# Patient Record
Sex: MALE | Race: WHITE | NOT HISPANIC OR LATINO | Employment: OTHER | URBAN - METROPOLITAN AREA
[De-identification: names, ages, dates, MRNs, and addresses within clinical notes are randomized per-mention and may not be internally consistent; named-entity substitution may affect disease eponyms.]

---

## 2017-01-01 ENCOUNTER — OUTPATIENT CASE MANAGEMENT (OUTPATIENT)
Dept: ADMINISTRATIVE | Facility: OTHER | Age: 67
End: 2017-01-01

## 2017-01-01 ENCOUNTER — TELEPHONE (OUTPATIENT)
Dept: ADMINISTRATIVE | Facility: CLINIC | Age: 67
End: 2017-01-01

## 2017-01-01 ENCOUNTER — TELEPHONE (OUTPATIENT)
Dept: HEPATOLOGY | Facility: CLINIC | Age: 67
End: 2017-01-01

## 2017-01-01 ENCOUNTER — HOSPITAL ENCOUNTER (OUTPATIENT)
Dept: INTERVENTIONAL RADIOLOGY/VASCULAR | Facility: HOSPITAL | Age: 67
Discharge: HOME | End: 2017-09-19
Attending: PHYSICIAN ASSISTANT
Payer: MEDICARE

## 2017-01-01 ENCOUNTER — HOSPITAL ENCOUNTER (INPATIENT)
Facility: HOSPITAL | Age: 67
LOS: 2 days | Discharge: HOME HEALTH | DRG: 641 | End: 2017-12-12
Attending: EMERGENCY MEDICINE | Admitting: HOSPITALIST
Payer: MEDICARE

## 2017-01-01 ENCOUNTER — HOSPITAL ENCOUNTER (OUTPATIENT)
Dept: INTERVENTIONAL RADIOLOGY/VASCULAR | Facility: HOSPITAL | Age: 67
Discharge: HOME | End: 2017-08-24
Attending: PHYSICIAN ASSISTANT

## 2017-01-01 ENCOUNTER — HOSPITAL ENCOUNTER (INPATIENT)
Facility: HOSPITAL | Age: 67
LOS: 2 days | Discharge: HOME HEALTH | DRG: 641 | End: 2017-11-15
Attending: EMERGENCY MEDICINE | Admitting: HOSPITALIST
Payer: MEDICARE

## 2017-01-01 ENCOUNTER — TELEPHONE (OUTPATIENT)
Dept: INTERVENTIONAL RADIOLOGY/VASCULAR | Facility: HOSPITAL | Age: 67
End: 2017-01-01

## 2017-01-01 ENCOUNTER — TELEPHONE (OUTPATIENT)
Dept: INTERNAL MEDICINE | Facility: CLINIC | Age: 67
End: 2017-01-01

## 2017-01-01 ENCOUNTER — OFFICE VISIT (OUTPATIENT)
Dept: HEPATOLOGY | Facility: CLINIC | Age: 67
End: 2017-01-01
Payer: MEDICARE

## 2017-01-01 ENCOUNTER — HOSPITAL ENCOUNTER (OUTPATIENT)
Dept: INTERVENTIONAL RADIOLOGY/VASCULAR | Facility: HOSPITAL | Age: 67
Discharge: HOME | End: 2017-08-10
Attending: PHYSICIAN ASSISTANT
Payer: MEDICARE

## 2017-01-01 ENCOUNTER — HOSPITAL ENCOUNTER (INPATIENT)
Facility: HOSPITAL | Age: 67
LOS: 2 days | Discharge: HOME | DRG: 432 | End: 2017-10-11
Attending: EMERGENCY MEDICINE | Admitting: INTERNAL MEDICINE
Payer: MEDICARE

## 2017-01-01 ENCOUNTER — TELEPHONE (OUTPATIENT)
Dept: HEPATOLOGY | Facility: HOSPITAL | Age: 67
End: 2017-01-01

## 2017-01-01 ENCOUNTER — NURSE TRIAGE (OUTPATIENT)
Dept: ADMINISTRATIVE | Facility: CLINIC | Age: 67
End: 2017-01-01

## 2017-01-01 ENCOUNTER — OFFICE VISIT (OUTPATIENT)
Dept: INTERNAL MEDICINE | Facility: CLINIC | Age: 67
End: 2017-01-01
Payer: MEDICARE

## 2017-01-01 ENCOUNTER — PATIENT OUTREACH (OUTPATIENT)
Dept: ADMINISTRATIVE | Facility: CLINIC | Age: 67
End: 2017-01-01

## 2017-01-01 ENCOUNTER — CLINICAL SUPPORT (OUTPATIENT)
Dept: CARDIOLOGY | Facility: CLINIC | Age: 67
End: 2017-01-01
Payer: MEDICARE

## 2017-01-01 ENCOUNTER — PROCEDURE VISIT (OUTPATIENT)
Dept: HEPATOLOGY | Facility: CLINIC | Age: 67
End: 2017-01-01
Attending: INTERNAL MEDICINE
Payer: MEDICARE

## 2017-01-01 ENCOUNTER — HOSPITAL ENCOUNTER (INPATIENT)
Facility: HOSPITAL | Age: 67
LOS: 3 days | Discharge: HOME HEALTH | DRG: 433 | End: 2017-08-07
Attending: EMERGENCY MEDICINE | Admitting: EMERGENCY MEDICINE
Payer: MEDICARE

## 2017-01-01 ENCOUNTER — HOSPITAL ENCOUNTER (INPATIENT)
Facility: HOSPITAL | Age: 67
LOS: 4 days | Discharge: HOME HEALTH | DRG: 371 | End: 2017-11-09
Attending: EMERGENCY MEDICINE | Admitting: HOSPITALIST
Payer: MEDICARE

## 2017-01-01 ENCOUNTER — LAB VISIT (OUTPATIENT)
Dept: LAB | Facility: HOSPITAL | Age: 67
End: 2017-01-01
Attending: INTERNAL MEDICINE
Payer: MEDICARE

## 2017-01-01 ENCOUNTER — LAB VISIT (OUTPATIENT)
Dept: LAB | Facility: HOSPITAL | Age: 67
End: 2017-01-01
Attending: FAMILY MEDICINE
Payer: MEDICARE

## 2017-01-01 ENCOUNTER — ANESTHESIA (OUTPATIENT)
Dept: EMERGENCY MEDICINE | Facility: HOSPITAL | Age: 67
DRG: 871 | End: 2017-01-01
Payer: MEDICARE

## 2017-01-01 ENCOUNTER — HOSPITAL ENCOUNTER (OUTPATIENT)
Facility: HOSPITAL | Age: 67
Discharge: HOME | End: 2017-10-03
Attending: EMERGENCY MEDICINE | Admitting: INTERNAL MEDICINE
Payer: MEDICARE

## 2017-01-01 ENCOUNTER — HOSPITAL ENCOUNTER (EMERGENCY)
Facility: HOSPITAL | Age: 67
Discharge: HOME | End: 2017-07-19
Attending: EMERGENCY MEDICINE
Payer: MEDICARE

## 2017-01-01 ENCOUNTER — HOSPITAL ENCOUNTER (INPATIENT)
Facility: HOSPITAL | Age: 67
LOS: 1 days | Discharge: LEFT BEFORE DISCHARGE | DRG: 434 | End: 2017-12-03
Attending: EMERGENCY MEDICINE | Admitting: HOSPITALIST
Payer: MEDICARE

## 2017-01-01 ENCOUNTER — HOSPITAL ENCOUNTER (OUTPATIENT)
Dept: RADIOLOGY | Facility: HOSPITAL | Age: 67
Discharge: HOME | End: 2017-08-10
Attending: INTERNAL MEDICINE
Payer: MEDICARE

## 2017-01-01 ENCOUNTER — HOSPITAL ENCOUNTER (EMERGENCY)
Facility: HOSPITAL | Age: 67
Discharge: HOME | End: 2017-11-01
Attending: EMERGENCY MEDICINE
Payer: MEDICARE

## 2017-01-01 ENCOUNTER — HOSPITAL ENCOUNTER (OUTPATIENT)
Dept: INTERVENTIONAL RADIOLOGY/VASCULAR | Facility: HOSPITAL | Age: 67
Discharge: HOME | End: 2017-10-23
Attending: INTERNAL MEDICINE

## 2017-01-01 ENCOUNTER — HOSPITAL ENCOUNTER (INPATIENT)
Facility: HOSPITAL | Age: 67
LOS: 4 days | Discharge: HOME HEALTH | DRG: 432 | End: 2017-10-19
Attending: EMERGENCY MEDICINE | Admitting: INTERNAL MEDICINE
Payer: MEDICARE

## 2017-01-01 ENCOUNTER — HOSPITAL ENCOUNTER (OUTPATIENT)
Dept: INTERVENTIONAL RADIOLOGY/VASCULAR | Facility: HOSPITAL | Age: 67
Discharge: HOME | End: 2017-08-31
Attending: PHYSICIAN ASSISTANT

## 2017-01-01 ENCOUNTER — ANESTHESIA EVENT (OUTPATIENT)
Dept: EMERGENCY MEDICINE | Facility: HOSPITAL | Age: 67
DRG: 871 | End: 2017-01-01
Payer: MEDICARE

## 2017-01-01 ENCOUNTER — HOSPITAL ENCOUNTER (OUTPATIENT)
Dept: INTERVENTIONAL RADIOLOGY/VASCULAR | Facility: HOSPITAL | Age: 67
Discharge: HOME | DRG: 371 | End: 2017-11-02
Attending: INTERNAL MEDICINE
Payer: MEDICARE

## 2017-01-01 ENCOUNTER — OFFICE VISIT (OUTPATIENT)
Dept: INTERNAL MEDICINE | Facility: CLINIC | Age: 67
DRG: 432 | End: 2017-01-01
Payer: MEDICARE

## 2017-01-01 ENCOUNTER — HOSPITAL ENCOUNTER (INPATIENT)
Facility: HOSPITAL | Age: 67
LOS: 1 days | DRG: 871 | End: 2017-12-20
Attending: EMERGENCY MEDICINE | Admitting: HOSPITALIST
Payer: MEDICARE

## 2017-01-01 VITALS
SYSTOLIC BLOOD PRESSURE: 130 MMHG | RESPIRATION RATE: 16 BRPM | DIASTOLIC BLOOD PRESSURE: 63 MMHG | OXYGEN SATURATION: 100 % | HEART RATE: 76 BPM

## 2017-01-01 VITALS
DIASTOLIC BLOOD PRESSURE: 61 MMHG | BODY MASS INDEX: 26.55 KG/M2 | RESPIRATION RATE: 20 BRPM | HEART RATE: 79 BPM | WEIGHT: 185.44 LBS | TEMPERATURE: 99 F | OXYGEN SATURATION: 94 % | SYSTOLIC BLOOD PRESSURE: 98 MMHG | HEIGHT: 70 IN

## 2017-01-01 VITALS
OXYGEN SATURATION: 98 % | SYSTOLIC BLOOD PRESSURE: 133 MMHG | HEIGHT: 67 IN | RESPIRATION RATE: 17 BRPM | BODY MASS INDEX: 19.96 KG/M2 | DIASTOLIC BLOOD PRESSURE: 87 MMHG | TEMPERATURE: 98 F | HEART RATE: 97 BPM | WEIGHT: 127.19 LBS

## 2017-01-01 VITALS
BODY MASS INDEX: 26.02 KG/M2 | SYSTOLIC BLOOD PRESSURE: 119 MMHG | DIASTOLIC BLOOD PRESSURE: 66 MMHG | TEMPERATURE: 98 F | RESPIRATION RATE: 18 BRPM | HEIGHT: 71 IN | OXYGEN SATURATION: 84 % | WEIGHT: 185.88 LBS | HEART RATE: 104 BPM

## 2017-01-01 VITALS
BODY MASS INDEX: 26.54 KG/M2 | TEMPERATURE: 98 F | DIASTOLIC BLOOD PRESSURE: 74 MMHG | SYSTOLIC BLOOD PRESSURE: 117 MMHG | HEIGHT: 70 IN | OXYGEN SATURATION: 98 % | HEART RATE: 80 BPM | RESPIRATION RATE: 18 BRPM | WEIGHT: 185.38 LBS

## 2017-01-01 VITALS
HEIGHT: 71 IN | DIASTOLIC BLOOD PRESSURE: 57 MMHG | HEART RATE: 80 BPM | OXYGEN SATURATION: 95 % | WEIGHT: 179 LBS | SYSTOLIC BLOOD PRESSURE: 129 MMHG | TEMPERATURE: 98 F | BODY MASS INDEX: 25.06 KG/M2 | RESPIRATION RATE: 18 BRPM

## 2017-01-01 VITALS
DIASTOLIC BLOOD PRESSURE: 86 MMHG | OXYGEN SATURATION: 99 % | SYSTOLIC BLOOD PRESSURE: 132 MMHG | HEIGHT: 71 IN | WEIGHT: 179.25 LBS | HEART RATE: 95 BPM | BODY MASS INDEX: 25.1 KG/M2

## 2017-01-01 VITALS
WEIGHT: 165.44 LBS | SYSTOLIC BLOOD PRESSURE: 115 MMHG | DIASTOLIC BLOOD PRESSURE: 68 MMHG | RESPIRATION RATE: 16 BRPM | TEMPERATURE: 98 F | HEIGHT: 70 IN | HEART RATE: 70 BPM | BODY MASS INDEX: 23.68 KG/M2 | OXYGEN SATURATION: 96 %

## 2017-01-01 VITALS
DIASTOLIC BLOOD PRESSURE: 89 MMHG | BODY MASS INDEX: 24.66 KG/M2 | OXYGEN SATURATION: 100 % | WEIGHT: 176.13 LBS | HEIGHT: 71 IN | SYSTOLIC BLOOD PRESSURE: 147 MMHG | HEART RATE: 94 BPM | TEMPERATURE: 97 F | RESPIRATION RATE: 18 BRPM

## 2017-01-01 VITALS
OXYGEN SATURATION: 97 % | RESPIRATION RATE: 20 BRPM | HEIGHT: 70 IN | HEART RATE: 90 BPM | BODY MASS INDEX: 26.48 KG/M2 | DIASTOLIC BLOOD PRESSURE: 74 MMHG | SYSTOLIC BLOOD PRESSURE: 151 MMHG | TEMPERATURE: 99 F | WEIGHT: 185 LBS

## 2017-01-01 VITALS
TEMPERATURE: 98 F | HEIGHT: 70 IN | BODY MASS INDEX: 25 KG/M2 | HEART RATE: 97 BPM | SYSTOLIC BLOOD PRESSURE: 124 MMHG | WEIGHT: 174.63 LBS | OXYGEN SATURATION: 97 % | DIASTOLIC BLOOD PRESSURE: 82 MMHG

## 2017-01-01 VITALS
RESPIRATION RATE: 16 BRPM | DIASTOLIC BLOOD PRESSURE: 75 MMHG | OXYGEN SATURATION: 100 % | SYSTOLIC BLOOD PRESSURE: 150 MMHG | HEART RATE: 80 BPM

## 2017-01-01 VITALS
WEIGHT: 182.75 LBS | TEMPERATURE: 97 F | BODY MASS INDEX: 27.7 KG/M2 | RESPIRATION RATE: 16 BRPM | HEART RATE: 76 BPM | SYSTOLIC BLOOD PRESSURE: 110 MMHG | HEIGHT: 68 IN | OXYGEN SATURATION: 100 % | DIASTOLIC BLOOD PRESSURE: 63 MMHG

## 2017-01-01 VITALS
DIASTOLIC BLOOD PRESSURE: 72 MMHG | RESPIRATION RATE: 16 BRPM | WEIGHT: 161.38 LBS | SYSTOLIC BLOOD PRESSURE: 111 MMHG | HEART RATE: 76 BPM | HEIGHT: 71 IN | TEMPERATURE: 99 F | OXYGEN SATURATION: 95 % | BODY MASS INDEX: 22.59 KG/M2

## 2017-01-01 VITALS
SYSTOLIC BLOOD PRESSURE: 148 MMHG | RESPIRATION RATE: 16 BRPM | HEIGHT: 71 IN | HEART RATE: 86 BPM | WEIGHT: 170 LBS | OXYGEN SATURATION: 95 % | TEMPERATURE: 98 F | BODY MASS INDEX: 23.8 KG/M2 | DIASTOLIC BLOOD PRESSURE: 87 MMHG

## 2017-01-01 VITALS
HEART RATE: 102 BPM | DIASTOLIC BLOOD PRESSURE: 88 MMHG | OXYGEN SATURATION: 94 % | TEMPERATURE: 99 F | HEIGHT: 70 IN | WEIGHT: 175.06 LBS | SYSTOLIC BLOOD PRESSURE: 134 MMHG | BODY MASS INDEX: 25.06 KG/M2 | RESPIRATION RATE: 20 BRPM

## 2017-01-01 VITALS
DIASTOLIC BLOOD PRESSURE: 65 MMHG | BODY MASS INDEX: 23.4 KG/M2 | WEIGHT: 195.56 LBS | TEMPERATURE: 98 F | WEIGHT: 167.13 LBS | HEIGHT: 71 IN | SYSTOLIC BLOOD PRESSURE: 153 MMHG | BODY MASS INDEX: 28 KG/M2 | OXYGEN SATURATION: 96 % | RESPIRATION RATE: 16 BRPM | HEART RATE: 86 BPM | HEIGHT: 70 IN | HEART RATE: 77 BPM | TEMPERATURE: 99 F | DIASTOLIC BLOOD PRESSURE: 76 MMHG | SYSTOLIC BLOOD PRESSURE: 121 MMHG

## 2017-01-01 VITALS
TEMPERATURE: 98 F | HEIGHT: 71 IN | HEART RATE: 76 BPM | WEIGHT: 201.75 LBS | SYSTOLIC BLOOD PRESSURE: 104 MMHG | DIASTOLIC BLOOD PRESSURE: 60 MMHG | BODY MASS INDEX: 28.24 KG/M2

## 2017-01-01 VITALS
DIASTOLIC BLOOD PRESSURE: 78 MMHG | TEMPERATURE: 99 F | RESPIRATION RATE: 16 BRPM | BODY MASS INDEX: 25.06 KG/M2 | HEIGHT: 70 IN | WEIGHT: 175.06 LBS | HEART RATE: 97 BPM | SYSTOLIC BLOOD PRESSURE: 114 MMHG

## 2017-01-01 VITALS
RESPIRATION RATE: 16 BRPM | OXYGEN SATURATION: 100 % | HEART RATE: 88 BPM | DIASTOLIC BLOOD PRESSURE: 94 MMHG | SYSTOLIC BLOOD PRESSURE: 140 MMHG

## 2017-01-01 DIAGNOSIS — I10 ESSENTIAL HYPERTENSION: Chronic | ICD-10-CM

## 2017-01-01 DIAGNOSIS — J94.8 HYDROTHORAX: ICD-10-CM

## 2017-01-01 DIAGNOSIS — R19.07 ABDOMINAL SWELLING, GENERALIZED: ICD-10-CM

## 2017-01-01 DIAGNOSIS — R53.81 DEBILITY: ICD-10-CM

## 2017-01-01 DIAGNOSIS — K70.11 ASCITES DUE TO ALCOHOLIC HEPATITIS: ICD-10-CM

## 2017-01-01 DIAGNOSIS — K74.69 OTHER CIRRHOSIS OF LIVER: ICD-10-CM

## 2017-01-01 DIAGNOSIS — K70.31 ASCITES DUE TO ALCOHOLIC CIRRHOSIS: Primary | ICD-10-CM

## 2017-01-01 DIAGNOSIS — B18.2 HEP C W/O COMA, CHRONIC: Primary | Chronic | ICD-10-CM

## 2017-01-01 DIAGNOSIS — R18.8 OTHER ASCITES: Primary | ICD-10-CM

## 2017-01-01 DIAGNOSIS — R06.00 DYSPNEA: ICD-10-CM
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
## 2017-01-01 DIAGNOSIS — K85.90 ACUTE PANCREATITIS, UNSPECIFIED COMPLICATION STATUS, UNSPECIFIED PANCREATITIS TYPE: ICD-10-CM

## 2017-01-01 DIAGNOSIS — K70.11 ASCITES DUE TO ALCOHOLIC HEPATITIS: Primary | ICD-10-CM

## 2017-01-01 DIAGNOSIS — D64.9 ANEMIA, UNSPECIFIED TYPE: ICD-10-CM

## 2017-01-01 DIAGNOSIS — F10.11 HISTORY OF ALCOHOL ABUSE: ICD-10-CM

## 2017-01-01 DIAGNOSIS — Z51.5 PALLIATIVE CARE ENCOUNTER: ICD-10-CM

## 2017-01-01 DIAGNOSIS — R06.02 SHORTNESS OF BREATH: ICD-10-CM

## 2017-01-01 DIAGNOSIS — K92.0 GASTROINTESTINAL HEMORRHAGE WITH HEMATEMESIS: ICD-10-CM

## 2017-01-01 DIAGNOSIS — B18.2 CHRONIC HEPATITIS C WITHOUT HEPATIC COMA: ICD-10-CM

## 2017-01-01 DIAGNOSIS — K74.60 DECOMPENSATED HEPATIC CIRRHOSIS: Chronic | ICD-10-CM

## 2017-01-01 DIAGNOSIS — R73.09 ELEVATED GLUCOSE: ICD-10-CM

## 2017-01-01 DIAGNOSIS — K72.90 DECOMPENSATED HEPATIC CIRRHOSIS: Primary | ICD-10-CM

## 2017-01-01 DIAGNOSIS — K74.60 DECOMPENSATED HEPATIC CIRRHOSIS: Primary | ICD-10-CM

## 2017-01-01 DIAGNOSIS — R52 PAIN: ICD-10-CM

## 2017-01-01 DIAGNOSIS — M79.671 PAIN IN BOTH FEET: Primary | ICD-10-CM

## 2017-01-01 DIAGNOSIS — R10.9 CHRONIC ABDOMINAL PAIN: Primary | ICD-10-CM

## 2017-01-01 DIAGNOSIS — K65.2 SPONTANEOUS BACTERIAL PERITONITIS: Chronic | ICD-10-CM

## 2017-01-01 DIAGNOSIS — R53.81 PHYSICAL DECONDITIONING: ICD-10-CM

## 2017-01-01 DIAGNOSIS — F10.21 ALCOHOLISM IN REMISSION: ICD-10-CM

## 2017-01-01 DIAGNOSIS — K70.31 ASCITES DUE TO ALCOHOLIC CIRRHOSIS: ICD-10-CM

## 2017-01-01 DIAGNOSIS — F10.21 ALCOHOLISM IN REMISSION: Chronic | ICD-10-CM

## 2017-01-01 DIAGNOSIS — I35.9 NONRHEUMATIC AORTIC VALVE DISORDER: ICD-10-CM

## 2017-01-01 DIAGNOSIS — J91.8 FLUID IN PLEURAL CAVITY ASSOCIATED WITH LIVER DISEASE: ICD-10-CM

## 2017-01-01 DIAGNOSIS — M79.672 PAIN IN BOTH FEET: Primary | ICD-10-CM

## 2017-01-01 DIAGNOSIS — Z71.89 GOALS OF CARE, COUNSELING/DISCUSSION: ICD-10-CM

## 2017-01-01 DIAGNOSIS — R18.8 OTHER ASCITES: ICD-10-CM

## 2017-01-01 DIAGNOSIS — B18.2 CHRONIC HEPATITIS C WITHOUT HEPATIC COMA: Primary | ICD-10-CM

## 2017-01-01 DIAGNOSIS — K72.90 DECOMPENSATED HEPATIC CIRRHOSIS: ICD-10-CM

## 2017-01-01 DIAGNOSIS — F10.21 ALCOHOLISM IN REMISSION: Primary | Chronic | ICD-10-CM

## 2017-01-01 DIAGNOSIS — K65.2 SPONTANEOUS BACTERIAL PERITONITIS: ICD-10-CM

## 2017-01-01 DIAGNOSIS — E87.5 HYPERKALEMIA: Primary | ICD-10-CM

## 2017-01-01 DIAGNOSIS — N18.2 TYPE 2 DIABETES MELLITUS WITH STAGE 2 CHRONIC KIDNEY DISEASE AND HYPERTENSION: ICD-10-CM

## 2017-01-01 DIAGNOSIS — B18.2 HEP C W/O COMA, CHRONIC: ICD-10-CM

## 2017-01-01 DIAGNOSIS — Z91.81 FALLS INFREQUENTLY: ICD-10-CM

## 2017-01-01 DIAGNOSIS — E11.9 DM2 (DIABETES MELLITUS, TYPE 2): ICD-10-CM

## 2017-01-01 DIAGNOSIS — B18.2 HEP C W/O COMA, CHRONIC: Chronic | ICD-10-CM

## 2017-01-01 DIAGNOSIS — K72.91 HEPATIC COMA/ENCEPHALOPATHY: ICD-10-CM

## 2017-01-01 DIAGNOSIS — Z12.5 PROSTATE CANCER SCREENING: ICD-10-CM

## 2017-01-01 DIAGNOSIS — E46 PROTEIN-CALORIE MALNUTRITION: ICD-10-CM

## 2017-01-01 DIAGNOSIS — B18.2 HEP C W/O COMA, CHRONIC: Primary | ICD-10-CM

## 2017-01-01 DIAGNOSIS — E11.65 TYPE 2 DIABETES MELLITUS WITH HYPERGLYCEMIA, WITHOUT LONG-TERM CURRENT USE OF INSULIN: ICD-10-CM

## 2017-01-01 DIAGNOSIS — K74.60 DECOMPENSATED HEPATIC CIRRHOSIS: ICD-10-CM

## 2017-01-01 DIAGNOSIS — K76.9 FLUID IN PLEURAL CAVITY ASSOCIATED WITH LIVER DISEASE: ICD-10-CM

## 2017-01-01 DIAGNOSIS — R10.9 ABDOMINAL PAIN, UNSPECIFIED ABDOMINAL LOCATION: ICD-10-CM

## 2017-01-01 DIAGNOSIS — E87.5 ACUTE HYPERKALEMIA: ICD-10-CM

## 2017-01-01 DIAGNOSIS — K72.90 DECOMPENSATED HEPATIC CIRRHOSIS: Chronic | ICD-10-CM

## 2017-01-01 DIAGNOSIS — R06.02 SOB (SHORTNESS OF BREATH): ICD-10-CM

## 2017-01-01 DIAGNOSIS — R10.84 GENERALIZED ABDOMINAL PAIN: ICD-10-CM

## 2017-01-01 DIAGNOSIS — E87.5 HYPERKALEMIA: ICD-10-CM

## 2017-01-01 DIAGNOSIS — R06.03 RESPIRATORY DISTRESS: ICD-10-CM

## 2017-01-01 DIAGNOSIS — R05.9 COUGH: ICD-10-CM

## 2017-01-01 DIAGNOSIS — R80.9 PROTEINURIA, UNSPECIFIED TYPE: ICD-10-CM

## 2017-01-01 DIAGNOSIS — K70.31 ALCOHOLIC CIRRHOSIS OF LIVER WITH ASCITES: ICD-10-CM

## 2017-01-01 DIAGNOSIS — R29.898 WEAKNESS OF BOTH LOWER EXTREMITIES: ICD-10-CM

## 2017-01-01 DIAGNOSIS — D51.9 ANEMIA DUE TO VITAMIN B12 DEFICIENCY, UNSPECIFIED B12 DEFICIENCY TYPE: ICD-10-CM

## 2017-01-01 DIAGNOSIS — E11.9 TYPE 2 DIABETES MELLITUS WITHOUT COMPLICATION: ICD-10-CM

## 2017-01-01 DIAGNOSIS — G89.29 CHRONIC ABDOMINAL PAIN: Primary | ICD-10-CM

## 2017-01-01 DIAGNOSIS — Z01.818 ENCOUNTER FOR INTUBATION: ICD-10-CM

## 2017-01-01 DIAGNOSIS — E11.22 TYPE 2 DIABETES MELLITUS WITH STAGE 2 CHRONIC KIDNEY DISEASE AND HYPERTENSION: ICD-10-CM

## 2017-01-01 DIAGNOSIS — R60.0 EDEMA, PERIPHERAL: Primary | ICD-10-CM

## 2017-01-01 DIAGNOSIS — I89.8 CHYLOUS ASCITES: ICD-10-CM

## 2017-01-01 DIAGNOSIS — K70.31 ALCOHOLIC CIRRHOSIS OF LIVER WITH ASCITES: Primary | ICD-10-CM

## 2017-01-01 DIAGNOSIS — I10 ESSENTIAL HYPERTENSION: ICD-10-CM

## 2017-01-01 DIAGNOSIS — N17.9 AKI (ACUTE KIDNEY INJURY): ICD-10-CM

## 2017-01-01 DIAGNOSIS — K85.90 ACUTE PANCREATITIS, UNSPECIFIED COMPLICATION STATUS, UNSPECIFIED PANCREATITIS TYPE: Primary | ICD-10-CM

## 2017-01-01 DIAGNOSIS — K92.0 HEMATEMESIS, PRESENCE OF NAUSEA NOT SPECIFIED: ICD-10-CM

## 2017-01-01 DIAGNOSIS — I12.9 TYPE 2 DIABETES MELLITUS WITH STAGE 2 CHRONIC KIDNEY DISEASE AND HYPERTENSION: ICD-10-CM

## 2017-01-01 LAB
ABO + RH BLD: NORMAL
ACID FAST MOD KINY STN SPEC: NORMAL
AFP SERPL-MCNC: 1 NG/ML
AFP SERPL-MCNC: 1 NG/ML
ALBUMIN FLD-MCNC: 0.6 G/DL
ALBUMIN FLD-MCNC: 0.7 G/DL
ALBUMIN FLD-MCNC: 0.8 G/DL
ALBUMIN FLD-MCNC: 0.8 G/DL
ALBUMIN FLD-MCNC: 1 G/DL
ALBUMIN FLD-MCNC: 1 G/DL
ALBUMIN FLD-MCNC: 1.1 G/DL
ALBUMIN FLD-MCNC: 1.2 G/DL
ALBUMIN SERPL BCP-MCNC: 1.6 G/DL
ALBUMIN SERPL BCP-MCNC: 1.9 G/DL
ALBUMIN SERPL BCP-MCNC: 2.1 G/DL
ALBUMIN SERPL BCP-MCNC: 2.1 G/DL
ALBUMIN SERPL BCP-MCNC: 2.2 G/DL
ALBUMIN SERPL BCP-MCNC: 2.3 G/DL
ALBUMIN SERPL BCP-MCNC: 2.4 G/DL
ALBUMIN SERPL BCP-MCNC: 2.5 G/DL
ALBUMIN SERPL BCP-MCNC: 2.6 G/DL
ALBUMIN SERPL BCP-MCNC: 2.7 G/DL
ALBUMIN SERPL BCP-MCNC: 2.8 G/DL
ALBUMIN SERPL BCP-MCNC: 2.9 G/DL
ALBUMIN SERPL BCP-MCNC: 2.9 G/DL
ALBUMIN SERPL BCP-MCNC: 3 G/DL
ALBUMIN SERPL BCP-MCNC: 3.1 G/DL
ALBUMIN SERPL BCP-MCNC: 3.2 G/DL
ALBUMIN SERPL BCP-MCNC: 3.2 G/DL
ALLENS TEST: ABNORMAL
ALP SERPL-CCNC: 100 U/L
ALP SERPL-CCNC: 102 U/L
ALP SERPL-CCNC: 103 U/L
ALP SERPL-CCNC: 105 U/L
ALP SERPL-CCNC: 107 U/L
ALP SERPL-CCNC: 107 U/L
ALP SERPL-CCNC: 108 U/L
ALP SERPL-CCNC: 108 U/L
ALP SERPL-CCNC: 111 U/L
ALP SERPL-CCNC: 118 U/L
ALP SERPL-CCNC: 125 U/L
ALP SERPL-CCNC: 132 U/L
ALP SERPL-CCNC: 142 U/L
ALP SERPL-CCNC: 150 U/L
ALP SERPL-CCNC: 161 U/L
ALP SERPL-CCNC: 174 U/L
ALP SERPL-CCNC: 178 U/L
ALP SERPL-CCNC: 186 U/L
ALP SERPL-CCNC: 200 U/L
ALP SERPL-CCNC: 203 U/L
ALP SERPL-CCNC: 208 U/L
ALP SERPL-CCNC: 210 U/L
ALP SERPL-CCNC: 217 U/L
ALP SERPL-CCNC: 220 U/L
ALP SERPL-CCNC: 230 U/L
ALP SERPL-CCNC: 233 U/L
ALP SERPL-CCNC: 352 U/L
ALP SERPL-CCNC: 68 U/L
ALP SERPL-CCNC: 70 U/L
ALP SERPL-CCNC: 73 U/L
ALP SERPL-CCNC: 83 U/L
ALP SERPL-CCNC: 89 U/L
ALP SERPL-CCNC: 92 U/L
ALP SERPL-CCNC: 93 U/L
ALP SERPL-CCNC: 94 U/L
ALT SERPL W/O P-5'-P-CCNC: 13 U/L
ALT SERPL W/O P-5'-P-CCNC: 15 U/L
ALT SERPL W/O P-5'-P-CCNC: 15 U/L
ALT SERPL W/O P-5'-P-CCNC: 17 U/L
ALT SERPL W/O P-5'-P-CCNC: 17 U/L
ALT SERPL W/O P-5'-P-CCNC: 18 U/L
ALT SERPL W/O P-5'-P-CCNC: 18 U/L
ALT SERPL W/O P-5'-P-CCNC: 19 U/L
ALT SERPL W/O P-5'-P-CCNC: 20 U/L
ALT SERPL W/O P-5'-P-CCNC: 23 U/L
ALT SERPL W/O P-5'-P-CCNC: 24 U/L
ALT SERPL W/O P-5'-P-CCNC: 24 U/L
ALT SERPL W/O P-5'-P-CCNC: 25 U/L
ALT SERPL W/O P-5'-P-CCNC: 25 U/L
ALT SERPL W/O P-5'-P-CCNC: 26 U/L
ALT SERPL W/O P-5'-P-CCNC: 34 U/L
ALT SERPL W/O P-5'-P-CCNC: 39 U/L
ALT SERPL W/O P-5'-P-CCNC: 40 U/L
ALT SERPL W/O P-5'-P-CCNC: 43 U/L
ALT SERPL W/O P-5'-P-CCNC: 45 U/L
ALT SERPL W/O P-5'-P-CCNC: 47 U/L
ALT SERPL W/O P-5'-P-CCNC: 49 U/L
ALT SERPL W/O P-5'-P-CCNC: 52 U/L
ALT SERPL W/O P-5'-P-CCNC: 52 U/L
ALT SERPL W/O P-5'-P-CCNC: 56 U/L
AMMONIA PLAS-SCNC: 100 UMOL/L
AMMONIA PLAS-SCNC: 103 UMOL/L
AMMONIA PLAS-SCNC: 104 UMOL/L
AMMONIA PLAS-SCNC: 32 UMOL/L
AMMONIA PLAS-SCNC: 66 UMOL/L
AMMONIA PLAS-SCNC: 92 UMOL/L
AMMONIA PLAS-SCNC: 96 UMOL/L
AMPHET+METHAMPHET UR QL: NEGATIVE
AMYLASE, BODY FLUID: 60 U/L
ANION GAP SERPL CALC-SCNC: 10 MMOL/L
ANION GAP SERPL CALC-SCNC: 13 MMOL/L
ANION GAP SERPL CALC-SCNC: 5 MMOL/L
ANION GAP SERPL CALC-SCNC: 6 MMOL/L
ANION GAP SERPL CALC-SCNC: 7 MMOL/L
ANION GAP SERPL CALC-SCNC: 8 MMOL/L
ANION GAP SERPL CALC-SCNC: 9 MMOL/L
ANISOCYTOSIS BLD QL SMEAR: SLIGHT
AORTIC VALVE REGURGITATION: ABNORMAL
AORTIC VALVE REGURGITATION: NORMAL
APPEARANCE FLD: ABNORMAL
APPEARANCE FLD: ABNORMAL
APPEARANCE FLD: NORMAL
APTT BLDCRRT: 22.5 SEC
APTT BLDCRRT: 22.9 SEC
APTT BLDCRRT: 23.2 SEC
APTT BLDCRRT: 24 SEC
AST SERPL-CCNC: 16 U/L
AST SERPL-CCNC: 16 U/L
AST SERPL-CCNC: 17 U/L
AST SERPL-CCNC: 17 U/L
AST SERPL-CCNC: 20 U/L
AST SERPL-CCNC: 21 U/L
AST SERPL-CCNC: 21 U/L
AST SERPL-CCNC: 22 U/L
AST SERPL-CCNC: 23 U/L
AST SERPL-CCNC: 24 U/L
AST SERPL-CCNC: 24 U/L
AST SERPL-CCNC: 26 U/L
AST SERPL-CCNC: 26 U/L
AST SERPL-CCNC: 30 U/L
AST SERPL-CCNC: 30 U/L
AST SERPL-CCNC: 31 U/L
AST SERPL-CCNC: 31 U/L
AST SERPL-CCNC: 32 U/L
AST SERPL-CCNC: 32 U/L
AST SERPL-CCNC: 33 U/L
AST SERPL-CCNC: 36 U/L
AST SERPL-CCNC: 39 U/L
AST SERPL-CCNC: 41 U/L
AST SERPL-CCNC: 42 U/L
AST SERPL-CCNC: 50 U/L
AST SERPL-CCNC: 52 U/L
AST SERPL-CCNC: 52 U/L
AST SERPL-CCNC: 56 U/L
AST SERPL-CCNC: 56 U/L
BACTERIA #/AREA URNS AUTO: ABNORMAL /HPF
BACTERIA #/AREA URNS AUTO: NORMAL /HPF
BACTERIA #/AREA URNS AUTO: NORMAL /HPF
BACTERIA BLD CULT: NORMAL
BACTERIA FLD AEROBE CULT: NO GROWTH
BACTERIA FLD AEROBE CULT: NO GROWTH
BACTERIA FLD CULT: NORMAL
BACTERIA FLD CULT: NORMAL
BACTERIA SPEC AEROBE CULT: NO GROWTH
BACTERIA SPEC ANAEROBE CULT: NORMAL
BARBITURATES UR QL SCN>200 NG/ML: NEGATIVE
BASOPHILS # BLD AUTO: 0.01 K/UL
BASOPHILS # BLD AUTO: 0.02 K/UL
BASOPHILS # BLD AUTO: 0.03 K/UL
BASOPHILS # BLD AUTO: 0.04 K/UL
BASOPHILS # BLD AUTO: 0.05 K/UL
BASOPHILS # BLD AUTO: 0.05 K/UL
BASOPHILS NFR BLD: 0.2 %
BASOPHILS NFR BLD: 0.3 %
BASOPHILS NFR BLD: 0.4 %
BASOPHILS NFR BLD: 0.5 %
BASOPHILS NFR BLD: 0.6 %
BASOPHILS NFR BLD: 0.6 %
BASOPHILS NFR BLD: 0.7 %
BASOPHILS NFR BLD: 0.9 %
BASOPHILS NFR BLD: 1.1 %
BENZODIAZ UR QL SCN>200 NG/ML: NEGATIVE
BILIRUB DIRECT SERPL-MCNC: 0.4 MG/DL
BILIRUB DIRECT SERPL-MCNC: 0.4 MG/DL
BILIRUB SERPL-MCNC: 0.5 MG/DL
BILIRUB SERPL-MCNC: 0.7 MG/DL
BILIRUB SERPL-MCNC: 0.8 MG/DL
BILIRUB SERPL-MCNC: 0.9 MG/DL
BILIRUB SERPL-MCNC: 1 MG/DL
BILIRUB SERPL-MCNC: 1.1 MG/DL
BILIRUB SERPL-MCNC: 1.2 MG/DL
BILIRUB SERPL-MCNC: 1.3 MG/DL
BILIRUB SERPL-MCNC: 1.3 MG/DL
BILIRUB SERPL-MCNC: 1.4 MG/DL
BILIRUB SERPL-MCNC: 1.6 MG/DL
BILIRUB SERPL-MCNC: 1.6 MG/DL
BILIRUB SERPL-MCNC: 1.7 MG/DL
BILIRUB SERPL-MCNC: 1.7 MG/DL
BILIRUB SERPL-MCNC: 2.1 MG/DL
BILIRUB SERPL-MCNC: 2.2 MG/DL
BILIRUB SERPL-MCNC: 2.7 MG/DL
BILIRUB SERPL-MCNC: 2.7 MG/DL
BILIRUB UR QL STRIP: NEGATIVE
BLD GP AB SCN CELLS X3 SERPL QL: NORMAL
BLD PROD TYP BPU: NORMAL
BLOOD UNIT EXPIRATION DATE: NORMAL
BLOOD UNIT TYPE CODE: 8400
BLOOD UNIT TYPE: NORMAL
BNP SERPL-MCNC: 69 PG/ML
BNP SERPL-MCNC: 79 PG/ML
BNP SERPL-MCNC: 80 PG/ML
BNP SERPL-MCNC: 88 PG/ML
BNP SERPL-MCNC: 92 PG/ML
BODY FLD TYPE: ABNORMAL
BODY FLD TYPE: ABNORMAL
BODY FLD TYPE: NORMAL
BODY FLUID COMMENTS: NORMAL
BODY FLUID SOURCE AMYLASE: NORMAL
BODY FLUID SOURCE, LDH: NORMAL
BUN SERPL-MCNC: 26 MG/DL
BUN SERPL-MCNC: 27 MG/DL
BUN SERPL-MCNC: 28 MG/DL
BUN SERPL-MCNC: 36 MG/DL
BUN SERPL-MCNC: 37 MG/DL
BUN SERPL-MCNC: 37 MG/DL
BUN SERPL-MCNC: 40 MG/DL
BUN SERPL-MCNC: 46 MG/DL
BUN SERPL-MCNC: 46 MG/DL
BUN SERPL-MCNC: 47 MG/DL
BUN SERPL-MCNC: 48 MG/DL
BUN SERPL-MCNC: 49 MG/DL
BUN SERPL-MCNC: 50 MG/DL
BUN SERPL-MCNC: 55 MG/DL
BUN SERPL-MCNC: 55 MG/DL
BUN SERPL-MCNC: 56 MG/DL
BUN SERPL-MCNC: 56 MG/DL (ref 6–30)
BUN SERPL-MCNC: 57 MG/DL
BUN SERPL-MCNC: 58 MG/DL
BUN SERPL-MCNC: 59 MG/DL
BUN SERPL-MCNC: 60 MG/DL
BUN SERPL-MCNC: 60 MG/DL
BUN SERPL-MCNC: 61 MG/DL
BUN SERPL-MCNC: 63 MG/DL
BUN SERPL-MCNC: 64 MG/DL
BUN SERPL-MCNC: 66 MG/DL
BUN SERPL-MCNC: 66 MG/DL
BUN SERPL-MCNC: 68 MG/DL
BUN SERPL-MCNC: 69 MG/DL
BUN SERPL-MCNC: 71 MG/DL
BUN SERPL-MCNC: 72 MG/DL (ref 6–30)
BUN SERPL-MCNC: 73 MG/DL (ref 6–30)
BUN SERPL-MCNC: 75 MG/DL
BUN SERPL-MCNC: 85 MG/DL
BUN SERPL-MCNC: 86 MG/DL
BUN SERPL-MCNC: 90 MG/DL
BUN SERPL-MCNC: 90 MG/DL
BUN SERPL-MCNC: 93 MG/DL (ref 6–30)
BUN SERPL-MCNC: 95 MG/DL (ref 6–30)
BZE UR QL SCN: NEGATIVE
CALCIUM SERPL-MCNC: 7.4 MG/DL
CALCIUM SERPL-MCNC: 7.5 MG/DL
CALCIUM SERPL-MCNC: 7.5 MG/DL
CALCIUM SERPL-MCNC: 7.8 MG/DL
CALCIUM SERPL-MCNC: 7.9 MG/DL
CALCIUM SERPL-MCNC: 8 MG/DL
CALCIUM SERPL-MCNC: 8.1 MG/DL
CALCIUM SERPL-MCNC: 8.2 MG/DL
CALCIUM SERPL-MCNC: 8.3 MG/DL
CALCIUM SERPL-MCNC: 8.4 MG/DL
CALCIUM SERPL-MCNC: 8.5 MG/DL
CALCIUM SERPL-MCNC: 8.6 MG/DL
CALCIUM SERPL-MCNC: 8.7 MG/DL
CALCIUM SERPL-MCNC: 8.8 MG/DL
CALCIUM SERPL-MCNC: 8.8 MG/DL
CALCIUM SERPL-MCNC: 9 MG/DL
CALCIUM SERPL-MCNC: 9 MG/DL
CANNABINOIDS UR QL SCN: NEGATIVE
CHLORIDE SERPL-SCNC: 102 MMOL/L
CHLORIDE SERPL-SCNC: 102 MMOL/L
CHLORIDE SERPL-SCNC: 102 MMOL/L (ref 95–110)
CHLORIDE SERPL-SCNC: 103 MMOL/L
CHLORIDE SERPL-SCNC: 103 MMOL/L
CHLORIDE SERPL-SCNC: 103 MMOL/L (ref 95–110)
CHLORIDE SERPL-SCNC: 104 MMOL/L
CHLORIDE SERPL-SCNC: 105 MMOL/L
CHLORIDE SERPL-SCNC: 105 MMOL/L (ref 95–110)
CHLORIDE SERPL-SCNC: 105 MMOL/L (ref 95–110)
CHLORIDE SERPL-SCNC: 106 MMOL/L
CHLORIDE SERPL-SCNC: 106 MMOL/L (ref 95–110)
CHLORIDE SERPL-SCNC: 107 MMOL/L
CHLORIDE SERPL-SCNC: 108 MMOL/L
CHLORIDE SERPL-SCNC: 109 MMOL/L
CHLORIDE SERPL-SCNC: 110 MMOL/L
CHLORIDE SERPL-SCNC: 111 MMOL/L
CHLORIDE SERPL-SCNC: 111 MMOL/L
CHLORIDE SERPL-SCNC: 114 MMOL/L
CHLORIDE SERPL-SCNC: 97 MMOL/L
CHLORIDE UR-SCNC: <20 MMOL/L
CHOLEST/HDLC SERPL: 5.5 {RATIO}
CK MB SERPL-MCNC: 1.9 NG/ML
CK MB SERPL-MCNC: 4.6 NG/ML
CK MB SERPL-RTO: 10.2 %
CK MB SERPL-RTO: 8.6 %
CK SERPL-CCNC: 22 U/L
CK SERPL-CCNC: 45 U/L
CLARITY UR REFRACT.AUTO: ABNORMAL
CLARITY UR REFRACT.AUTO: CLEAR
CO2 SERPL-SCNC: 14 MMOL/L
CO2 SERPL-SCNC: 14 MMOL/L
CO2 SERPL-SCNC: 16 MMOL/L
CO2 SERPL-SCNC: 17 MMOL/L
CO2 SERPL-SCNC: 18 MMOL/L
CO2 SERPL-SCNC: 19 MMOL/L
CO2 SERPL-SCNC: 20 MMOL/L
CO2 SERPL-SCNC: 21 MMOL/L
CO2 SERPL-SCNC: 22 MMOL/L
CO2 SERPL-SCNC: 23 MMOL/L
CO2 SERPL-SCNC: 24 MMOL/L
CO2 SERPL-SCNC: 25 MMOL/L
CODING SYSTEM: NORMAL
COLOR FLD: ABNORMAL
COLOR FLD: ABNORMAL
COLOR FLD: NORMAL
COLOR FLD: YELLOW
COLOR UR AUTO: ABNORMAL
COLOR UR AUTO: ABNORMAL
COLOR UR AUTO: YELLOW
COMPLEXED PSA SERPL-MCNC: 0.35 NG/ML
CREAT SERPL-MCNC: 1 MG/DL
CREAT SERPL-MCNC: 1.1 MG/DL
CREAT SERPL-MCNC: 1.1 MG/DL
CREAT SERPL-MCNC: 1.2 MG/DL
CREAT SERPL-MCNC: 1.3 MG/DL
CREAT SERPL-MCNC: 1.4 MG/DL
CREAT SERPL-MCNC: 1.4 MG/DL (ref 0.5–1.4)
CREAT SERPL-MCNC: 1.5 MG/DL
CREAT SERPL-MCNC: 1.5 MG/DL (ref 0.5–1.4)
CREAT SERPL-MCNC: 1.6 MG/DL
CREAT SERPL-MCNC: 1.7 MG/DL
CREAT SERPL-MCNC: 1.8 MG/DL
CREAT SERPL-MCNC: 1.8 MG/DL (ref 0.5–1.4)
CREAT SERPL-MCNC: 1.9 MG/DL
CREAT SERPL-MCNC: 2.2 MG/DL
CREAT SERPL-MCNC: 2.2 MG/DL
CREAT SERPL-MCNC: 2.4 MG/DL
CREAT SERPL-MCNC: 2.5 MG/DL (ref 0.5–1.4)
CREAT SERPL-MCNC: 2.5 MG/DL (ref 0.5–1.4)
CREAT SERPL-MCNC: 2.6 MG/DL
CREAT UR-MCNC: 197 MG/DL
CREAT UR-MCNC: 75 MG/DL
CREAT UR-MCNC: 75 MG/DL
CREAT UR-MCNC: 97 MG/DL
DELSYS: ABNORMAL
DELSYS: ABNORMAL
DIASTOLIC DYSFUNCTION: YES
DIFFERENTIAL METHOD: ABNORMAL
DISPENSE STATUS: NORMAL
EOSINOPHIL # BLD AUTO: 0 K/UL
EOSINOPHIL # BLD AUTO: 0.1 K/UL
EOSINOPHIL # BLD AUTO: 0.2 K/UL
EOSINOPHIL # BLD AUTO: 0.3 K/UL
EOSINOPHIL NFR BLD: 0.1 %
EOSINOPHIL NFR BLD: 0.5 %
EOSINOPHIL NFR BLD: 0.7 %
EOSINOPHIL NFR BLD: 1.2 %
EOSINOPHIL NFR BLD: 1.2 %
EOSINOPHIL NFR BLD: 1.3 %
EOSINOPHIL NFR BLD: 1.4 %
EOSINOPHIL NFR BLD: 1.7 %
EOSINOPHIL NFR BLD: 1.8 %
EOSINOPHIL NFR BLD: 2 %
EOSINOPHIL NFR BLD: 2.1 %
EOSINOPHIL NFR BLD: 2.4 %
EOSINOPHIL NFR BLD: 2.4 %
EOSINOPHIL NFR BLD: 2.5 %
EOSINOPHIL NFR BLD: 2.6 %
EOSINOPHIL NFR BLD: 2.9 %
EOSINOPHIL NFR BLD: 3 %
EOSINOPHIL NFR BLD: 3 %
EOSINOPHIL NFR BLD: 3.2 %
EOSINOPHIL NFR BLD: 3.5 %
EOSINOPHIL NFR BLD: 3.6 %
EOSINOPHIL NFR BLD: 3.8 %
EOSINOPHIL NFR BLD: 3.9 %
EOSINOPHIL NFR BLD: 4 %
EOSINOPHIL NFR BLD: 4.5 %
EOSINOPHIL NFR BLD: 4.9 %
EOSINOPHIL NFR FLD MANUAL: 1 %
EOSINOPHIL URNS QL WRIGHT STN: NORMAL
ERYTHROCYTE [DISTWIDTH] IN BLOOD BY AUTOMATED COUNT: 14.9 %
ERYTHROCYTE [DISTWIDTH] IN BLOOD BY AUTOMATED COUNT: 15 %
ERYTHROCYTE [DISTWIDTH] IN BLOOD BY AUTOMATED COUNT: 15 %
ERYTHROCYTE [DISTWIDTH] IN BLOOD BY AUTOMATED COUNT: 15.1 %
ERYTHROCYTE [DISTWIDTH] IN BLOOD BY AUTOMATED COUNT: 15.1 %
ERYTHROCYTE [DISTWIDTH] IN BLOOD BY AUTOMATED COUNT: 15.4 %
ERYTHROCYTE [DISTWIDTH] IN BLOOD BY AUTOMATED COUNT: 15.7 %
ERYTHROCYTE [DISTWIDTH] IN BLOOD BY AUTOMATED COUNT: 15.9 %
ERYTHROCYTE [DISTWIDTH] IN BLOOD BY AUTOMATED COUNT: 16.1 %
ERYTHROCYTE [DISTWIDTH] IN BLOOD BY AUTOMATED COUNT: 16.3 %
ERYTHROCYTE [DISTWIDTH] IN BLOOD BY AUTOMATED COUNT: 16.5 %
ERYTHROCYTE [DISTWIDTH] IN BLOOD BY AUTOMATED COUNT: 16.6 %
ERYTHROCYTE [DISTWIDTH] IN BLOOD BY AUTOMATED COUNT: 16.6 %
ERYTHROCYTE [DISTWIDTH] IN BLOOD BY AUTOMATED COUNT: 16.8 %
ERYTHROCYTE [DISTWIDTH] IN BLOOD BY AUTOMATED COUNT: 16.8 %
ERYTHROCYTE [DISTWIDTH] IN BLOOD BY AUTOMATED COUNT: 16.9 %
ERYTHROCYTE [DISTWIDTH] IN BLOOD BY AUTOMATED COUNT: 16.9 %
ERYTHROCYTE [DISTWIDTH] IN BLOOD BY AUTOMATED COUNT: 17 %
ERYTHROCYTE [DISTWIDTH] IN BLOOD BY AUTOMATED COUNT: 17.4 %
ERYTHROCYTE [DISTWIDTH] IN BLOOD BY AUTOMATED COUNT: 17.5 %
ERYTHROCYTE [DISTWIDTH] IN BLOOD BY AUTOMATED COUNT: 17.5 %
ERYTHROCYTE [DISTWIDTH] IN BLOOD BY AUTOMATED COUNT: 17.8 %
ERYTHROCYTE [DISTWIDTH] IN BLOOD BY AUTOMATED COUNT: 18 %
ERYTHROCYTE [DISTWIDTH] IN BLOOD BY AUTOMATED COUNT: 18.2 %
ERYTHROCYTE [DISTWIDTH] IN BLOOD BY AUTOMATED COUNT: 18.4 %
ERYTHROCYTE [DISTWIDTH] IN BLOOD BY AUTOMATED COUNT: 18.5 %
ERYTHROCYTE [DISTWIDTH] IN BLOOD BY AUTOMATED COUNT: 18.8 %
ERYTHROCYTE [SEDIMENTATION RATE] IN BLOOD BY WESTERGREN METHOD: 16 MM/H
EST. GFR  (AFRICAN AMERICAN): 28.4 ML/MIN/1.73 M^2
EST. GFR  (AFRICAN AMERICAN): 31.3 ML/MIN/1.73 M^2
EST. GFR  (AFRICAN AMERICAN): 34.8 ML/MIN/1.73 M^2
EST. GFR  (AFRICAN AMERICAN): 34.8 ML/MIN/1.73 M^2
EST. GFR  (AFRICAN AMERICAN): 41.5 ML/MIN/1.73 M^2
EST. GFR  (AFRICAN AMERICAN): 41.8 ML/MIN/1.73 M^2
EST. GFR  (AFRICAN AMERICAN): 41.8 ML/MIN/1.73 M^2
EST. GFR  (AFRICAN AMERICAN): 44.3 ML/MIN/1.73 M^2
EST. GFR  (AFRICAN AMERICAN): 44.7 ML/MIN/1.73 M^2
EST. GFR  (AFRICAN AMERICAN): 44.7 ML/MIN/1.73 M^2
EST. GFR  (AFRICAN AMERICAN): 47.5 ML/MIN/1.73 M^2
EST. GFR  (AFRICAN AMERICAN): 47.9 ML/MIN/1.73 M^2
EST. GFR  (AFRICAN AMERICAN): 47.9 ML/MIN/1.73 M^2
EST. GFR  (AFRICAN AMERICAN): 51.5 ML/MIN/1.73 M^2
EST. GFR  (AFRICAN AMERICAN): 55.7 ML/MIN/1.73 M^2
EST. GFR  (AFRICAN AMERICAN): >60 ML/MIN/1.73 M^2
EST. GFR  (NON AFRICAN AMERICAN): 24.6 ML/MIN/1.73 M^2
EST. GFR  (NON AFRICAN AMERICAN): 27.1 ML/MIN/1.73 M^2
EST. GFR  (NON AFRICAN AMERICAN): 30.1 ML/MIN/1.73 M^2
EST. GFR  (NON AFRICAN AMERICAN): 30.1 ML/MIN/1.73 M^2
EST. GFR  (NON AFRICAN AMERICAN): 35.9 ML/MIN/1.73 M^2
EST. GFR  (NON AFRICAN AMERICAN): 36.2 ML/MIN/1.73 M^2
EST. GFR  (NON AFRICAN AMERICAN): 36.2 ML/MIN/1.73 M^2
EST. GFR  (NON AFRICAN AMERICAN): 38.4 ML/MIN/1.73 M^2
EST. GFR  (NON AFRICAN AMERICAN): 38.6 ML/MIN/1.73 M^2
EST. GFR  (NON AFRICAN AMERICAN): 38.6 ML/MIN/1.73 M^2
EST. GFR  (NON AFRICAN AMERICAN): 41.1 ML/MIN/1.73 M^2
EST. GFR  (NON AFRICAN AMERICAN): 41.4 ML/MIN/1.73 M^2
EST. GFR  (NON AFRICAN AMERICAN): 41.4 ML/MIN/1.73 M^2
EST. GFR  (NON AFRICAN AMERICAN): 44.5 ML/MIN/1.73 M^2
EST. GFR  (NON AFRICAN AMERICAN): 48.2 ML/MIN/1.73 M^2
EST. GFR  (NON AFRICAN AMERICAN): 52.4 ML/MIN/1.73 M^2
EST. GFR  (NON AFRICAN AMERICAN): 57.3 ML/MIN/1.73 M^2
EST. GFR  (NON AFRICAN AMERICAN): >60 ML/MIN/1.73 M^2
ESTIMATED AVG GLUCOSE: 131 MG/DL
ESTIMATED AVG GLUCOSE: 134 MG/DL
ESTIMATED AVG GLUCOSE: 137 MG/DL
ESTIMATED AVG GLUCOSE: 137 MG/DL
ESTIMATED AVG GLUCOSE: 263 MG/DL
ESTIMATED PA SYSTOLIC PRESSURE: 29.83
ESTIMATED PA SYSTOLIC PRESSURE: 32.21
ETHANOL UR-MCNC: <10 MG/DL
FIO2: 21
FIO2: 30
FOLATE SERPL-MCNC: 13.4 NG/ML
GLUCOSE FLD-MCNC: 157 MG/DL
GLUCOSE FLD-MCNC: 178 MG/DL
GLUCOSE SERPL-MCNC: 106 MG/DL
GLUCOSE SERPL-MCNC: 118 MG/DL
GLUCOSE SERPL-MCNC: 118 MG/DL (ref 70–110)
GLUCOSE SERPL-MCNC: 123 MG/DL
GLUCOSE SERPL-MCNC: 132 MG/DL
GLUCOSE SERPL-MCNC: 134 MG/DL
GLUCOSE SERPL-MCNC: 142 MG/DL
GLUCOSE SERPL-MCNC: 144 MG/DL
GLUCOSE SERPL-MCNC: 145 MG/DL
GLUCOSE SERPL-MCNC: 151 MG/DL
GLUCOSE SERPL-MCNC: 151 MG/DL
GLUCOSE SERPL-MCNC: 155 MG/DL
GLUCOSE SERPL-MCNC: 159 MG/DL (ref 70–110)
GLUCOSE SERPL-MCNC: 161 MG/DL
GLUCOSE SERPL-MCNC: 161 MG/DL
GLUCOSE SERPL-MCNC: 162 MG/DL
GLUCOSE SERPL-MCNC: 164 MG/DL
GLUCOSE SERPL-MCNC: 165 MG/DL
GLUCOSE SERPL-MCNC: 168 MG/DL
GLUCOSE SERPL-MCNC: 170 MG/DL
GLUCOSE SERPL-MCNC: 172 MG/DL
GLUCOSE SERPL-MCNC: 173 MG/DL
GLUCOSE SERPL-MCNC: 176 MG/DL
GLUCOSE SERPL-MCNC: 177 MG/DL
GLUCOSE SERPL-MCNC: 183 MG/DL
GLUCOSE SERPL-MCNC: 184 MG/DL
GLUCOSE SERPL-MCNC: 184 MG/DL
GLUCOSE SERPL-MCNC: 195 MG/DL
GLUCOSE SERPL-MCNC: 202 MG/DL
GLUCOSE SERPL-MCNC: 211 MG/DL
GLUCOSE SERPL-MCNC: 213 MG/DL
GLUCOSE SERPL-MCNC: 224 MG/DL (ref 70–110)
GLUCOSE SERPL-MCNC: 235 MG/DL
GLUCOSE SERPL-MCNC: 243 MG/DL
GLUCOSE SERPL-MCNC: 258 MG/DL
GLUCOSE SERPL-MCNC: 271 MG/DL
GLUCOSE SERPL-MCNC: 273 MG/DL
GLUCOSE SERPL-MCNC: 275 MG/DL
GLUCOSE SERPL-MCNC: 276 MG/DL
GLUCOSE SERPL-MCNC: 286 MG/DL (ref 70–110)
GLUCOSE SERPL-MCNC: 289 MG/DL
GLUCOSE SERPL-MCNC: 293 MG/DL
GLUCOSE SERPL-MCNC: 298 MG/DL
GLUCOSE SERPL-MCNC: 301 MG/DL
GLUCOSE SERPL-MCNC: 302 MG/DL
GLUCOSE SERPL-MCNC: 305 MG/DL
GLUCOSE SERPL-MCNC: 316 MG/DL (ref 70–110)
GLUCOSE SERPL-MCNC: 588 MG/DL
GLUCOSE SERPL-MCNC: 84 MG/DL
GLUCOSE SERPL-MCNC: 92 MG/DL
GLUCOSE SERPL-MCNC: 94 MG/DL
GLUCOSE SERPL-MCNC: 97 MG/DL
GLUCOSE SERPL-MCNC: 97 MG/DL
GLUCOSE UR QL STRIP: NEGATIVE
GRAM STN SPEC: NORMAL
HBA1C MFR BLD HPLC: 10.8 %
HBA1C MFR BLD HPLC: 6.2 %
HBA1C MFR BLD HPLC: 6.3 %
HBA1C MFR BLD HPLC: 6.4 %
HBA1C MFR BLD HPLC: 6.4 %
HBV CORE AB SERPL QL IA: POSITIVE
HBV SURFACE AB SER-ACNC: NEGATIVE M[IU]/ML
HBV SURFACE AG SERPL QL IA: NEGATIVE
HCO3 UR-SCNC: 16.1 MMOL/L (ref 24–28)
HCO3 UR-SCNC: 17.2 MMOL/L (ref 24–28)
HCO3 UR-SCNC: 19.7 MMOL/L (ref 24–28)
HCT VFR BLD AUTO: 23.9 %
HCT VFR BLD AUTO: 24 %
HCT VFR BLD AUTO: 24.4 %
HCT VFR BLD AUTO: 25.4 %
HCT VFR BLD AUTO: 26 %
HCT VFR BLD AUTO: 26.6 %
HCT VFR BLD AUTO: 27.1 %
HCT VFR BLD AUTO: 27.3 %
HCT VFR BLD AUTO: 27.3 %
HCT VFR BLD AUTO: 27.4 %
HCT VFR BLD AUTO: 27.6 %
HCT VFR BLD AUTO: 27.8 %
HCT VFR BLD AUTO: 28.3 %
HCT VFR BLD AUTO: 28.6 %
HCT VFR BLD AUTO: 28.9 %
HCT VFR BLD AUTO: 29 %
HCT VFR BLD AUTO: 29.5 %
HCT VFR BLD AUTO: 29.5 %
HCT VFR BLD AUTO: 29.7 %
HCT VFR BLD AUTO: 30.3 %
HCT VFR BLD AUTO: 30.4 %
HCT VFR BLD AUTO: 30.5 %
HCT VFR BLD AUTO: 30.8 %
HCT VFR BLD AUTO: 30.8 %
HCT VFR BLD AUTO: 31 %
HCT VFR BLD AUTO: 31.5 %
HCT VFR BLD AUTO: 31.9 %
HCT VFR BLD AUTO: 32 %
HCT VFR BLD AUTO: 32.5 %
HCT VFR BLD AUTO: 33 %
HCT VFR BLD AUTO: 33.4 %
HCT VFR BLD AUTO: 33.5 %
HCT VFR BLD AUTO: 34.5 %
HCT VFR BLD AUTO: 34.5 %
HCT VFR BLD AUTO: 35 %
HCT VFR BLD CALC: 24 %PCV (ref 36–54)
HCT VFR BLD CALC: 26 %PCV (ref 36–54)
HCT VFR BLD CALC: 27 %PCV (ref 36–54)
HCT VFR BLD CALC: 28 %PCV (ref 36–54)
HCT VFR BLD CALC: 30 %PCV (ref 36–54)
HCT VFR BLD CALC: 30 %PCV (ref 36–54)
HCV AB SERPL QL IA: POSITIVE
HCV GENTYP SERPL NAA+PROBE: ABNORMAL
HCV QUALITATIVE RESULT: DETECTED
HCV QUANTITATIVE LOG: 3.79 LOG (10) IU/ML
HCV RNA SPEC NAA+PROBE-ACNC: ABNORMAL IU/ML
HDL/CHOLESTEROL RATIO: 18.2 %
HDLC SERPL-MCNC: 192 MG/DL
HDLC SERPL-MCNC: 35 MG/DL
HEPATITIS A ANTIBODY, IGG: POSITIVE
HGB BLD-MCNC: 10 G/DL
HGB BLD-MCNC: 10 G/DL
HGB BLD-MCNC: 10.2 G/DL
HGB BLD-MCNC: 10.2 G/DL
HGB BLD-MCNC: 10.3 G/DL
HGB BLD-MCNC: 10.5 G/DL
HGB BLD-MCNC: 10.5 G/DL
HGB BLD-MCNC: 10.6 G/DL
HGB BLD-MCNC: 10.7 G/DL
HGB BLD-MCNC: 10.7 G/DL
HGB BLD-MCNC: 10.8 G/DL
HGB BLD-MCNC: 11 G/DL
HGB BLD-MCNC: 11.3 G/DL
HGB BLD-MCNC: 11.3 G/DL
HGB BLD-MCNC: 11.6 G/DL
HGB BLD-MCNC: 11.7 G/DL
HGB BLD-MCNC: 8 G/DL
HGB BLD-MCNC: 8.2 G/DL
HGB BLD-MCNC: 8.2 G/DL
HGB BLD-MCNC: 8.4 G/DL
HGB BLD-MCNC: 8.5 G/DL
HGB BLD-MCNC: 8.6 G/DL
HGB BLD-MCNC: 9 G/DL
HGB BLD-MCNC: 9.1 G/DL
HGB BLD-MCNC: 9.2 G/DL
HGB BLD-MCNC: 9.2 G/DL
HGB BLD-MCNC: 9.3 G/DL
HGB BLD-MCNC: 9.3 G/DL
HGB BLD-MCNC: 9.5 G/DL
HGB BLD-MCNC: 9.6 G/DL
HGB BLD-MCNC: 9.9 G/DL
HGB BLD-MCNC: 9.9 G/DL
HGB UR QL STRIP: ABNORMAL
HGB UR QL STRIP: ABNORMAL
HGB UR QL STRIP: NEGATIVE
HYALINE CASTS UR QL AUTO: 0 /LPF
HYALINE CASTS UR QL AUTO: 0 /LPF
HYALINE CASTS UR QL AUTO: 10 /LPF
IMM GRANULOCYTES # BLD AUTO: 0.01 K/UL
IMM GRANULOCYTES # BLD AUTO: 0.02 K/UL
IMM GRANULOCYTES # BLD AUTO: 0.03 K/UL
IMM GRANULOCYTES # BLD AUTO: 0.04 K/UL
IMM GRANULOCYTES # BLD AUTO: 0.04 K/UL
IMM GRANULOCYTES # BLD AUTO: 0.05 K/UL
IMM GRANULOCYTES # BLD AUTO: 0.09 K/UL
IMM GRANULOCYTES # BLD AUTO: 0.18 K/UL
IMM GRANULOCYTES NFR BLD AUTO: 0.2 %
IMM GRANULOCYTES NFR BLD AUTO: 0.3 %
IMM GRANULOCYTES NFR BLD AUTO: 0.4 %
IMM GRANULOCYTES NFR BLD AUTO: 0.5 %
IMM GRANULOCYTES NFR BLD AUTO: 0.6 %
IMM GRANULOCYTES NFR BLD AUTO: 0.7 %
IMM GRANULOCYTES NFR BLD AUTO: 1 %
INR PPP: 0.9
INR PPP: 1
INR PPP: 1.1
IRON SERPL-MCNC: 43 UG/DL
KETONES UR QL STRIP: NEGATIVE
LACTATE SERPL-SCNC: 1 MMOL/L
LACTATE SERPL-SCNC: 1.6 MMOL/L
LACTATE SERPL-SCNC: 1.8 MMOL/L
LACTATE SERPL-SCNC: 2 MMOL/L
LACTATE SERPL-SCNC: 2.2 MMOL/L
LACTATE SERPL-SCNC: 4.1 MMOL/L
LDH FLD L TO P-CCNC: 50 U/L
LDH FLD L TO P-CCNC: 50 U/L
LDH FLD L TO P-CCNC: 54 U/L
LDH FLD L TO P-CCNC: 62 U/L
LDH FLD L TO P-CCNC: 70 U/L
LDH SERPL L TO P-CCNC: 188 U/L
LDH SERPL L TO P-CCNC: 372 U/L
LDLC SERPL CALC-MCNC: 134 MG/DL
LEUKOCYTE ESTERASE UR QL STRIP: NEGATIVE
LIPASE SERPL-CCNC: 105 U/L
LIPASE SERPL-CCNC: 110 U/L
LIPASE SERPL-CCNC: 115 U/L
LIPASE SERPL-CCNC: 199 U/L
LIPASE SERPL-CCNC: 23 U/L
LIPASE SERPL-CCNC: 40 U/L
LIPASE SERPL-CCNC: 42 U/L
LIPASE SERPL-CCNC: 99 U/L
LYMPHOCYTES # BLD AUTO: 0.3 K/UL
LYMPHOCYTES # BLD AUTO: 0.3 K/UL
LYMPHOCYTES # BLD AUTO: 0.4 K/UL
LYMPHOCYTES # BLD AUTO: 0.5 K/UL
LYMPHOCYTES # BLD AUTO: 0.6 K/UL
LYMPHOCYTES # BLD AUTO: 0.7 K/UL
LYMPHOCYTES # BLD AUTO: 0.8 K/UL
LYMPHOCYTES # BLD AUTO: 0.9 K/UL
LYMPHOCYTES # BLD AUTO: 1 K/UL
LYMPHOCYTES # BLD AUTO: 1 K/UL
LYMPHOCYTES NFR BLD: 10.3 %
LYMPHOCYTES NFR BLD: 10.6 %
LYMPHOCYTES NFR BLD: 10.9 %
LYMPHOCYTES NFR BLD: 11 %
LYMPHOCYTES NFR BLD: 11.3 %
LYMPHOCYTES NFR BLD: 11.5 %
LYMPHOCYTES NFR BLD: 11.8 %
LYMPHOCYTES NFR BLD: 13 %
LYMPHOCYTES NFR BLD: 13.1 %
LYMPHOCYTES NFR BLD: 13.6 %
LYMPHOCYTES NFR BLD: 13.6 %
LYMPHOCYTES NFR BLD: 14.3 %
LYMPHOCYTES NFR BLD: 14.7 %
LYMPHOCYTES NFR BLD: 15.3 %
LYMPHOCYTES NFR BLD: 15.3 %
LYMPHOCYTES NFR BLD: 16.9 %
LYMPHOCYTES NFR BLD: 17.2 %
LYMPHOCYTES NFR BLD: 17.4 %
LYMPHOCYTES NFR BLD: 17.4 %
LYMPHOCYTES NFR BLD: 18.8 %
LYMPHOCYTES NFR BLD: 19 %
LYMPHOCYTES NFR BLD: 20 %
LYMPHOCYTES NFR BLD: 20.1 %
LYMPHOCYTES NFR BLD: 22.3 %
LYMPHOCYTES NFR BLD: 3.3 %
LYMPHOCYTES NFR BLD: 5.2 %
LYMPHOCYTES NFR BLD: 6.8 %
LYMPHOCYTES NFR BLD: 7.3 %
LYMPHOCYTES NFR BLD: 7.5 %
LYMPHOCYTES NFR BLD: 8.7 %
LYMPHOCYTES NFR BLD: 9.1 %
LYMPHOCYTES NFR BLD: 9.3 %
LYMPHOCYTES NFR BLD: 9.7 %
LYMPHOCYTES NFR BLD: 9.7 %
LYMPHOCYTES NFR FLD MANUAL: 28 %
LYMPHOCYTES NFR FLD MANUAL: 30 %
LYMPHOCYTES NFR FLD MANUAL: 43 %
LYMPHOCYTES NFR FLD MANUAL: 62 %
LYMPHOCYTES NFR FLD MANUAL: 65 %
LYMPHOCYTES NFR FLD MANUAL: 70 %
LYMPHOCYTES NFR FLD MANUAL: 72 %
LYMPHOCYTES NFR FLD MANUAL: 80 %
LYMPHOCYTES NFR FLD MANUAL: 82 %
LYMPHOCYTES NFR FLD MANUAL: 84 %
LYMPHOCYTES NFR FLD MANUAL: 85 %
LYMPHOCYTES NFR FLD MANUAL: 86 %
MAGNESIUM SERPL-MCNC: 1.6 MG/DL
MAGNESIUM SERPL-MCNC: 1.6 MG/DL
MAGNESIUM SERPL-MCNC: 1.9 MG/DL
MAGNESIUM SERPL-MCNC: 2 MG/DL
MAGNESIUM SERPL-MCNC: 2 MG/DL
MAGNESIUM SERPL-MCNC: 2.1 MG/DL
MAGNESIUM SERPL-MCNC: 2.2 MG/DL
MAGNESIUM SERPL-MCNC: 2.3 MG/DL
MAGNESIUM SERPL-MCNC: 2.4 MG/DL
MAGNESIUM SERPL-MCNC: 2.4 MG/DL
MAGNESIUM SERPL-MCNC: 2.5 MG/DL
MAGNESIUM SERPL-MCNC: 2.6 MG/DL
MCH RBC QN AUTO: 22.5 PG
MCH RBC QN AUTO: 22.6 PG
MCH RBC QN AUTO: 24.7 PG
MCH RBC QN AUTO: 24.7 PG
MCH RBC QN AUTO: 24.8 PG
MCH RBC QN AUTO: 24.8 PG
MCH RBC QN AUTO: 24.9 PG
MCH RBC QN AUTO: 25 PG
MCH RBC QN AUTO: 25.1 PG
MCH RBC QN AUTO: 25.2 PG
MCH RBC QN AUTO: 25.8 PG
MCH RBC QN AUTO: 25.9 PG
MCH RBC QN AUTO: 25.9 PG
MCH RBC QN AUTO: 26 PG
MCH RBC QN AUTO: 26 PG
MCH RBC QN AUTO: 26.1 PG
MCH RBC QN AUTO: 26.4 PG
MCH RBC QN AUTO: 26.6 PG
MCH RBC QN AUTO: 26.8 PG
MCH RBC QN AUTO: 26.8 PG
MCH RBC QN AUTO: 26.9 PG
MCH RBC QN AUTO: 27 PG
MCH RBC QN AUTO: 27.1 PG
MCH RBC QN AUTO: 27.1 PG
MCH RBC QN AUTO: 27.2 PG
MCH RBC QN AUTO: 27.3 PG
MCH RBC QN AUTO: 27.4 PG
MCH RBC QN AUTO: 27.5 PG
MCH RBC QN AUTO: 27.6 PG
MCH RBC QN AUTO: 27.6 PG
MCH RBC QN AUTO: 27.7 PG
MCHC RBC AUTO-ENTMCNC: 31.1 G/DL
MCHC RBC AUTO-ENTMCNC: 31.2 G/DL
MCHC RBC AUTO-ENTMCNC: 31.2 G/DL
MCHC RBC AUTO-ENTMCNC: 31.3 %
MCHC RBC AUTO-ENTMCNC: 31.5 G/DL
MCHC RBC AUTO-ENTMCNC: 31.8 G/DL
MCHC RBC AUTO-ENTMCNC: 31.9 %
MCHC RBC AUTO-ENTMCNC: 32 G/DL
MCHC RBC AUTO-ENTMCNC: 32.3 G/DL
MCHC RBC AUTO-ENTMCNC: 32.4 G/DL
MCHC RBC AUTO-ENTMCNC: 32.5 G/DL
MCHC RBC AUTO-ENTMCNC: 32.5 G/DL
MCHC RBC AUTO-ENTMCNC: 32.6 G/DL
MCHC RBC AUTO-ENTMCNC: 33.1 G/DL
MCHC RBC AUTO-ENTMCNC: 33.1 G/DL
MCHC RBC AUTO-ENTMCNC: 33.2 G/DL
MCHC RBC AUTO-ENTMCNC: 33.2 G/DL
MCHC RBC AUTO-ENTMCNC: 33.4 G/DL
MCHC RBC AUTO-ENTMCNC: 33.4 G/DL
MCHC RBC AUTO-ENTMCNC: 33.6 G/DL
MCHC RBC AUTO-ENTMCNC: 33.6 G/DL
MCHC RBC AUTO-ENTMCNC: 33.8 G/DL
MCHC RBC AUTO-ENTMCNC: 33.9 G/DL
MCHC RBC AUTO-ENTMCNC: 34.1 G/DL
MCHC RBC AUTO-ENTMCNC: 34.3 G/DL
MCHC RBC AUTO-ENTMCNC: 34.3 G/DL
MCHC RBC AUTO-ENTMCNC: 34.5 G/DL
MCHC RBC AUTO-ENTMCNC: 34.5 G/DL
MCHC RBC AUTO-ENTMCNC: 34.6 G/DL
MCHC RBC AUTO-ENTMCNC: 34.7 G/DL
MCHC RBC AUTO-ENTMCNC: 35 G/DL
MCHC RBC AUTO-ENTMCNC: 35 G/DL
MCHC RBC AUTO-ENTMCNC: 35.2 G/DL
MCHC RBC AUTO-ENTMCNC: 35.4 G/DL
MCHC RBC AUTO-ENTMCNC: 35.4 G/DL
MCV RBC AUTO: 71 FL
MCV RBC AUTO: 72 FL
MCV RBC AUTO: 75 FL
MCV RBC AUTO: 76 FL
MCV RBC AUTO: 77 FL
MCV RBC AUTO: 78 FL
MCV RBC AUTO: 79 FL
MCV RBC AUTO: 80 FL
MCV RBC AUTO: 81 FL
MCV RBC AUTO: 81 FL
MCV RBC AUTO: 82 FL
MCV RBC AUTO: 82 FL
MCV RBC AUTO: 83 FL
MCV RBC AUTO: 85 FL
MESOTHL CELL NFR FLD MANUAL: 1 %
MESOTHL CELL NFR FLD MANUAL: 3 %
MESOTHL CELL NFR FLD MANUAL: 4 %
MESOTHL CELL NFR FLD MANUAL: 5 %
METHADONE UR QL SCN>300 NG/ML: NEGATIVE
MICROSCOPIC COMMENT: ABNORMAL
MICROSCOPIC COMMENT: NORMAL
MICROSCOPIC COMMENT: NORMAL
MIN VOL: 8
MITOGEN NIL: 5.45 IU/ML
MITRAL VALVE REGURGITATION: ABNORMAL
MODE: ABNORMAL
MODE: ABNORMAL
MONOCYTES # BLD AUTO: 0.4 K/UL
MONOCYTES # BLD AUTO: 0.4 K/UL
MONOCYTES # BLD AUTO: 0.5 K/UL
MONOCYTES # BLD AUTO: 0.6 K/UL
MONOCYTES # BLD AUTO: 0.7 K/UL
MONOCYTES # BLD AUTO: 0.8 K/UL
MONOCYTES # BLD AUTO: 0.9 K/UL
MONOCYTES # BLD AUTO: 0.9 K/UL
MONOCYTES # BLD AUTO: 1 K/UL
MONOCYTES # BLD AUTO: 1.1 K/UL
MONOCYTES # BLD AUTO: 1.8 K/UL
MONOCYTES NFR BLD: 10.2 %
MONOCYTES NFR BLD: 10.7 %
MONOCYTES NFR BLD: 10.9 %
MONOCYTES NFR BLD: 11.1 %
MONOCYTES NFR BLD: 11.1 %
MONOCYTES NFR BLD: 11.2 %
MONOCYTES NFR BLD: 11.3 %
MONOCYTES NFR BLD: 11.4 %
MONOCYTES NFR BLD: 11.4 %
MONOCYTES NFR BLD: 11.8 %
MONOCYTES NFR BLD: 12 %
MONOCYTES NFR BLD: 12.3 %
MONOCYTES NFR BLD: 12.8 %
MONOCYTES NFR BLD: 12.8 %
MONOCYTES NFR BLD: 13 %
MONOCYTES NFR BLD: 13 %
MONOCYTES NFR BLD: 13.4 %
MONOCYTES NFR BLD: 13.5 %
MONOCYTES NFR BLD: 13.7 %
MONOCYTES NFR BLD: 14.1 %
MONOCYTES NFR BLD: 14.2 %
MONOCYTES NFR BLD: 14.3 %
MONOCYTES NFR BLD: 14.3 %
MONOCYTES NFR BLD: 14.7 %
MONOCYTES NFR BLD: 15.3 %
MONOCYTES NFR BLD: 15.7 %
MONOCYTES NFR BLD: 15.8 %
MONOCYTES NFR BLD: 17.9 %
MONOCYTES NFR BLD: 18 %
MONOCYTES NFR BLD: 9 %
MONOCYTES NFR BLD: 9.1 %
MONOCYTES NFR BLD: 9.2 %
MONOCYTES NFR BLD: 9.4 %
MONOCYTES NFR BLD: 9.8 %
MONOS+MACROS NFR FLD MANUAL: 10 %
MONOS+MACROS NFR FLD MANUAL: 12 %
MONOS+MACROS NFR FLD MANUAL: 13 %
MONOS+MACROS NFR FLD MANUAL: 14 %
MONOS+MACROS NFR FLD MANUAL: 14 %
MONOS+MACROS NFR FLD MANUAL: 18 %
MONOS+MACROS NFR FLD MANUAL: 22 %
MONOS+MACROS NFR FLD MANUAL: 24 %
MONOS+MACROS NFR FLD MANUAL: 32 %
MONOS+MACROS NFR FLD MANUAL: 41 %
MONOS+MACROS NFR FLD MANUAL: 6 %
MONOS+MACROS NFR FLD MANUAL: 9 %
MYCOBACTERIUM SPEC QL CULT: NORMAL
NEUTROPHILS # BLD AUTO: 1.8 K/UL
NEUTROPHILS # BLD AUTO: 15.4 K/UL
NEUTROPHILS # BLD AUTO: 2.1 K/UL
NEUTROPHILS # BLD AUTO: 2.1 K/UL
NEUTROPHILS # BLD AUTO: 2.4 K/UL
NEUTROPHILS # BLD AUTO: 2.6 K/UL
NEUTROPHILS # BLD AUTO: 2.7 K/UL
NEUTROPHILS # BLD AUTO: 2.8 K/UL
NEUTROPHILS # BLD AUTO: 2.9 K/UL
NEUTROPHILS # BLD AUTO: 3 K/UL
NEUTROPHILS # BLD AUTO: 3 K/UL
NEUTROPHILS # BLD AUTO: 3.2 K/UL
NEUTROPHILS # BLD AUTO: 3.3 K/UL
NEUTROPHILS # BLD AUTO: 3.3 K/UL
NEUTROPHILS # BLD AUTO: 3.4 K/UL
NEUTROPHILS # BLD AUTO: 3.6 K/UL
NEUTROPHILS # BLD AUTO: 3.7 K/UL
NEUTROPHILS # BLD AUTO: 3.8 K/UL
NEUTROPHILS # BLD AUTO: 3.8 K/UL
NEUTROPHILS # BLD AUTO: 3.9 K/UL
NEUTROPHILS # BLD AUTO: 4 K/UL
NEUTROPHILS # BLD AUTO: 4 K/UL
NEUTROPHILS # BLD AUTO: 4.3 K/UL
NEUTROPHILS # BLD AUTO: 4.7 K/UL
NEUTROPHILS # BLD AUTO: 4.9 K/UL
NEUTROPHILS # BLD AUTO: 5.1 K/UL
NEUTROPHILS # BLD AUTO: 5.2 K/UL
NEUTROPHILS # BLD AUTO: 5.7 K/UL
NEUTROPHILS # BLD AUTO: 6 K/UL
NEUTROPHILS # BLD AUTO: 6.7 K/UL
NEUTROPHILS # BLD AUTO: 8.8 K/UL
NEUTROPHILS NFR BLD: 60.2 %
NEUTROPHILS NFR BLD: 60.6 %
NEUTROPHILS NFR BLD: 65.3 %
NEUTROPHILS NFR BLD: 65.3 %
NEUTROPHILS NFR BLD: 65.8 %
NEUTROPHILS NFR BLD: 66.5 %
NEUTROPHILS NFR BLD: 66.8 %
NEUTROPHILS NFR BLD: 67.3 %
NEUTROPHILS NFR BLD: 67.3 %
NEUTROPHILS NFR BLD: 68.8 %
NEUTROPHILS NFR BLD: 69.2 %
NEUTROPHILS NFR BLD: 69.5 %
NEUTROPHILS NFR BLD: 69.7 %
NEUTROPHILS NFR BLD: 70 %
NEUTROPHILS NFR BLD: 70.1 %
NEUTROPHILS NFR BLD: 70.3 %
NEUTROPHILS NFR BLD: 70.4 %
NEUTROPHILS NFR BLD: 70.5 %
NEUTROPHILS NFR BLD: 70.7 %
NEUTROPHILS NFR BLD: 71.1 %
NEUTROPHILS NFR BLD: 71.8 %
NEUTROPHILS NFR BLD: 72.5 %
NEUTROPHILS NFR BLD: 73.7 %
NEUTROPHILS NFR BLD: 74.3 %
NEUTROPHILS NFR BLD: 74.9 %
NEUTROPHILS NFR BLD: 75 %
NEUTROPHILS NFR BLD: 75.2 %
NEUTROPHILS NFR BLD: 75.6 %
NEUTROPHILS NFR BLD: 77.1 %
NEUTROPHILS NFR BLD: 77.1 %
NEUTROPHILS NFR BLD: 78.2 %
NEUTROPHILS NFR BLD: 79.5 %
NEUTROPHILS NFR BLD: 80.7 %
NEUTROPHILS NFR BLD: 85.6 %
NEUTROPHILS NFR FLD MANUAL: 10 %
NEUTROPHILS NFR FLD MANUAL: 2 %
NEUTROPHILS NFR FLD MANUAL: 2 %
NEUTROPHILS NFR FLD MANUAL: 3 %
NEUTROPHILS NFR FLD MANUAL: 4 %
NEUTROPHILS NFR FLD MANUAL: 5 %
NEUTROPHILS NFR FLD MANUAL: 56 %
NEUTROPHILS NFR FLD MANUAL: 6 %
NEUTROPHILS NFR FLD MANUAL: 63 %
NEUTROPHILS NFR FLD MANUAL: 8 %
NIL: 0.05 IU/ML
NITRITE UR QL STRIP: NEGATIVE
NONHDLC SERPL-MCNC: 157 MG/DL
NRBC BLD-RTO: 0 /100 WBC
OPIATES UR QL SCN: NORMAL
OSMOLALITY UR: 435 MOSM/KG
OSMOLALITY UR: 490 MOSM/KG
OTHER CELLS FLD MANUAL: 10 %
OTHER CELLS FLD MANUAL: 3 %
PATH INTERP FLD-IMP: NORMAL
PATH INTERP FLD-IMP: NORMAL
PCO2 BLDA: 30.4 MMHG (ref 35–45)
PCO2 BLDA: 31.6 MMHG (ref 35–45)
PCO2 BLDA: 33.4 MMHG (ref 35–45)
PCP UR QL SCN>25 NG/ML: NEGATIVE
PEEP: 5
PH SMN: 7.32 [PH] (ref 7.35–7.45)
PH SMN: 7.36 [PH] (ref 7.35–7.45)
PH SMN: 7.38 [PH] (ref 7.35–7.45)
PH UR STRIP: 5 [PH] (ref 5–8)
PHOSPHATE SERPL-MCNC: 2.9 MG/DL
PHOSPHATE SERPL-MCNC: 3 MG/DL
PHOSPHATE SERPL-MCNC: 3.3 MG/DL
PHOSPHATE SERPL-MCNC: 3.4 MG/DL
PHOSPHATE SERPL-MCNC: 3.5 MG/DL
PHOSPHATE SERPL-MCNC: 3.5 MG/DL
PHOSPHATE SERPL-MCNC: 3.7 MG/DL
PHOSPHATE SERPL-MCNC: 3.8 MG/DL
PHOSPHATE SERPL-MCNC: 3.8 MG/DL
PHOSPHATE SERPL-MCNC: 3.9 MG/DL
PHOSPHATE SERPL-MCNC: 4 MG/DL
PHOSPHATE SERPL-MCNC: 4.5 MG/DL
PHOSPHATE SERPL-MCNC: 4.9 MG/DL
PHOSPHATE SERPL-MCNC: 4.9 MG/DL
PHOSPHATE SERPL-MCNC: 5.2 MG/DL
PHOSPHATE SERPL-MCNC: 5.5 MG/DL
PHOSPHATE SERPL-MCNC: 6.1 MG/DL
PHOSPHATIDYLETHANOL (PETH): NEGATIVE NG/ML
PIP: 21
PLATELET # BLD AUTO: 107 K/UL
PLATELET # BLD AUTO: 108 K/UL
PLATELET # BLD AUTO: 109 K/UL
PLATELET # BLD AUTO: 110 K/UL
PLATELET # BLD AUTO: 110 K/UL
PLATELET # BLD AUTO: 112 K/UL
PLATELET # BLD AUTO: 113 K/UL
PLATELET # BLD AUTO: 115 K/UL
PLATELET # BLD AUTO: 117 K/UL
PLATELET # BLD AUTO: 128 K/UL
PLATELET # BLD AUTO: 128 K/UL
PLATELET # BLD AUTO: 134 K/UL
PLATELET # BLD AUTO: 136 K/UL
PLATELET # BLD AUTO: 142 K/UL
PLATELET # BLD AUTO: 142 K/UL
PLATELET # BLD AUTO: 144 K/UL
PLATELET # BLD AUTO: 147 K/UL
PLATELET # BLD AUTO: 153 K/UL
PLATELET # BLD AUTO: 168 K/UL
PLATELET # BLD AUTO: 168 K/UL
PLATELET # BLD AUTO: 172 K/UL
PLATELET # BLD AUTO: 217 K/UL
PLATELET # BLD AUTO: 219 K/UL
PLATELET # BLD AUTO: 45 K/UL
PLATELET # BLD AUTO: 52 K/UL
PLATELET # BLD AUTO: 67 K/UL
PLATELET # BLD AUTO: 77 K/UL
PLATELET # BLD AUTO: 84 K/UL
PLATELET # BLD AUTO: 89 K/UL
PLATELET # BLD AUTO: 90 K/UL
PLATELET # BLD AUTO: 95 K/UL
PLATELET # BLD AUTO: 96 K/UL
PLATELET # BLD AUTO: 98 K/UL
PLATELET # BLD AUTO: 98 K/UL
PLATELET # BLD AUTO: ABNORMAL K/UL
PLATELET BLD QL SMEAR: ABNORMAL
PMV BLD AUTO: 10 FL
PMV BLD AUTO: 10.2 FL
PMV BLD AUTO: 10.3 FL
PMV BLD AUTO: 10.3 FL
PMV BLD AUTO: 10.7 FL
PMV BLD AUTO: 10.7 FL
PMV BLD AUTO: 10.9 FL
PMV BLD AUTO: 11.1 FL
PMV BLD AUTO: 11.2 FL
PMV BLD AUTO: 11.5 FL
PMV BLD AUTO: 11.6 FL
PMV BLD AUTO: 12.1 FL
PMV BLD AUTO: 12.2 FL
PMV BLD AUTO: 12.4 FL
PMV BLD AUTO: 12.5 FL
PMV BLD AUTO: 12.6 FL
PMV BLD AUTO: 8.8 FL
PMV BLD AUTO: 8.8 FL
PMV BLD AUTO: 8.9 FL
PMV BLD AUTO: 9 FL
PMV BLD AUTO: 9.1 FL
PMV BLD AUTO: 9.2 FL
PMV BLD AUTO: 9.4 FL
PMV BLD AUTO: 9.5 FL
PMV BLD AUTO: 9.6 FL
PMV BLD AUTO: 9.7 FL
PMV BLD AUTO: 9.7 FL
PMV BLD AUTO: 9.9 FL
PMV BLD AUTO: ABNORMAL FL
PO2 BLDA: 123 MMHG (ref 80–100)
PO2 BLDA: 26 MMHG (ref 40–60)
PO2 BLDA: 31 MMHG (ref 40–60)
POC BE: -10 MMOL/L
POC BE: -5 MMOL/L
POC BE: -8 MMOL/L
POC IONIZED CALCIUM: 1.05 MMOL/L (ref 1.06–1.42)
POC IONIZED CALCIUM: 1.09 MMOL/L (ref 1.06–1.42)
POC IONIZED CALCIUM: 1.1 MMOL/L (ref 1.06–1.42)
POC IONIZED CALCIUM: 1.14 MMOL/L (ref 1.06–1.42)
POC IONIZED CALCIUM: 1.15 MMOL/L (ref 1.06–1.42)
POC IONIZED CALCIUM: 1.17 MMOL/L (ref 1.06–1.42)
POC SATURATED O2: 47 % (ref 95–100)
POC SATURATED O2: 58 % (ref 95–100)
POC SATURATED O2: 99 % (ref 95–100)
POC TCO2 (MEASURED): 17 MMOL/L (ref 23–29)
POC TCO2 (MEASURED): 19 MMOL/L (ref 23–29)
POC TCO2 (MEASURED): 19 MMOL/L (ref 23–29)
POC TCO2 (MEASURED): 22 MMOL/L (ref 23–29)
POC TCO2 (MEASURED): ABNORMAL MMOL/L
POC TCO2: 17 MMOL/L (ref 23–27)
POC TCO2: 18 MMOL/L (ref 24–29)
POC TCO2: 21 MMOL/L (ref 24–29)
POCT GLUCOSE: 112 MG/DL (ref 70–110)
POCT GLUCOSE: 121 MG/DL (ref 70–110)
POCT GLUCOSE: 121 MG/DL (ref 70–110)
POCT GLUCOSE: 123 MG/DL (ref 70–110)
POCT GLUCOSE: 124 MG/DL (ref 70–110)
POCT GLUCOSE: 125 MG/DL (ref 70–110)
POCT GLUCOSE: 125 MG/DL (ref 70–110)
POCT GLUCOSE: 132 MG/DL (ref 70–110)
POCT GLUCOSE: 135 MG/DL (ref 70–110)
POCT GLUCOSE: 142 MG/DL (ref 70–110)
POCT GLUCOSE: 143 MG/DL (ref 70–110)
POCT GLUCOSE: 143 MG/DL (ref 70–110)
POCT GLUCOSE: 144 MG/DL (ref 70–110)
POCT GLUCOSE: 144 MG/DL (ref 70–110)
POCT GLUCOSE: 147 MG/DL (ref 70–110)
POCT GLUCOSE: 152 MG/DL (ref 70–110)
POCT GLUCOSE: 159 MG/DL (ref 70–110)
POCT GLUCOSE: 159 MG/DL (ref 70–110)
POCT GLUCOSE: 160 MG/DL (ref 70–110)
POCT GLUCOSE: 162 MG/DL (ref 70–110)
POCT GLUCOSE: 162 MG/DL (ref 70–110)
POCT GLUCOSE: 164 MG/DL (ref 70–110)
POCT GLUCOSE: 165 MG/DL (ref 70–110)
POCT GLUCOSE: 167 MG/DL (ref 70–110)
POCT GLUCOSE: 168 MG/DL (ref 70–110)
POCT GLUCOSE: 169 MG/DL (ref 70–110)
POCT GLUCOSE: 170 MG/DL (ref 70–110)
POCT GLUCOSE: 174 MG/DL (ref 70–110)
POCT GLUCOSE: 176 MG/DL (ref 70–110)
POCT GLUCOSE: 178 MG/DL (ref 70–110)
POCT GLUCOSE: 181 MG/DL (ref 70–110)
POCT GLUCOSE: 181 MG/DL (ref 70–110)
POCT GLUCOSE: 183 MG/DL (ref 70–110)
POCT GLUCOSE: 186 MG/DL (ref 70–110)
POCT GLUCOSE: 189 MG/DL (ref 70–110)
POCT GLUCOSE: 189 MG/DL (ref 70–110)
POCT GLUCOSE: 192 MG/DL (ref 70–110)
POCT GLUCOSE: 193 MG/DL (ref 70–110)
POCT GLUCOSE: 196 MG/DL (ref 70–110)
POCT GLUCOSE: 199 MG/DL (ref 70–110)
POCT GLUCOSE: 202 MG/DL (ref 70–110)
POCT GLUCOSE: 203 MG/DL (ref 70–110)
POCT GLUCOSE: 203 MG/DL (ref 70–110)
POCT GLUCOSE: 204 MG/DL (ref 70–110)
POCT GLUCOSE: 207 MG/DL (ref 70–110)
POCT GLUCOSE: 209 MG/DL (ref 70–110)
POCT GLUCOSE: 210 MG/DL (ref 70–110)
POCT GLUCOSE: 211 MG/DL (ref 70–110)
POCT GLUCOSE: 215 MG/DL (ref 70–110)
POCT GLUCOSE: 215 MG/DL (ref 70–110)
POCT GLUCOSE: 221 MG/DL (ref 70–110)
POCT GLUCOSE: 222 MG/DL (ref 70–110)
POCT GLUCOSE: 223 MG/DL (ref 70–110)
POCT GLUCOSE: 224 MG/DL (ref 70–110)
POCT GLUCOSE: 224 MG/DL (ref 70–110)
POCT GLUCOSE: 225 MG/DL (ref 70–110)
POCT GLUCOSE: 230 MG/DL (ref 70–110)
POCT GLUCOSE: 232 MG/DL (ref 70–110)
POCT GLUCOSE: 236 MG/DL (ref 70–110)
POCT GLUCOSE: 240 MG/DL (ref 70–110)
POCT GLUCOSE: 240 MG/DL (ref 70–110)
POCT GLUCOSE: 243 MG/DL (ref 70–110)
POCT GLUCOSE: 244 MG/DL (ref 70–110)
POCT GLUCOSE: 246 MG/DL (ref 70–110)
POCT GLUCOSE: 249 MG/DL (ref 70–110)
POCT GLUCOSE: 256 MG/DL (ref 70–110)
POCT GLUCOSE: 265 MG/DL (ref 70–110)
POCT GLUCOSE: 266 MG/DL (ref 70–110)
POCT GLUCOSE: 271 MG/DL (ref 70–110)
POCT GLUCOSE: 271 MG/DL (ref 70–110)
POCT GLUCOSE: 273 MG/DL (ref 70–110)
POCT GLUCOSE: 277 MG/DL (ref 70–110)
POCT GLUCOSE: 284 MG/DL (ref 70–110)
POCT GLUCOSE: 285 MG/DL (ref 70–110)
POCT GLUCOSE: 289 MG/DL (ref 70–110)
POCT GLUCOSE: 292 MG/DL (ref 70–110)
POCT GLUCOSE: 297 MG/DL (ref 70–110)
POCT GLUCOSE: 299 MG/DL (ref 70–110)
POCT GLUCOSE: 300 MG/DL (ref 70–110)
POCT GLUCOSE: 311 MG/DL (ref 70–110)
POCT GLUCOSE: 314 MG/DL (ref 70–110)
POCT GLUCOSE: 315 MG/DL (ref 70–110)
POCT GLUCOSE: 317 MG/DL (ref 70–110)
POCT GLUCOSE: 343 MG/DL (ref 70–110)
POCT GLUCOSE: 348 MG/DL (ref 70–110)
POCT GLUCOSE: 411 MG/DL (ref 70–110)
POCT GLUCOSE: 74 MG/DL (ref 70–110)
POCT GLUCOSE: 78 MG/DL (ref 70–110)
POCT GLUCOSE: 84 MG/DL (ref 70–110)
POCT GLUCOSE: 87 MG/DL (ref 70–110)
POCT GLUCOSE: 92 MG/DL (ref 70–110)
POCT GLUCOSE: 97 MG/DL (ref 70–110)
POLYCHROMASIA BLD QL SMEAR: ABNORMAL
POTASSIUM BLD-SCNC: 4.7 MMOL/L (ref 3.5–5.1)
POTASSIUM BLD-SCNC: 5.7 MMOL/L (ref 3.5–5.1)
POTASSIUM BLD-SCNC: 6.6 MMOL/L (ref 3.5–5.1)
POTASSIUM BLD-SCNC: 6.6 MMOL/L (ref 3.5–5.1)
POTASSIUM BLD-SCNC: 7 MMOL/L (ref 3.5–5.1)
POTASSIUM BLD-SCNC: 7.3 MMOL/L (ref 3.5–5.1)
POTASSIUM SERPL-SCNC: 4.3 MMOL/L
POTASSIUM SERPL-SCNC: 4.4 MMOL/L
POTASSIUM SERPL-SCNC: 4.4 MMOL/L
POTASSIUM SERPL-SCNC: 4.5 MMOL/L
POTASSIUM SERPL-SCNC: 4.6 MMOL/L
POTASSIUM SERPL-SCNC: 4.7 MMOL/L
POTASSIUM SERPL-SCNC: 4.8 MMOL/L
POTASSIUM SERPL-SCNC: 4.9 MMOL/L
POTASSIUM SERPL-SCNC: 4.9 MMOL/L
POTASSIUM SERPL-SCNC: 5 MMOL/L
POTASSIUM SERPL-SCNC: 5.2 MMOL/L
POTASSIUM SERPL-SCNC: 5.2 MMOL/L
POTASSIUM SERPL-SCNC: 5.3 MMOL/L
POTASSIUM SERPL-SCNC: 5.4 MMOL/L
POTASSIUM SERPL-SCNC: 5.4 MMOL/L
POTASSIUM SERPL-SCNC: 5.5 MMOL/L
POTASSIUM SERPL-SCNC: 5.6 MMOL/L
POTASSIUM SERPL-SCNC: 5.7 MMOL/L
POTASSIUM SERPL-SCNC: 5.8 MMOL/L
POTASSIUM SERPL-SCNC: 5.8 MMOL/L
POTASSIUM SERPL-SCNC: 5.9 MMOL/L
POTASSIUM SERPL-SCNC: 6 MMOL/L
POTASSIUM SERPL-SCNC: 6.1 MMOL/L
POTASSIUM SERPL-SCNC: 6.3 MMOL/L
POTASSIUM SERPL-SCNC: 6.5 MMOL/L
POTASSIUM SERPL-SCNC: 6.6 MMOL/L
POTASSIUM SERPL-SCNC: 6.7 MMOL/L
POTASSIUM SERPL-SCNC: 6.8 MMOL/L
POTASSIUM SERPL-SCNC: 7 MMOL/L
POTASSIUM SERPL-SCNC: 7.1 MMOL/L
POTASSIUM SERPL-SCNC: 7.3 MMOL/L
POTASSIUM UR-SCNC: 59 MMOL/L
POTASSIUM UR-SCNC: 63 MMOL/L
PROT FLD-MCNC: 1.3 G/DL
PROT FLD-MCNC: 2.1 G/DL
PROT FLD-MCNC: 2.2 G/DL
PROT FLD-MCNC: 2.5 G/DL
PROT FLD-MCNC: 2.6 G/DL
PROT FLD-MCNC: 2.6 G/DL
PROT FLD-MCNC: 2.8 G/DL
PROT FLD-MCNC: 3.8 G/DL
PROT SERPL-MCNC: 5.1 G/DL
PROT SERPL-MCNC: 5.4 G/DL
PROT SERPL-MCNC: 5.4 G/DL
PROT SERPL-MCNC: 5.5 G/DL
PROT SERPL-MCNC: 5.5 G/DL
PROT SERPL-MCNC: 5.6 G/DL
PROT SERPL-MCNC: 5.7 G/DL
PROT SERPL-MCNC: 5.7 G/DL
PROT SERPL-MCNC: 5.8 G/DL
PROT SERPL-MCNC: 6 G/DL
PROT SERPL-MCNC: 6.2 G/DL
PROT SERPL-MCNC: 6.3 G/DL
PROT SERPL-MCNC: 6.3 G/DL
PROT SERPL-MCNC: 6.4 G/DL
PROT SERPL-MCNC: 6.6 G/DL
PROT SERPL-MCNC: 6.7 G/DL
PROT SERPL-MCNC: 6.8 G/DL
PROT SERPL-MCNC: 6.9 G/DL
PROT SERPL-MCNC: 6.9 G/DL
PROT SERPL-MCNC: 7.1 G/DL
PROT SERPL-MCNC: 7.2 G/DL
PROT SERPL-MCNC: 7.2 G/DL
PROT UR QL STRIP: ABNORMAL
PROT UR QL STRIP: NEGATIVE
PROT UR-MCNC: 496 MG/DL
PROT/CREAT RATIO, UR: 2.52
PROTHROMBIN TIME: 10 SEC
PROTHROMBIN TIME: 10.1 SEC
PROTHROMBIN TIME: 10.2 SEC
PROTHROMBIN TIME: 10.3 SEC
PROTHROMBIN TIME: 10.4 SEC
PROTHROMBIN TIME: 10.5 SEC
PROTHROMBIN TIME: 10.6 SEC
PROTHROMBIN TIME: 10.8 SEC
PROTHROMBIN TIME: 10.9 SEC
PROTHROMBIN TIME: 11.2 SEC
PROTHROMBIN TIME: 11.2 SEC
PROTHROMBIN TIME: 11.4 SEC
PROTHROMBIN TIME: 11.4 SEC
PROTHROMBIN TIME: 11.5 SEC
PROTHROMBIN TIME: 9.8 SEC
RBC # BLD AUTO: 3.07 M/UL
RBC # BLD AUTO: 3.08 M/UL
RBC # BLD AUTO: 3.15 M/UL
RBC # BLD AUTO: 3.23 M/UL
RBC # BLD AUTO: 3.32 M/UL
RBC # BLD AUTO: 3.35 M/UL
RBC # BLD AUTO: 3.37 M/UL
RBC # BLD AUTO: 3.42 M/UL
RBC # BLD AUTO: 3.44 M/UL
RBC # BLD AUTO: 3.44 M/UL
RBC # BLD AUTO: 3.53 M/UL
RBC # BLD AUTO: 3.57 M/UL
RBC # BLD AUTO: 3.57 M/UL
RBC # BLD AUTO: 3.59 M/UL
RBC # BLD AUTO: 3.65 M/UL
RBC # BLD AUTO: 3.68 M/UL
RBC # BLD AUTO: 3.69 M/UL
RBC # BLD AUTO: 3.7 M/UL
RBC # BLD AUTO: 3.71 M/UL
RBC # BLD AUTO: 3.73 M/UL
RBC # BLD AUTO: 3.82 M/UL
RBC # BLD AUTO: 3.83 M/UL
RBC # BLD AUTO: 3.85 M/UL
RBC # BLD AUTO: 3.92 M/UL
RBC # BLD AUTO: 3.95 M/UL
RBC # BLD AUTO: 4.05 M/UL
RBC # BLD AUTO: 4.11 M/UL
RBC # BLD AUTO: 4.14 M/UL
RBC # BLD AUTO: 4.18 M/UL
RBC # BLD AUTO: 4.22 M/UL
RBC # BLD AUTO: 4.27 M/UL
RBC # BLD AUTO: 4.28 M/UL
RBC # BLD AUTO: 4.49 M/UL
RBC # BLD AUTO: 4.65 M/UL
RBC # BLD AUTO: 4.88 M/UL
RBC #/AREA URNS AUTO: 0 /HPF (ref 0–4)
RBC #/AREA URNS AUTO: 1 /HPF (ref 0–4)
RBC #/AREA URNS AUTO: 2 /HPF (ref 0–4)
RETIRED EF AND QEF - SEE NOTES: 60 (ref 55–65)
RETIRED EF AND QEF - SEE NOTES: 65 (ref 55–65)
SAMPLE: ABNORMAL
SITE: ABNORMAL
SODIUM BLD-SCNC: 130 MMOL/L (ref 136–145)
SODIUM BLD-SCNC: 130 MMOL/L (ref 136–145)
SODIUM BLD-SCNC: 133 MMOL/L (ref 136–145)
SODIUM BLD-SCNC: 133 MMOL/L (ref 136–145)
SODIUM BLD-SCNC: 136 MMOL/L (ref 136–145)
SODIUM BLD-SCNC: 136 MMOL/L (ref 136–145)
SODIUM SERPL-SCNC: 126 MMOL/L
SODIUM SERPL-SCNC: 128 MMOL/L
SODIUM SERPL-SCNC: 129 MMOL/L
SODIUM SERPL-SCNC: 129 MMOL/L
SODIUM SERPL-SCNC: 130 MMOL/L
SODIUM SERPL-SCNC: 130 MMOL/L
SODIUM SERPL-SCNC: 131 MMOL/L
SODIUM SERPL-SCNC: 131 MMOL/L
SODIUM SERPL-SCNC: 132 MMOL/L
SODIUM SERPL-SCNC: 133 MMOL/L
SODIUM SERPL-SCNC: 134 MMOL/L
SODIUM SERPL-SCNC: 135 MMOL/L
SODIUM SERPL-SCNC: 136 MMOL/L
SODIUM SERPL-SCNC: 137 MMOL/L
SODIUM SERPL-SCNC: 138 MMOL/L
SODIUM SERPL-SCNC: 138 MMOL/L
SODIUM SERPL-SCNC: 139 MMOL/L
SODIUM SERPL-SCNC: 141 MMOL/L
SODIUM UR-SCNC: <20 MMOL/L
SODIUM UR-SCNC: <20 MMOL/L
SP GR UR STRIP: 1.01 (ref 1–1.03)
SP GR UR STRIP: 1.02 (ref 1–1.03)
SP02: 100
SP02: 99
SPECIMEN SOURCE: NORMAL
SQUAMOUS #/AREA URNS AUTO: 0 /HPF
SQUAMOUS #/AREA URNS AUTO: 0 /HPF
SQUAMOUS #/AREA URNS AUTO: 1 /HPF
T4 FREE SERPL-MCNC: 0.9 NG/DL
TB ANTIGEN NIL: 0.07 IU/ML
TB ANTIGEN: 0.12 IU/ML
TB GOLD: NEGATIVE
TB INDURATION 48 - 72 HR READ: 0 MM
TOXICOLOGY INFORMATION: NORMAL
TRANS ERYTHROCYTES VOL PATIENT: NORMAL ML
TRICUSPID VALVE REGURGITATION: ABNORMAL
TRIGL SERPL-MCNC: 115 MG/DL
TRIGLYCERIDE, BF: 579 MG/DL
TROPONIN I SERPL DL<=0.01 NG/ML-MCNC: 0.01 NG/ML
TROPONIN I SERPL DL<=0.01 NG/ML-MCNC: 0.02 NG/ML
TROPONIN I SERPL DL<=0.01 NG/ML-MCNC: 0.02 NG/ML
TSH SERPL DL<=0.005 MIU/L-ACNC: 2.01 UIU/ML
URN SPEC COLLECT METH UR: ABNORMAL
URN SPEC COLLECT METH UR: NORMAL
UROBILINOGEN UR STRIP-ACNC: 2 EU/DL
UROBILINOGEN UR STRIP-ACNC: 2 EU/DL
UROBILINOGEN UR STRIP-ACNC: 4 EU/DL
UROBILINOGEN UR STRIP-ACNC: NEGATIVE EU/DL
UUN UR-MCNC: 1063 MG/DL
UUN UR-MCNC: 706 MG/DL
UUN UR-MCNC: 861 MG/DL
VIT B12 SERPL-MCNC: 380 PG/ML
VIT B12 SERPL-MCNC: 448 PG/ML
VT: 400
WBC # BLD AUTO: 10.84 K/UL
WBC # BLD AUTO: 17.99 K/UL
WBC # BLD AUTO: 2.91 K/UL
WBC # BLD AUTO: 3.13 K/UL
WBC # BLD AUTO: 3.45 K/UL
WBC # BLD AUTO: 3.67 K/UL
WBC # BLD AUTO: 3.67 K/UL
WBC # BLD AUTO: 3.82 K/UL
WBC # BLD AUTO: 4.04 K/UL
WBC # BLD AUTO: 4.04 K/UL
WBC # BLD AUTO: 4.08 K/UL
WBC # BLD AUTO: 4.14 K/UL
WBC # BLD AUTO: 4.33 K/UL
WBC # BLD AUTO: 4.38 K/UL
WBC # BLD AUTO: 4.43 K/UL
WBC # BLD AUTO: 4.58 K/UL
WBC # BLD AUTO: 4.71 K/UL
WBC # BLD AUTO: 4.8 K/UL
WBC # BLD AUTO: 5.07 K/UL
WBC # BLD AUTO: 5.31 K/UL
WBC # BLD AUTO: 5.38 K/UL
WBC # BLD AUTO: 5.49 K/UL
WBC # BLD AUTO: 5.51 K/UL
WBC # BLD AUTO: 5.51 K/UL
WBC # BLD AUTO: 5.59 K/UL
WBC # BLD AUTO: 5.61 K/UL
WBC # BLD AUTO: 5.71 K/UL
WBC # BLD AUTO: 6.4 K/UL
WBC # BLD AUTO: 6.41 K/UL
WBC # BLD AUTO: 6.41 K/UL
WBC # BLD AUTO: 6.87 K/UL
WBC # BLD AUTO: 6.92 K/UL
WBC # BLD AUTO: 7.49 K/UL
WBC # BLD AUTO: 7.52 K/UL
WBC # BLD AUTO: 8.65 K/UL
WBC # FLD: 1056 /CU MM
WBC # FLD: 166 /CU MM
WBC # FLD: 218 /CU MM
WBC # FLD: 278 /CU MM
WBC # FLD: 290 /CU MM
WBC # FLD: 300 /CU MM
WBC # FLD: 336 /CU MM
WBC # FLD: 428 /CU MM
WBC # FLD: 555 /CU MM
WBC # FLD: 579 /CU MM
WBC # FLD: 580 /CU MM
WBC # FLD: 610 /CU MM
WBC #/AREA URNS AUTO: 0 /HPF (ref 0–5)
WBC #/AREA URNS AUTO: 1 /HPF (ref 0–5)
WBC #/AREA URNS AUTO: 1 /HPF (ref 0–5)
WBC #/AREA URNS AUTO: 2 /HPF (ref 0–5)
WBC #/AREA URNS AUTO: 2 /HPF (ref 0–5)

## 2017-01-01 PROCEDURE — 97161 PT EVAL LOW COMPLEX 20 MIN: CPT

## 2017-01-01 PROCEDURE — 36415 COLL VENOUS BLD VENIPUNCTURE: CPT

## 2017-01-01 PROCEDURE — 85610 PROTHROMBIN TIME: CPT

## 2017-01-01 PROCEDURE — 25000003 PHARM REV CODE 250: Performed by: HOSPITALIST

## 2017-01-01 PROCEDURE — 63600175 PHARM REV CODE 636 W HCPCS: Performed by: INTERNAL MEDICINE

## 2017-01-01 PROCEDURE — 97166 OT EVAL MOD COMPLEX 45 MIN: CPT

## 2017-01-01 PROCEDURE — 83735 ASSAY OF MAGNESIUM: CPT

## 2017-01-01 PROCEDURE — 99999 PR PBB SHADOW E&M-EST. PATIENT-LVL III: CPT | Mod: PBBFAC,,, | Performed by: FAMILY MEDICINE

## 2017-01-01 PROCEDURE — 87075 CULTR BACTERIA EXCEPT BLOOD: CPT

## 2017-01-01 PROCEDURE — 96375 TX/PRO/DX INJ NEW DRUG ADDON: CPT

## 2017-01-01 PROCEDURE — G0378 HOSPITAL OBSERVATION PER HR: HCPCS

## 2017-01-01 PROCEDURE — 97110 THERAPEUTIC EXERCISES: CPT

## 2017-01-01 PROCEDURE — 85025 COMPLETE CBC W/AUTO DIFF WBC: CPT

## 2017-01-01 PROCEDURE — 20000000 HC ICU ROOM

## 2017-01-01 PROCEDURE — 99285 EMERGENCY DEPT VISIT HI MDM: CPT | Mod: 25

## 2017-01-01 PROCEDURE — 63600175 PHARM REV CODE 636 W HCPCS: Performed by: STUDENT IN AN ORGANIZED HEALTH CARE EDUCATION/TRAINING PROGRAM

## 2017-01-01 PROCEDURE — 80307 DRUG TEST PRSMV CHEM ANLYZR: CPT

## 2017-01-01 PROCEDURE — 97165 OT EVAL LOW COMPLEX 30 MIN: CPT

## 2017-01-01 PROCEDURE — 11000001 HC ACUTE MED/SURG PRIVATE ROOM

## 2017-01-01 PROCEDURE — 82150 ASSAY OF AMYLASE: CPT

## 2017-01-01 PROCEDURE — 85730 THROMBOPLASTIN TIME PARTIAL: CPT

## 2017-01-01 PROCEDURE — 25000242 PHARM REV CODE 250 ALT 637 W/ HCPCS: Performed by: STUDENT IN AN ORGANIZED HEALTH CARE EDUCATION/TRAINING PROGRAM

## 2017-01-01 PROCEDURE — 51702 INSERT TEMP BLADDER CATH: CPT

## 2017-01-01 PROCEDURE — 91200 LIVER ELASTOGRAPHY: CPT | Mod: 26,S$PBB,, | Performed by: PHYSICIAN ASSISTANT

## 2017-01-01 PROCEDURE — 0W9G3ZZ DRAINAGE OF PERITONEAL CAVITY, PERCUTANEOUS APPROACH: ICD-10-PCS | Performed by: HOSPITALIST

## 2017-01-01 PROCEDURE — 80321 ALCOHOLS BIOMARKERS 1OR 2: CPT

## 2017-01-01 PROCEDURE — 63600175 PHARM REV CODE 636 W HCPCS: Performed by: EMERGENCY MEDICINE

## 2017-01-01 PROCEDURE — 25000003 PHARM REV CODE 250: Performed by: INTERNAL MEDICINE

## 2017-01-01 PROCEDURE — 76700 US EXAM ABDOM COMPLETE: CPT | Mod: 26,GC,, | Performed by: RADIOLOGY

## 2017-01-01 PROCEDURE — 99900026 HC AIRWAY MAINTENANCE (STAT)

## 2017-01-01 PROCEDURE — 80053 COMPREHEN METABOLIC PANEL: CPT

## 2017-01-01 PROCEDURE — 87070 CULTURE OTHR SPECIMN AEROBIC: CPT

## 2017-01-01 PROCEDURE — A4216 STERILE WATER/SALINE, 10 ML: HCPCS | Performed by: PHYSICIAN ASSISTANT

## 2017-01-01 PROCEDURE — 25000003 PHARM REV CODE 250: Performed by: EMERGENCY MEDICINE

## 2017-01-01 PROCEDURE — 84478 ASSAY OF TRIGLYCERIDES: CPT

## 2017-01-01 PROCEDURE — 84157 ASSAY OF PROTEIN OTHER: CPT

## 2017-01-01 PROCEDURE — 31500 INSERT EMERGENCY AIRWAY: CPT

## 2017-01-01 PROCEDURE — 93010 ELECTROCARDIOGRAM REPORT: CPT | Mod: ,,, | Performed by: INTERNAL MEDICINE

## 2017-01-01 PROCEDURE — 99285 EMERGENCY DEPT VISIT HI MDM: CPT | Mod: ,,, | Performed by: EMERGENCY MEDICINE

## 2017-01-01 PROCEDURE — 84484 ASSAY OF TROPONIN QUANT: CPT

## 2017-01-01 PROCEDURE — 25000003 PHARM REV CODE 250: Performed by: PHYSICIAN ASSISTANT

## 2017-01-01 PROCEDURE — 88305 TISSUE EXAM BY PATHOLOGIST: CPT | Mod: 26,,, | Performed by: PATHOLOGY

## 2017-01-01 PROCEDURE — 93005 ELECTROCARDIOGRAM TRACING: CPT

## 2017-01-01 PROCEDURE — 25000003 PHARM REV CODE 250: Performed by: STUDENT IN AN ORGANIZED HEALTH CARE EDUCATION/TRAINING PROGRAM

## 2017-01-01 PROCEDURE — 3078F DIAST BP <80 MM HG: CPT | Mod: ,,, | Performed by: PHYSICIAN ASSISTANT

## 2017-01-01 PROCEDURE — 63600175 PHARM REV CODE 636 W HCPCS

## 2017-01-01 PROCEDURE — 89051 BODY FLUID CELL COUNT: CPT

## 2017-01-01 PROCEDURE — 63600175 PHARM REV CODE 636 W HCPCS: Performed by: HOSPITALIST

## 2017-01-01 PROCEDURE — 80048 BASIC METABOLIC PNL TOTAL CA: CPT | Mod: 91

## 2017-01-01 PROCEDURE — 49083 ABD PARACENTESIS W/IMAGING: CPT

## 2017-01-01 PROCEDURE — 84132 ASSAY OF SERUM POTASSIUM: CPT

## 2017-01-01 PROCEDURE — 96368 THER/DIAG CONCURRENT INF: CPT

## 2017-01-01 PROCEDURE — 97802 MEDICAL NUTRITION INDIV IN: CPT | Performed by: DIETITIAN, REGISTERED

## 2017-01-01 PROCEDURE — 99220 PR INITIAL OBSERVATION CARE,LEVL III: CPT | Mod: ,,, | Performed by: INTERNAL MEDICINE

## 2017-01-01 PROCEDURE — 97116 GAIT TRAINING THERAPY: CPT

## 2017-01-01 PROCEDURE — 99233 SBSQ HOSP IP/OBS HIGH 50: CPT | Mod: 25,GC,, | Performed by: INTERNAL MEDICINE

## 2017-01-01 PROCEDURE — 84100 ASSAY OF PHOSPHORUS: CPT

## 2017-01-01 PROCEDURE — 94640 AIRWAY INHALATION TREATMENT: CPT

## 2017-01-01 PROCEDURE — 36430 TRANSFUSION BLD/BLD COMPNT: CPT

## 2017-01-01 PROCEDURE — 83690 ASSAY OF LIPASE: CPT

## 2017-01-01 PROCEDURE — 82436 ASSAY OF URINE CHLORIDE: CPT

## 2017-01-01 PROCEDURE — 87205 SMEAR GRAM STAIN: CPT

## 2017-01-01 PROCEDURE — 99231 SBSQ HOSP IP/OBS SF/LOW 25: CPT | Mod: ,,, | Performed by: INTERNAL MEDICINE

## 2017-01-01 PROCEDURE — 84439 ASSAY OF FREE THYROXINE: CPT

## 2017-01-01 PROCEDURE — 88305 TISSUE EXAM BY PATHOLOGIST: CPT | Performed by: PATHOLOGY

## 2017-01-01 PROCEDURE — 82962 GLUCOSE BLOOD TEST: CPT

## 2017-01-01 PROCEDURE — 99213 OFFICE O/P EST LOW 20 MIN: CPT | Mod: PBBFAC,PO | Performed by: FAMILY MEDICINE

## 2017-01-01 PROCEDURE — 96376 TX/PRO/DX INJ SAME DRUG ADON: CPT

## 2017-01-01 PROCEDURE — 83540 ASSAY OF IRON: CPT

## 2017-01-01 PROCEDURE — 25000003 PHARM REV CODE 250

## 2017-01-01 PROCEDURE — 86790 VIRUS ANTIBODY NOS: CPT

## 2017-01-01 PROCEDURE — 99232 SBSQ HOSP IP/OBS MODERATE 35: CPT | Mod: GC,,, | Performed by: HOSPITALIST

## 2017-01-01 PROCEDURE — 36415 COLL VENOUS BLD VENIPUNCTURE: CPT | Mod: PO

## 2017-01-01 PROCEDURE — 94761 N-INVAS EAR/PLS OXIMETRY MLT: CPT

## 2017-01-01 PROCEDURE — 94002 VENT MGMT INPAT INIT DAY: CPT

## 2017-01-01 PROCEDURE — 82140 ASSAY OF AMMONIA: CPT

## 2017-01-01 PROCEDURE — C1729 CATH, DRAINAGE: HCPCS

## 2017-01-01 PROCEDURE — 5A1945Z RESPIRATORY VENTILATION, 24-96 CONSECUTIVE HOURS: ICD-10-PCS | Performed by: EMERGENCY MEDICINE

## 2017-01-01 PROCEDURE — 96372 THER/PROPH/DIAG INJ SC/IM: CPT

## 2017-01-01 PROCEDURE — 4010F ACE/ARB THERAPY RXD/TAKEN: CPT | Mod: ,,, | Performed by: FAMILY MEDICINE

## 2017-01-01 PROCEDURE — 0W9G3ZZ DRAINAGE OF PERITONEAL CAVITY, PERCUTANEOUS APPROACH: ICD-10-PCS | Performed by: RADIOLOGY

## 2017-01-01 PROCEDURE — 87116 MYCOBACTERIA CULTURE: CPT

## 2017-01-01 PROCEDURE — 99214 OFFICE O/P EST MOD 30 MIN: CPT | Mod: S$PBB,,, | Performed by: FAMILY MEDICINE

## 2017-01-01 PROCEDURE — 80061 LIPID PANEL: CPT

## 2017-01-01 PROCEDURE — 99900035 HC TECH TIME PER 15 MIN (STAT)

## 2017-01-01 PROCEDURE — 87076 CULTURE ANAEROBE IDENT EACH: CPT

## 2017-01-01 PROCEDURE — 99220 PR INITIAL OBSERVATION CARE,LEVL III: CPT | Mod: ,,, | Performed by: PHYSICIAN ASSISTANT

## 2017-01-01 PROCEDURE — 99239 HOSP IP/OBS DSCHRG MGMT >30: CPT | Mod: ,,, | Performed by: INTERNAL MEDICINE

## 2017-01-01 PROCEDURE — 83615 LACTATE (LD) (LDH) ENZYME: CPT

## 2017-01-01 PROCEDURE — 84153 ASSAY OF PSA TOTAL: CPT

## 2017-01-01 PROCEDURE — 94003 VENT MGMT INPAT SUBQ DAY: CPT

## 2017-01-01 PROCEDURE — 87205 SMEAR GRAM STAIN: CPT | Mod: 59

## 2017-01-01 PROCEDURE — 82105 ALPHA-FETOPROTEIN SERUM: CPT

## 2017-01-01 PROCEDURE — P9047 ALBUMIN (HUMAN), 25%, 50ML: HCPCS | Performed by: PHYSICIAN ASSISTANT

## 2017-01-01 PROCEDURE — 82330 ASSAY OF CALCIUM: CPT

## 2017-01-01 PROCEDURE — P9047 ALBUMIN (HUMAN), 25%, 50ML: HCPCS | Performed by: STUDENT IN AN ORGANIZED HEALTH CARE EDUCATION/TRAINING PROGRAM

## 2017-01-01 PROCEDURE — 0W9G3ZX DRAINAGE OF PERITONEAL CAVITY, PERCUTANEOUS APPROACH, DIAGNOSTIC: ICD-10-PCS | Performed by: HOSPITALIST

## 2017-01-01 PROCEDURE — 0BH18EZ INSERTION OF ENDOTRACHEAL AIRWAY INTO TRACHEA, VIA NATURAL OR ARTIFICIAL OPENING ENDOSCOPIC: ICD-10-PCS | Performed by: EMERGENCY MEDICINE

## 2017-01-01 PROCEDURE — 99223 1ST HOSP IP/OBS HIGH 75: CPT | Mod: AI,GC,, | Performed by: HOSPITALIST

## 2017-01-01 PROCEDURE — 3074F SYST BP LT 130 MM HG: CPT | Mod: ,,, | Performed by: PHYSICIAN ASSISTANT

## 2017-01-01 PROCEDURE — 81001 URINALYSIS AUTO W/SCOPE: CPT

## 2017-01-01 PROCEDURE — 82042 OTHER SOURCE ALBUMIN QUAN EA: CPT

## 2017-01-01 PROCEDURE — 93306 TTE W/DOPPLER COMPLETE: CPT

## 2017-01-01 PROCEDURE — 99233 SBSQ HOSP IP/OBS HIGH 50: CPT | Mod: GC,,, | Performed by: INTERNAL MEDICINE

## 2017-01-01 PROCEDURE — 99232 SBSQ HOSP IP/OBS MODERATE 35: CPT | Mod: ,,, | Performed by: INTERNAL MEDICINE

## 2017-01-01 PROCEDURE — 84295 ASSAY OF SERUM SODIUM: CPT

## 2017-01-01 PROCEDURE — 93010 ELECTROCARDIOGRAM REPORT: CPT | Mod: 77,,, | Performed by: INTERNAL MEDICINE

## 2017-01-01 PROCEDURE — 83036 HEMOGLOBIN GLYCOSYLATED A1C: CPT

## 2017-01-01 PROCEDURE — 99213 OFFICE O/P EST LOW 20 MIN: CPT | Mod: PBBFAC | Performed by: INTERNAL MEDICINE

## 2017-01-01 PROCEDURE — 63600175 PHARM REV CODE 636 W HCPCS: Performed by: PHYSICIAN ASSISTANT

## 2017-01-01 PROCEDURE — 80048 BASIC METABOLIC PNL TOTAL CA: CPT

## 2017-01-01 PROCEDURE — 96374 THER/PROPH/DIAG INJ IV PUSH: CPT

## 2017-01-01 PROCEDURE — 81003 URINALYSIS AUTO W/O SCOPE: CPT

## 2017-01-01 PROCEDURE — 99233 SBSQ HOSP IP/OBS HIGH 50: CPT | Mod: GC,,, | Performed by: HOSPITALIST

## 2017-01-01 PROCEDURE — 20600001 HC STEP DOWN PRIVATE ROOM

## 2017-01-01 PROCEDURE — 99223 1ST HOSP IP/OBS HIGH 75: CPT | Mod: ,,, | Performed by: INTERNAL MEDICINE

## 2017-01-01 PROCEDURE — 82945 GLUCOSE OTHER FLUID: CPT

## 2017-01-01 PROCEDURE — 96361 HYDRATE IV INFUSION ADD-ON: CPT

## 2017-01-01 PROCEDURE — 31500 INSERT EMERGENCY AIRWAY: CPT | Performed by: ANESTHESIOLOGY

## 2017-01-01 PROCEDURE — 99284 EMERGENCY DEPT VISIT MOD MDM: CPT | Mod: 25

## 2017-01-01 PROCEDURE — 84540 ASSAY OF URINE/UREA-N: CPT

## 2017-01-01 PROCEDURE — 84443 ASSAY THYROID STIM HORMONE: CPT

## 2017-01-01 PROCEDURE — 90792 PSYCH DIAG EVAL W/MED SRVCS: CPT | Mod: GC,,, | Performed by: PSYCHIATRY & NEUROLOGY

## 2017-01-01 PROCEDURE — 99284 EMERGENCY DEPT VISIT MOD MDM: CPT | Mod: ,,, | Performed by: PHYSICIAN ASSISTANT

## 2017-01-01 PROCEDURE — 83880 ASSAY OF NATRIURETIC PEPTIDE: CPT

## 2017-01-01 PROCEDURE — 97162 PT EVAL MOD COMPLEX 30 MIN: CPT

## 2017-01-01 PROCEDURE — 83935 ASSAY OF URINE OSMOLALITY: CPT

## 2017-01-01 PROCEDURE — 99239 HOSP IP/OBS DSCHRG MGMT >30: CPT | Mod: GC,,, | Performed by: HOSPITALIST

## 2017-01-01 PROCEDURE — 99222 1ST HOSP IP/OBS MODERATE 55: CPT | Mod: AI,,, | Performed by: HOSPITALIST

## 2017-01-01 PROCEDURE — G8988 SELF CARE GOAL STATUS: HCPCS | Mod: CJ

## 2017-01-01 PROCEDURE — 99999 PR PBB SHADOW E&M-EST. PATIENT-LVL III: CPT | Mod: PBBFAC,,, | Performed by: INTERNAL MEDICINE

## 2017-01-01 PROCEDURE — 88112 CYTOPATH CELL ENHANCE TECH: CPT | Mod: 26,,, | Performed by: PATHOLOGY

## 2017-01-01 PROCEDURE — 3044F HG A1C LEVEL LT 7.0%: CPT | Mod: ,,, | Performed by: FAMILY MEDICINE

## 2017-01-01 PROCEDURE — 87040 BLOOD CULTURE FOR BACTERIA: CPT

## 2017-01-01 PROCEDURE — 99223 1ST HOSP IP/OBS HIGH 75: CPT | Mod: ,,, | Performed by: PSYCHIATRY & NEUROLOGY

## 2017-01-01 PROCEDURE — 87040 BLOOD CULTURE FOR BACTERIA: CPT | Mod: 59

## 2017-01-01 PROCEDURE — 83605 ASSAY OF LACTIC ACID: CPT | Mod: 91

## 2017-01-01 PROCEDURE — 96365 THER/PROPH/DIAG IV INF INIT: CPT

## 2017-01-01 PROCEDURE — 84300 ASSAY OF URINE SODIUM: CPT

## 2017-01-01 PROCEDURE — 3074F SYST BP LT 130 MM HG: CPT | Mod: ,,, | Performed by: FAMILY MEDICINE

## 2017-01-01 PROCEDURE — 93306 TTE W/DOPPLER COMPLETE: CPT | Mod: PBBFAC,PO | Performed by: INTERNAL MEDICINE

## 2017-01-01 PROCEDURE — 88112 CYTOPATH CELL ENHANCE TECH: CPT | Mod: 26,59,, | Performed by: PATHOLOGY

## 2017-01-01 PROCEDURE — 25500020 PHARM REV CODE 255: Performed by: STUDENT IN AN ORGANIZED HEALTH CARE EDUCATION/TRAINING PROGRAM

## 2017-01-01 PROCEDURE — 99999 PR PBB SHADOW E&M-EST. PATIENT-LVL IV: CPT | Mod: PBBFAC,,, | Performed by: PHYSICIAN ASSISTANT

## 2017-01-01 PROCEDURE — 85027 COMPLETE CBC AUTOMATED: CPT

## 2017-01-01 PROCEDURE — 87522 HEPATITIS C REVRS TRNSCRPJ: CPT

## 2017-01-01 PROCEDURE — 83605 ASSAY OF LACTIC ACID: CPT

## 2017-01-01 PROCEDURE — 84132 ASSAY OF SERUM POTASSIUM: CPT | Mod: 91

## 2017-01-01 PROCEDURE — 86704 HEP B CORE ANTIBODY TOTAL: CPT

## 2017-01-01 PROCEDURE — 96366 THER/PROPH/DIAG IV INF ADDON: CPT

## 2017-01-01 PROCEDURE — A7048 VACUUM DRAIN BOTTLE/TUBE KIT: HCPCS

## 2017-01-01 PROCEDURE — 99233 SBSQ HOSP IP/OBS HIGH 50: CPT | Mod: ,,, | Performed by: PHYSICIAN ASSISTANT

## 2017-01-01 PROCEDURE — 99284 EMERGENCY DEPT VISIT MOD MDM: CPT | Mod: ,,, | Performed by: EMERGENCY MEDICINE

## 2017-01-01 PROCEDURE — 99223 1ST HOSP IP/OBS HIGH 75: CPT | Mod: ,,, | Performed by: CLINICAL NURSE SPECIALIST

## 2017-01-01 PROCEDURE — 43244 EGD VARICES LIGATION: CPT | Performed by: INTERNAL MEDICINE

## 2017-01-01 PROCEDURE — 93010 ELECTROCARDIOGRAM REPORT: CPT | Mod: 76,,, | Performed by: INTERNAL MEDICINE

## 2017-01-01 PROCEDURE — 82803 BLOOD GASES ANY COMBINATION: CPT

## 2017-01-01 PROCEDURE — 84133 ASSAY OF URINE POTASSIUM: CPT

## 2017-01-01 PROCEDURE — 49082 ABD PARACENTESIS: CPT

## 2017-01-01 PROCEDURE — P9047 ALBUMIN (HUMAN), 25%, 50ML: HCPCS | Performed by: INTERNAL MEDICINE

## 2017-01-01 PROCEDURE — 99284 EMERGENCY DEPT VISIT MOD MDM: CPT | Mod: ,,,

## 2017-01-01 PROCEDURE — 82607 VITAMIN B-12: CPT

## 2017-01-01 PROCEDURE — 86580 TB INTRADERMAL TEST: CPT | Performed by: INTERNAL MEDICINE

## 2017-01-01 PROCEDURE — 82962 GLUCOSE BLOOD TEST: CPT | Mod: 91

## 2017-01-01 PROCEDURE — 86850 RBC ANTIBODY SCREEN: CPT

## 2017-01-01 PROCEDURE — 99215 OFFICE O/P EST HI 40 MIN: CPT | Mod: S$PBB,,, | Performed by: INTERNAL MEDICINE

## 2017-01-01 PROCEDURE — P9047 ALBUMIN (HUMAN), 25%, 50ML: HCPCS | Performed by: HOSPITALIST

## 2017-01-01 PROCEDURE — S0030 INJECTION, METRONIDAZOLE: HCPCS | Performed by: EMERGENCY MEDICINE

## 2017-01-01 PROCEDURE — 97530 THERAPEUTIC ACTIVITIES: CPT

## 2017-01-01 PROCEDURE — C9113 INJ PANTOPRAZOLE SODIUM, VIA: HCPCS | Performed by: INTERNAL MEDICINE

## 2017-01-01 PROCEDURE — P9021 RED BLOOD CELLS UNIT: HCPCS

## 2017-01-01 PROCEDURE — 99217 PR OBSERVATION CARE DISCHARGE: CPT | Mod: ,,, | Performed by: INTERNAL MEDICINE

## 2017-01-01 PROCEDURE — 3008F BODY MASS INDEX DOCD: CPT | Mod: ,,, | Performed by: FAMILY MEDICINE

## 2017-01-01 PROCEDURE — 99223 1ST HOSP IP/OBS HIGH 75: CPT | Mod: GC,,, | Performed by: INTERNAL MEDICINE

## 2017-01-01 PROCEDURE — 99222 1ST HOSP IP/OBS MODERATE 55: CPT | Mod: AI,GC,, | Performed by: HOSPITALIST

## 2017-01-01 PROCEDURE — 99238 HOSP IP/OBS DSCHRG MGMT 30/<: CPT | Mod: GC,,, | Performed by: HOSPITALIST

## 2017-01-01 PROCEDURE — 85014 HEMATOCRIT: CPT

## 2017-01-01 PROCEDURE — 86803 HEPATITIS C AB TEST: CPT

## 2017-01-01 PROCEDURE — 99232 SBSQ HOSP IP/OBS MODERATE 35: CPT | Mod: GC,,, | Performed by: INTERNAL MEDICINE

## 2017-01-01 PROCEDURE — 99214 OFFICE O/P EST MOD 30 MIN: CPT | Mod: PBBFAC,25 | Performed by: PHYSICIAN ASSISTANT

## 2017-01-01 PROCEDURE — 87070 CULTURE OTHR SPECIMN AEROBIC: CPT | Mod: 59

## 2017-01-01 PROCEDURE — G8978 MOBILITY CURRENT STATUS: HCPCS | Mod: CJ

## 2017-01-01 PROCEDURE — 84156 ASSAY OF PROTEIN URINE: CPT

## 2017-01-01 PROCEDURE — 82553 CREATINE MB FRACTION: CPT

## 2017-01-01 PROCEDURE — 99204 OFFICE O/P NEW MOD 45 MIN: CPT | Mod: S$PBB,,, | Performed by: FAMILY MEDICINE

## 2017-01-01 PROCEDURE — 25500020 PHARM REV CODE 255: Performed by: INTERNAL MEDICINE

## 2017-01-01 PROCEDURE — 99999 PR PBB SHADOW E&M-EST. PATIENT-LVL IV: CPT | Mod: PBBFAC,,, | Performed by: FAMILY MEDICINE

## 2017-01-01 PROCEDURE — 96367 TX/PROPH/DG ADDL SEQ IV INF: CPT

## 2017-01-01 PROCEDURE — 3075F SYST BP GE 130 - 139MM HG: CPT | Mod: ,,, | Performed by: INTERNAL MEDICINE

## 2017-01-01 PROCEDURE — 99214 OFFICE O/P EST MOD 30 MIN: CPT | Mod: PBBFAC,PO | Performed by: FAMILY MEDICINE

## 2017-01-01 PROCEDURE — 86920 COMPATIBILITY TEST SPIN: CPT

## 2017-01-01 PROCEDURE — 27100111 IR PARACENTESIS WITH IMAGING

## 2017-01-01 PROCEDURE — 27000221 HC OXYGEN, UP TO 24 HOURS

## 2017-01-01 PROCEDURE — 82746 ASSAY OF FOLIC ACID SERUM: CPT

## 2017-01-01 PROCEDURE — 94760 N-INVAS EAR/PLS OXIMETRY 1: CPT

## 2017-01-01 PROCEDURE — 80076 HEPATIC FUNCTION PANEL: CPT

## 2017-01-01 PROCEDURE — 25000003 PHARM REV CODE 250: Performed by: NURSE PRACTITIONER

## 2017-01-01 PROCEDURE — 83615 LACTATE (LD) (LDH) ENZYME: CPT | Mod: 91

## 2017-01-01 PROCEDURE — 99284 EMERGENCY DEPT VISIT MOD MDM: CPT | Mod: GC,,, | Performed by: EMERGENCY MEDICINE

## 2017-01-01 PROCEDURE — 25000003 PHARM REV CODE 250: Performed by: ANESTHESIOLOGY

## 2017-01-01 PROCEDURE — 99291 CRITICAL CARE FIRST HOUR: CPT | Mod: ,,, | Performed by: INTERNAL MEDICINE

## 2017-01-01 PROCEDURE — 36600 WITHDRAWAL OF ARTERIAL BLOOD: CPT

## 2017-01-01 PROCEDURE — 99214 OFFICE O/P EST MOD 30 MIN: CPT | Mod: S$PBB,,, | Performed by: PHYSICIAN ASSISTANT

## 2017-01-01 PROCEDURE — 96375 TX/PRO/DX INJ NEW DRUG ADDON: CPT | Mod: 59

## 2017-01-01 PROCEDURE — 99223 1ST HOSP IP/OBS HIGH 75: CPT | Mod: ,,, | Performed by: PHYSICIAN ASSISTANT

## 2017-01-01 PROCEDURE — 27201022: Performed by: INTERNAL MEDICINE

## 2017-01-01 PROCEDURE — 3079F DIAST BP 80-89 MM HG: CPT | Mod: ,,, | Performed by: INTERNAL MEDICINE

## 2017-01-01 PROCEDURE — 87015 SPECIMEN INFECT AGNT CONCNTJ: CPT

## 2017-01-01 PROCEDURE — 99239 HOSP IP/OBS DSCHRG MGMT >30: CPT | Mod: ,,, | Performed by: PHYSICIAN ASSISTANT

## 2017-01-01 PROCEDURE — 93306 TTE W/DOPPLER COMPLETE: CPT | Mod: 26,,, | Performed by: INTERNAL MEDICINE

## 2017-01-01 PROCEDURE — 97535 SELF CARE MNGMENT TRAINING: CPT

## 2017-01-01 PROCEDURE — 49083 ABD PARACENTESIS W/IMAGING: CPT | Mod: GC,,, | Performed by: RADIOLOGY

## 2017-01-01 PROCEDURE — 86706 HEP B SURFACE ANTIBODY: CPT

## 2017-01-01 PROCEDURE — G8979 MOBILITY GOAL STATUS: HCPCS | Mod: CI

## 2017-01-01 PROCEDURE — 82570 ASSAY OF URINE CREATININE: CPT

## 2017-01-01 PROCEDURE — 86480 TB TEST CELL IMMUN MEASURE: CPT

## 2017-01-01 PROCEDURE — 99233 SBSQ HOSP IP/OBS HIGH 50: CPT | Mod: ,,, | Performed by: INTERNAL MEDICINE

## 2017-01-01 PROCEDURE — 99285 EMERGENCY DEPT VISIT HI MDM: CPT | Mod: ,,, | Performed by: PHYSICIAN ASSISTANT

## 2017-01-01 PROCEDURE — 87902 NFCT AGT GNTYP ALYS HEP C: CPT

## 2017-01-01 PROCEDURE — G8987 SELF CARE CURRENT STATUS: HCPCS | Mod: CK

## 2017-01-01 PROCEDURE — 87340 HEPATITIS B SURFACE AG IA: CPT

## 2017-01-01 PROCEDURE — 63600175 PHARM REV CODE 636 W HCPCS: Performed by: ANESTHESIOLOGY

## 2017-01-01 PROCEDURE — 06L38CZ OCCLUSION OF ESOPHAGEAL VEIN WITH EXTRALUMINAL DEVICE, VIA NATURAL OR ARTIFICIAL OPENING ENDOSCOPIC: ICD-10-PCS | Performed by: INTERNAL MEDICINE

## 2017-01-01 PROCEDURE — 3008F BODY MASS INDEX DOCD: CPT | Mod: ,,, | Performed by: PHYSICIAN ASSISTANT

## 2017-01-01 PROCEDURE — 3078F DIAST BP <80 MM HG: CPT | Mod: ,,, | Performed by: FAMILY MEDICINE

## 2017-01-01 PROCEDURE — 49083 ABD PARACENTESIS W/IMAGING: CPT | Mod: ,,, | Performed by: FAMILY MEDICINE

## 2017-01-01 PROCEDURE — 43244 EGD VARICES LIGATION: CPT | Mod: 22,,, | Performed by: INTERNAL MEDICINE

## 2017-01-01 RX ORDER — IBUPROFEN 200 MG
24 TABLET ORAL
Status: DISCONTINUED | OUTPATIENT
Start: 2017-01-01 | End: 2017-01-01

## 2017-01-01 RX ORDER — IBUPROFEN 200 MG
16 TABLET ORAL
Status: DISCONTINUED | OUTPATIENT
Start: 2017-01-01 | End: 2017-01-01

## 2017-01-01 RX ORDER — POTASSIUM CHLORIDE 20 MEQ/1
20 TABLET, EXTENDED RELEASE ORAL 2 TIMES DAILY
Status: ON HOLD | COMMUNITY
End: 2017-01-01 | Stop reason: HOSPADM

## 2017-01-01 RX ORDER — HYDROCODONE BITARTRATE AND ACETAMINOPHEN 5; 325 MG/1; MG/1
1 TABLET ORAL EVERY 4 HOURS PRN
Status: DISCONTINUED | OUTPATIENT
Start: 2017-01-01 | End: 2017-01-01

## 2017-01-01 RX ORDER — INSULIN ASPART 100 [IU]/ML
3 INJECTION, SOLUTION INTRAVENOUS; SUBCUTANEOUS
Status: DISCONTINUED | OUTPATIENT
Start: 2017-01-01 | End: 2017-01-01 | Stop reason: HOSPADM

## 2017-01-01 RX ORDER — GLIMEPIRIDE 1 MG/1
1 TABLET ORAL
Qty: 60 TABLET | Refills: 0 | Status: ON HOLD | OUTPATIENT
Start: 2017-01-01 | End: 2017-01-01 | Stop reason: HOSPADM

## 2017-01-01 RX ORDER — FUROSEMIDE 20 MG/1
20 TABLET ORAL DAILY
Qty: 7 TABLET | Refills: 0
Start: 2017-01-01 | End: 2017-01-01

## 2017-01-01 RX ORDER — NAPROXEN SODIUM 220 MG/1
81 TABLET, FILM COATED ORAL DAILY
Status: DISCONTINUED | OUTPATIENT
Start: 2017-01-01 | End: 2017-01-01 | Stop reason: HOSPADM

## 2017-01-01 RX ORDER — OXYCODONE HYDROCHLORIDE 5 MG/1
5 TABLET ORAL EVERY 6 HOURS PRN
Status: DISCONTINUED | OUTPATIENT
Start: 2017-01-01 | End: 2017-01-01 | Stop reason: HOSPADM

## 2017-01-01 RX ORDER — ONDANSETRON 2 MG/ML
8 INJECTION INTRAMUSCULAR; INTRAVENOUS EVERY 8 HOURS PRN
Status: DISCONTINUED | OUTPATIENT
Start: 2017-01-01 | End: 2017-01-01 | Stop reason: HOSPADM

## 2017-01-01 RX ORDER — GLUCAGON 1 MG
1 KIT INJECTION
Status: DISCONTINUED | OUTPATIENT
Start: 2017-01-01 | End: 2017-01-01

## 2017-01-01 RX ORDER — LACTULOSE 10 G/15ML
200 SOLUTION ORAL; RECTAL
Status: DISCONTINUED | OUTPATIENT
Start: 2017-01-01 | End: 2017-01-01

## 2017-01-01 RX ORDER — FUROSEMIDE 40 MG/1
40 TABLET ORAL 2 TIMES DAILY
Status: DISCONTINUED | OUTPATIENT
Start: 2017-01-01 | End: 2017-01-01

## 2017-01-01 RX ORDER — CIPROFLOXACIN 500 MG/1
500 TABLET ORAL DAILY
Qty: 30 TABLET | Refills: 3 | Status: ON HOLD | OUTPATIENT
Start: 2017-01-01 | End: 2017-01-01

## 2017-01-01 RX ORDER — LACTULOSE 10 G/15ML
30 SOLUTION ORAL EVERY 6 HOURS
Status: DISCONTINUED | OUTPATIENT
Start: 2017-01-01 | End: 2017-01-01 | Stop reason: HOSPADM

## 2017-01-01 RX ORDER — INSULIN ASPART 100 [IU]/ML
0-5 INJECTION, SOLUTION INTRAVENOUS; SUBCUTANEOUS EVERY 6 HOURS PRN
Status: DISCONTINUED | OUTPATIENT
Start: 2017-01-01 | End: 2017-01-01 | Stop reason: HOSPADM

## 2017-01-01 RX ORDER — CARVEDILOL 3.12 MG/1
6.25 TABLET ORAL 2 TIMES DAILY
Status: DISCONTINUED | OUTPATIENT
Start: 2017-01-01 | End: 2017-01-01 | Stop reason: HOSPADM

## 2017-01-01 RX ORDER — IBUPROFEN 200 MG
16 TABLET ORAL
Status: DISCONTINUED | OUTPATIENT
Start: 2017-01-01 | End: 2017-01-01 | Stop reason: HOSPADM

## 2017-01-01 RX ORDER — HYDRALAZINE HYDROCHLORIDE 25 MG/1
25 TABLET, FILM COATED ORAL EVERY 6 HOURS PRN
Status: DISCONTINUED | OUTPATIENT
Start: 2017-01-01 | End: 2017-01-01 | Stop reason: HOSPADM

## 2017-01-01 RX ORDER — GLIMEPIRIDE 1 MG/1
1 TABLET ORAL
Qty: 90 TABLET | Refills: 3 | Status: ON HOLD | OUTPATIENT
Start: 2017-01-01 | End: 2017-01-01

## 2017-01-01 RX ORDER — LIDOCAINE HYDROCHLORIDE 20 MG/ML
INJECTION, SOLUTION INFILTRATION; PERINEURAL
Status: COMPLETED
Start: 2017-01-01 | End: 2017-01-01

## 2017-01-01 RX ORDER — NOREPINEPHRINE BITARTRATE/D5W 4MG/250ML
0.05 PLASTIC BAG, INJECTION (ML) INTRAVENOUS CONTINUOUS
Status: DISCONTINUED | OUTPATIENT
Start: 2017-01-01 | End: 2017-01-01

## 2017-01-01 RX ORDER — MORPHINE SULFATE 2 MG/ML
1 INJECTION, SOLUTION INTRAMUSCULAR; INTRAVENOUS EVERY 6 HOURS PRN
Status: DISCONTINUED | OUTPATIENT
Start: 2017-01-01 | End: 2017-01-01 | Stop reason: HOSPADM

## 2017-01-01 RX ORDER — HYDROMORPHONE HYDROCHLORIDE 1 MG/ML
1 INJECTION, SOLUTION INTRAMUSCULAR; INTRAVENOUS; SUBCUTANEOUS
Status: COMPLETED | OUTPATIENT
Start: 2017-01-01 | End: 2017-01-01

## 2017-01-01 RX ORDER — LISINOPRIL 20 MG/1
20 TABLET ORAL DAILY
Qty: 90 TABLET | Refills: 3 | Status: SHIPPED | OUTPATIENT
Start: 2017-01-01 | End: 2017-01-01 | Stop reason: SDUPTHER

## 2017-01-01 RX ORDER — ONDANSETRON 2 MG/ML
4 INJECTION INTRAMUSCULAR; INTRAVENOUS EVERY 8 HOURS PRN
Status: DISCONTINUED | OUTPATIENT
Start: 2017-01-01 | End: 2017-01-01 | Stop reason: HOSPADM

## 2017-01-01 RX ORDER — ONDANSETRON 2 MG/ML
4 INJECTION INTRAMUSCULAR; INTRAVENOUS EVERY 12 HOURS PRN
Status: DISCONTINUED | OUTPATIENT
Start: 2017-01-01 | End: 2017-01-01

## 2017-01-01 RX ORDER — ACETAMINOPHEN 325 MG/1
325 TABLET ORAL EVERY 6 HOURS PRN
Status: DISCONTINUED | OUTPATIENT
Start: 2017-01-01 | End: 2017-01-01 | Stop reason: HOSPADM

## 2017-01-01 RX ORDER — CIPROFLOXACIN 500 MG/1
500 TABLET ORAL EVERY 24 HOURS
Status: DISCONTINUED | OUTPATIENT
Start: 2017-01-01 | End: 2017-01-01 | Stop reason: HOSPADM

## 2017-01-01 RX ORDER — ONDANSETRON 2 MG/ML
8 INJECTION INTRAMUSCULAR; INTRAVENOUS EVERY 6 HOURS PRN
Status: DISCONTINUED | OUTPATIENT
Start: 2017-01-01 | End: 2017-12-21 | Stop reason: HOSPADM

## 2017-01-01 RX ORDER — HYDROCODONE BITARTRATE AND ACETAMINOPHEN 5; 325 MG/1; MG/1
1 TABLET ORAL
Status: COMPLETED | OUTPATIENT
Start: 2017-01-01 | End: 2017-01-01

## 2017-01-01 RX ORDER — ACETAMINOPHEN 325 MG/1
650 TABLET ORAL EVERY 8 HOURS PRN
Status: DISCONTINUED | OUTPATIENT
Start: 2017-01-01 | End: 2017-01-01 | Stop reason: HOSPADM

## 2017-01-01 RX ORDER — DEXTROSE 50 % IN WATER (D50W) INTRAVENOUS SYRINGE
12.5
Status: COMPLETED | OUTPATIENT
Start: 2017-01-01 | End: 2017-01-01

## 2017-01-01 RX ORDER — ROCURONIUM BROMIDE 10 MG/ML
90 INJECTION, SOLUTION INTRAVENOUS ONCE
Status: COMPLETED | OUTPATIENT
Start: 2017-01-01 | End: 2017-01-01

## 2017-01-01 RX ORDER — ONDANSETRON 2 MG/ML
4 INJECTION INTRAMUSCULAR; INTRAVENOUS
Status: COMPLETED | OUTPATIENT
Start: 2017-01-01 | End: 2017-01-01

## 2017-01-01 RX ORDER — LACTULOSE 10 G/15ML
15 SOLUTION ORAL 3 TIMES DAILY
Status: DISCONTINUED | OUTPATIENT
Start: 2017-01-01 | End: 2017-01-01

## 2017-01-01 RX ORDER — CARVEDILOL 6.25 MG/1
6.25 TABLET ORAL 2 TIMES DAILY
Status: DISCONTINUED | OUTPATIENT
Start: 2017-01-01 | End: 2017-01-01 | Stop reason: HOSPADM

## 2017-01-01 RX ORDER — METHADONE HYDROCHLORIDE 5 MG/1
TABLET ORAL
Qty: 30 TABLET | Refills: 0 | Status: ON HOLD | OUTPATIENT
Start: 2017-01-01 | End: 2017-01-01

## 2017-01-01 RX ORDER — MORPHINE SULFATE 2 MG/ML
5 INJECTION, SOLUTION INTRAMUSCULAR; INTRAVENOUS ONCE
Status: DISCONTINUED | OUTPATIENT
Start: 2017-01-01 | End: 2017-12-21 | Stop reason: HOSPADM

## 2017-01-01 RX ORDER — HYDROMORPHONE HYDROCHLORIDE 1 MG/ML
0.2 INJECTION, SOLUTION INTRAMUSCULAR; INTRAVENOUS; SUBCUTANEOUS EVERY 6 HOURS PRN
Status: DISCONTINUED | OUTPATIENT
Start: 2017-01-01 | End: 2017-01-01

## 2017-01-01 RX ORDER — LACTULOSE 10 G/15ML
20 SOLUTION ORAL 3 TIMES DAILY
Status: DISCONTINUED | OUTPATIENT
Start: 2017-01-01 | End: 2017-01-01

## 2017-01-01 RX ORDER — HEPARIN SODIUM 5000 [USP'U]/ML
5000 INJECTION, SOLUTION INTRAVENOUS; SUBCUTANEOUS EVERY 8 HOURS
Status: DISCONTINUED | OUTPATIENT
Start: 2017-01-01 | End: 2017-01-01 | Stop reason: HOSPADM

## 2017-01-01 RX ORDER — FUROSEMIDE 20 MG/1
20 TABLET ORAL DAILY
Qty: 7 TABLET | Refills: 0
Start: 2017-01-01 | End: 2017-01-01 | Stop reason: SDUPTHER

## 2017-01-01 RX ORDER — FUROSEMIDE 20 MG/1
20 TABLET ORAL DAILY
Qty: 7 TABLET | Refills: 0 | Status: ON HOLD | OUTPATIENT
Start: 2017-01-01 | End: 2017-01-01

## 2017-01-01 RX ORDER — CIPROFLOXACIN 500 MG/1
500 TABLET ORAL DAILY
Qty: 60 TABLET | Refills: 0 | Status: ON HOLD | OUTPATIENT
Start: 2017-01-01 | End: 2017-01-01 | Stop reason: HOSPADM

## 2017-01-01 RX ORDER — FENTANYL CITRATE 50 UG/ML
50 INJECTION, SOLUTION INTRAMUSCULAR; INTRAVENOUS
Status: DISCONTINUED | OUTPATIENT
Start: 2017-01-01 | End: 2017-01-01

## 2017-01-01 RX ORDER — MORPHINE SULFATE 2 MG/ML
1 INJECTION, SOLUTION INTRAMUSCULAR; INTRAVENOUS
Status: DISCONTINUED | OUTPATIENT
Start: 2017-01-01 | End: 2017-01-01

## 2017-01-01 RX ORDER — LACTULOSE 10 G/15ML
200 SOLUTION ORAL; RECTAL 3 TIMES DAILY
Status: DISCONTINUED | OUTPATIENT
Start: 2017-01-01 | End: 2017-01-01

## 2017-01-01 RX ORDER — METFORMIN HYDROCHLORIDE 500 MG/1
2000 TABLET, EXTENDED RELEASE ORAL
Qty: 360 TABLET | Refills: 3 | Status: SHIPPED | OUTPATIENT
Start: 2017-01-01 | End: 2017-01-01 | Stop reason: SDUPTHER

## 2017-01-01 RX ORDER — ALBUTEROL SULFATE 0.83 MG/ML
10 SOLUTION RESPIRATORY (INHALATION) ONCE
Status: COMPLETED | OUTPATIENT
Start: 2017-01-01 | End: 2017-01-01

## 2017-01-01 RX ORDER — INSULIN ASPART 100 [IU]/ML
5 INJECTION, SOLUTION INTRAVENOUS; SUBCUTANEOUS
Status: DISCONTINUED | OUTPATIENT
Start: 2017-01-01 | End: 2017-01-01 | Stop reason: HOSPADM

## 2017-01-01 RX ORDER — ACETAMINOPHEN 325 MG/1
650 TABLET ORAL EVERY 6 HOURS PRN
Status: DISCONTINUED | OUTPATIENT
Start: 2017-01-01 | End: 2017-01-01 | Stop reason: HOSPADM

## 2017-01-01 RX ORDER — MORPHINE SULFATE 2 MG/ML
2 INJECTION, SOLUTION INTRAMUSCULAR; INTRAVENOUS
Status: DISCONTINUED | OUTPATIENT
Start: 2017-01-01 | End: 2017-12-21 | Stop reason: HOSPADM

## 2017-01-01 RX ORDER — NOREPINEPHRINE BITARTRATE/D5W 4MG/250ML
PLASTIC BAG, INJECTION (ML) INTRAVENOUS
Status: COMPLETED
Start: 2017-01-01 | End: 2017-01-01

## 2017-01-01 RX ORDER — ONDANSETRON 8 MG/1
8 TABLET, ORALLY DISINTEGRATING ORAL EVERY 8 HOURS PRN
Status: DISCONTINUED | OUTPATIENT
Start: 2017-01-01 | End: 2017-01-01

## 2017-01-01 RX ORDER — FUROSEMIDE 20 MG/1
20 TABLET ORAL DAILY
Qty: 30 TABLET | Refills: 1 | Status: ON HOLD
Start: 2017-01-01 | End: 2017-01-01 | Stop reason: HOSPADM

## 2017-01-01 RX ORDER — INSULIN ASPART 100 [IU]/ML
0-5 INJECTION, SOLUTION INTRAVENOUS; SUBCUTANEOUS
Status: DISCONTINUED | OUTPATIENT
Start: 2017-01-01 | End: 2017-01-01 | Stop reason: HOSPADM

## 2017-01-01 RX ORDER — FUROSEMIDE 40 MG/1
40 TABLET ORAL DAILY
Qty: 30 TABLET | Refills: 1 | Status: ON HOLD | OUTPATIENT
Start: 2017-01-01 | End: 2017-01-01 | Stop reason: HOSPADM

## 2017-01-01 RX ORDER — ONDANSETRON 4 MG/1
8 TABLET, ORALLY DISINTEGRATING ORAL EVERY 8 HOURS PRN
Status: DISCONTINUED | OUTPATIENT
Start: 2017-01-01 | End: 2017-01-01 | Stop reason: HOSPADM

## 2017-01-01 RX ORDER — LIDOCAINE HYDROCHLORIDE 10 MG/ML
1 INJECTION INFILTRATION; PERINEURAL ONCE
Status: COMPLETED | OUTPATIENT
Start: 2017-01-01 | End: 2017-01-01

## 2017-01-01 RX ORDER — HALOPERIDOL 5 MG/ML
1 INJECTION INTRAMUSCULAR EVERY 4 HOURS PRN
Status: DISCONTINUED | OUTPATIENT
Start: 2017-01-01 | End: 2017-01-01

## 2017-01-01 RX ORDER — OXYCODONE HYDROCHLORIDE 5 MG/1
5 TABLET ORAL EVERY 6 HOURS PRN
Qty: 15 TABLET | Refills: 0 | Status: SHIPPED | OUTPATIENT
Start: 2017-01-01 | End: 2017-01-01 | Stop reason: SDUPTHER

## 2017-01-01 RX ORDER — LIDOCAINE HYDROCHLORIDE 10 MG/ML
INJECTION INFILTRATION; PERINEURAL
Status: COMPLETED
Start: 2017-01-01 | End: 2017-01-01

## 2017-01-01 RX ORDER — FUROSEMIDE 40 MG/1
40 TABLET ORAL DAILY
Status: ON HOLD | COMMUNITY
End: 2017-01-01

## 2017-01-01 RX ORDER — LISINOPRIL 20 MG/1
20 TABLET ORAL DAILY
Qty: 90 TABLET | Refills: 3 | Status: ON HOLD | OUTPATIENT
Start: 2017-01-01 | End: 2017-01-01

## 2017-01-01 RX ORDER — ACETAMINOPHEN 325 MG/1
650 TABLET ORAL
Status: COMPLETED | OUTPATIENT
Start: 2017-01-01 | End: 2017-01-01

## 2017-01-01 RX ORDER — FUROSEMIDE 10 MG/ML
40 INJECTION INTRAMUSCULAR; INTRAVENOUS 2 TIMES DAILY
Status: DISCONTINUED | OUTPATIENT
Start: 2017-01-01 | End: 2017-01-01

## 2017-01-01 RX ORDER — ONDANSETRON 2 MG/ML
4 INJECTION INTRAMUSCULAR; INTRAVENOUS EVERY 6 HOURS PRN
Status: DISCONTINUED | OUTPATIENT
Start: 2017-01-01 | End: 2017-01-01 | Stop reason: HOSPADM

## 2017-01-01 RX ORDER — FUROSEMIDE 20 MG/1
20 TABLET ORAL DAILY
Status: DISCONTINUED | OUTPATIENT
Start: 2017-01-01 | End: 2017-01-01 | Stop reason: HOSPADM

## 2017-01-01 RX ORDER — GLUCAGON 1 MG
1 KIT INJECTION
Status: DISCONTINUED | OUTPATIENT
Start: 2017-01-01 | End: 2017-01-01 | Stop reason: SDUPTHER

## 2017-01-01 RX ORDER — ONDANSETRON 2 MG/ML
INJECTION INTRAMUSCULAR; INTRAVENOUS
Status: DISPENSED
Start: 2017-01-01 | End: 2017-01-01

## 2017-01-01 RX ORDER — SODIUM CHLORIDE 0.9 % (FLUSH) 0.9 %
3 SYRINGE (ML) INJECTION
Status: DISCONTINUED | OUTPATIENT
Start: 2017-01-01 | End: 2017-12-21 | Stop reason: HOSPADM

## 2017-01-01 RX ORDER — ONDANSETRON 2 MG/ML
4 INJECTION INTRAMUSCULAR; INTRAVENOUS EVERY 6 HOURS PRN
Status: DISCONTINUED | OUTPATIENT
Start: 2017-01-01 | End: 2017-01-01

## 2017-01-01 RX ORDER — ALBUMIN HUMAN 250 G/1000ML
50 SOLUTION INTRAVENOUS ONCE
Status: COMPLETED | OUTPATIENT
Start: 2017-01-01 | End: 2017-01-01

## 2017-01-01 RX ORDER — ONDANSETRON 2 MG/ML
4 INJECTION INTRAMUSCULAR; INTRAVENOUS EVERY 12 HOURS PRN
Status: DISCONTINUED | OUTPATIENT
Start: 2017-01-01 | End: 2017-01-01 | Stop reason: HOSPADM

## 2017-01-01 RX ORDER — ALBUMIN HUMAN 250 G/1000ML
12.5 SOLUTION INTRAVENOUS ONCE
Status: COMPLETED | OUTPATIENT
Start: 2017-01-01 | End: 2017-01-01

## 2017-01-01 RX ORDER — ACETAMINOPHEN 325 MG/1
650 TABLET ORAL EVERY 8 HOURS PRN
Status: DISCONTINUED | OUTPATIENT
Start: 2017-01-01 | End: 2017-01-01

## 2017-01-01 RX ORDER — FUROSEMIDE 10 MG/ML
60 INJECTION INTRAMUSCULAR; INTRAVENOUS ONCE
Status: DISCONTINUED | OUTPATIENT
Start: 2017-01-01 | End: 2017-01-01

## 2017-01-01 RX ORDER — MAG HYDROX/ALUMINUM HYD/SIMETH 200-200-20
30 SUSPENSION, ORAL (FINAL DOSE FORM) ORAL EVERY 6 HOURS PRN
Status: DISCONTINUED | OUTPATIENT
Start: 2017-01-01 | End: 2017-01-01 | Stop reason: HOSPADM

## 2017-01-01 RX ORDER — LISINOPRIL 20 MG/1
20 TABLET ORAL DAILY
Status: DISCONTINUED | OUTPATIENT
Start: 2017-01-01 | End: 2017-01-01

## 2017-01-01 RX ORDER — OXYCODONE HYDROCHLORIDE 5 MG/1
5 TABLET ORAL EVERY 6 HOURS PRN
Qty: 15 TABLET | Refills: 0 | Status: ON HOLD | OUTPATIENT
Start: 2017-01-01 | End: 2017-01-01 | Stop reason: HOSPADM

## 2017-01-01 RX ORDER — OXYCODONE AND ACETAMINOPHEN 5; 325 MG/1; MG/1
1 TABLET ORAL EVERY 6 HOURS PRN
Qty: 15 TABLET | Refills: 0 | OUTPATIENT
Start: 2017-01-01

## 2017-01-01 RX ORDER — IBUPROFEN 200 MG
24 TABLET ORAL
Status: DISCONTINUED | OUTPATIENT
Start: 2017-01-01 | End: 2017-01-01 | Stop reason: HOSPADM

## 2017-01-01 RX ORDER — MORPHINE SULFATE 2 MG/ML
2 INJECTION, SOLUTION INTRAMUSCULAR; INTRAVENOUS
Status: COMPLETED | OUTPATIENT
Start: 2017-01-01 | End: 2017-01-01

## 2017-01-01 RX ORDER — ONDANSETRON 4 MG/1
4 TABLET, ORALLY DISINTEGRATING ORAL ONCE
Status: DISCONTINUED | OUTPATIENT
Start: 2017-01-01 | End: 2017-01-01

## 2017-01-01 RX ORDER — GLUCAGON 1 MG
1 KIT INJECTION
Status: DISCONTINUED | OUTPATIENT
Start: 2017-01-01 | End: 2017-01-01 | Stop reason: HOSPADM

## 2017-01-01 RX ORDER — FUROSEMIDE 10 MG/ML
80 INJECTION INTRAMUSCULAR; INTRAVENOUS ONCE
Status: COMPLETED | OUTPATIENT
Start: 2017-01-01 | End: 2017-01-01

## 2017-01-01 RX ORDER — ALBUTEROL SULFATE 0.83 MG/ML
10 SOLUTION RESPIRATORY (INHALATION)
Status: COMPLETED | OUTPATIENT
Start: 2017-01-01 | End: 2017-01-01

## 2017-01-01 RX ORDER — OXYCODONE AND ACETAMINOPHEN 10; 325 MG/1; MG/1
1 TABLET ORAL ONCE
Status: COMPLETED | OUTPATIENT
Start: 2017-01-01 | End: 2017-01-01

## 2017-01-01 RX ORDER — LIDOCAINE HYDROCHLORIDE AND EPINEPHRINE 20; 10 MG/ML; UG/ML
1 INJECTION, SOLUTION INFILTRATION; PERINEURAL ONCE
Status: DISCONTINUED | OUTPATIENT
Start: 2017-01-01 | End: 2017-01-01

## 2017-01-01 RX ORDER — LIDOCAINE HYDROCHLORIDE AND EPINEPHRINE 10; 10 MG/ML; UG/ML
1 INJECTION, SOLUTION INFILTRATION; PERINEURAL ONCE
Status: COMPLETED | OUTPATIENT
Start: 2017-01-01 | End: 2017-01-01

## 2017-01-01 RX ORDER — CARVEDILOL 6.25 MG/1
6.25 TABLET ORAL 2 TIMES DAILY
Qty: 60 TABLET | Refills: 3 | Status: SHIPPED | OUTPATIENT
Start: 2017-01-01 | End: 2017-01-01 | Stop reason: SDUPTHER

## 2017-01-01 RX ORDER — ALBUMIN HUMAN 250 G/1000ML
25 SOLUTION INTRAVENOUS ONCE
Status: COMPLETED | OUTPATIENT
Start: 2017-01-01 | End: 2017-01-01

## 2017-01-01 RX ORDER — METFORMIN HYDROCHLORIDE 1000 MG/1
1000 TABLET ORAL 2 TIMES DAILY WITH MEALS
Qty: 180 TABLET | Refills: 3 | Status: SHIPPED | OUTPATIENT
Start: 2017-01-01 | End: 2017-01-01 | Stop reason: SDUPTHER

## 2017-01-01 RX ORDER — HEPARIN SODIUM 5000 [USP'U]/ML
5000 INJECTION, SOLUTION INTRAVENOUS; SUBCUTANEOUS EVERY 8 HOURS
Status: DISCONTINUED | OUTPATIENT
Start: 2017-01-01 | End: 2017-01-01

## 2017-01-01 RX ORDER — SCOLOPAMINE TRANSDERMAL SYSTEM 1 MG/1
1 PATCH, EXTENDED RELEASE TRANSDERMAL
Status: DISCONTINUED | OUTPATIENT
Start: 2017-01-01 | End: 2017-12-21 | Stop reason: HOSPADM

## 2017-01-01 RX ORDER — PANTOPRAZOLE SODIUM 40 MG/10ML
80 INJECTION, POWDER, LYOPHILIZED, FOR SOLUTION INTRAVENOUS ONCE
Status: COMPLETED | OUTPATIENT
Start: 2017-01-01 | End: 2017-01-01

## 2017-01-01 RX ORDER — ONDANSETRON 8 MG/1
8 TABLET, ORALLY DISINTEGRATING ORAL EVERY 6 HOURS PRN
Qty: 30 TABLET | Refills: 1 | Status: ON HOLD | OUTPATIENT
Start: 2017-01-01 | End: 2017-01-01

## 2017-01-01 RX ORDER — LACTULOSE 10 G/15ML
30 SOLUTION ORAL EVERY 4 HOURS
Status: DISCONTINUED | OUTPATIENT
Start: 2017-01-01 | End: 2017-01-01

## 2017-01-01 RX ORDER — METFORMIN HYDROCHLORIDE 1000 MG/1
TABLET ORAL
COMMUNITY
Start: 2017-01-01 | End: 2017-01-01

## 2017-01-01 RX ORDER — CIPROFLOXACIN 500 MG/1
500 TABLET ORAL
Qty: 90 TABLET | Refills: 3 | Status: CANCELLED | OUTPATIENT
Start: 2017-01-01

## 2017-01-01 RX ORDER — RISPERIDONE 0.5 MG/1
0.5 TABLET ORAL NIGHTLY
Status: DISCONTINUED | OUTPATIENT
Start: 2017-01-01 | End: 2017-01-01 | Stop reason: HOSPADM

## 2017-01-01 RX ORDER — CIPROFLOXACIN 500 MG/1
500 TABLET ORAL DAILY
Qty: 30 TABLET | Refills: 3 | Status: CANCELLED | OUTPATIENT
Start: 2017-01-01 | End: 2018-03-13

## 2017-01-01 RX ORDER — MORPHINE SULFATE 2 MG/ML
4 INJECTION, SOLUTION INTRAMUSCULAR; INTRAVENOUS EVERY 4 HOURS PRN
Status: DISCONTINUED | OUTPATIENT
Start: 2017-01-01 | End: 2017-01-01

## 2017-01-01 RX ORDER — SULFAMETHOXAZOLE AND TRIMETHOPRIM 800; 160 MG/1; MG/1
TABLET ORAL
Refills: 0 | COMMUNITY
Start: 2016-10-24 | End: 2017-01-01 | Stop reason: ALTCHOICE

## 2017-01-01 RX ORDER — ALBUTEROL SULFATE 0.83 MG/ML
9 SOLUTION RESPIRATORY (INHALATION) EVERY 8 HOURS
Status: DISCONTINUED | OUTPATIENT
Start: 2017-01-01 | End: 2017-01-01

## 2017-01-01 RX ORDER — LACTULOSE 10 G/15ML
20 SOLUTION ORAL 3 TIMES DAILY
Status: DISCONTINUED | OUTPATIENT
Start: 2017-01-01 | End: 2017-01-01 | Stop reason: HOSPADM

## 2017-01-01 RX ORDER — ALBUMIN HUMAN 250 G/1000ML
100 SOLUTION INTRAVENOUS ONCE
Status: COMPLETED | OUTPATIENT
Start: 2017-01-01 | End: 2017-01-01

## 2017-01-01 RX ORDER — GLIMEPIRIDE 1 MG/1
1 TABLET ORAL
Qty: 90 TABLET | Refills: 3 | Status: CANCELLED | OUTPATIENT
Start: 2017-01-01 | End: 2018-11-15

## 2017-01-01 RX ORDER — LACTULOSE 10 G/15ML
30 SOLUTION ORAL 3 TIMES DAILY
Status: DISCONTINUED | OUTPATIENT
Start: 2017-01-01 | End: 2017-01-01 | Stop reason: HOSPADM

## 2017-01-01 RX ORDER — ACETAMINOPHEN 325 MG/1
650 TABLET ORAL EVERY 8 HOURS PRN
Refills: 0 | Status: ON HOLD | COMMUNITY
Start: 2017-01-01 | End: 2017-01-01

## 2017-01-01 RX ORDER — CARVEDILOL 6.25 MG/1
6.25 TABLET ORAL 2 TIMES DAILY
Status: DISCONTINUED | OUTPATIENT
Start: 2017-01-01 | End: 2017-01-01

## 2017-01-01 RX ORDER — CIPROFLOXACIN 500 MG/1
500 TABLET ORAL DAILY
Status: DISCONTINUED | OUTPATIENT
Start: 2017-01-01 | End: 2017-01-01 | Stop reason: HOSPADM

## 2017-01-01 RX ORDER — HYDROCODONE BITARTRATE AND ACETAMINOPHEN 500; 5 MG/1; MG/1
TABLET ORAL
Status: DISCONTINUED | OUTPATIENT
Start: 2017-01-01 | End: 2017-01-01

## 2017-01-01 RX ORDER — ETOMIDATE 2 MG/ML
INJECTION INTRAVENOUS
Status: DISPENSED
Start: 2017-01-01 | End: 2017-01-01

## 2017-01-01 RX ORDER — FUROSEMIDE 10 MG/ML
80 INJECTION INTRAMUSCULAR; INTRAVENOUS
Status: COMPLETED | OUTPATIENT
Start: 2017-01-01 | End: 2017-01-01

## 2017-01-01 RX ORDER — LIDOCAINE HYDROCHLORIDE 10 MG/ML
5 INJECTION INFILTRATION; PERINEURAL ONCE
Status: COMPLETED | OUTPATIENT
Start: 2017-01-01 | End: 2017-01-01

## 2017-01-01 RX ORDER — LACTULOSE 10 G/15ML
15 SOLUTION ORAL 3 TIMES DAILY
Status: DISCONTINUED | OUTPATIENT
Start: 2017-01-01 | End: 2017-01-01 | Stop reason: HOSPADM

## 2017-01-01 RX ORDER — LACTULOSE 10 G/15ML
30 SOLUTION ORAL 3 TIMES DAILY
Status: DISCONTINUED | OUTPATIENT
Start: 2017-01-01 | End: 2017-01-01

## 2017-01-01 RX ORDER — FUROSEMIDE 40 MG/1
40 TABLET ORAL DAILY
Status: DISCONTINUED | OUTPATIENT
Start: 2017-01-01 | End: 2017-01-01 | Stop reason: HOSPADM

## 2017-01-01 RX ORDER — ONDANSETRON 8 MG/1
8 TABLET, ORALLY DISINTEGRATING ORAL EVERY 6 HOURS PRN
Status: DISCONTINUED | OUTPATIENT
Start: 2017-01-01 | End: 2017-01-01 | Stop reason: HOSPADM

## 2017-01-01 RX ORDER — MORPHINE SULFATE 4 MG/ML
4 INJECTION, SOLUTION INTRAMUSCULAR; INTRAVENOUS EVERY 4 HOURS PRN
Status: DISCONTINUED | OUTPATIENT
Start: 2017-01-01 | End: 2017-01-01

## 2017-01-01 RX ORDER — ALBUMIN HUMAN 250 G/1000ML
37.5 SOLUTION INTRAVENOUS ONCE
Status: COMPLETED | OUTPATIENT
Start: 2017-01-01 | End: 2017-01-01

## 2017-01-01 RX ORDER — ALBUMIN HUMAN 250 G/1000ML
1 SOLUTION INTRAVENOUS ONCE
Status: DISCONTINUED | OUTPATIENT
Start: 2017-01-01 | End: 2017-01-01

## 2017-01-01 RX ORDER — MORPHINE SULFATE 4 MG/ML
4 INJECTION, SOLUTION INTRAMUSCULAR; INTRAVENOUS
Status: COMPLETED | OUTPATIENT
Start: 2017-01-01 | End: 2017-01-01

## 2017-01-01 RX ORDER — OXYCODONE HYDROCHLORIDE 5 MG/1
10 TABLET ORAL EVERY 6 HOURS PRN
Status: DISCONTINUED | OUTPATIENT
Start: 2017-01-01 | End: 2017-01-01 | Stop reason: HOSPADM

## 2017-01-01 RX ORDER — FUROSEMIDE 40 MG/1
40 TABLET ORAL DAILY
Qty: 90 TABLET | Refills: 1 | Status: SHIPPED | OUTPATIENT
Start: 2017-01-01 | End: 2017-01-01 | Stop reason: SDUPTHER

## 2017-01-01 RX ORDER — LIDOCAINE HYDROCHLORIDE 10 MG/ML
10 INJECTION INFILTRATION; PERINEURAL ONCE
Status: COMPLETED | OUTPATIENT
Start: 2017-01-01 | End: 2017-01-01

## 2017-01-01 RX ORDER — CHLORHEXIDINE GLUCONATE ORAL RINSE 1.2 MG/ML
15 SOLUTION DENTAL 2 TIMES DAILY
Status: DISCONTINUED | OUTPATIENT
Start: 2017-01-01 | End: 2017-12-21 | Stop reason: HOSPADM

## 2017-01-01 RX ORDER — LACTULOSE 10 G/15ML
20 SOLUTION ORAL; RECTAL 3 TIMES DAILY
Qty: 1892 ML | Refills: 1 | Status: ON HOLD | OUTPATIENT
Start: 2017-01-01 | End: 2017-01-01

## 2017-01-01 RX ORDER — ONDANSETRON 8 MG/1
8 TABLET, ORALLY DISINTEGRATING ORAL EVERY 8 HOURS PRN
Status: DISCONTINUED | OUTPATIENT
Start: 2017-01-01 | End: 2017-01-01 | Stop reason: HOSPADM

## 2017-01-01 RX ORDER — METHADONE HYDROCHLORIDE 5 MG/1
5 TABLET ORAL EVERY 12 HOURS PRN
Qty: 30 TABLET | Refills: 0 | Status: SHIPPED | OUTPATIENT
Start: 2017-01-01 | End: 2017-01-01 | Stop reason: SDUPTHER

## 2017-01-01 RX ORDER — LACTULOSE 10 G/15ML
30 SOLUTION ORAL EVERY 6 HOURS PRN
Status: DISCONTINUED | OUTPATIENT
Start: 2017-01-01 | End: 2017-01-01

## 2017-01-01 RX ORDER — POTASSIUM CHLORIDE 20 MEQ/1
20 TABLET, EXTENDED RELEASE ORAL 2 TIMES DAILY
Status: DISCONTINUED | OUTPATIENT
Start: 2017-01-01 | End: 2017-01-01 | Stop reason: HOSPADM

## 2017-01-01 RX ORDER — FUROSEMIDE 40 MG/1
40 TABLET ORAL DAILY
Qty: 90 TABLET | Refills: 1 | Status: SHIPPED | OUTPATIENT
Start: 2017-01-01 | End: 2017-01-01

## 2017-01-01 RX ORDER — ONDANSETRON 4 MG/1
4 TABLET, ORALLY DISINTEGRATING ORAL EVERY 8 HOURS PRN
Status: DISCONTINUED | OUTPATIENT
Start: 2017-01-01 | End: 2017-01-01

## 2017-01-01 RX ORDER — ACETAMINOPHEN 500 MG
500 TABLET ORAL EVERY 6 HOURS PRN
Status: DISCONTINUED | OUTPATIENT
Start: 2017-01-01 | End: 2017-01-01 | Stop reason: HOSPADM

## 2017-01-01 RX ORDER — LACTULOSE 10 G/15ML
10 SOLUTION ORAL; RECTAL DAILY
Qty: 236 ML | Refills: 0 | Status: ON HOLD | OUTPATIENT
Start: 2017-01-01 | End: 2017-01-01

## 2017-01-01 RX ORDER — MORPHINE SULFATE 2 MG/ML
6 INJECTION, SOLUTION INTRAMUSCULAR; INTRAVENOUS
Status: COMPLETED | OUTPATIENT
Start: 2017-01-01 | End: 2017-01-01

## 2017-01-01 RX ORDER — RAMELTEON 8 MG/1
8 TABLET ORAL NIGHTLY PRN
Status: DISCONTINUED | OUTPATIENT
Start: 2017-01-01 | End: 2017-01-01 | Stop reason: HOSPADM

## 2017-01-01 RX ORDER — MAGNESIUM SULFATE HEPTAHYDRATE 40 MG/ML
2 INJECTION, SOLUTION INTRAVENOUS
Status: COMPLETED | OUTPATIENT
Start: 2017-01-01 | End: 2017-01-01

## 2017-01-01 RX ORDER — CALCIUM CARBONATE 200(500)MG
500 TABLET,CHEWABLE ORAL ONCE
Status: COMPLETED | OUTPATIENT
Start: 2017-01-01 | End: 2017-01-01

## 2017-01-01 RX ORDER — MORPHINE SULFATE 2 MG/ML
2 INJECTION, SOLUTION INTRAMUSCULAR; INTRAVENOUS EVERY 4 HOURS PRN
Status: DISCONTINUED | OUTPATIENT
Start: 2017-01-01 | End: 2017-01-01

## 2017-01-01 RX ORDER — OXYCODONE AND ACETAMINOPHEN 5; 325 MG/1; MG/1
1 TABLET ORAL EVERY 6 HOURS PRN
Qty: 15 TABLET | Refills: 0 | Status: SHIPPED | OUTPATIENT
Start: 2017-01-01 | End: 2017-01-01

## 2017-01-01 RX ORDER — LORAZEPAM 2 MG/ML
2 INJECTION INTRAMUSCULAR
Status: COMPLETED | OUTPATIENT
Start: 2017-01-01 | End: 2017-01-01

## 2017-01-01 RX ORDER — FUROSEMIDE 40 MG/1
40 TABLET ORAL DAILY
Status: DISCONTINUED | OUTPATIENT
Start: 2017-01-01 | End: 2017-01-01

## 2017-01-01 RX ORDER — HYDROMORPHONE HYDROCHLORIDE 1 MG/ML
0.1 INJECTION, SOLUTION INTRAMUSCULAR; INTRAVENOUS; SUBCUTANEOUS ONCE
Status: DISCONTINUED | OUTPATIENT
Start: 2017-01-01 | End: 2017-01-01

## 2017-01-01 RX ORDER — OXYCODONE AND ACETAMINOPHEN 5; 325 MG/1; MG/1
1 TABLET ORAL ONCE
Status: COMPLETED | OUTPATIENT
Start: 2017-01-01 | End: 2017-01-01

## 2017-01-01 RX ORDER — FENTANYL CITRAT/DEXTROSE 5%/PF 100 MCG/10
PATIENT CONTROLLED ANALGESIA SYRINGE INTRAVENOUS CONTINUOUS
Status: DISCONTINUED | OUTPATIENT
Start: 2017-01-01 | End: 2017-01-01

## 2017-01-01 RX ORDER — SULFAMETHOXAZOLE AND TRIMETHOPRIM 400; 80 MG/1; MG/1
1 TABLET ORAL 2 TIMES DAILY
Qty: 60 TABLET | Refills: 3 | Status: ON HOLD | OUTPATIENT
Start: 2017-01-01 | End: 2017-01-01 | Stop reason: HOSPADM

## 2017-01-01 RX ORDER — FUROSEMIDE 20 MG/1
20 TABLET ORAL
Status: DISCONTINUED | OUTPATIENT
Start: 2017-01-01 | End: 2017-01-01

## 2017-01-01 RX ORDER — OXYCODONE HYDROCHLORIDE 5 MG/1
5 TABLET ORAL ONCE
Status: COMPLETED | OUTPATIENT
Start: 2017-01-01 | End: 2017-01-01

## 2017-01-01 RX ORDER — AMOXICILLIN 250 MG
1 CAPSULE ORAL 2 TIMES DAILY
Status: DISCONTINUED | OUTPATIENT
Start: 2017-01-01 | End: 2017-01-01 | Stop reason: HOSPADM

## 2017-01-01 RX ORDER — HEPARIN SODIUM 5000 [USP'U]/ML
5000 INJECTION, SOLUTION INTRAVENOUS; SUBCUTANEOUS EVERY 12 HOURS
Status: DISCONTINUED | OUTPATIENT
Start: 2017-01-01 | End: 2017-01-01 | Stop reason: HOSPADM

## 2017-01-01 RX ORDER — FUROSEMIDE 10 MG/ML
60 INJECTION INTRAMUSCULAR; INTRAVENOUS ONCE
Status: COMPLETED | OUTPATIENT
Start: 2017-01-01 | End: 2017-01-01

## 2017-01-01 RX ORDER — HALOPERIDOL 5 MG/ML
1 INJECTION INTRAMUSCULAR EVERY 4 HOURS PRN
Status: DISCONTINUED | OUTPATIENT
Start: 2017-01-01 | End: 2017-12-21 | Stop reason: HOSPADM

## 2017-01-01 RX ORDER — POLYETHYLENE GLYCOL 3350 17 G/17G
17 POWDER, FOR SOLUTION ORAL 2 TIMES DAILY PRN
Status: DISCONTINUED | OUTPATIENT
Start: 2017-01-01 | End: 2017-01-01 | Stop reason: HOSPADM

## 2017-01-01 RX ORDER — SODIUM CHLORIDE 0.9 % (FLUSH) 0.9 %
3 SYRINGE (ML) INJECTION EVERY 8 HOURS
Status: DISCONTINUED | OUTPATIENT
Start: 2017-01-01 | End: 2017-01-01 | Stop reason: HOSPADM

## 2017-01-01 RX ORDER — LISINOPRIL 20 MG/1
20 TABLET ORAL DAILY
Qty: 90 TABLET | Refills: 3 | Status: ON HOLD
Start: 2017-01-01 | End: 2017-01-01 | Stop reason: HOSPADM

## 2017-01-01 RX ORDER — LACTULOSE 10 G/15ML
200 SOLUTION ORAL; RECTAL ONCE
Status: COMPLETED | OUTPATIENT
Start: 2017-01-01 | End: 2017-01-01

## 2017-01-01 RX ORDER — ALBUMIN HUMAN 250 G/1000ML
25 SOLUTION INTRAVENOUS 3 TIMES DAILY
Status: COMPLETED | OUTPATIENT
Start: 2017-01-01 | End: 2017-01-01

## 2017-01-01 RX ORDER — METOCLOPRAMIDE HYDROCHLORIDE 5 MG/ML
5 INJECTION INTRAMUSCULAR; INTRAVENOUS EVERY 8 HOURS PRN
Status: DISCONTINUED | OUTPATIENT
Start: 2017-01-01 | End: 2017-01-01 | Stop reason: HOSPADM

## 2017-01-01 RX ORDER — METRONIDAZOLE 500 MG/100ML
500 INJECTION, SOLUTION INTRAVENOUS
Status: COMPLETED | OUTPATIENT
Start: 2017-01-01 | End: 2017-01-01

## 2017-01-01 RX ORDER — HYDROCODONE BITARTRATE AND ACETAMINOPHEN 5; 325 MG/1; MG/1
1 TABLET ORAL ONCE
Status: COMPLETED | OUTPATIENT
Start: 2017-01-01 | End: 2017-01-01

## 2017-01-01 RX ORDER — ALBUTEROL SULFATE 0.83 MG/ML
2.5 SOLUTION RESPIRATORY (INHALATION) EVERY 4 HOURS
Status: DISCONTINUED | OUTPATIENT
Start: 2017-01-01 | End: 2017-01-01

## 2017-01-01 RX ORDER — ROCURONIUM BROMIDE 10 MG/ML
INJECTION, SOLUTION INTRAVENOUS
Status: DISPENSED
Start: 2017-01-01 | End: 2017-01-01

## 2017-01-01 RX ORDER — LACTULOSE 10 G/15ML
10 SOLUTION ORAL 3 TIMES DAILY
Status: DISCONTINUED | OUTPATIENT
Start: 2017-01-01 | End: 2017-01-01 | Stop reason: HOSPADM

## 2017-01-01 RX ORDER — MORPHINE SULFATE 2 MG/ML
2 INJECTION, SOLUTION INTRAMUSCULAR; INTRAVENOUS EVERY 4 HOURS PRN
Status: DISCONTINUED | OUTPATIENT
Start: 2017-01-01 | End: 2017-01-01 | Stop reason: ALTCHOICE

## 2017-01-01 RX ORDER — SPIRONOLACTONE 25 MG/1
25 TABLET ORAL DAILY
Status: DISCONTINUED | OUTPATIENT
Start: 2017-01-01 | End: 2017-01-01

## 2017-01-01 RX ORDER — FUROSEMIDE 20 MG/1
20 TABLET ORAL DAILY
Status: DISCONTINUED | OUTPATIENT
Start: 2017-01-01 | End: 2017-01-01

## 2017-01-01 RX ORDER — GLYCOPYRROLATE 1 MG/5ML
1 SOLUTION ORAL 3 TIMES DAILY PRN
Status: DISCONTINUED | OUTPATIENT
Start: 2017-01-01 | End: 2017-12-21 | Stop reason: HOSPADM

## 2017-01-01 RX ORDER — ALBUMIN HUMAN 250 G/1000ML
60 SOLUTION INTRAVENOUS ONCE
Status: COMPLETED | OUTPATIENT
Start: 2017-01-01 | End: 2017-01-01

## 2017-01-01 RX ORDER — LACTULOSE 10 G/15ML
20 SOLUTION ORAL; RECTAL 3 TIMES DAILY
Qty: 5400 ML | Refills: 0 | Status: ON HOLD | OUTPATIENT
Start: 2017-01-01 | End: 2017-01-01 | Stop reason: HOSPADM

## 2017-01-01 RX ORDER — INSULIN ASPART 100 [IU]/ML
0-5 INJECTION, SOLUTION INTRAVENOUS; SUBCUTANEOUS EVERY 6 HOURS PRN
Status: DISCONTINUED | OUTPATIENT
Start: 2017-01-01 | End: 2017-01-01

## 2017-01-01 RX ORDER — METFORMIN HYDROCHLORIDE 1000 MG/1
1000 TABLET ORAL 2 TIMES DAILY WITH MEALS
Qty: 180 TABLET | Refills: 3 | Status: SHIPPED | OUTPATIENT
Start: 2017-01-01 | End: 2017-01-01 | Stop reason: ALTCHOICE

## 2017-01-01 RX ORDER — CIPROFLOXACIN 250 MG/1
500 TABLET, FILM COATED ORAL EVERY 24 HOURS
Status: DISCONTINUED | OUTPATIENT
Start: 2017-01-01 | End: 2017-01-01 | Stop reason: HOSPADM

## 2017-01-01 RX ORDER — POTASSIUM CHLORIDE 20 MEQ/1
20 TABLET, EXTENDED RELEASE ORAL 2 TIMES DAILY
Qty: 180 TABLET | Refills: 1 | Status: SHIPPED | OUTPATIENT
Start: 2017-01-01 | End: 2017-01-01 | Stop reason: SDUPTHER

## 2017-01-01 RX ORDER — INSULIN ASPART 100 [IU]/ML
0-5 INJECTION, SOLUTION INTRAVENOUS; SUBCUTANEOUS
Status: DISCONTINUED | OUTPATIENT
Start: 2017-01-01 | End: 2017-01-01

## 2017-01-01 RX ORDER — PANTOPRAZOLE SODIUM 40 MG/10ML
40 INJECTION, POWDER, LYOPHILIZED, FOR SOLUTION INTRAVENOUS 2 TIMES DAILY
Status: DISCONTINUED | OUTPATIENT
Start: 2017-01-01 | End: 2017-01-01

## 2017-01-01 RX ORDER — METFORMIN HYDROCHLORIDE 500 MG/1
2000 TABLET, EXTENDED RELEASE ORAL
Qty: 360 TABLET | Refills: 1 | Status: SHIPPED | OUTPATIENT
Start: 2017-01-01 | End: 2017-01-01

## 2017-01-01 RX ORDER — SPIRONOLACTONE 25 MG/1
25 TABLET ORAL DAILY
Status: DISCONTINUED | OUTPATIENT
Start: 2017-01-01 | End: 2017-01-01 | Stop reason: HOSPADM

## 2017-01-01 RX ORDER — LACTULOSE 10 G/15ML
20 SOLUTION ORAL; RECTAL 3 TIMES DAILY
Status: DISCONTINUED | OUTPATIENT
Start: 2017-01-01 | End: 2017-01-01

## 2017-01-01 RX ORDER — FUROSEMIDE 10 MG/ML
40 INJECTION INTRAMUSCULAR; INTRAVENOUS ONCE
Status: COMPLETED | OUTPATIENT
Start: 2017-01-01 | End: 2017-01-01

## 2017-01-01 RX ORDER — LORAZEPAM 2 MG/ML
2 INJECTION INTRAMUSCULAR
Status: DISCONTINUED | OUTPATIENT
Start: 2017-01-01 | End: 2017-12-21 | Stop reason: HOSPADM

## 2017-01-01 RX ORDER — ONDANSETRON 4 MG/1
4 TABLET, FILM COATED ORAL EVERY 6 HOURS PRN
Qty: 6 TABLET | Refills: 0 | Status: ON HOLD | OUTPATIENT
Start: 2017-01-01 | End: 2017-01-01

## 2017-01-01 RX ORDER — LIDOCAINE HCL/PF 100 MG/5ML
SYRINGE (ML) INTRAVENOUS
Status: COMPLETED
Start: 2017-01-01 | End: 2017-01-01

## 2017-01-01 RX ORDER — FUROSEMIDE 10 MG/ML
40 INJECTION INTRAMUSCULAR; INTRAVENOUS 2 TIMES DAILY
Status: DISCONTINUED | OUTPATIENT
Start: 2017-01-01 | End: 2017-01-01 | Stop reason: HOSPADM

## 2017-01-01 RX ORDER — TRAMADOL HYDROCHLORIDE 50 MG/1
50 TABLET ORAL EVERY 6 HOURS PRN
Status: DISCONTINUED | OUTPATIENT
Start: 2017-01-01 | End: 2017-01-01 | Stop reason: HOSPADM

## 2017-01-01 RX ORDER — NAPROXEN SODIUM 220 MG/1
81 TABLET, FILM COATED ORAL EVERY OTHER DAY
Status: DISCONTINUED | OUTPATIENT
Start: 2017-01-01 | End: 2017-01-01 | Stop reason: HOSPADM

## 2017-01-01 RX ORDER — LACTULOSE 10 G/15ML
20 SOLUTION ORAL; RECTAL 3 TIMES DAILY
Qty: 236 ML | Refills: 0 | Status: SHIPPED | OUTPATIENT
Start: 2017-01-01 | End: 2017-01-01 | Stop reason: SDUPTHER

## 2017-01-01 RX ORDER — POTASSIUM CHLORIDE 20 MEQ/1
20 TABLET, EXTENDED RELEASE ORAL 2 TIMES DAILY
Qty: 180 TABLET | Refills: 1 | Status: ON HOLD | OUTPATIENT
Start: 2017-01-01 | End: 2017-01-01

## 2017-01-01 RX ORDER — HYDROCODONE BITARTRATE AND ACETAMINOPHEN 5; 325 MG/1; MG/1
1 TABLET ORAL EVERY 6 HOURS PRN
Qty: 30 TABLET | Refills: 0 | Status: ON HOLD | OUTPATIENT
Start: 2017-01-01 | End: 2017-01-01 | Stop reason: HOSPADM

## 2017-01-01 RX ORDER — LACTULOSE 10 G/15ML
30 SOLUTION ORAL EVERY 6 HOURS
Status: DISCONTINUED | OUTPATIENT
Start: 2017-01-01 | End: 2017-01-01

## 2017-01-01 RX ORDER — CARVEDILOL 6.25 MG/1
6.25 TABLET ORAL 2 TIMES DAILY
Qty: 180 TABLET | Refills: 1 | Status: ON HOLD | OUTPATIENT
Start: 2017-01-01 | End: 2017-01-01 | Stop reason: HOSPADM

## 2017-01-01 RX ORDER — ALBUTEROL SULFATE 0.83 MG/ML
9 SOLUTION RESPIRATORY (INHALATION) EVERY 8 HOURS
Status: COMPLETED | OUTPATIENT
Start: 2017-01-01 | End: 2017-01-01

## 2017-01-01 RX ORDER — FUROSEMIDE 40 MG/1
40 TABLET ORAL DAILY
Qty: 7 TABLET | Refills: 0 | Status: SHIPPED | OUTPATIENT
Start: 2017-01-01 | End: 2017-01-01 | Stop reason: SDUPTHER

## 2017-01-01 RX ORDER — LACTULOSE 10 G/15ML
20 SOLUTION ORAL; RECTAL 3 TIMES DAILY
Qty: 1892 ML | Refills: 1 | Status: SHIPPED | OUTPATIENT
Start: 2017-01-01 | End: 2017-01-01 | Stop reason: SDUPTHER

## 2017-01-01 RX ORDER — SPIRONOLACTONE 25 MG/1
25 TABLET ORAL DAILY
Qty: 30 TABLET | Refills: 11 | Status: ON HOLD | OUTPATIENT
Start: 2017-01-01 | End: 2017-01-01 | Stop reason: HOSPADM

## 2017-01-01 RX ORDER — ONDANSETRON 2 MG/ML
8 INJECTION INTRAMUSCULAR; INTRAVENOUS
Status: COMPLETED | OUTPATIENT
Start: 2017-01-01 | End: 2017-01-01

## 2017-01-01 RX ORDER — ALBUMIN HUMAN 250 G/1000ML
90 SOLUTION INTRAVENOUS ONCE
Status: COMPLETED | OUTPATIENT
Start: 2017-01-01 | End: 2017-01-01

## 2017-01-01 RX ORDER — LISINOPRIL 20 MG/1
20 TABLET ORAL DAILY
Qty: 90 TABLET | Refills: 3
Start: 2017-01-01 | End: 2017-01-01

## 2017-01-01 RX ORDER — ALBUMIN HUMAN 250 G/1000ML
12.5 SOLUTION INTRAVENOUS ONCE
Status: DISCONTINUED | OUTPATIENT
Start: 2017-01-01 | End: 2017-01-01

## 2017-01-01 RX ORDER — SODIUM CHLORIDE 0.9 % (FLUSH) 0.9 %
5 SYRINGE (ML) INJECTION
Status: DISCONTINUED | OUTPATIENT
Start: 2017-01-01 | End: 2017-01-01 | Stop reason: HOSPADM

## 2017-01-01 RX ORDER — HYDROCODONE BITARTRATE AND ACETAMINOPHEN 5; 325 MG/1; MG/1
1 TABLET ORAL EVERY 4 HOURS PRN
Status: DISCONTINUED | OUTPATIENT
Start: 2017-01-01 | End: 2017-01-01 | Stop reason: HOSPADM

## 2017-01-01 RX ORDER — METOCLOPRAMIDE HYDROCHLORIDE 5 MG/ML
10 INJECTION INTRAMUSCULAR; INTRAVENOUS ONCE
Status: DISCONTINUED | OUTPATIENT
Start: 2017-01-01 | End: 2017-01-01

## 2017-01-01 RX ORDER — BISACODYL 10 MG
10 SUPPOSITORY, RECTAL RECTAL DAILY
Status: DISCONTINUED | OUTPATIENT
Start: 2017-01-01 | End: 2017-01-01

## 2017-01-01 RX ORDER — FUROSEMIDE 20 MG/1
20 TABLET ORAL DAILY
Qty: 30 TABLET | Refills: 11 | Status: ON HOLD | OUTPATIENT
Start: 2017-01-01 | End: 2017-01-01 | Stop reason: HOSPADM

## 2017-01-01 RX ORDER — ALBUMIN HUMAN 250 G/1000ML
25 SOLUTION INTRAVENOUS
Status: DISCONTINUED | OUTPATIENT
Start: 2017-01-01 | End: 2017-01-01 | Stop reason: HOSPADM

## 2017-01-01 RX ORDER — ACETAMINOPHEN 500 MG
500 TABLET ORAL ONCE
Status: COMPLETED | OUTPATIENT
Start: 2017-01-01 | End: 2017-01-01

## 2017-01-01 RX ORDER — FUROSEMIDE 80 MG/1
80 TABLET ORAL DAILY
Status: DISCONTINUED | OUTPATIENT
Start: 2017-01-01 | End: 2017-01-01 | Stop reason: HOSPADM

## 2017-01-01 RX ORDER — ETOMIDATE 2 MG/ML
20 INJECTION INTRAVENOUS
Status: COMPLETED | OUTPATIENT
Start: 2017-01-01 | End: 2017-01-01

## 2017-01-01 RX ORDER — METFORMIN HYDROCHLORIDE 500 MG/1
500 TABLET, EXTENDED RELEASE ORAL EVERY OTHER DAY
Status: ON HOLD | COMMUNITY
Start: 2017-01-01 | End: 2017-01-01 | Stop reason: HOSPADM

## 2017-01-01 RX ADMIN — RIFAXIMIN 550 MG: 550 TABLET ORAL at 09:10

## 2017-01-01 RX ADMIN — FUROSEMIDE 40 MG: 10 INJECTION, SOLUTION INTRAVENOUS at 03:10

## 2017-01-01 RX ADMIN — INSULIN ASPART 3 UNITS: 100 INJECTION, SOLUTION INTRAVENOUS; SUBCUTANEOUS at 11:11

## 2017-01-01 RX ADMIN — CEFTRIAXONE SODIUM 1 G: 1 INJECTION, POWDER, FOR SOLUTION INTRAMUSCULAR; INTRAVENOUS at 01:11

## 2017-01-01 RX ADMIN — DEXTROSE MONOHYDRATE 12.5 G: 25 INJECTION, SOLUTION INTRAVENOUS at 04:11

## 2017-01-01 RX ADMIN — RAMELTEON 8 MG: 8 TABLET, FILM COATED ORAL at 09:08

## 2017-01-01 RX ADMIN — LACTULOSE 20 G: 20 SOLUTION ORAL at 09:10

## 2017-01-01 RX ADMIN — FUROSEMIDE 40 MG: 40 TABLET ORAL at 06:12

## 2017-01-01 RX ADMIN — HEPARIN SODIUM 5000 UNITS: 5000 INJECTION, SOLUTION INTRAVENOUS; SUBCUTANEOUS at 09:11

## 2017-01-01 RX ADMIN — ACETAMINOPHEN 650 MG: 325 TABLET ORAL at 05:10

## 2017-01-01 RX ADMIN — ALBUMIN (HUMAN) 12.5 G: 25 SOLUTION INTRAVENOUS at 12:08

## 2017-01-01 RX ADMIN — FUROSEMIDE 40 MG: 10 INJECTION, SOLUTION INTRAVENOUS at 10:11

## 2017-01-01 RX ADMIN — SCOPALAMINE 1 PATCH: 1 PATCH, EXTENDED RELEASE TRANSDERMAL at 04:12

## 2017-01-01 RX ADMIN — IOHEXOL 75 ML: 350 INJECTION, SOLUTION INTRAVENOUS at 07:10

## 2017-01-01 RX ADMIN — Medication 3 ML: at 06:08

## 2017-01-01 RX ADMIN — FUROSEMIDE 80 MG: 10 INJECTION, SOLUTION INTRAVENOUS at 04:11

## 2017-01-01 RX ADMIN — HYDROCODONE BITARTRATE AND ACETAMINOPHEN 1 TABLET: 5; 325 TABLET ORAL at 10:10

## 2017-01-01 RX ADMIN — HYDROCODONE BITARTRATE AND ACETAMINOPHEN 1 TABLET: 5; 325 TABLET ORAL at 09:10

## 2017-01-01 RX ADMIN — LACTULOSE 20 G: 20 SOLUTION ORAL at 04:10

## 2017-01-01 RX ADMIN — MAGNESIUM SULFATE IN WATER 2 G: 40 INJECTION, SOLUTION INTRAVENOUS at 12:08

## 2017-01-01 RX ADMIN — LACTULOSE 30 G: 20 SOLUTION ORAL at 09:11

## 2017-01-01 RX ADMIN — INSULIN ASPART 2 UNITS: 100 INJECTION, SOLUTION INTRAVENOUS; SUBCUTANEOUS at 08:10

## 2017-01-01 RX ADMIN — ONDANSETRON 4 MG: 4 TABLET, ORALLY DISINTEGRATING ORAL at 01:11

## 2017-01-01 RX ADMIN — ACETAMINOPHEN 500 MG: 500 TABLET ORAL at 10:12

## 2017-01-01 RX ADMIN — CARVEDILOL 6.25 MG: 3.12 TABLET, FILM COATED ORAL at 10:10

## 2017-01-01 RX ADMIN — LACTULOSE 30 G: 20 SOLUTION ORAL at 10:11

## 2017-01-01 RX ADMIN — INSULIN ASPART 3 UNITS: 100 INJECTION, SOLUTION INTRAVENOUS; SUBCUTANEOUS at 12:11

## 2017-01-01 RX ADMIN — SODIUM CHLORIDE 500 ML: 0.9 INJECTION, SOLUTION INTRAVENOUS at 06:12

## 2017-01-01 RX ADMIN — LISINOPRIL 20 MG: 20 TABLET ORAL at 09:08

## 2017-01-01 RX ADMIN — LACTULOSE 20 G: 20 SOLUTION ORAL at 05:12

## 2017-01-01 RX ADMIN — CEFTRIAXONE 1 G: 1 INJECTION, SOLUTION INTRAVENOUS at 11:10

## 2017-01-01 RX ADMIN — INSULIN ASPART 2 UNITS: 100 INJECTION, SOLUTION INTRAVENOUS; SUBCUTANEOUS at 05:10

## 2017-01-01 RX ADMIN — FUROSEMIDE 20 MG: 20 TABLET ORAL at 05:10

## 2017-01-01 RX ADMIN — ALBUMIN (HUMAN) 100 G: 25 SOLUTION INTRAVENOUS at 12:09

## 2017-01-01 RX ADMIN — CARVEDILOL 6.25 MG: 3.12 TABLET, FILM COATED ORAL at 09:10

## 2017-01-01 RX ADMIN — BISACODYL 10 MG: 10 SUPPOSITORY RECTAL at 05:11

## 2017-01-01 RX ADMIN — LACTULOSE 30 G: 20 SOLUTION ORAL at 01:11

## 2017-01-01 RX ADMIN — MORPHINE SULFATE 2 MG: 2 INJECTION, SOLUTION INTRAMUSCULAR; INTRAVENOUS at 09:10

## 2017-01-01 RX ADMIN — ASPIRIN 81 MG CHEWABLE TABLET 81 MG: 81 TABLET CHEWABLE at 09:08

## 2017-01-01 RX ADMIN — CARVEDILOL 6.25 MG: 3.12 TABLET, FILM COATED ORAL at 11:10

## 2017-01-01 RX ADMIN — LACTULOSE 20 G: 20 SOLUTION ORAL at 02:12

## 2017-01-01 RX ADMIN — INSULIN DETEMIR 5 UNITS: 100 INJECTION, SOLUTION SUBCUTANEOUS at 08:11

## 2017-01-01 RX ADMIN — ACETAMINOPHEN 500 MG: 500 TABLET ORAL at 01:11

## 2017-01-01 RX ADMIN — STANDARDIZED SENNA CONCENTRATE AND DOCUSATE SODIUM 1 TABLET: 8.6; 5 TABLET, FILM COATED ORAL at 08:08

## 2017-01-01 RX ADMIN — LIDOCAINE HYDROCHLORIDE: 10 INJECTION, SOLUTION INFILTRATION; PERINEURAL at 01:11

## 2017-01-01 RX ADMIN — DEXTROSE MONOHYDRATE 25 G: 25 INJECTION, SOLUTION INTRAVENOUS at 11:12

## 2017-01-01 RX ADMIN — ASPIRIN 81 MG CHEWABLE TABLET 81 MG: 81 TABLET CHEWABLE at 10:10

## 2017-01-01 RX ADMIN — ASPIRIN 81 MG CHEWABLE TABLET 81 MG: 81 TABLET CHEWABLE at 08:12

## 2017-01-01 RX ADMIN — ALBUMIN (HUMAN) 25 G: 25 SOLUTION INTRAVENOUS at 12:11

## 2017-01-01 RX ADMIN — Medication 50 MCG/HR: at 02:12

## 2017-01-01 RX ADMIN — ALBUMIN (HUMAN) 25 G: 12.5 SOLUTION INTRAVENOUS at 09:08

## 2017-01-01 RX ADMIN — ONDANSETRON 4 MG: 2 INJECTION INTRAMUSCULAR; INTRAVENOUS at 06:12

## 2017-01-01 RX ADMIN — HYDROCODONE BITARTRATE AND ACETAMINOPHEN 1 TABLET: 5; 325 TABLET ORAL at 08:10

## 2017-01-01 RX ADMIN — LACTULOSE 15 G: 20 SOLUTION ORAL at 09:10

## 2017-01-01 RX ADMIN — INSULIN ASPART 2 UNITS: 100 INJECTION, SOLUTION INTRAVENOUS; SUBCUTANEOUS at 07:11

## 2017-01-01 RX ADMIN — ALBUMIN (HUMAN): 25 SOLUTION INTRAVENOUS at 10:10

## 2017-01-01 RX ADMIN — INSULIN HUMAN 8.12 UNITS: 100 INJECTION, SOLUTION PARENTERAL at 07:12

## 2017-01-01 RX ADMIN — INSULIN ASPART 3 UNITS: 100 INJECTION, SOLUTION INTRAVENOUS; SUBCUTANEOUS at 04:11

## 2017-01-01 RX ADMIN — MORPHINE SULFATE 1 MG: 2 INJECTION, SOLUTION INTRAMUSCULAR; INTRAVENOUS at 05:12

## 2017-01-01 RX ADMIN — LACTULOSE 15 G: 20 SOLUTION ORAL at 01:10

## 2017-01-01 RX ADMIN — CARVEDILOL 6.25 MG: 6.25 TABLET, FILM COATED ORAL at 01:10

## 2017-01-01 RX ADMIN — INSULIN ASPART 3 UNITS: 100 INJECTION, SOLUTION INTRAVENOUS; SUBCUTANEOUS at 01:11

## 2017-01-01 RX ADMIN — PANTOPRAZOLE SODIUM 80 MG: 40 INJECTION, POWDER, FOR SOLUTION INTRAVENOUS at 09:12

## 2017-01-01 RX ADMIN — ONDANSETRON 4 MG: 2 INJECTION INTRAMUSCULAR; INTRAVENOUS at 04:11

## 2017-01-01 RX ADMIN — ASPIRIN 81 MG CHEWABLE TABLET 81 MG: 81 TABLET CHEWABLE at 09:10

## 2017-01-01 RX ADMIN — ALBUMIN (HUMAN) 12.5 G: 12.5 SOLUTION INTRAVENOUS at 12:12

## 2017-01-01 RX ADMIN — CIPROFLOXACIN HYDROCHLORIDE 500 MG: 500 TABLET, FILM COATED ORAL at 08:12

## 2017-01-01 RX ADMIN — LACTULOSE 30 G: 20 SOLUTION ORAL at 06:11

## 2017-01-01 RX ADMIN — MORPHINE SULFATE 1 MG/HR: 10 INJECTION INTRAVENOUS at 02:12

## 2017-01-01 RX ADMIN — OXYCODONE HYDROCHLORIDE 5 MG: 5 TABLET ORAL at 08:08

## 2017-01-01 RX ADMIN — HEPARIN SODIUM 5000 UNITS: 5000 INJECTION, SOLUTION INTRAVENOUS; SUBCUTANEOUS at 10:11

## 2017-01-01 RX ADMIN — LACTULOSE 15 G: 20 SOLUTION ORAL at 02:10

## 2017-01-01 RX ADMIN — INSULIN HUMAN 8.12 UNITS: 100 INJECTION, SOLUTION PARENTERAL at 09:12

## 2017-01-01 RX ADMIN — CALCIUM GLUCONATE 1 G: 94 INJECTION, SOLUTION INTRAVENOUS at 04:11

## 2017-01-01 RX ADMIN — LACTULOSE 200 G: 10 SOLUTION ORAL at 06:11

## 2017-01-01 RX ADMIN — RIFAXIMIN 550 MG: 550 TABLET ORAL at 10:12

## 2017-01-01 RX ADMIN — INSULIN ASPART 3 UNITS: 100 INJECTION, SOLUTION INTRAVENOUS; SUBCUTANEOUS at 07:11

## 2017-01-01 RX ADMIN — CIPROFLOXACIN HYDROCHLORIDE 500 MG: 250 TABLET, FILM COATED ORAL at 12:11

## 2017-01-01 RX ADMIN — HEPARIN SODIUM 5000 UNITS: 5000 INJECTION, SOLUTION INTRAVENOUS; SUBCUTANEOUS at 08:11

## 2017-01-01 RX ADMIN — INSULIN ASPART 2 UNITS: 100 INJECTION, SOLUTION INTRAVENOUS; SUBCUTANEOUS at 11:10

## 2017-01-01 RX ADMIN — HYDROCODONE BITARTRATE AND ACETAMINOPHEN 1 TABLET: 5; 325 TABLET ORAL at 05:10

## 2017-01-01 RX ADMIN — FUROSEMIDE 80 MG: 80 TABLET ORAL at 10:12

## 2017-01-01 RX ADMIN — LORAZEPAM 2 MG: 2 INJECTION, SOLUTION INTRAMUSCULAR; INTRAVENOUS at 02:12

## 2017-01-01 RX ADMIN — DEXTROSE MONOHYDRATE 25 G: 25 INJECTION, SOLUTION INTRAVENOUS at 09:11

## 2017-01-01 RX ADMIN — ALBUTEROL SULFATE 10 MG: 2.5 SOLUTION RESPIRATORY (INHALATION) at 04:11

## 2017-01-01 RX ADMIN — LACTULOSE 20 G: 20 SOLUTION ORAL at 05:10

## 2017-01-01 RX ADMIN — Medication 100 MCG/HR: at 03:12

## 2017-01-01 RX ADMIN — STANDARDIZED SENNA CONCENTRATE AND DOCUSATE SODIUM 1 TABLET: 8.6; 5 TABLET, FILM COATED ORAL at 11:08

## 2017-01-01 RX ADMIN — HYDROCODONE BITARTRATE AND ACETAMINOPHEN 1 TABLET: 5; 325 TABLET ORAL at 01:10

## 2017-01-01 RX ADMIN — ALBUMIN (HUMAN) 50 G: 25 SOLUTION INTRAVENOUS at 04:10

## 2017-01-01 RX ADMIN — LACTULOSE 15 G: 20 SOLUTION ORAL at 10:10

## 2017-01-01 RX ADMIN — ALBUTEROL SULFATE 10 MG: 2.5 SOLUTION RESPIRATORY (INHALATION) at 05:11

## 2017-01-01 RX ADMIN — FUROSEMIDE 40 MG: 10 INJECTION, SOLUTION INTRAVENOUS at 05:08

## 2017-01-01 RX ADMIN — LACTULOSE 30 G: 20 SOLUTION ORAL at 05:11

## 2017-01-01 RX ADMIN — RIFAXIMIN 550 MG: 550 TABLET ORAL at 10:10

## 2017-01-01 RX ADMIN — ALBUMIN (HUMAN) 90 G: 12.5 SOLUTION INTRAVENOUS at 03:12

## 2017-01-01 RX ADMIN — LACTULOSE 10 G: 20 SOLUTION ORAL at 09:12

## 2017-01-01 RX ADMIN — LACTULOSE 20 G: 20 SOLUTION ORAL at 02:10

## 2017-01-01 RX ADMIN — LIDOCAINE HYDROCHLORIDE AND EPINEPHRINE 1 ML: 10; 10 INJECTION, SOLUTION INFILTRATION; PERINEURAL at 02:11

## 2017-01-01 RX ADMIN — PIPERACILLIN SODIUM,TAZOBACTAM SODIUM 4.5 G: 4; .5 INJECTION, POWDER, FOR SOLUTION INTRAVENOUS at 05:12

## 2017-01-01 RX ADMIN — LIDOCAINE HYDROCHLORIDE 5 ML: 10 INJECTION, SOLUTION INFILTRATION; PERINEURAL at 12:11

## 2017-01-01 RX ADMIN — INSULIN ASPART 1 UNITS: 100 INJECTION, SOLUTION INTRAVENOUS; SUBCUTANEOUS at 09:10

## 2017-01-01 RX ADMIN — MORPHINE SULFATE 1 MG: 2 INJECTION, SOLUTION INTRAMUSCULAR; INTRAVENOUS at 12:12

## 2017-01-01 RX ADMIN — HEPARIN SODIUM 5000 UNITS: 5000 INJECTION, SOLUTION INTRAVENOUS; SUBCUTANEOUS at 06:10

## 2017-01-01 RX ADMIN — SODIUM CHLORIDE 1000 ML: 0.9 INJECTION, SOLUTION INTRAVENOUS at 05:12

## 2017-01-01 RX ADMIN — INSULIN ASPART 2 UNITS: 100 INJECTION, SOLUTION INTRAVENOUS; SUBCUTANEOUS at 12:11

## 2017-01-01 RX ADMIN — OXYCODONE AND ACETAMINOPHEN 1 TABLET: 10; 325 TABLET ORAL at 08:12

## 2017-01-01 RX ADMIN — RIFAXIMIN 200 MG: 200 TABLET ORAL at 09:12

## 2017-01-01 RX ADMIN — LACTULOSE 15 G: 20 SOLUTION ORAL at 05:10

## 2017-01-01 RX ADMIN — SODIUM CHLORIDE 1000 ML: 0.9 INJECTION, SOLUTION INTRAVENOUS at 08:12

## 2017-01-01 RX ADMIN — PANTOPRAZOLE SODIUM 40 MG: 40 INJECTION, POWDER, FOR SOLUTION INTRAVENOUS at 09:12

## 2017-01-01 RX ADMIN — INSULIN ASPART 2 UNITS: 100 INJECTION, SOLUTION INTRAVENOUS; SUBCUTANEOUS at 12:08

## 2017-01-01 RX ADMIN — LIDOCAINE HYDROCHLORIDE 10 ML: 10 INJECTION, SOLUTION INFILTRATION; PERINEURAL at 10:12

## 2017-01-01 RX ADMIN — ONDANSETRON 4 MG: 2 INJECTION INTRAMUSCULAR; INTRAVENOUS at 02:10

## 2017-01-01 RX ADMIN — INSULIN ASPART 3 UNITS: 100 INJECTION, SOLUTION INTRAVENOUS; SUBCUTANEOUS at 05:11

## 2017-01-01 RX ADMIN — SODIUM POLYSTYRENE SULFONATE 30 G: 15 SUSPENSION ORAL; RECTAL at 06:11

## 2017-01-01 RX ADMIN — ONDANSETRON 8 MG: 2 INJECTION INTRAMUSCULAR; INTRAVENOUS at 01:12

## 2017-01-01 RX ADMIN — Medication 3 ML: at 09:08

## 2017-01-01 RX ADMIN — INSULIN DETEMIR 5 UNITS: 100 INJECTION, SOLUTION SUBCUTANEOUS at 09:11

## 2017-01-01 RX ADMIN — MORPHINE SULFATE 4 MG: 4 INJECTION INTRAVENOUS at 10:11

## 2017-01-01 RX ADMIN — Medication 3 ML: at 10:08

## 2017-01-01 RX ADMIN — CARVEDILOL 6.25 MG: 3.12 TABLET, FILM COATED ORAL at 08:10

## 2017-01-01 RX ADMIN — CEFTRIAXONE 2 G: 2 INJECTION, SOLUTION INTRAVENOUS at 03:10

## 2017-01-01 RX ADMIN — RIFAXIMIN 550 MG: 550 TABLET ORAL at 11:10

## 2017-01-01 RX ADMIN — CARVEDILOL 6.25 MG: 6.25 TABLET, FILM COATED ORAL at 09:10

## 2017-01-01 RX ADMIN — RIFAXIMIN 550 MG: 550 TABLET ORAL at 08:10

## 2017-01-01 RX ADMIN — MORPHINE SULFATE 4 MG: 4 INJECTION, SOLUTION INTRAMUSCULAR; INTRAVENOUS at 08:12

## 2017-01-01 RX ADMIN — INSULIN ASPART 2 UNITS: 100 INJECTION, SOLUTION INTRAVENOUS; SUBCUTANEOUS at 01:11

## 2017-01-01 RX ADMIN — MORPHINE SULFATE 4 MG: 4 INJECTION INTRAVENOUS at 06:12

## 2017-01-01 RX ADMIN — ONDANSETRON 4 MG: 2 INJECTION INTRAMUSCULAR; INTRAVENOUS at 05:11

## 2017-01-01 RX ADMIN — FUROSEMIDE 20 MG: 20 TABLET ORAL at 08:10

## 2017-01-01 RX ADMIN — HYDROCODONE BITARTRATE AND ACETAMINOPHEN 1 TABLET: 5; 325 TABLET ORAL at 02:11

## 2017-01-01 RX ADMIN — SODIUM POLYSTYRENE SULFONATE 30 G: 15 SUSPENSION ORAL; RECTAL at 02:12

## 2017-01-01 RX ADMIN — INSULIN ASPART 3 UNITS: 100 INJECTION, SOLUTION INTRAVENOUS; SUBCUTANEOUS at 12:10

## 2017-01-01 RX ADMIN — INSULIN ASPART 5 UNITS: 100 INJECTION, SOLUTION INTRAVENOUS; SUBCUTANEOUS at 01:11

## 2017-01-01 RX ADMIN — CEFTRIAXONE 1 G: 1 INJECTION, SOLUTION INTRAVENOUS at 12:10

## 2017-01-01 RX ADMIN — ALBUTEROL SULFATE 9 MG: 2.5 SOLUTION RESPIRATORY (INHALATION) at 04:11

## 2017-01-01 RX ADMIN — OXYCODONE HYDROCHLORIDE 10 MG: 5 TABLET ORAL at 04:08

## 2017-01-01 RX ADMIN — OCTREOTIDE ACETATE 50 MCG/HR: 1000 INJECTION, SOLUTION INTRAVENOUS; SUBCUTANEOUS at 09:12

## 2017-01-01 RX ADMIN — INSULIN ASPART 3 UNITS: 100 INJECTION, SOLUTION INTRAVENOUS; SUBCUTANEOUS at 08:11

## 2017-01-01 RX ADMIN — LACTULOSE 20 G: 20 SOLUTION ORAL at 01:10

## 2017-01-01 RX ADMIN — ONDANSETRON 4 MG: 2 INJECTION INTRAMUSCULAR; INTRAVENOUS at 03:11

## 2017-01-01 RX ADMIN — FUROSEMIDE 20 MG: 20 TABLET ORAL at 09:10

## 2017-01-01 RX ADMIN — Medication 0.05 MCG/KG/MIN: at 01:12

## 2017-01-01 RX ADMIN — SODIUM CHLORIDE 500 ML: 0.9 INJECTION, SOLUTION INTRAVENOUS at 05:12

## 2017-01-01 RX ADMIN — HEPARIN SODIUM 5000 UNITS: 5000 INJECTION, SOLUTION INTRAVENOUS; SUBCUTANEOUS at 06:12

## 2017-01-01 RX ADMIN — SODIUM POLYSTYRENE SULFONATE 15 G: 15 SUSPENSION ORAL; RECTAL at 04:12

## 2017-01-01 RX ADMIN — Medication 3 ML: at 01:08

## 2017-01-01 RX ADMIN — ASPIRIN 81 MG CHEWABLE TABLET 81 MG: 81 TABLET CHEWABLE at 11:08

## 2017-01-01 RX ADMIN — OXYCODONE HYDROCHLORIDE 5 MG: 5 TABLET ORAL at 09:10

## 2017-01-01 RX ADMIN — FUROSEMIDE 40 MG: 10 INJECTION, SOLUTION INTRAVENOUS at 09:08

## 2017-01-01 RX ADMIN — LACTULOSE 20 G: 20 SOLUTION ORAL at 01:11

## 2017-01-01 RX ADMIN — CARVEDILOL 6.25 MG: 6.25 TABLET, FILM COATED ORAL at 09:08

## 2017-01-01 RX ADMIN — METRONIDAZOLE 500 MG: 500 INJECTION, SOLUTION INTRAVENOUS at 10:11

## 2017-01-01 RX ADMIN — FUROSEMIDE 20 MG: 20 TABLET ORAL at 11:10

## 2017-01-01 RX ADMIN — INSULIN ASPART 1 UNITS: 100 INJECTION, SOLUTION INTRAVENOUS; SUBCUTANEOUS at 09:08

## 2017-01-01 RX ADMIN — LORAZEPAM 2 MG: 2 INJECTION, SOLUTION INTRAMUSCULAR; INTRAVENOUS at 04:12

## 2017-01-01 RX ADMIN — INSULIN ASPART 2 UNITS: 100 INJECTION, SOLUTION INTRAVENOUS; SUBCUTANEOUS at 04:11

## 2017-01-01 RX ADMIN — ALBUMIN (HUMAN) 25 G: 12.5 SOLUTION INTRAVENOUS at 04:11

## 2017-01-01 RX ADMIN — LACTULOSE 20 G: 20 SOLUTION ORAL at 09:12

## 2017-01-01 RX ADMIN — LIDOCAINE HYDROCHLORIDE: 20 INJECTION, SOLUTION INFILTRATION; PERINEURAL at 03:11

## 2017-01-01 RX ADMIN — INSULIN ASPART 5 UNITS: 100 INJECTION, SOLUTION INTRAVENOUS; SUBCUTANEOUS at 09:11

## 2017-01-01 RX ADMIN — DEXTROSE MONOHYDRATE 25 G: 25 INJECTION, SOLUTION INTRAVENOUS at 03:12

## 2017-01-01 RX ADMIN — LACTULOSE 15 G: 20 SOLUTION ORAL at 04:12

## 2017-01-01 RX ADMIN — LACTULOSE 20 G: 20 SOLUTION ORAL at 05:11

## 2017-01-01 RX ADMIN — SPIRONOLACTONE 25 MG: 25 TABLET, FILM COATED ORAL at 08:10

## 2017-01-01 RX ADMIN — STANDARDIZED SENNA CONCENTRATE AND DOCUSATE SODIUM 1 TABLET: 8.6; 5 TABLET, FILM COATED ORAL at 09:08

## 2017-01-01 RX ADMIN — ACETAMINOPHEN 650 MG: 325 TABLET ORAL at 05:12

## 2017-01-01 RX ADMIN — INSULIN HUMAN 10 UNITS: 100 INJECTION, SOLUTION PARENTERAL at 03:12

## 2017-01-01 RX ADMIN — OXYCODONE HYDROCHLORIDE AND ACETAMINOPHEN 1 TABLET: 5; 325 TABLET ORAL at 04:12

## 2017-01-01 RX ADMIN — ETOMIDATE 20 MG: 2 INJECTION, SOLUTION INTRAVENOUS at 01:12

## 2017-01-01 RX ADMIN — INSULIN DETEMIR 10 UNITS: 100 INJECTION, SOLUTION SUBCUTANEOUS at 06:11

## 2017-01-01 RX ADMIN — FUROSEMIDE 40 MG: 40 TABLET ORAL at 08:12

## 2017-01-01 RX ADMIN — INSULIN ASPART 1 UNITS: 100 INJECTION, SOLUTION INTRAVENOUS; SUBCUTANEOUS at 09:11

## 2017-01-01 RX ADMIN — SODIUM POLYSTYRENE SULFONATE 30 G: 15 SUSPENSION ORAL; RECTAL at 10:12

## 2017-01-01 RX ADMIN — POTASSIUM CHLORIDE 20 MEQ: 1500 TABLET, EXTENDED RELEASE ORAL at 08:12

## 2017-01-01 RX ADMIN — INSULIN ASPART 2 UNITS: 100 INJECTION, SOLUTION INTRAVENOUS; SUBCUTANEOUS at 01:08

## 2017-01-01 RX ADMIN — CALCIUM GLUCONATE 1 G: 94 INJECTION, SOLUTION INTRAVENOUS at 09:11

## 2017-01-01 RX ADMIN — CIPROFLOXACIN HYDROCHLORIDE 500 MG: 500 TABLET, FILM COATED ORAL at 10:12

## 2017-01-01 RX ADMIN — CIPROFLOXACIN HYDROCHLORIDE 500 MG: 250 TABLET, FILM COATED ORAL at 10:11

## 2017-01-01 RX ADMIN — ALBUMIN (HUMAN) 25 G: 12.5 SOLUTION INTRAVENOUS at 06:08

## 2017-01-01 RX ADMIN — INSULIN HUMAN 9.07 UNITS: 100 INJECTION, SOLUTION PARENTERAL at 11:12

## 2017-01-01 RX ADMIN — HYDROCODONE BITARTRATE AND ACETAMINOPHEN 1 TABLET: 5; 325 TABLET ORAL at 02:12

## 2017-01-01 RX ADMIN — ALBUTEROL SULFATE 9 MG: 2.5 SOLUTION RESPIRATORY (INHALATION) at 01:11

## 2017-01-01 RX ADMIN — LACTULOSE 200 G: 10 SOLUTION ORAL at 04:11

## 2017-01-01 RX ADMIN — TRAMADOL HYDROCHLORIDE 50 MG: 50 TABLET, COATED ORAL at 11:12

## 2017-01-01 RX ADMIN — ALBUMIN (HUMAN) 50 G: 12.5 SOLUTION INTRAVENOUS at 02:10

## 2017-01-01 RX ADMIN — OXYCODONE HYDROCHLORIDE 5 MG: 5 TABLET ORAL at 10:08

## 2017-01-01 RX ADMIN — INSULIN ASPART 5 UNITS: 100 INJECTION, SOLUTION INTRAVENOUS; SUBCUTANEOUS at 06:11

## 2017-01-01 RX ADMIN — INSULIN ASPART 1 UNITS: 100 INJECTION, SOLUTION INTRAVENOUS; SUBCUTANEOUS at 10:10

## 2017-01-01 RX ADMIN — IOHEXOL 15 ML: 350 INJECTION, SOLUTION INTRAVENOUS at 03:10

## 2017-01-01 RX ADMIN — OCTREOTIDE ACETATE 50 MCG/HR: 1000 INJECTION, SOLUTION INTRAVENOUS; SUBCUTANEOUS at 07:12

## 2017-01-01 RX ADMIN — ONDANSETRON 4 MG: 2 INJECTION INTRAMUSCULAR; INTRAVENOUS at 10:11

## 2017-01-01 RX ADMIN — LACTULOSE 10 G: 20 SOLUTION ORAL at 05:12

## 2017-01-01 RX ADMIN — ACETAMINOPHEN 650 MG: 325 TABLET ORAL at 10:10

## 2017-01-01 RX ADMIN — PROMETHAZINE HYDROCHLORIDE 25 MG: 25 INJECTION INTRAMUSCULAR; INTRAVENOUS at 11:11

## 2017-01-01 RX ADMIN — ACETAMINOPHEN 650 MG: 325 TABLET ORAL at 04:10

## 2017-01-01 RX ADMIN — LIDOCAINE HYDROCHLORIDE 100 MG: 20 INJECTION, SOLUTION INTRAVENOUS at 10:10

## 2017-01-01 RX ADMIN — ALBUTEROL SULFATE 9 MG: 2.5 SOLUTION RESPIRATORY (INHALATION) at 10:11

## 2017-01-01 RX ADMIN — RIFAXIMIN 550 MG: 550 TABLET ORAL at 09:12

## 2017-01-01 RX ADMIN — MAGNESIUM SULFATE IN WATER 2 G: 40 INJECTION, SOLUTION INTRAVENOUS at 09:08

## 2017-01-01 RX ADMIN — DEXTROSE MONOHYDRATE 25 G: 25 INJECTION, SOLUTION INTRAVENOUS at 09:12

## 2017-01-01 RX ADMIN — ALBUMIN (HUMAN) 25 G: 25 SOLUTION INTRAVENOUS at 12:08

## 2017-01-01 RX ADMIN — ALBUTEROL SULFATE 10 MG: 2.5 SOLUTION RESPIRATORY (INHALATION) at 02:11

## 2017-01-01 RX ADMIN — ALBUMIN (HUMAN) 60 G: 12.5 SOLUTION INTRAVENOUS at 04:12

## 2017-01-01 RX ADMIN — INSULIN ASPART 2 UNITS: 100 INJECTION, SOLUTION INTRAVENOUS; SUBCUTANEOUS at 08:08

## 2017-01-01 RX ADMIN — Medication 5 UNITS: at 04:10

## 2017-01-01 RX ADMIN — INSULIN ASPART 3 UNITS: 100 INJECTION, SOLUTION INTRAVENOUS; SUBCUTANEOUS at 09:11

## 2017-01-01 RX ADMIN — FUROSEMIDE 40 MG: 10 INJECTION, SOLUTION INTRAVENOUS at 02:11

## 2017-01-01 RX ADMIN — ROCURONIUM BROMIDE 90 MG: 10 INJECTION, SOLUTION INTRAVENOUS at 01:12

## 2017-01-01 RX ADMIN — FUROSEMIDE 40 MG: 10 INJECTION, SOLUTION INTRAVENOUS at 07:12

## 2017-01-01 RX ADMIN — LIDOCAINE HYDROCHLORIDE 1 ML: 10 INJECTION, SOLUTION INFILTRATION; PERINEURAL at 01:12

## 2017-01-01 RX ADMIN — Medication 3 ML: at 02:08

## 2017-01-01 RX ADMIN — FUROSEMIDE 80 MG: 10 INJECTION, SOLUTION INTRAVENOUS at 01:08

## 2017-01-01 RX ADMIN — OXYCODONE HYDROCHLORIDE 10 MG: 5 TABLET ORAL at 11:08

## 2017-01-01 RX ADMIN — LACTULOSE 20 G: 20 SOLUTION ORAL at 10:10

## 2017-01-01 RX ADMIN — OXYCODONE HYDROCHLORIDE 5 MG: 5 TABLET ORAL at 02:08

## 2017-01-01 RX ADMIN — SODIUM POLYSTYRENE SULFONATE 15 G: 15 SUSPENSION ORAL; RECTAL at 11:10

## 2017-01-01 RX ADMIN — LACTULOSE 30 G: 20 SOLUTION ORAL at 02:11

## 2017-01-01 RX ADMIN — ALBUMIN (HUMAN) 50 G: 25 SOLUTION INTRAVENOUS at 10:08

## 2017-01-01 RX ADMIN — LACTULOSE 10 G: 20 SOLUTION ORAL at 01:12

## 2017-01-01 RX ADMIN — ONDANSETRON 8 MG: 8 TABLET, ORALLY DISINTEGRATING ORAL at 01:11

## 2017-01-01 RX ADMIN — CARVEDILOL 6.25 MG: 6.25 TABLET, FILM COATED ORAL at 08:08

## 2017-01-01 RX ADMIN — MORPHINE SULFATE 6 MG: 2 INJECTION, SOLUTION INTRAMUSCULAR; INTRAVENOUS at 09:12

## 2017-01-01 RX ADMIN — CEFTRIAXONE 1 G: 1 INJECTION, SOLUTION INTRAVENOUS at 01:11

## 2017-01-01 RX ADMIN — ASPIRIN 81 MG CHEWABLE TABLET 81 MG: 81 TABLET CHEWABLE at 11:10

## 2017-01-01 RX ADMIN — ALBUMIN (HUMAN) 25 G: 12.5 SOLUTION INTRAVENOUS at 02:08

## 2017-01-01 RX ADMIN — ONDANSETRON 8 MG: 2 INJECTION INTRAMUSCULAR; INTRAVENOUS at 03:12

## 2017-01-01 RX ADMIN — SPIRONOLACTONE 25 MG: 25 TABLET, FILM COATED ORAL at 05:10

## 2017-01-01 RX ADMIN — ALBUMIN (HUMAN) 12.5 G: 25 SOLUTION INTRAVENOUS at 01:08

## 2017-01-01 RX ADMIN — ONDANSETRON 4 MG: 2 INJECTION INTRAMUSCULAR; INTRAVENOUS at 06:11

## 2017-01-01 RX ADMIN — MORPHINE SULFATE 2 MG: 2 INJECTION, SOLUTION INTRAMUSCULAR; INTRAVENOUS at 12:10

## 2017-01-01 RX ADMIN — CEFTRIAXONE 1 G: 1 INJECTION, SOLUTION INTRAVENOUS at 12:11

## 2017-01-01 RX ADMIN — ALBUMIN (HUMAN) 50 G: 12.5 SOLUTION INTRAVENOUS at 11:11

## 2017-01-01 RX ADMIN — INSULIN ASPART 2 UNITS: 100 INJECTION, SOLUTION INTRAVENOUS; SUBCUTANEOUS at 11:08

## 2017-01-01 RX ADMIN — LACTULOSE 20 G: 20 SOLUTION ORAL at 06:12

## 2017-01-01 RX ADMIN — DEXTROSE MONOHYDRATE 25 G: 25 INJECTION, SOLUTION INTRAVENOUS at 12:12

## 2017-01-01 RX ADMIN — CEFTRIAXONE SODIUM 2 G: 2 INJECTION, POWDER, FOR SOLUTION INTRAMUSCULAR; INTRAVENOUS at 04:12

## 2017-01-01 RX ADMIN — FUROSEMIDE 60 MG: 10 INJECTION, SOLUTION INTRAMUSCULAR; INTRAVENOUS at 05:11

## 2017-01-01 RX ADMIN — CALCIUM CARBONATE (ANTACID) CHEW TAB 500 MG 500 MG: 500 CHEW TAB at 10:11

## 2017-01-01 RX ADMIN — LORAZEPAM 2 MG: 2 INJECTION, SOLUTION INTRAMUSCULAR; INTRAVENOUS at 01:12

## 2017-01-01 RX ADMIN — ALBUTEROL SULFATE 10 MG: 2.5 SOLUTION RESPIRATORY (INHALATION) at 09:12

## 2017-01-01 RX ADMIN — CHLORHEXIDINE GLUCONATE 15 ML: 1.2 RINSE ORAL at 09:12

## 2017-01-01 RX ADMIN — CALCIUM GLUCONATE 1 G: 94 INJECTION, SOLUTION INTRAVENOUS at 08:12

## 2017-01-01 RX ADMIN — ACETAMINOPHEN 500 MG: 500 TABLET ORAL at 09:12

## 2017-01-01 RX ADMIN — ONDANSETRON 8 MG: 2 INJECTION INTRAMUSCULAR; INTRAVENOUS at 07:12

## 2017-01-01 RX ADMIN — OXYCODONE HYDROCHLORIDE 10 MG: 5 TABLET ORAL at 09:08

## 2017-01-01 RX ADMIN — INSULIN HUMAN 10 UNITS: 100 INJECTION, SOLUTION PARENTERAL at 04:11

## 2017-01-01 RX ADMIN — ASPIRIN 81 MG CHEWABLE TABLET 81 MG: 81 TABLET CHEWABLE at 08:10

## 2017-01-01 RX ADMIN — ONDANSETRON 8 MG: 2 INJECTION INTRAMUSCULAR; INTRAVENOUS at 10:12

## 2017-01-01 RX ADMIN — DEXTROSE MONOHYDRATE 25 G: 25 INJECTION, SOLUTION INTRAVENOUS at 04:12

## 2017-01-01 RX ADMIN — SODIUM POLYSTYRENE SULFONATE 30 G: 15 SUSPENSION ORAL; RECTAL at 04:12

## 2017-01-01 RX ADMIN — LACTULOSE 200 G: 20 SOLUTION ORAL at 10:11

## 2017-01-01 RX ADMIN — CARVEDILOL 6.25 MG: 6.25 TABLET, FILM COATED ORAL at 11:08

## 2017-01-01 RX ADMIN — CIPROFLOXACIN HYDROCHLORIDE 500 MG: 500 TABLET, FILM COATED ORAL at 09:12

## 2017-01-01 RX ADMIN — SODIUM POLYSTYRENE SULFONATE 15 G: 15 SUSPENSION ORAL; RECTAL at 06:11

## 2017-01-01 RX ADMIN — DEXTROSE MONOHYDRATE 25 G: 25 INJECTION, SOLUTION INTRAVENOUS at 04:11

## 2017-01-01 RX ADMIN — CALCIUM GLUCONATE 1 G: 94 INJECTION, SOLUTION INTRAVENOUS at 10:12

## 2017-01-01 RX ADMIN — ALBUTEROL SULFATE 10 MG: 2.5 SOLUTION RESPIRATORY (INHALATION) at 07:11

## 2017-01-01 RX ADMIN — CIPROFLOXACIN HYDROCHLORIDE 500 MG: 250 TABLET, FILM COATED ORAL at 08:11

## 2017-01-01 RX ADMIN — HYDROCODONE BITARTRATE AND ACETAMINOPHEN 1 TABLET: 5; 325 TABLET ORAL at 12:10

## 2017-01-01 RX ADMIN — IOHEXOL 15 ML: 350 INJECTION, SOLUTION INTRAVENOUS at 02:10

## 2017-01-01 RX ADMIN — ACETAMINOPHEN 500 MG: 500 TABLET ORAL at 04:12

## 2017-01-01 RX ADMIN — LIDOCAINE HYDROCHLORIDE 1 ML: 10 INJECTION, SOLUTION INFILTRATION; PERINEURAL at 12:12

## 2017-01-01 RX ADMIN — HYDROMORPHONE HYDROCHLORIDE 1 MG: 1 INJECTION, SOLUTION INTRAMUSCULAR; INTRAVENOUS; SUBCUTANEOUS at 11:11

## 2017-01-01 RX ADMIN — FUROSEMIDE 80 MG: 80 TABLET ORAL at 09:12

## 2017-01-01 RX ADMIN — INSULIN HUMAN 7.94 UNITS: 100 INJECTION, SOLUTION PARENTERAL at 09:11

## 2017-01-01 RX ADMIN — OCTREOTIDE ACETATE 50 MCG/HR: 1000 INJECTION, SOLUTION INTRAVENOUS; SUBCUTANEOUS at 05:12

## 2017-01-01 RX ADMIN — MORPHINE SULFATE 2 MG: 2 INJECTION, SOLUTION INTRAMUSCULAR; INTRAVENOUS at 08:10

## 2017-01-01 RX ADMIN — HEPARIN SODIUM 5000 UNITS: 5000 INJECTION, SOLUTION INTRAVENOUS; SUBCUTANEOUS at 02:10

## 2017-01-01 RX ADMIN — DEXTROSE MONOHYDRATE 25 G: 25 INJECTION, SOLUTION INTRAVENOUS at 07:12

## 2017-01-01 RX ADMIN — Medication 3 ML: at 05:08

## 2017-01-01 RX ADMIN — ONDANSETRON 4 MG: 2 INJECTION INTRAMUSCULAR; INTRAVENOUS at 10:10

## 2017-01-01 RX ADMIN — INSULIN ASPART 2 UNITS: 100 INJECTION, SOLUTION INTRAVENOUS; SUBCUTANEOUS at 10:10

## 2017-01-16 NOTE — PATIENT INSTRUCTIONS
Using a Blood Sugar Log  You have diabetes. This means your body has trouble regulating a sugar called glucose. To help manage your diabetes, youll need to check your blood sugar level as directed by your healthcare provider. Keeping a log of your blood sugar levels will help you track your blood sugar readings. Its a simple and easy way to see how well you are controlling your diabetes.    Checking your blood sugar level  You can check your blood sugar level with a blood glucose meter. Youll first prick the side of your finger with a tiny lancet to draw a tiny drop of blood onto the test strip. Some glucose meters let you use another place on your body to test. But these other places should not be used in some cases as they may be inaccurate. Follow the instructions for your glucose meter. And talk with your healthcare provider before doing the test on other places.  The strip goes into the meter first, then a drop of blood is placed on the tip of the strip. The meter then shows a reading that tells you the level of your blood sugar. Your readings should be in your target range as often as possible. This means not too high or too low. Staying in this range helps lower your risk for complications. Your healthcare provider will help you figure out the target range that is best for you.  Tracking your readings  Every time you check your blood sugar, use your log to keep track of your readings. Your meter will also probably have a memory feature that your healthcare provider can check at your next visit. You may be advised by your healthcare provider to check your blood sugar in the morning, at bedtime, and before and after meals. Be sure to write down all of your numbers. Also use your log to record things that might have affected your blood sugar. Some examples include being sick, certain medicines, being physically active, feeling stressed, or skipping meals.   Lessons learned from your readings  Tracking your  blood sugar readings helps you see patterns. These patterns tell you how your actions affect your blood sugar. For instance, you may have higher numbers after eating certain foods or lower numbers after exercise. They just help you understand how to stay in your target range more often, so that your diabetes remains in good control.  Sharing your log with your healthcare team  Bring your blood sugar log and glucose meter with you to all of your healthcare appointments. This can help your healthcare team make changes to your treatment plan, if needed. This may involve making changes in what you eat, what medicines you take, or how much you exercise.  To learn more  The resources below can help you learn more:  · American Diabetes Association 299-971-2979 www.diabetes.org  · [employer] 122-799-8840 www.lighthouse.org  · [employer] 195-194-2751 www.nei.nih.gov  · [employer] 033-709-1680 www.hormone.org  © 2209-1231 [employer]. [address] [place] [address]. All rights reserved. This information is not intended as a substitute for professional medical care. Always follow your healthcare professional's instructions.        Step-by-Step  Checking Your Blood Sugar    © 4354-7308 [employer]. [address] [place] [address]. All rights reserved. This information is not intended as a substitute for professional medical care. Always follow your healthcare professional's instructions.        Diet: Diabetes  Food is an important tool that you can use to control diabetes and stay healthy. Eating well-balanced meals in the correct amounts will help you control your blood glucose levels and prevent low blood sugar reactions. It will also help you reduce the health risks of diabetes. There is no one specific diet that is right for everyone with diabetes. But there are general guidelines to follow. A registered dietitian (WILLIAM) will create a  tailored diet approach thats just right for you. He or she will also help you plan healthy meals and snacks. If you have any questions, call your dietitian for advice.    Guidelines for success  Talk with your healthcare provider before starting a diabetes diet or weight loss program. If you haven't talked with a dietitian yet, ask your provider for a referral. The following guidelines can help you succeed:  · Select foods from the 6 food groups below. Your dietitian will help you find food choices within each group. He or she will also show you serving sizes and how many servings you can have at each meal.  ¨ Grains, beans, and starchy vegetables  ¨ Vegetables  ¨ Fruit  ¨ Milk or yogurt  ¨ Meat, poultry, fish, or tofu  ¨ Healthy fats  · Check your blood sugar levels as directed by your provider. Take any medicine as prescribed by your provider.  · Learn to read food labels and pick the right portion sizes.  · Eat only the amount of food in your meal plan. Eat about the same amount of food at regular times each day. Dont skip meals. Eat meals 4 to 5 hours apart, with snacks in between.  · Limit alcohol. It raises blood sugar levels. Drink water or calorie-free diet drinks that use safe sweeteners.  · Eat less fat to help lower your risk of heart disease. Use nonfat or low-fat dairy products and lean meats. Avoid fried foods. Use cooking oils that are unsaturated, such as olive, canola, or peanut oil.  · Talk with your dietitian about safe sugar substitutes.  · Avoid added salt. It can contribute to high blood pressure, which can cause heart disease. People with diabetes already have a risk of high blood pressure and heart disease.  · Stay at a healthy weight. If you need to lose weight, cut down on your portion sizes. But dont skip meals. Exercise is an important part of any weight management program. Talk with your provider about an exercise program thats right for you.  · For more information about the best  diet plan for you, talk with a registered dietitian (RD). To find an RD in your area, contact:  ¨ Academy of Nutrition and Dietetics www.eatright.org  ¨ The American Diabetes Association 224-944-8100 www.diabetes.org  © 9131-3495 [employer]. [address] [place] [address]. All rights reserved. This information is not intended as a substitute for professional medical care. Always follow your healthcare professional's instructions.        Diabetes (General Information)  Diabetes is a long-term health problem. It means your body does not make enough insulin. Or it may mean that your body cannot use the insulin it makes. Insulin is a hormone in your body. It lets blood sugar (glucose) reach the cells in your body. All of your cells need glucose for fuel.  When you have diabetes, the glucose in your blood builds up because it cannot get into the cells. This buildup is called high blood sugar (hyperglycemia).  Your blood sugar level depends on several things. It depends on what kind of food you eat and how much of it you eat. It also depends on how much exercise you get, and how much insulin you have in your body. Eating too much of the wrong kinds of food or not taking diabetes medicine on time can cause high blood sugar. Infections can cause high blood sugar even if you are taking medicines correctly.  These things can also cause low blood sugar:  · Missing meals  · Not eating enough food  · Taking too much diabetes medicine  Diabetes can cause serious problems over time if you do not get treated. These problems include heart disease, stroke, kidney failure, and blindness. They also include nerve pain or loss of feeling in your legs and feet, and gangrene of the feet. By keeping your blood sugar under control you can prevent or delay these problems.  Normal blood sugar levels are 80 to 100 before a meal and less than 180 in the 1 to 2 hours after a meal.  Home care  Follow these guidelines when  caring for yourself at home:  · Follow the diet your healthcare provider gives you. Take insulin or other diabetes medicine exactly as told to.  · Watch your blood sugar as you are told to. Keep a log of your results. This will help your provider change your medicines to keep your blood sugar under control.  · Try to reach your ideal weight. You may be able to cut back on or not have to take diabetes medicine if you eat the right foods and get exercise.  · Do not smoke. Smoking worsens the effects of diabetes on your circulation. You are much more likely to have a heart attack if you have diabetes and you smoke.  · Take good care of your feet. If you have lost feeling in your feet, you may not see an injury or infection. Check your feet and between your toes at least once a week.  · Wear a medical alert bracelet or necklace, or carry a card in your wallet that says you have diabetes. This will help healthcare providers give you the right care if you get very ill and cannot tell them that you have diabetes.  Sick day plan  If you get a cold, the flu, or a bacterial or viral infection, take these steps:  · Look at your diabetes sick plan and call your healthcare provider as you were told to. You may need to call your provider right away if:  ¨ Your blood sugar is above 240 while taking your diabetes medicine  ¨ Your urine ketone levels are above normal or high  ¨ You have been vomiting more than 6 hours  ¨ You have trouble breathing or your breath ha s a fruity smell  ¨ You have a high fever  ¨ You have a fever for several days and you are not getting better  ¨ You get light-headed and are sleepier than usual  · Keep taking your diabetes pills (oral medicine) even if you have been vomiting and are feeling sick. Call your provider right away because you may need insulin to lower your blood sugar until you recover from your illness.  · Keep taking your insulin even if you have been vomiting and are feeling sick. Call  your provider right away to ask if you need to change your insulin dose. This will depend on your blood sugar results.  · Check your blood sugar every 2 to 4 hours, or at least 4 times a day.  · Check your ketones often. If you are vomiting and having diarrhea, watch them more often.  · Do not skip meals. Try to eat small meals on a regular schedule. Do this even if you do not feel like eating.  · Drink water or other liquids that do not have caffeine or calories. This will keep you from getting dehydrated. If you are nauseated or vomiting, takes small sips every 5 minutes. To prevent dehydration try to drink a cup (8 ounces) of fluids every hour while you are awake.  General care  Always bring a source of fast-acting sugar with you in case you have symptoms of low blood sugar (below 70). At the first sign of low blood sugar, eat or drink 15 to 20 grams of fast-acting sugar to raise your blood sugar. Examples are:  · 3 to 4 glucose tablets. You can buy these at most drugstores.  · 4 ounces (1/2 cup) of regular (not diet) soft drinks  · 4 ounces (1/2 cup) of any fruit juice  · 8 ounces (1 cup) of milk  · 5 to 6 pieces of hard candy  · 1 tablespoon of honey  Check your blood sugar 15 minutes after treating yourself. If it is still below 70, take 15 to 20 more grams of fast-acting sugar. Test again in 15 minutes. If it returns to normal (70 or above), eat a snack or meal to keep your blood sugar in a safe range. If it stays low, call your doctor or go to an emergency room.  Follow-up care  Follow-up with your healthcare provider, or as advised. For more information about diabetes, visit the American Diabetes Association website at www.diabetes.org or call 679-686-4239.  When to seek medical advice  Call your healthcare provider right away if you have any of these symptoms of high blood sugar:  · Frequent urination  · Dizziness  · Drowsiness  · Thirst  · Headache  · Nausea or vomiting  · Abdominal pain  · Eyesight  changes  · Fast breathing  · Confusion or loss of consciousness  Also call your provider right away if you have any of these signs of low blood sugar:  · Fatigue  · Headache  · Shakes  · Excess sweating  · Hunger  · Feeling anxious or restless  · Eyesight changes  · Drowsiness  · Weakness  · Confusion or loss of consciousness  Call 911  Call for emergency help right away if any of these occur:  · Chest pain or shortness of breath  · Dizziness or fainting  · Weakness of an arm or leg or one side of the face  · Trouble speaking or seeing   © 2000-2016 [employer]. [address] [place] [address]. All rights reserved. This information is not intended as a substitute for professional medical care. Always follow your healthcare professional's instructions.        Diabetes: Meal Planning  You can help keep your blood sugar level in your target range by eating healthy foods. Your healthcare team can help you create a low-fat, nutritious meal plan. Take an active role in your diabetes management by following your meal plan and working with your healthcare team.    Make your meal plan  A meal plan gives guidelines for the types and amounts of food you should eat. The goal is to balance food and insulin (or other diabetes medications) so your blood sugars will be in your target range. Your dietitian will help you make a flexible meal plan that includes many foods that you like.  Watch serving sizes  Your meal plan will group foods by servings. To learn how much a serving is, start by measuring food portions at each meal. Soon youll know what a serving looks like on your plate. Ask your healthcare provider about how to balance servings of different foods.  Eat from all the food groups  The basis of a healthy meal plan is variety (eating lots of different foods). Choose lean meats, fresh fruits and vegetables, whole grains, and low-fat or nonfat dairy products. Eating a wide variety of foods provides  the nutrients your body needs. It can also keep you from getting bored with your meal plan.  Learn about carbohydrates, fats, and protein  · Carbohydrates are starches, sugars, and fiber. They are found in many foods, including fruit, bread, pasta, milk, and sweets. Of all the foods you eat, carbohydrates have the most effect on your blood sugar. Your dietitian may teach you about carb counting, a way to figure out the number of carbohydrates in a meal.  · Fats have the most calories. They also have the most effect on your weight and your risk of heart disease. When you have diabetes, its important to control your weight and protect your heart. Foods that are high in fat include whole milk, cheese, snack foods, and desserts.  · Protein is important for building and repairing muscles and bones. Choose low-fat protein sources, such as fish, egg whites, and skinless chicken.  Reduce liquid sugars  Extra calories from sodas, sports drinks, and fruit drinks make it hard to keep blood sugar in range. Cut as many liquid sugars from your meal plan as you can.  This includes most fruit juices, which are often high in natural or added sugar. Instead, drink plenty of water and other sugar-free beverages.  Eat less fat  If you need to lose weight, try to reduce the amount of fat in your diet. This can also help lower your cholesterol level to keep blood vessels healthier. Cut fat by using only small amounts of liquid oil for cooking. Read food labels carefully to avoid foods with unhealthy trans fats.  Timing your meals  When it comes to blood sugar control, when you eat is as important as what you eat. You may need to eat several small meals spaced evenly throughout the day to stay in your target range. So dont skip breakfast or wait until late in the day to get most of your calories. Doing so can cause your blood sugar to rise too high or fall too low.   © 4708-8904 [employer]. [address] [facility],  [place] [address]. All rights reserved. This information is not intended as a substitute for professional medical care. Always follow your healthcare professional's instructions.        Oral Medicines for Type 2 Diabetes  Diabetes pills can help to manage your blood sugar. These pills are not insulin. They work to manage your blood sugar in several ways. You may be given a combination of medicines. Always follow your healthcare provider's instructions.  Some pills may put you at higher risk for low blood sugar (hypoglycemia). Watch for symptoms of low blood sugar. Symptoms are listed below. Call your healthcare provider's if low blood sugar happens often.  Type of diabetes pills  Biguanides  These pills help control the amount of glucose in your blood. They do this by decreasing the amount of glucose made by your liver and helping your muscles use insulin more effectively. These medicines are usually taken with or after each meal. Possible side effects and other problems include:  · Diarrhea  · Nausea  · Vomiting  · Abdominal bloating  · Excess gas (flatulence)  · Metallic taste in mouth  · Lower blood vitamin B12 levels from decreased absorption from the GI tract of this essential vitamin  Sulfonylureas  These pills help your body make more insulin. They are usually taken 30 minutes before a meal. Possible side effects include hypoglycemia.  Alpha-glucosidase inhibitors  These pills slow the digestion of sugars and starches. They can help keep your blood sugar from going too high after a meal. Take them with the first bite of each main meal. Possible side effects include:  · Stomach pain  · Diarrhea  · Excess gas (flatulence)    Thiazolidinediones  These pills help your muscle cells use insulin better. Your healthcare provider may order lab tests to check the function of your liver before prescribing these pills and regularly while you are taking them. Possible side effects include:   · Weight gain  · Extra fluid  in your body and swelling  · Increased risk for heart failure  · Osteoporosis and increased risk for broken bones  Meglitinides  These pills increase your insulin for a short period of time. They are usually taken before meals. Possible side effects include:  · Low blood sugar  · Diarrhea  · Headache   · Slightly increased risk for heart problems  DPP-4 inhibitors  These pills help lower blood sugar levels in people with type 2 diabetes. They are less likely to cause hypoglycemia, unless you take them with a sulfonylurea. They are taken once a day. Possible side effects include:  · Upper respiratory tract infection  · Stuffy or runny nose  · Sore throat  · Headache  Other side effects are under investigation.  SGLT-2 inhibitors  These pills help lower blood sugar levels in people with type 2 diabetes by increasing the amount of sugar that leaks into the urine. Possible side effects include:  · Urinary tract infections  · Genital infections, especially in women  · Dehydration and low blood pressure  · Increased bone fractures  · Development of keotacidosis while blood sugar is only mildly elevated above the target range   Dopamine D2 receptor agonist (bromocriptine mesylate)  These pills help lower blood sugar levels in people with type 2 diabetes. Possible side effects of this medicine include:  · Nausea  · Vomiting  · Fatigue  · Dizziness  · Headaches  Combination pills  These medicines may help keep your blood glucose within your target range. They also help your pancreas make more insulin and may help your muscles use insulin more effectively. Side effects depend on which type of combination you use. Your healthcare provider can tell you more.     Watch for symptoms of hypoglycemia  Symptoms include the following:  · Headaches  · Shakiness or dizziness  · Hunger  · Cold, clammy skin; sweating  · A hard, fast heartbeat  · Confusion or irritability   © 2146-0805 [employer]. [address] [place] [address],  [place] [address]. All rights reserved. This information is not intended as a substitute for professional medical care. Always follow your healthcare professional's instructions.        Preventing Falls in the Home  As you get older, falls are more likely. Thats because your reaction time slows. Your muscles and joints may also get stiffer, making them less flexible. Illness, medications, and vision changes can also affect your balance. A fall could leave you unable to live on your own. To make your home safer, follow these tips:   Floors:  · Put nonskid pads under area rugs.  · Remove throw rugs.  · Replace worn floor coverings.  · Tack carpets firmly to each step on carpeted stairs. Put nonskid strips on the edges of uncarpeted stairs.  · Keep floors and stairs free of clutter and cords.  · Arrange furniture so there are clear pathways.  · Clean up any spills right away.   Bathrooms:  ·   Install grab bars in the tub or shower.  · Apply nonskid strips or put a nonskid rubber mat in the tub or shower.  · Sit on a bath chair to bathe.  · Use bathmats with nonskid backing.   Lighting:  · Keep a flashlight in each room.  · Put a nightlight along the pathway between the bedroom and the bathroom.  © 7438-7053 [employer]. [address] [place] [address], [place] [address]. All rights reserved. This information is not intended as a substitute for professional medical care. Always follow your healthcare professional's instructions.

## 2017-01-16 NOTE — PROGRESS NOTES
"Subjective:   Patient ID: Soy Reza is a 66 y.o. male.    Chief Complaint: Establish Care      HPI     Cordial 65 yo male with type 2 diabetes mellitus that he states is controlled (glucose am today per his report 124) with metformin 1000mg po bid and with essential hypertension presents for medication refills and to discuss his chronic illnesses and to establish with a new pcp.     He also reports he has peripheral neuropathy with his diabetes and says this causes his "knees to give out" and he has had falls at home; about 5-6 over the last 12 months.  He lives with his 92 yo mother who can't help him get up. Sometimes he has been on the floor a couple of hours before he felt he could get up. However, he denies any history of seizures. He denies hypoglycemia, hyperglycemia, dyspnea, chest pain, syncope. He says his "feet or knees or both give out" and he falls. He denies any recent fall and denies any injury or pain to me today.    Patient queried and denies any further complaints    PAST MEDICAL HISTORY:  Past Medical History   Diagnosis Date    Diabetes mellitus     Hypertension     Macular degeneration     Neuropathy        PAST SURGICAL HISTORY:  Past Surgical History   Procedure Laterality Date    Eye surgery      Hand surgery         SOCIAL HISTORY:  Social History     Social History    Marital status: Single     Spouse name: N/A    Number of children: N/A    Years of education: N/A     Occupational History    Not on file.     Social History Main Topics    Smoking status: Former Smoker    Smokeless tobacco: Not on file    Alcohol use No      Comment: 2 5th per week- stopped one month ago approx 9/10/16    Drug use: No    Sexual activity: Not on file     Other Topics Concern    Not on file     Social History Narrative     CODE STATUS: FULL CODE. No medical power of . No   ADLs-- Good but has falls; about 5-6 in last 12 months.      FAMILY HISTORY:  History reviewed. No pertinent " "family history.    ALLERGIES AND MEDICATIONS: updated and reviewed.  Review of patient's allergies indicates:  No Known Allergies    Current Outpatient Prescriptions:     aspirin 81 MG Chew, Take 81 mg by mouth once daily., Disp: , Rfl:     lisinopril (PRINIVIL,ZESTRIL) 20 MG tablet, Take 1 tablet (20 mg total) by mouth once daily., Disp: 90 tablet, Rfl: 3    metformin (GLUCOPHAGE) 1000 MG tablet, Take 1 tablet (1,000 mg total) by mouth 2 (two) times daily with meals., Disp: 180 tablet, Rfl: 3    Review of Systems   Constitutional: Negative for activity change, appetite change, chills, diaphoresis, fatigue, fever and unexpected weight change.   HENT: Negative for congestion, ear discharge, ear pain, postnasal drip, rhinorrhea, sneezing and sore throat.    Eyes: Negative for photophobia and discharge.   Respiratory: Negative for cough, chest tightness, shortness of breath and wheezing.    Cardiovascular: Negative for chest pain and palpitations.   Gastrointestinal: Negative for abdominal distention, abdominal pain, diarrhea, nausea and vomiting.   Genitourinary: Negative for dysuria.   Musculoskeletal: Negative for arthralgias and neck pain.   Skin: Negative for rash.   Neurological: Negative for headaches.       Objective:     Vitals:    01/16/17 1316   BP: 114/78   Pulse: 97   Resp: 16   Temp: 98.7 °F (37.1 °C)   TempSrc: Oral   Weight: 79.4 kg (175 lb 0.7 oz)   Height: 5' 10" (1.778 m)   PainSc: 0-No pain     Body mass index is 25.12 kg/(m^2).    Physical Exam   Constitutional: He appears well-developed and well-nourished.   HENT:   Head: Normocephalic and atraumatic.   Right Ear: Hearing, tympanic membrane, external ear and ear canal normal. No drainage or swelling. No decreased hearing is noted.   Left Ear: Hearing, tympanic membrane, external ear and ear canal normal. No drainage or swelling. No decreased hearing is noted.   Nose: Nose normal. No rhinorrhea.   Mouth/Throat: Oropharynx is clear and moist. No " oropharyngeal exudate, posterior oropharyngeal edema or posterior oropharyngeal erythema.   Eyes: Conjunctivae, EOM and lids are normal. Pupils are equal, round, and reactive to light. Right eye exhibits no discharge and no exudate. Left eye exhibits no discharge and no exudate. Right conjunctiva is not injected. Left conjunctiva is not injected.   Neck: Trachea normal and full passive range of motion without pain. Normal carotid pulses, no hepatojugular reflux and no JVD present. Carotid bruit is not present. No rigidity. No edema and no erythema present. No thyroid mass and no thyromegaly present.   Cardiovascular: Normal rate, regular rhythm and normal heart sounds.    Pulmonary/Chest: Effort normal. No respiratory distress.   Abdominal: Soft. Normal appearance and bowel sounds are normal. There is no tenderness. There is negative Blount's sign.   Lymphadenopathy:     He has no cervical adenopathy.   Neurological: He is alert.   Skin: Skin is warm and dry.   Psychiatric: He has a normal mood and affect. His speech is normal and behavior is normal.       Assessment and Plan:   Soy Bain was seen today for establish care.    Diagnoses and all orders for this visit:    Type 2 diabetes, controlled by patient history, with neuropathy  -     CBC auto differential; Future  -     Comprehensive metabolic panel; Future  -     Hemoglobin A1c; Future  -     TSH; Future  -     T4, free; Future  -     Lipid panel; Future  -     Hepatitis C antibody; Future  -     metformin (GLUCOPHAGE) 1000 MG tablet; Take 1 tablet (1,000 mg total) by mouth 2 (two) times daily with meals.    Essential hypertension, controlled.  --     lisinopril (PRINIVIL,ZESTRIL) 20 MG tablet; Take 1 tablet (20 mg total) by mouth once daily.    Falls infrequently    -     CBC auto differential; Future  -     Comprehensive metabolic panel; Future  -     Hemoglobin A1c; Future  -     TSH; Future  -     T4, free; Future  -     Lipid panel; Future  -      Hepatitis C antibody; Future  -     Ambulatory consult to Physical Therapy    Prostate cancer screening  -     CBC auto differential; Future  -     Comprehensive metabolic panel; Future  -     Hemoglobin A1c; Future  -     TSH; Future  -     T4, free; Future  -     PSA, Screening; Future  -     Lipid panel; Future  -     Hepatitis C antibody; Future    Weakness of both lower extremities  -     Ambulatory consult to Physical Therapy

## 2017-01-16 NOTE — TELEPHONE ENCOUNTER
"Called [facility] pharmacy [phone]; spoke with "Trace" (pharmacist) gave verbal for the following per Dr Swanson:    Lisinopril 20   1 po qd  #90  3 refills    Metformin 1000  1 po bid  #180  3 refills    Pharmacist is preparing medication for pt and will be available for  in about an hour. Pt has been informed.   "

## 2017-01-16 NOTE — MR AVS SNAPSHOT
[place] - [facility] Medicine   [facility]  [place] [address]  Phone: [phone]  Fax: [phone]                  Soy Reza   2017 1:00 PM   Office Visit    Description:  Male : 1950   Provider:  Jean Carlos Swanson MD   Department:  [place] - [facility] Medicine           Reason for Visit     Establish Care           Diagnoses this Visit        Comments    Type 2 diabetes, uncontrolled, with neuropathy    -  Primary     Falls infrequently         Prostate cancer screening         Weakness of both lower extremities                To Do List           Future Appointments        Provider Department Dept Phone    2017 8:00 AM LAB, [facility] [phone]    2017 8:00 AM Jose Hendrickson, PT [facility] [phone]      Goals (5 Years of Data)     None      Follow-Up and Disposition     Return in about 3 months (around 2017).       These Medications        Disp Refills Start End    metformin (GLUCOPHAGE) 1000 MG tablet 180 tablet 3 2017     Take 1 tablet (1,000 mg total) by mouth 2 (two) times daily with meals. - Oral    Pharmacy: [facility]/pharmacy #[id number] - [place], [place] - [address] [facility] Ph #: [phone]       lisinopril (PRINIVIL,ZESTRIL) 20 MG tablet 90 tablet 3 2017     Take 1 tablet (20 mg total) by mouth once daily. - Oral    Pharmacy: [facility]/pharmacy #[id number]  [place], [place] - [address] [facility] [address] #: [phone]         [facility]     [facility] -  Assistance  Registered nurses in the [facility] provide clinical advisement, health education, appointment booking, and other advisory services.  Call for this free service at [phone].             Medications           Message regarding Medications     Verify the changes and/or additions to your medication regime listed below are the same as discussed with your clinician today.  If any of these changes or  "additions are incorrect, please notify your healthcare provider.        CHANGE how you are taking these medications     Start Taking Instead of    lisinopril (PRINIVIL,ZESTRIL) 20 MG tablet lisinopril (PRINIVIL,ZESTRIL) 20 MG tablet    Dosage:  Take 1 tablet (20 mg total) by mouth once daily. Dosage:  Take 20 mg by mouth once daily.    Reason for Change:  Reorder       STOP taking these medications     sulfamethoxazole-trimethoprim 800-160mg (BACTRIM DS) 800-160 mg Tab TAKE 1 TABLET BY MOUTH EVERY DAY FOR 10 DAYS    meloxicam (MOBIC) 15 MG tablet Take 1 tablet (15 mg total) by mouth once daily.           Verify that the below list of medications is an accurate representation of the medications you are currently taking.  If none reported, the list may be blank. If incorrect, please contact your healthcare provider. Carry this list with you in case of emergency.           Current Medications     aspirin 81 MG Chew Take 81 mg by mouth once daily.    lisinopril (PRINIVIL,ZESTRIL) 20 MG tablet Take 1 tablet (20 mg total) by mouth once daily.    metformin (GLUCOPHAGE) 1000 MG tablet Take 1 tablet (1,000 mg total) by mouth 2 (two) times daily with meals.           Clinical Reference Information           Vital Signs - Last Recorded  Most recent update: 1/16/2017  1:47 PM by Jean Carlos Swanson MD    BP Pulse Temp Resp Ht Wt    114/78 (BP Location: Left arm, Patient Position: Sitting, BP Method: Manual) 97 98.7 °F (37.1 °C) (Oral) 16 5' 10" (1.778 m) 79.4 kg (175 lb 0.7 oz)    BMI                25.12 kg/m2          Blood Pressure          Most Recent Value    BP  114/78      Allergies as of 1/16/2017     No Known Allergies      Immunizations Administered on Date of Encounter - 1/16/2017     None      Orders Placed During Today's Visit      Normal Orders This Visit    Ambulatory consult to Physical Therapy     Future Labs/Procedures Expected by Expires    Comprehensive metabolic panel  1/16/2017 3/17/2018    Hepatitis C " antibody  1/16/2017 3/17/2018    Lipid panel  1/16/2017 3/17/2018    PSA, Screening  1/16/2017 3/17/2018    CBC auto differential  2/15/2017 (Approximate) 3/17/2018    T4, free  2/15/2017 (Approximate) 3/17/2018    TSH  2/15/2017 (Approximate) 3/17/2018    Hemoglobin A1c  As directed 3/17/2018      [facility] Sign-Up     Activating your [facility] account is as easy as 1-2-3!     1) Visit [email or web address], select Sign Up Now, enter this activation code and your date of birth, then select Next.  [email or web address]  Expires: 3/2/2017  5:28 PM      2) Create a username and password to use when you visit [facility] in the future and select a security question in case you lose your password and select Next.    3) Enter your e-mail address and click Sign Up!    Additional Information  If you have questions, please e-mail [email or web address] or call [phone] to talk to our [facility] staff. Remember, [facility] is NOT to be used for urgent needs. For medical emergencies, dial 911.         Instructions      Using a Blood Sugar Log  You have diabetes. This means your body has trouble regulating a sugar called glucose. To help manage your diabetes, youll need to check your blood sugar level as directed by your healthcare provider. Keeping a log of your blood sugar levels will help you track your blood sugar readings. Its a simple and easy way to see how well you are controlling your diabetes.    Checking your blood sugar level  You can check your blood sugar level with a blood glucose meter. Youll first prick the side of your finger with a tiny lancet to draw a tiny drop of blood onto the test strip. Some glucose meters let you use another place on your body to test. But these other places should not be used in some cases as they may be inaccurate. Follow the instructions for your glucose meter. And talk with your healthcare provider before doing the test on other places.  The strip goes into the meter first, then a drop of  blood is placed on the tip of the strip. The meter then shows a reading that tells you the level of your blood sugar. Your readings should be in your target range as often as possible. This means not too high or too low. Staying in this range helps lower your risk for complications. Your healthcare provider will help you figure out the target range that is best for you.  Tracking your readings  Every time you check your blood sugar, use your log to keep track of your readings. Your meter will also probably have a memory feature that your healthcare provider can check at your next visit. You may be advised by your healthcare provider to check your blood sugar in the morning, at bedtime, and before and after meals. Be sure to write down all of your numbers. Also use your log to record things that might have affected your blood sugar. Some examples include being sick, certain medicines, being physically active, feeling stressed, or skipping meals.   Lessons learned from your readings  Tracking your blood sugar readings helps you see patterns. These patterns tell you how your actions affect your blood sugar. For instance, you may have higher numbers after eating certain foods or lower numbers after exercise. They just help you understand how to stay in your target range more often, so that your diabetes remains in good control.  Sharing your log with your healthcare team  Bring your blood sugar log and glucose meter with you to all of your healthcare appointments. This can help your healthcare team make changes to your treatment plan, if needed. This may involve making changes in what you eat, what medicines you take, or how much you exercise.  To learn more  The resources below can help you learn more:  · American Diabetes Association 979-298-2489 www.diabetes.org  · [employer] 738-405-7471 www.lighthouse.org  · [employer] 749-969-5273 www.nei.nih.gov  · H[employer]one [employer] Network 404-230-3318  www.hormone.org  © 2000-2016 [employer]. [address] [place] [address]. All rights reserved. This information is not intended as a substitute for professional medical care. Always follow your healthcare professional's instructions.        Step-by-Step  Checking Your Blood Sugar    © 2000-2016 [employer]. [address] [place] [address]. All rights reserved. This information is not intended as a substitute for professional medical care. Always follow your healthcare professional's instructions.        Diet: Diabetes  Food is an important tool that you can use to control diabetes and stay healthy. Eating well-balanced meals in the correct amounts will help you control your blood glucose levels and prevent low blood sugar reactions. It will also help you reduce the health risks of diabetes. There is no one specific diet that is right for everyone with diabetes. But there are general guidelines to follow. A registered dietitian (RD) will create a tailored diet approach thats just right for you. He or she will also help you plan healthy meals and snacks. If you have any questions, call your dietitian for advice.    Guidelines for success  Talk with your healthcare provider before starting a diabetes diet or weight loss program. If you haven't talked with a dietitian yet, ask your provider for a referral. The following guidelines can help you succeed:  · Select foods from the 6 food groups below. Your dietitian will help you find food choices within each group. He or she will also show you serving sizes and how many servings you can have at each meal.  ¨ Grains, beans, and starchy vegetables  ¨ Vegetables  ¨ Fruit  ¨ Milk or yogurt  ¨ Meat, poultry, fish, or tofu  ¨ Healthy fats  · Check your blood sugar levels as directed by your provider. Take any medicine as prescribed by your provider.  · Learn to read food labels and pick the right portion sizes.  · Eat only the  amount of food in your meal plan. Eat about the same amount of food at regular times each day. Dont skip meals. Eat meals 4 to 5 hours apart, with snacks in between.  · Limit alcohol. It raises blood sugar levels. Drink water or calorie-free diet drinks that use safe sweeteners.  · Eat less fat to help lower your risk of heart disease. Use nonfat or low-fat dairy products and lean meats. Avoid fried foods. Use cooking oils that are unsaturated, such as olive, canola, or peanut oil.  · Talk with your dietitian about safe sugar substitutes.  · Avoid added salt. It can contribute to high blood pressure, which can cause heart disease. People with diabetes already have a risk of high blood pressure and heart disease.  · Stay at a healthy weight. If you need to lose weight, cut down on your portion sizes. But dont skip meals. Exercise is an important part of any weight management program. Talk with your provider about an exercise program thats right for you.  · For more information about the best diet plan for you, talk with a registered dietitian (RD). To find an RD in your area, contact:  ¨ Academy of Nutrition and Dietetics www.eatright.org  ¨ The American Diabetes Association 371-882-1422 www.diabetes.org  © 5788-8244 [employer] [facility]. [address] [place] [address], [place] [address]. All rights reserved. This information is not intended as a substitute for professional medical care. Always follow your healthcare professional's instructions.        Diabetes (General Information)  Diabetes is a long-term health problem. It means your body does not make enough insulin. Or it may mean that your body cannot use the insulin it makes. Insulin is a hormone in your body. It lets blood sugar (glucose) reach the cells in your body. All of your cells need glucose for fuel.  When you have diabetes, the glucose in your blood builds up because it cannot get into the cells. This buildup is called high blood sugar  (hyperglycemia).  Your blood sugar level depends on several things. It depends on what kind of food you eat and how much of it you eat. It also depends on how much exercise you get, and how much insulin you have in your body. Eating too much of the wrong kinds of food or not taking diabetes medicine on time can cause high blood sugar. Infections can cause high blood sugar even if you are taking medicines correctly.  These things can also cause low blood sugar:  · Missing meals  · Not eating enough food  · Taking too much diabetes medicine  Diabetes can cause serious problems over time if you do not get treated. These problems include heart disease, stroke, kidney failure, and blindness. They also include nerve pain or loss of feeling in your legs and feet, and gangrene of the feet. By keeping your blood sugar under control you can prevent or delay these problems.  Normal blood sugar levels are 80 to 100 before a meal and less than 180 in the 1 to 2 hours after a meal.  Home care  Follow these guidelines when caring for yourself at home:  · Follow the diet your healthcare provider gives you. Take insulin or other diabetes medicine exactly as told to.  · Watch your blood sugar as you are told to. Keep a log of your results. This will help your provider change your medicines to keep your blood sugar under control.  · Try to reach your ideal weight. You may be able to cut back on or not have to take diabetes medicine if you eat the right foods and get exercise.  · Do not smoke. Smoking worsens the effects of diabetes on your circulation. You are much more likely to have a heart attack if you have diabetes and you smoke.  · Take good care of your feet. If you have lost feeling in your feet, you may not see an injury or infection. Check your feet and between your toes at least once a week.  · Wear a medical alert bracelet or necklace, or carry a card in your wallet that says you have diabetes. This will help healthcare  providers give you the right care if you get very ill and cannot tell them that you have diabetes.  Sick day plan  If you get a cold, the flu, or a bacterial or viral infection, take these steps:  · Look at your diabetes sick plan and call your healthcare provider as you were told to. You may need to call your provider right away if:  ¨ Your blood sugar is above 240 while taking your diabetes medicine  ¨ Your urine ketone levels are above normal or high  ¨ You have been vomiting more than 6 hours  ¨ You have trouble breathing or your breath ha s a fruity smell  ¨ You have a high fever  ¨ You have a fever for several days and you are not getting better  ¨ You get light-headed and are sleepier than usual  · Keep taking your diabetes pills (oral medicine) even if you have been vomiting and are feeling sick. Call your provider right away because you may need insulin to lower your blood sugar until you recover from your illness.  · Keep taking your insulin even if you have been vomiting and are feeling sick. Call your provider right away to ask if you need to change your insulin dose. This will depend on your blood sugar results.  · Check your blood sugar every 2 to 4 hours, or at least 4 times a day.  · Check your ketones often. If you are vomiting and having diarrhea, watch them more often.  · Do not skip meals. Try to eat small meals on a regular schedule. Do this even if you do not feel like eating.  · Drink water or other liquids that do not have caffeine or calories. This will keep you from getting dehydrated. If you are nauseated or vomiting, takes small sips every 5 minutes. To prevent dehydration try to drink a cup (8 ounces) of fluids every hour while you are awake.  General care  Always bring a source of fast-acting sugar with you in case you have symptoms of low blood sugar (below 70). At the first sign of low blood sugar, eat or drink 15 to 20 grams of fast-acting sugar to raise your blood sugar. Examples  are:  · 3 to 4 glucose tablets. You can buy these at most [employer].  · 4 ounces (1/2 cup) of regular (not diet) soft drinks  · 4 ounces (1/2 cup) of any fruit juice  · 8 ounces (1 cup) of milk  · 5 to 6 pieces of hard candy  · 1 tablespoon of honey  Check your blood sugar 15 minutes after treating yourself. If it is still below 70, take 15 to 20 more grams of fast-acting sugar. Test again in 15 minutes. If it returns to normal (70 or above), eat a snack or meal to keep your blood sugar in a safe range. If it stays low, call your doctor or go to an emergency room.  Follow-up care  Follow-up with your healthcare provider, or as advised. For more information about diabetes, visit the American Diabetes Association website at www.diabetes.org or call 915-185-5931.  When to seek medical advice  Call your healthcare provider right away if you have any of these symptoms of high blood sugar:  · Frequent urination  · Dizziness  · Drowsiness  · Thirst  · Headache  · Nausea or vomiting  · Abdominal pain  · Eyesight changes  · Fast breathing  · Confusion or loss of consciousness  Also call your provider right away if you have any of these signs of low blood sugar:  · Fatigue  · Headache  · Shakes  · Excess sweating  · Hunger  · Feeling anxious or restless  · Eyesight changes  · Drowsiness  · Weakness  · Confusion or loss of consciousness  Call 911  Call for emergency help right away if any of these occur:  · Chest pain or shortness of breath  · Dizziness or fainting  · Weakness of an arm or leg or one side of the face  · Trouble speaking or seeing   © 4552-5165 [employer]. [address] [place] [address]. All rights reserved. This information is not intended as a substitute for professional medical care. Always follow your healthcare professional's instructions.        Diabetes: Meal Planning  You can help keep your blood sugar level in your target range by eating healthy foods. Your healthcare team  can help you create a low-fat, nutritious meal plan. Take an active role in your diabetes management by following your meal plan and working with your healthcare team.    Make your meal plan  A meal plan gives guidelines for the types and amounts of food you should eat. The goal is to balance food and insulin (or other diabetes medications) so your blood sugars will be in your target range. Your dietitian will help you make a flexible meal plan that includes many foods that you like.  Watch serving sizes  Your meal plan will group foods by servings. To learn how much a serving is, start by measuring food portions at each meal. Soon youll know what a serving looks like on your plate. Ask your healthcare provider about how to balance servings of different foods.  Eat from all the food groups  The basis of a healthy meal plan is variety (eating lots of different foods). Choose lean meats, fresh fruits and vegetables, whole grains, and low-fat or nonfat dairy products. Eating a wide variety of foods provides the nutrients your body needs. It can also keep you from getting bored with your meal plan.  Learn about carbohydrates, fats, and protein  · Carbohydrates are starches, sugars, and fiber. They are found in many foods, including fruit, bread, pasta, milk, and sweets. Of all the foods you eat, carbohydrates have the most effect on your blood sugar. Your dietitian may teach you about carb counting, a way to figure out the number of carbohydrates in a meal.  · Fats have the most calories. They also have the most effect on your weight and your risk of heart disease. When you have diabetes, its important to control your weight and protect your heart. Foods that are high in fat include whole milk, cheese, snack foods, and desserts.  · Protein is important for building and repairing muscles and bones. Choose low-fat protein sources, such as fish, egg whites, and skinless chicken.  Reduce liquid sugars  Extra calories from  sodas, sports drinks, and fruit drinks make it hard to keep blood sugar in range. Cut as many liquid sugars from your meal plan as you can.  This includes most fruit juices, which are often high in natural or added sugar. Instead, drink plenty of water and other sugar-free beverages.  Eat less fat  If you need to lose weight, try to reduce the amount of fat in your diet. This can also help lower your cholesterol level to keep blood vessels healthier. Cut fat by using only small amounts of liquid oil for cooking. Read food labels carefully to avoid foods with unhealthy trans fats.  Timing your meals  When it comes to blood sugar control, when you eat is as important as what you eat. You may need to eat several small meals spaced evenly throughout the day to stay in your target range. So dont skip breakfast or wait until late in the day to get most of your calories. Doing so can cause your blood sugar to rise too high or fall too low.   © 3105-1701 [employer]. [address] [place] [address], [place] [address]. All rights reserved. This information is not intended as a substitute for professional medical care. Always follow your healthcare professional's instructions.        Oral Medicines for Type 2 Diabetes  Diabetes pills can help to manage your blood sugar. These pills are not insulin. They work to manage your blood sugar in several ways. You may be given a combination of medicines. Always follow your healthcare provider's instructions.  Some pills may put you at higher risk for low blood sugar (hypoglycemia). Watch for symptoms of low blood sugar. Symptoms are listed below. Call your healthcare provider's if low blood sugar happens often.  Type of diabetes pills  Biguanides  These pills help control the amount of glucose in your blood. They do this by decreasing the amount of glucose made by your liver and helping your muscles use insulin more effectively. These medicines are usually taken with  or after each meal. Possible side effects and other problems include:  · Diarrhea  · Nausea  · Vomiting  · Abdominal bloating  · Excess gas (flatulence)  · Metallic taste in mouth  · Lower blood vitamin B12 levels from decreased absorption from the GI tract of this essential vitamin  Sulfonylureas  These pills help your body make more insulin. They are usually taken 30 minutes before a meal. Possible side effects include hypoglycemia.  Alpha-glucosidase inhibitors  These pills slow the digestion of sugars and starches. They can help keep your blood sugar from going too high after a meal. Take them with the first bite of each main meal. Possible side effects include:  · Stomach pain  · Diarrhea  · Excess gas (flatulence)    Thiazolidinediones  These pills help your muscle cells use insulin better. Your healthcare provider may order lab tests to check the function of your liver before prescribing these pills and regularly while you are taking them. Possible side effects include:   · Weight gain  · Extra fluid in your body and swelling  · Increased risk for heart failure  · Osteoporosis and increased risk for broken bones  Meglitinides  These pills increase your insulin for a short period of time. They are usually taken before meals. Possible side effects include:  · Low blood sugar  · Diarrhea  · Headache   · Slightly increased risk for heart problems  DPP-4 inhibitors  These pills help lower blood sugar levels in people with type 2 diabetes. They are less likely to cause hypoglycemia, unless you take them with a sulfonylurea. They are taken once a day. Possible side effects include:  · Upper respiratory tract infection  · Stuffy or runny nose  · Sore throat  · Headache  Other side effects are under investigation.  SGLT-2 inhibitors  These pills help lower blood sugar levels in people with type 2 diabetes by increasing the amount of sugar that leaks into the urine. Possible side effects include:  · Urinary tract  infections  · Genital infections, especially in women  · Dehydration and low blood pressure  · Increased bone fractures  · Development of keotacidosis while blood sugar is only mildly elevated above the target range   Dopamine D2 receptor agonist (bromocriptine mesylate)  These pills help lower blood sugar levels in people with type 2 diabetes. Possible side effects of this medicine include:  · Nausea  · Vomiting  · Fatigue  · Dizziness  · Headaches  Combination pills  These medicines may help keep your blood glucose within your target range. They also help your pancreas make more insulin and may help your muscles use insulin more effectively. Side effects depend on which type of combination you use. Your healthcare provider can tell you more.     Watch for symptoms of hypoglycemia  Symptoms include the following:  · Headaches  · Shakiness or dizziness  · Hunger  · Cold, clammy skin; sweating  · A hard, fast heartbeat  · Confusion or irritability   © 7866-5975 [employer]. [address] [place] [address]. All rights reserved. This information is not intended as a substitute for professional medical care. Always follow your healthcare professional's instructions.        Preventing Falls in the Home  As you get older, falls are more likely. Thats because your reaction time slows. Your muscles and joints may also get stiffer, making them less flexible. Illness, medications, and vision changes can also affect your balance. A fall could leave you unable to live on your own. To make your home safer, follow these tips:   Floors:  · Put nonskid pads under area rugs.  · Remove throw rugs.  · Replace worn floor coverings.  · Tack carpets firmly to each step on carpeted stairs. Put nonskid strips on the edges of uncarpeted stairs.  · Keep floors and stairs free of clutter and cords.  · Arrange furniture so there are clear pathways.  · Clean up any spills right away.   Bathrooms:  ·   Install grab bars  in the tub or shower.  · Apply nonskid strips or put a nonskid rubber mat in the tub or shower.  · Sit on a bath chair to bathe.  · Use bathmats with nonskid backing.   Lighting:  · Keep a flashlight in each room.  · Put a nightlight along the pathway between the bedroom and the bathroom.  © 0526-6439 [employer]. [address] [place] [address], [place] [address]. All rights reserved. This information is not intended as a substitute for professional medical care. Always follow your healthcare professional's instructions.

## 2017-01-18 NOTE — TELEPHONE ENCOUNTER
----- Message from Jean Carlos Swanson MD sent at 1/18/2017 11:23 AM CST -----  Hepatitis C antibody test was positive. Has he ever been told this? If not, he should come in relatively soon to discuss. (NOT urgent, however.) Glucose is very high and HgA1c is very high. He should at least come in relatively soon to discuss our plan to improve his diabetes care.

## 2017-01-19 PROBLEM — D51.9 ANEMIA DUE TO VITAMIN B12 DEFICIENCY: Chronic | Status: ACTIVE | Noted: 2017-01-01

## 2017-01-19 PROBLEM — D51.9 ANEMIA DUE TO VITAMIN B12 DEFICIENCY: Status: ACTIVE | Noted: 2017-01-01

## 2017-01-19 PROBLEM — E11.65 TYPE 2 DIABETES MELLITUS WITH HYPERGLYCEMIA, WITHOUT LONG-TERM CURRENT USE OF INSULIN: Status: ACTIVE | Noted: 2017-01-01

## 2017-01-19 PROBLEM — B18.2 HEP C W/O COMA, CHRONIC: Status: ACTIVE | Noted: 2017-01-01

## 2017-01-19 PROBLEM — F10.21 ALCOHOLISM IN REMISSION: Status: ACTIVE | Noted: 2017-01-01

## 2017-01-19 PROBLEM — E11.65 TYPE 2 DIABETES MELLITUS WITH HYPERGLYCEMIA, WITHOUT LONG-TERM CURRENT USE OF INSULIN: Chronic | Status: ACTIVE | Noted: 2017-01-01

## 2017-01-19 PROBLEM — B18.2 HEP C W/O COMA, CHRONIC: Chronic | Status: ACTIVE | Noted: 2017-01-01

## 2017-01-19 PROBLEM — F10.21 ALCOHOLISM IN REMISSION: Chronic | Status: ACTIVE | Noted: 2017-01-01

## 2017-01-19 PROBLEM — I10 ESSENTIAL HYPERTENSION: Chronic | Status: ACTIVE | Noted: 2017-01-01

## 2017-01-19 PROBLEM — I10 ESSENTIAL HYPERTENSION: Status: ACTIVE | Noted: 2017-01-01

## 2017-01-19 NOTE — PATIENT INSTRUCTIONS
"  Alcohol Abuse  Alcoholic drinks are harmful when you have too many of them. There is no set number of drinks that defines too much. Drinking that disrupts your life or your health is called alcohol abuse. Alcohol abuse can hurt your relationships with others. You may lose friends, a spouse, or even your job. You may be abusing alcohol if any of the following are true for you:  · Duties at home or with  suffer because of drinking.  · Duties at work or in school suffer because of drinking.  · You have missed work or school because of drinking.  · You use alcohol while driving or operating machinery.  · You have legal problems such as arrests due to drinking.  · You keep drinking even though it causes serious problems in your life.  Health effects  Alcohol abuse causes health problems. Sometimes this can happen after only drinking a little." There is no set number of drinks or amount of alcohol that defines too much. The more you drink at one time, and the more often you drink determine both the short-term and long-term health effects. It affects all parts of your body and your health, including your:  · Brain. Alcohol is a central nervous system depressant. It can damage parts of the brain that affect your balance, memory, thinking, and emotions. It can cause memory loss, blackouts, depression, agitation, sleep cycle changes, and seizures. These changes may or may not be reversible.  · Heart and vascular system. Alcohol affects multiple areas. It can damage heart muscle causing cardiomyopathy, which is a weakening and stretching of the heart muscle. This can lead to trouble breathing, an irregular heartbeat, atrial fibrillation, leg swelling, and heart failure. Alcohol use makes the blood vessels stiffen causing high blood pressure. All of these problems increase your risk of having heart attacks or strokes.  · Liver. Alcohol causes fat to build up in the liver, affecting its normal function. This " increases the risk for hepatitis, leading to abdominal pain, appetite loss, jaundice, bleeding problems, liver fibrosis, and cirrhosis. This, in turn, can affect your ability to fight off infections, and can cause diabetes. The liver changes prevent it from removing toxins in your blood that can cause encephalopathy, which may show with confusion, altered level of consciousness, personality changes, memory loss, seizures, coma, and death.  · Pancreas. Alcohol can cause inflammation of the pancreas, or pancreatitis. This can cause abdominal pain, fever, and diabetes.  · Immune system. Alcohol weakens your immune system in a number of ways. It suppresses your immune system making it harder to fight infections and colds. It also increases the chance of getting pneumonia and tuberculosis.  · Cancer. Alcohol is a risk factor for developing cancer of the mouth, esophagus, pharynx, larynx, liver, and breast.  · Sexual function. Alcohol can lead to sexual problems.  Home care  The following guidelines will help you deal with alcohol abuse:  · Admit you have a problem with alcohol.  · Ask for help from your healthcare provider and trusted family members or close friends.  · Get help from people trained in dealing with alcohol abuse. This may be individual counseling or group therapy, or it may be a supervised alcohol treatment program.  · Join a self-help group for alcohol abuse such as Alcoholics Anonymous (AA).  · Avoid people who abuse alcohol or tempt you to drink.  Follow-up care  Follow up as advised by the healthcare provider, or as advised. Contact these groups to get help:  · Alcoholics Anonymous (AA): Go to www.aa.org or check the phone book for meetings near you.  · National Alcohol and Substance Abuse Information Center (NASAIC): 456.514.5551 www.addictioncareoptions.com  · National [place] on Alcoholism and Drug Dependence (NCADD): 529-CFA-TDYH (942-1949) www.ncadd.org  Call 911  Call 911 if any of these  occur:  · Trouble breathing or slow irregular breathing  · Chest pain  · Sudden weakness on one side of your body or sudden trouble speaking  · Heavy bleeding or vomiting blood  · Very drowsy or trouble awakening  · Fainting or loss of consciousness  · Rapid heart rate  · Seizure  When to seek medical care  Call your healthcare provider right away if any of these occur:   · Confusion  · Hallucinations (seeing, hearing, or feeling things that arent there)  · Pain in your upper abdomen that gets worse  · Repeated vomiting or black or tarry stools  · Severe shakiness  © 4694-8338 [employer]. [address] [place] [address]. All rights reserved. This information is not intended as a substitute for professional medical care. Always follow your healthcare professional's instructions.        Understanding Hepatitis C (HCV)  Hepatitis means inflammation of the liver. There are many kinds of hepatitis. Some can be spread. Others are not. Hepatitis C virus (HCV) can be spread to others. It can lead to lifelong liver disease. This includes chronic hepatitis, cirrhosis, liver failure, and liver cancer.  Symptoms of hepatitis C  Most people notice no problems until they develop liver disease years later. Symptoms include the following:  · Flulike problems (fatigue, nausea, vomiting, diarrhea, and sore muscles and joints)  · Tenderness in the upper right abdomen  · Jaundice (yellowing skin)  · Swelling in the belly  · Itching  · Dark urine  How HCV spreads  HCV spreads through exposure to an infected persons blood. This is most likely to happen if:  · You used an infected needle (IV drug needles, tattoos, acupuncture needles, and body piercing)  · You had a needlestick injury in the hospital  · You shared personal care items such as razors  · You had sex without a condom with an infected person (a less common cause)  · You had a blood transfusion several years ago (blood is now screened for HCV)  Many  people do not know how they were exposed to HCV.  Prevent the spread  No vaccine can prevent the spread of HCV and hepatitis C. Its up to you to keep others safe.  Do:  · Cover all skin breaks and sores yourself. If you need help, the person treating you should wear latex gloves.  · Use condoms during sex.  Dont:  · Dont donate blood, plasma, body organs, other body tissue, or sperm.  · Dont share needles.  · Dont share razors, toothbrushes, manicure tools, or other personal items.   © 8971-0528 [employer]. [address] [place] [address], [place] [address]. All rights reserved. This information is not intended as a substitute for professional medical care. Always follow your healthcare professional's instructions.

## 2017-01-19 NOTE — PROGRESS NOTES
"Subjective:   Patient ID: Soy Reza is a 66 y.o. male.    Chief Complaint: Follow-up (lab results)      HPI  65 yo male here to discuss positive HEP C study and to discuss uncontrolled type 2 diabetes mellitus. He has no known history of Hep C. He denies any IV drug use. He says, "I guess maybe it was through sex..." He is not currently sexually active.    He states he was drinking heavily but stopped in Oct after he took a hard fall and had a head injury. He denies residua from this trauma.     He is not especially compliant with his diabetic diet but is willing to try harder. He is willing to undergo treatment for HepC if he is a candidate. He is emphatic that he has quit drinking for life but admits it is a daily struggle.    Patient queried and denies any further complaints.    ALLERGIES AND MEDICATIONS: updated and reviewed.  Review of patient's allergies indicates:  No Known Allergies    Current Outpatient Prescriptions:     aspirin 81 MG Chew, Take 81 mg by mouth once daily., Disp: , Rfl:     lisinopril (PRINIVIL,ZESTRIL) 20 MG tablet, Take 1 tablet (20 mg total) by mouth once daily., Disp: 90 tablet, Rfl: 3    metformin (GLUCOPHAGE-XR) 500 MG 24 hr tablet, Take 4 tablets (2,000 mg total) by mouth daily with breakfast., Disp: 360 tablet, Rfl: 3    Review of Systems   Constitutional: Negative for activity change, appetite change, chills, diaphoresis, fatigue, fever and unexpected weight change.   HENT: Negative for congestion, ear discharge, ear pain, postnasal drip, rhinorrhea, sneezing and sore throat.    Eyes: Negative for photophobia and discharge.   Respiratory: Negative for cough, chest tightness, shortness of breath and wheezing.    Cardiovascular: Negative for chest pain and palpitations.   Gastrointestinal: Negative for abdominal distention, abdominal pain, diarrhea, nausea and vomiting.   Genitourinary: Negative for dysuria.   Musculoskeletal: Negative for arthralgias and neck pain.   Skin: " "Negative for rash.   Neurological: Negative for headaches.       Objective:     Vitals:    01/19/17 1100   BP: 124/82   Pulse: 97   Temp: 97.8 °F (36.6 °C)   TempSrc: Oral   SpO2: 97%   Weight: 79.2 kg (174 lb 9.7 oz)   Height: 5' 10" (1.778 m)   PainSc: 0-No pain     Body mass index is 25.05 kg/(m^2).    Physical Exam   Constitutional: He appears well-developed and well-nourished.   HENT:   Head: Normocephalic and atraumatic.   Right Ear: Hearing, tympanic membrane, external ear and ear canal normal. No drainage or swelling. No decreased hearing is noted.   Left Ear: Hearing, tympanic membrane, external ear and ear canal normal. No drainage or swelling. No decreased hearing is noted.   Nose: Nose normal. No rhinorrhea.   Mouth/Throat: Oropharynx is clear and moist. No oropharyngeal exudate, posterior oropharyngeal edema or posterior oropharyngeal erythema.   Eyes: Conjunctivae, EOM and lids are normal. Pupils are equal, round, and reactive to light. Right eye exhibits no discharge and no exudate. Left eye exhibits no discharge and no exudate. Right conjunctiva is not injected. Left conjunctiva is not injected.   Neck: Trachea normal and full passive range of motion without pain. Normal carotid pulses, no hepatojugular reflux and no JVD present. Carotid bruit is not present. No rigidity. No edema and no erythema present. No thyroid mass and no thyromegaly present.   Cardiovascular: Normal rate, regular rhythm and normal heart sounds.    Pulmonary/Chest: Effort normal. No respiratory distress.   Abdominal: Soft. Normal appearance and bowel sounds are normal. There is no tenderness. There is negative Blount's sign.   Lymphadenopathy:     He has no cervical adenopathy.   Neurological: He is alert.   Skin: Skin is warm and dry.   Psychiatric: He has a normal mood and affect. His speech is normal and behavior is normal.       Assessment and Plan:   Soy Bain was seen today for follow-up.    Diagnoses and all orders for " this visit:    Hep C w/o coma, chronic  -     Ambulatory Referral to Hepatitis C  -     CBC auto differential; Future  -     Basic metabolic panel; Future  -     Iron; Future  -     Folate; Future  -     Vitamin B12; Future    Anemia, unspecified type  -     Ambulatory Referral to Hepatitis C  -     CBC auto differential; Future  -     Basic metabolic panel; Future  -     Iron; Future  -     Folate; Future  -     Vitamin B12; Future    Protein-calorie malnutrition   -     Folate; Future    Anemia due to vitamin B12 deficiency, unspecified B12 deficiency type   -     Vitamin B12; Future    History of alcohol abuse    Type 2 diabetes mellitus with hyperglycemia, without long-term current use of insulin    Alcoholism in remission    Essential hypertension    Other orders  -     metformin (GLUCOPHAGE-XR) 500 MG 24 hr tablet; Take 4 tablets (2,000 mg total) by mouth daily with breakfast.        No Follow-up on file.

## 2017-01-19 NOTE — MR AVS SNAPSHOT
[place] [facility]   [facility]  [place] [address]  Phone: [phone]  Fax: [phone]                  Soy Reza   2017 11:00 AM   Office Visit    Description:  Male : 1950   Provider:  Jean Carlos Swanson MD   Department:  [place] [facility]           Reason for Visit     Follow-up           Diagnoses this Visit        Comments    Hep C w/o coma, chronic    -  Primary     Anemia, unspecified type         Protein-calorie malnutrition         Anemia due to vitamin B12 deficiency, unspecified B12 deficiency type         History of alcohol abuse         Type 2 diabetes mellitus with hyperglycemia, without long-term current use of insulin         Alcoholism in remission         Essential hypertension                To Do List           Future Appointments        Provider Department Dept Phone    2017 8:00 AM Jose Hendrickson PT [facility] [phone]      Goals (5 Years of Data)     None       These Medications        Disp Refills Start End    metformin (GLUCOPHAGE-XR) 500 MG 24 hr tablet 360 tablet 3 2017    Take 4 tablets (2,000 mg total) by mouth daily with breakfast. - Oral    Pharmacy: [facility]/pharmacy #[id number] - [place], [place] - [address] #: [phone]         [facility] On [facility]     [facility] -  Assistance  Registered nurses in the [facility] provide clinical advisement, health education, appointment booking, and other advisory services.  Call for this free service at [phone].             Medications           Message regarding Medications     Verify the changes and/or additions to your medication regime listed below are the same as discussed with your clinician today.  If any of these changes or additions are incorrect, please notify your healthcare provider.        START taking these NEW medications        Refills    metformin (GLUCOPHAGE-XR) 500 MG  "24 hr tablet 3    Sig: Take 4 tablets (2,000 mg total) by mouth daily with breakfast.    Class: Normal    Route: Oral      STOP taking these medications     metformin (GLUCOPHAGE) 1000 MG tablet Take 1 tablet (1,000 mg total) by mouth 2 (two) times daily with meals.           Verify that the below list of medications is an accurate representation of the medications you are currently taking.  If none reported, the list may be blank. If incorrect, please contact your healthcare provider. Carry this list with you in case of emergency.           Current Medications     aspirin 81 MG Chew Take 81 mg by mouth once daily.    lisinopril (PRINIVIL,ZESTRIL) 20 MG tablet Take 1 tablet (20 mg total) by mouth once daily.    metformin (GLUCOPHAGE-XR) 500 MG 24 hr tablet Take 4 tablets (2,000 mg total) by mouth daily with breakfast.           Clinical Reference Information           Vital Signs - Last Recorded  Most recent update: 1/19/2017 11:01 AM by Donna Hoffman MA    BP Pulse Temp Ht Wt SpO2    124/82 (BP Location: Right arm, Patient Position: Sitting, BP Method: Manual) 97 97.8 °F (36.6 °C) (Oral) 5' 10" (1.778 m) 79.2 kg (174 lb 9.7 oz) 97%    BMI                25.05 kg/m2          Blood Pressure          Most Recent Value    BP  124/82      Allergies as of 1/19/2017     No Known Allergies      Immunizations Administered on Date of Encounter - 1/19/2017     None      Orders Placed During Today's Visit      Normal Orders This Visit    Ambulatory Referral to Hepatitis C     Future Labs/Procedures Expected by Expires    Basic metabolic panel  1/19/2017 3/20/2018    CBC auto differential  2/18/2017 (Approximate) 3/20/2018    Folate  As directed 3/20/2018    Iron  As directed 3/20/2018    Vitamin B12  As directed 3/20/2018      Instructions      Alcohol Abuse  Alcoholic drinks are harmful when you have too many of them. There is no set number of drinks that defines too much. Drinking that disrupts your life or your health is " "called alcohol abuse. Alcohol abuse can hurt your relationships with others. You may lose friends, a spouse, or even your job. You may be abusing alcohol if any of the following are true for you:  · Duties at home or with  suffer because of drinking.  · Duties at work or in school suffer because of drinking.  · You have missed work or school because of drinking.  · You use alcohol while driving or operating machinery.  · You have legal problems such as arrests due to drinking.  · You keep drinking even though it causes serious problems in your life.  Health effects  Alcohol abuse causes health problems. Sometimes this can happen after only drinking a little." There is no set number of drinks or amount of alcohol that defines too much. The more you drink at one time, and the more often you drink determine both the short-term and long-term health effects. It affects all parts of your body and your health, including your:  · Brain. Alcohol is a central nervous system depressant. It can damage parts of the brain that affect your balance, memory, thinking, and emotions. It can cause memory loss, blackouts, depression, agitation, sleep cycle changes, and seizures. These changes may or may not be reversible.  · Heart and vascular system. Alcohol affects multiple areas. It can damage heart muscle causing cardiomyopathy, which is a weakening and stretching of the heart muscle. This can lead to trouble breathing, an irregular heartbeat, atrial fibrillation, leg swelling, and heart failure. Alcohol use makes the blood vessels stiffen causing high blood pressure. All of these problems increase your risk of having heart attacks or strokes.  · Liver. Alcohol causes fat to build up in the liver, affecting its normal function. This increases the risk for hepatitis, leading to abdominal pain, appetite loss, jaundice, bleeding problems, liver fibrosis, and cirrhosis. This, in turn, can affect your ability to fight off " infections, and can cause diabetes. The liver changes prevent it from removing toxins in your blood that can cause encephalopathy, which may show with confusion, altered level of consciousness, personality changes, memory loss, seizures, coma, and death.  · Pancreas. Alcohol can cause inflammation of the pancreas, or pancreatitis. This can cause abdominal pain, fever, and diabetes.  · Immune system. Alcohol weakens your immune system in a number of ways. It suppresses your immune system making it harder to fight infections and colds. It also increases the chance of getting pneumonia and tuberculosis.  · Cancer. Alcohol is a risk factor for developing cancer of the mouth, esophagus, pharynx, larynx, liver, and breast.  · Sexual function. Alcohol can lead to sexual problems.  Home care  The following guidelines will help you deal with alcohol abuse:  · Admit you have a problem with alcohol.  · Ask for help from your healthcare provider and trusted family members or close friends.  · Get help from people trained in dealing with alcohol abuse. This may be individual counseling or group therapy, or it may be a supervised alcohol treatment program.  · Join a self-help group for alcohol abuse such as Alcoholics Anonymous (AA).  · Avoid people who abuse alcohol or tempt you to drink.  Follow-up care  Follow up as advised by the healthcare provider, or as advised. Contact these groups to get help:  · Alcoholics Anonymous (AA): Go to www.aa.org or check the phone book for meetings near you.  · National Alcohol and Substance Abuse Information Center (NASAIC): 935.251.4937 www.addictioncareoptions.com  · National [place] on Alcoholism and Drug Dependence (NCADD): 087-SOY-IYLA (007-6586) www.ncadd.org  Call 911  Call 911 if any of these occur:  · Trouble breathing or slow irregular breathing  · Chest pain  · Sudden weakness on one side of your body or sudden trouble speaking  · Heavy bleeding or vomiting blood  · Very drowsy or  trouble awakening  · Fainting or loss of consciousness  · Rapid heart rate  · Seizure  When to seek medical care  Call your healthcare provider right away if any of these occur:   · Confusion  · Hallucinations (seeing, hearing, or feeling things that arent there)  · Pain in your upper abdomen that gets worse  · Repeated vomiting or black or tarry stools  · Severe shakiness  © 3688-2093 [employer]. [address] [place] [address], [place] [address]. All rights reserved. This information is not intended as a substitute for professional medical care. Always follow your healthcare professional's instructions.        Understanding Hepatitis C (HCV)  Hepatitis means inflammation of the liver. There are many kinds of hepatitis. Some can be spread. Others are not. Hepatitis C virus (HCV) can be spread to others. It can lead to lifelong liver disease. This includes chronic hepatitis, cirrhosis, liver failure, and liver cancer.  Symptoms of hepatitis C  Most people notice no problems until they develop liver disease years later. Symptoms include the following:  · Flulike problems (fatigue, nausea, vomiting, diarrhea, and sore muscles and joints)  · Tenderness in the upper right abdomen  · Jaundice (yellowing skin)  · Swelling in the belly  · Itching  · Dark urine  How HCV spreads  HCV spreads through exposure to an infected persons blood. This is most likely to happen if:  · You used an infected needle (IV drug needles, tattoos, acupuncture needles, and body piercing)  · You had a needlestick injury in the hospital  · You shared personal care items such as razors  · You had sex without a condom with an infected person (a less common cause)  · You had a blood transfusion several years ago (blood is now screened for HCV)  Many people do not know how they were exposed to HCV.  Prevent the spread  No vaccine can prevent the spread of HCV and hepatitis C. Its up to you to keep others safe.  Do:  · Cover all skin breaks  and sores yourself. If you need help, the person treating you should wear latex gloves.  · Use condoms during sex.  Dont:  · Dont donate blood, plasma, body organs, other body tissue, or sperm.  · Dont share needles.  · Dont share razors, toothbrushes, manicure tools, or other personal items.   © 6065-5223 [employer]. [address] [place] [address], [place] [address]. All rights reserved. This information is not intended as a substitute for professional medical care. Always follow your healthcare professional's instructions.

## 2017-01-20 NOTE — TELEPHONE ENCOUNTER
Chart reviewed. Positive HCV antibody on 1/17/17    Spoke to pt. States he remembers being told he had Hep A many years ago, unclear about Hep C. Informed pt that additional test is needed to confirm active virus. Pt states that he has a lot of bruising from recent labs and wants to wait about 2 weeks to return. HCV genotype scheduled on 2/3. Pt will be called once results are available.

## 2017-02-16 NOTE — TELEPHONE ENCOUNTER
Pt was a no show for lab on 2/3 and 2/14. Letter will be mailed asking that he call back to reschedule so that consult can be scheduled if needed.

## 2017-07-19 NOTE — ED PROVIDER NOTES
Encounter Date: 7/19/2017       History     Chief Complaint   Patient presents with    Leg Swelling     bilateral. first noticed yesterday. Hx DM. Denies CHF     Patient is a 66-year-old male with a past medical history of DMII (not on insulin), HTN and hepatitis C (dx in Jan), not on any medications who presents to the ED with abdominal distention and bilateral lower extremity swelling. Patient reports that he has been having gradually progressive swelling of his abdomen and BLL for the past month. He decided to come to the ED today because of his mother. He reports SOB when he gets up from a sitting poisition, constipation, & difficulty starting urination. Denies any fevers, chills, HA, CP, palpations, orthopnea, PND. A former smoker (3ppd*45ys), alcohol, illicit drug user, quit 5 years ago.               Review of patient's allergies indicates:  No Known Allergies  Past Medical History:   Diagnosis Date    Diabetes mellitus     Hypertension     Macular degeneration     Neuropathy      Past Surgical History:   Procedure Laterality Date    EYE SURGERY      HAND SURGERY       History reviewed. No pertinent family history.  Social History   Substance Use Topics    Smoking status: Former Smoker    Smokeless tobacco: Never Used    Alcohol use No      Comment: 2 5th per week- stopped one month ago approx 9/10/16     Review of Systems   Constitutional: Negative for chills and fever.   HENT: Negative for ear pain and sore throat.    Eyes: Negative for visual disturbance.   Respiratory: Positive for shortness of breath. Negative for cough.    Cardiovascular: Negative for chest pain.   Gastrointestinal: Positive for abdominal pain and constipation. Negative for nausea and vomiting.   Endocrine: Negative for polydipsia and polyuria.   Genitourinary: Positive for decreased urine volume and difficulty urinating. Negative for dysuria and hematuria.   Musculoskeletal: Negative for back pain and neck pain.   Skin:  Negative for rash.   Neurological: Negative for weakness and headaches.   Hematological: Does not bruise/bleed easily.       Physical Exam     Initial Vitals [07/19/17 1610]   BP Pulse Resp Temp SpO2   139/80 101 18 98.7 °F (37.1 °C) 96 %      MAP       99.67         Physical Exam    Constitutional: He appears well-developed. He is not diaphoretic. No distress.   HENT:   Head: Normocephalic and atraumatic.   Eyes: Conjunctivae and EOM are normal. Pupils are equal, round, and reactive to light. No scleral icterus.   Neck: Normal range of motion. Neck supple.   Cardiovascular: Normal rate, regular rhythm and intact distal pulses. Exam reveals no gallop and no friction rub.    No murmur heard.  2+ pitting edema of foot to mid shin then 1+ to knee bilaterally.   Pulmonary/Chest: No respiratory distress. He has no rales.   Abdominal: He exhibits shifting dullness, distension and ascites. There is no tenderness. There is no rigidity, no rebound and no guarding.   Neurological: He is alert and oriented to person, place, and time. No cranial nerve deficit or sensory deficit.   Skin: Skin is warm and dry.         ED Course   Procedures  Labs Reviewed   CBC W/ AUTO DIFFERENTIAL - Abnormal; Notable for the following:        Result Value    RBC 4.18 (*)     Hemoglobin 10.8 (*)     Hematocrit 32.5 (*)     MCV 78 (*)     MCH 25.8 (*)     RDW 16.3 (*)     Platelets 128 (*)     Lymph # 0.9 (*)     Lymph% 16.9 (*)     All other components within normal limits   COMPREHENSIVE METABOLIC PANEL - Abnormal; Notable for the following:     CO2 22 (*)     Glucose 151 (*)     BUN, Bld 27 (*)     Albumin 2.4 (*)     Total Bilirubin 1.2 (*)     AST 42 (*)     ALT 56 (*)     All other components within normal limits   URINALYSIS, REFLEX TO URINE CULTURE - Abnormal; Notable for the following:     Appearance, UA Hazy (*)     Protein, UA 3+ (*)     Occult Blood UA 1+ (*)     All other components within normal limits    Narrative:     Preferred  Collection Type->Urine, Clean Catch   AMMONIA - Abnormal; Notable for the following:     Ammonia 100 (*)     All other components within normal limits   PROTEIN / CREATININE RATIO, URINE - Abnormal; Notable for the following:     Protein, Urine Random 496 (*)     Prot/Creat Ratio, Ur 2.52 (*)     All other components within normal limits    Narrative:     Preferred Collection Type->Urine, Clean Catch   URINALYSIS MICROSCOPIC - Abnormal; Notable for the following:     Hyaline Casts, UA 10 (*)     All other components within normal limits    Narrative:     Preferred Collection Type->Urine, Clean Catch   PROTIME-INR   B-TYPE NATRIURETIC PEPTIDE             Medical Decision Making:   Initial Assessment:   Patient is a 66-year-old male with a past medical history of DMII (not on insulin), HTN and hepatitis C who presents to the ED w/ ascites. Most likely secondary to hep C. Will r/o proteinuria, CHF.    Differential Diagnosis:   Ascites secondary to hep C  Nephrotic Syndrome secondary to DM or Hep C  Proteinuria   CHF  Infectious    Independently Interpreted Test(s):   I have ordered and independently interpreted X-rays - see prior notes.  I have ordered and independently interpreted EKG Reading(s) - see prior notes  Clinical Tests:   Lab Tests: Ordered and Reviewed  The following lab test(s) were unremarkable: CBC, BMP, CMP, Urinalysis and PT  Radiological Study: Reviewed and Ordered  ED Management:  Patient came in with ascites and peripheral edema. No symptoms of SOB, orthopnea, CP, PND, cough. UA revealed proteinuria 3+, no acute liver decompensation. Patient stable. D/C with lasix and instruted to follow up with PCP.      Other:   I discussed test(s) with the performing physician.       APC / Resident Notes:   Patient is a 66-year-old male with a past medical history of DMII (not on insulin), HTN and hepatitis C who presents to the ED w/ ascites. Most likely secondary to hep C vs. Proteinuria.   CBC, CMP, UA, MA/CR  ratio, BNP, PT-INR.          Attending Attestation:   Physician Attestation Statement for Resident:  As the supervising MD   Physician Attestation Statement: I have personally seen and examined this patient.   I agree with the above history. -: No new sxs today, here after gradual onset of sxs at the suggestion of his mother.   Has not attempted to go see his pcp for it yet.    As the supervising MD I agree with the above PE.   -: Nad, wdwn  Anicteric  bilat LE edema, symmetric  abd w/ ascites, nontender, no peritoneal signs, no masses  Chronic venous stasis changes ble  2+ dp bilat  Nl mentation  Slow steady gait     As the supervising MD I agree with the above treatment, course, plan, and disposition.   -: sxs likely attributable to progression of chronic liver disease.  Will check screening studies to rule out any acute process that would warranted expedited inpt mgt.    I have reviewed and agree with the residents interpretation of the following: lab data.  I have reviewed the following: old records at this facility.            Attending ED Notes:   Start on trial of lasix w/ limited supply - pt educated on need to see pmd in coming week for repeat labs and re-evaluation for further med mgt and outpt care of his chronic medical condition.  He indicates understanding and is agreeable to d/c.            ED Course     Clinical Impression:     The primary encounter diagnosis was Edema, peripheral. Diagnoses of Abdominal swelling, generalized, Chronic hepatitis C without hepatic coma, Dyspnea, and Proteinuria, unspecified type were also pertinent to this visit.                           Nesha Abel MD  Resident  07/24/17 1611       Jean Carlos Colon MD  07/25/17 2032

## 2017-07-19 NOTE — ED NOTES
Patient identifiers verified and correct for Soy Reza.    LOC: The patient is awake, alert and oriented x 4. Pt is speaking appropriately, no slurred speech.  APPEARANCE: Patient resting comfortably and in no acute distress. Pt is clean and well groomed. No JVD visible. Pt reports pain level of 9/10.  SKIN: Skin is warm dry and intact, and color is pallor. No tenting observed and capillary refill <3 seconds. No clubbing noted to nail beds. No breakdown or brusing visible and mucus membranes moist and acyanotic.  MUSCULOSKELETAL: Full range of motion present in all extremities. Hand  equal and leg strength strong +2 bilaterally.  RESPIRATORY: Airway is open and patent. Respirations-unlabored, regular rate, equal bilaterally on inspiration and expiration. No accessory muscle use noted. Lungs clear to auscultation in all fields bilaterally anterior and posterior.   CARDIAC: Patient has regular heart rate and rhythm. Patient has no c/o chest pain. +2 pitting edema noted to BLE.  ABDOMEN: Firm, distended, no tenderness noted. Normoactive bowel sounds X4 quadrants. Pt c/o constipation - reports LBM 07/18/17. Pt reports normal appetite.   NEUROLOGIC: Eyes open spontaneously and facial expression symmetrical. Pt behavior appropriate to situation, and pt follows commands.  Pt reports sensation present in all extremities when touched with a finger. No s/s of ischemic stroke. PERRLA  : No complaints of frequency, burning, urgency or blood in the urine.

## 2017-07-19 NOTE — ED TRIAGE NOTES
Pt c/o bilateral lower extremity swelling and pain/ tenderness x2 days - rates pain 9/10 at this time. Pt also c/o SOB on exertion. Pt reports numbness and tingling to bilateral lower extremities with hx of neuropathy. Pt has no c/o CP.

## 2017-07-20 NOTE — ED NOTES
Patient requesting update on POC. Dr. Abel notified and states he will come speak with patient in ten minutes.

## 2017-07-21 NOTE — TELEPHONE ENCOUNTER
Returned call to pt. No answer. Left message informing him that lab is still needed before consult can be scheduled. Since pt has appt today, lab appt scheduled as well. Asked that he call back with any questions.

## 2017-07-21 NOTE — TELEPHONE ENCOUNTER
----- Message from Ana Rosa Gonzalez RN sent at 7/21/2017  8:34 AM CDT -----  Contact: patient       ----- Message -----  From: Daphne Garcia  Sent: 7/20/2017   3:55 PM  To: [facility] Hepatitis C Staff    Calling to schedule his appt. Please call [phone]

## 2017-07-25 NOTE — TELEPHONE ENCOUNTER
Pt was initially referred in 1/2017 with +HCV Ab. Labs were needed to confirm active virus. But after multiple attempts to schedule, he never had lab done. Pt was seen in ER on 7/24 and was noted to have ascites and edema. It was recommended that he see hepatology.     HCV genotype done 7/24, result pending. Labs done 7/19 also noted.    Returned call to pt. U/s, fibroscan, and clinic visit scheduled on 8/10. Reminder mailed.   Pt is aware that if HCV is not detected, appt day/time may need to be changed to general hepatology.

## 2017-07-25 NOTE — TELEPHONE ENCOUNTER
----- Message from Daphne Macdonald RN sent at 7/24/2017  4:27 PM CDT -----  Nikos Hernandez would like to RS his appointment with Hepatitis C clinic. He is requesting a call.

## 2017-07-25 NOTE — PROGRESS NOTES
"Subjective:   Patient ID: Soy Reza is a 66 y.o. male.    Chief Complaint: Follow-up (hosp)      HPI  65 yo male here after July 19 ER visit. Complains of abdominal distension without morgan pain and "fluid" in my legs. Complains of modest dyspnea on exertion. Denies chest pain, URI symptoms otherwise (ie, no cough)  Patient queried and denies any further complaints.      ALLERGIES AND MEDICATIONS: updated and reviewed.  Review of patient's allergies indicates:  No Known Allergies    Current Outpatient Prescriptions:     aspirin 81 MG Chew, Take 81 mg by mouth once daily., Disp: , Rfl:     furosemide (LASIX) 20 MG tablet, Take 1 tablet (20 mg total) by mouth once daily., Disp: 7 tablet, Rfl: 0    lisinopril (PRINIVIL,ZESTRIL) 20 MG tablet, Take 1 tablet (20 mg total) by mouth once daily., Disp: 90 tablet, Rfl: 3    metformin (GLUCOPHAGE-XR) 500 MG 24 hr tablet, Take 4 tablets (2,000 mg total) by mouth daily with breakfast., Disp: 360 tablet, Rfl: 3    Review of Systems   Constitutional: Positive for fatigue. Negative for activity change, appetite change, chills, diaphoresis, fever and unexpected weight change.   HENT: Negative for congestion, ear discharge, ear pain, postnasal drip, rhinorrhea, sneezing and sore throat.    Eyes: Negative for photophobia and discharge.   Respiratory: Positive for shortness of breath. Negative for cough, chest tightness and wheezing.    Cardiovascular: Negative for chest pain and palpitations.   Gastrointestinal: Positive for abdominal distention. Negative for abdominal pain, blood in stool, constipation, diarrhea, nausea, rectal pain and vomiting.   Genitourinary: Negative for dysuria.   Musculoskeletal: Negative for arthralgias, back pain, gait problem and neck pain.   Skin: Negative for rash.   Neurological: Negative for headaches.       Objective:     Vitals:    07/24/17 1418   BP: 104/60   Pulse: 76   Temp: 98.3 °F (36.8 °C)   TempSrc: Oral   Weight: 91.5 kg (201 lb " "11.5 oz)   Height: 5' 10.5" (1.791 m)   PainSc:   6     Body mass index is 28.53 kg/m².    Physical Exam   Constitutional: He appears well-developed and well-nourished.   HENT:   Head: Normocephalic and atraumatic.   Right Ear: Hearing, tympanic membrane, external ear and ear canal normal. No drainage or swelling. No decreased hearing is noted.   Left Ear: Hearing, tympanic membrane, external ear and ear canal normal. No drainage or swelling. No decreased hearing is noted.   Nose: Nose normal. No rhinorrhea.   Mouth/Throat: Oropharynx is clear and moist. No oropharyngeal exudate, posterior oropharyngeal edema or posterior oropharyngeal erythema.   Eyes: Conjunctivae, EOM and lids are normal. Pupils are equal, round, and reactive to light. Right eye exhibits no discharge and no exudate. Left eye exhibits no discharge and no exudate. Right conjunctiva is not injected. Left conjunctiva is not injected.   Neck: Trachea normal and full passive range of motion without pain. Normal carotid pulses, no hepatojugular reflux and no JVD present. Carotid bruit is not present. No neck rigidity. No edema and no erythema present. No thyroid mass and no thyromegaly present.   Cardiovascular: Normal rate, regular rhythm and normal heart sounds.    2+ edema lower ext bilat to mid-leg   Pulmonary/Chest: Effort normal. No respiratory distress.   Abdominal: Soft. Normal appearance and bowel sounds are normal. He exhibits ascites. He exhibits no pulsatile midline mass. There is no tenderness. There is negative Blount's sign.   Lymphadenopathy:     He has no cervical adenopathy.   Neurological: He is alert.   Skin: Skin is warm and dry.   Psychiatric: He has a normal mood and affect. His speech is normal and behavior is normal.       Assessment and Plan:   Soy Bain was seen today for follow-up.    Diagnoses and all orders for this visit:    Hep C w/o coma, chronic    Other ascites  -     2D Echo w/ Color Flow Doppler; Future    SOB " (shortness of breath)  -     2D Echo w/ Color Flow Doppler; Future    Has not attempted to see hepatology. Has not returned their calls. Declines EMS or going to ER ama.   Fluid restriction, sodium restriction discussed. Discussed possibly increasing lasix but I am worried with his bp today that he may become hypotensive.    Again strongly rec'd pt go to ER immediately. He later said he would think it over.    Time spent in the evaluation and management of this patient exceeded 45min and greater than 50% of this time was in face-to-face education regarding diagnoses, medications, plan, and follow-up.      Return in about 2 weeks (around 8/7/2017).    THIS NOTE WILL BE SHARED WITH THE PATIENT.

## 2017-07-31 NOTE — TELEPHONE ENCOUNTER
Pt advised of results and i explained in short detail  to pt what diastolic heart failure is and some of the symptoms that may be present or present themselves.  Pt voices understanding of results and the importance of keeping his appts. F/u made with Dr Swanson to go over results in more detail and possibly get a cardio referral.  Pt did schedule hepatology appt and Dr Swanson informed.

## 2017-07-31 NOTE — TELEPHONE ENCOUNTER
"----- Message from Jean Carlos Swanson MD sent at 7/28/2017  1:22 PM CDT -----  Please let pt know he has some "diastolic heart failure" and he can come in to discuss more with me or with card. He should come in within 1-2 weeks with one of us. Has he made an appt with the hepatology clinic? Thank you.  "

## 2017-08-03 PROBLEM — K70.11 ASCITES DUE TO ALCOHOLIC HEPATITIS: Status: ACTIVE | Noted: 2017-01-01

## 2017-08-03 NOTE — HOSPITAL COURSE
Patient admitted to observation for ascites. IR was consulted for paracentesis and removed 5L of fluid. Started on IV diuresis. Upgraded to inpatient for ongoing management. Patient started on coreg for elevated blood pressure and rate control. Noted to have increase in SCr (1.1>1.4) 8/5 and held ACEi, lasix, and started on IV albumin. 8/6 patient SCr increased to 1.8. Monitoring I/Os and continue to hold diuretics. Patient continues to have ascites, IR re-consulted and removed 10L of fluid on 8/7. Patient stable for discharge.

## 2017-08-03 NOTE — ED NOTES
"Pt had episode of incontinence due to "dropping the urinal on the floor when I was trying to use it."   "

## 2017-08-03 NOTE — ED NOTES
LOC: The patient is awake, alert, and oriented to place, time, situation. Affect is appropriate. Speech is appropriate and clear.       APPEARANCE: Patient resting comfortably in no acute distress. Patient is clean and well groomed.     SKIN: The skin is warm and dry; color consistent with ethnicity. Patient has normal skin turgor and moist mucus membranes. Skin intact; no breakdown or bruising noted.      MUSCULOSKELETAL: Patient moving upper and lower extremities without difficulty. Denies weakness.       RESPIRATORY: Airway is open and patent. Respirations spontaneous, even, easy, and slightly labored. Tachypnea. No accessory muscle use noted. Denies cough. reports SOB.     CARDIAC: Normal rhythm and rate noted. BLE edema 2+. complaints of chest pain left side 8/10.       ABDOMEN: Soft and tender to palpation. distention noted.       NEUROLOGIC: Eyes open spontaneously. Behavior appropriate to situation. Follows commands; facial expression symmetrical. Purposeful motor response noted; normal sensation in all extremities.

## 2017-08-03 NOTE — ED TRIAGE NOTES
Pt reports SOB and CP beginning last night.pt reports left sided CP 8/10. Pt reports abdominal pain and distention. BLE edema 2+. Pt reports taking ASA last night and went to bed.

## 2017-08-03 NOTE — H&P
[facility] Hospital Medicine  History & Physical    Patient Name: Soy Reza  MRN: [id number]  Admission Date: 8/3/2017  Attending Physician: Jeffy Liao MD   Primary Care Provider: Jean Carlos Swanson MD    [place] Medicine Team: [facility] HOSP MED E Mckenna Cordova PA-C     Patient information was obtained from patient and ER records.     Subjective:     Principal Problem:Ascites due to alcoholic hepatitis    Chief Complaint:   Chief Complaint   Patient presents with    Shortness of Breath     Fatigue. Abdominal swelling. hx of chf and htn        HPI: Patient is a 67yo male with PMHx of uncontrolled DM2, HTN, diastolic HF, and hep C being admitted to observation for ascites.  Patient  states his ascites began  1 month ago and has gradually worsened. Patient also reports associated worsening SOB. Patient developed midsternal, nonradiating chest pain overnight which made him come to the ED.  Patient also endorses diffuse abdominal pain, abdominal distention and swelling to lower extremities. He states that the swelling is a 9/10 pain. Patient has never had a peritoneal tap.  Patient denies fever, chills, nausea, vomiting,  weakness, syncope. Patient endorses decreased urination and constipation.     Of note, patient was recently diagnosed with diastolic HF (EF 55% 7/2017) and Hepatitis C and was seeking care through [facility]. Patient has a history of alcoholism and states he has not had a drink in 10 years.    In the ED, CBC showed H&H 10.7/31, platelets 128; CMP showed glucose 184, BUN 37, Cr 1.2, calcium 8.3, albumin 2.4, Tbili 2.2, AST 21, ALT 24; UA unremarkable; Troponin and BNP WNL; CXR showed Mild cardiomegaly.  No significant air space consolidation identified.  Slightly blunted costophrenic angle posteriorly suggesting small amount of pleural effusion.    Past Medical History:   Diagnosis Date    Diabetes mellitus     Hep C w/o coma, chronic     Hypertension     Macular  degeneration     Neuropathy        Past Surgical History:   Procedure Laterality Date    EYE SURGERY      HAND SURGERY         Review of patient's allergies indicates:  No Known Allergies    No current facility-administered medications on file prior to encounter.      Current Outpatient Prescriptions on File Prior to Encounter   Medication Sig    aspirin 81 MG Chew Take 81 mg by mouth once daily.    furosemide (LASIX) 20 MG tablet Take 1 tablet (20 mg total) by mouth once daily.    lisinopril (PRINIVIL,ZESTRIL) 20 MG tablet Take 1 tablet (20 mg total) by mouth once daily.    metformin (GLUCOPHAGE-XR) 500 MG 24 hr tablet Take 4 tablets (2,000 mg total) by mouth daily with breakfast.     Family History     None        Social History Main Topics    Smoking status: Former Smoker    Smokeless tobacco: Never Used    Alcohol use No      Comment: 2 5th per week- stopped one month ago approx 9/10/16    Drug use: No    Sexual activity: Not on file     Review of Systems   Constitutional: Positive for activity change and unexpected weight change (fluid accumulation). Negative for appetite change, chills and fever.   HENT: Negative for congestion, facial swelling, hearing loss, sore throat and trouble swallowing.    Eyes: Negative for photophobia, pain and visual disturbance.   Respiratory: Positive for chest tightness, shortness of breath and wheezing. Negative for cough.    Cardiovascular: Positive for leg swelling. Negative for chest pain and palpitations.   Gastrointestinal: Positive for abdominal distention, abdominal pain and constipation. Negative for diarrhea, nausea and vomiting.   Genitourinary: Positive for decreased urine volume. Negative for flank pain, frequency and hematuria.   Musculoskeletal: Negative for arthralgias, joint swelling and myalgias.   Skin: Positive for wound (left foot). Negative for rash.   Neurological: Positive for numbness (diabetic foot neuropathy bilaterally). Negative for  dizziness, tremors, syncope, weakness and light-headedness.   Psychiatric/Behavioral: Negative for confusion.     Objective:     Vital Signs (Most Recent):  Temp: 98.6 °F (37 °C) (08/03/17 1554)  Pulse: 106 (08/03/17 1554)  Resp: 18 (08/03/17 1554)  BP: (!) 167/92 (08/03/17 1554)  SpO2: 99 % (08/03/17 1554) Vital Signs (24h Range):  Temp:  [98.6 °F (37 °C)-98.7 °F (37.1 °C)] 98.6 °F (37 °C)  Pulse:  [] 106  Resp:  [18-19] 18  SpO2:  [94 %-99 %] 99 %  BP: (158-198)/() 167/92     Weight: 93 kg (205 lb)  Body mass index is 29.41 kg/m².    Physical Exam   Constitutional: He is oriented to person, place, and time. He appears cachectic.   HENT:   Head: Normocephalic and atraumatic.   Nose: Nose normal.   Mouth/Throat: No oropharyngeal exudate.   Eyes: Conjunctivae and EOM are normal. Pupils are equal, round, and reactive to light. No scleral icterus.   Neck: Normal range of motion.   Cardiovascular: Regular rhythm, normal heart sounds and intact distal pulses.  Tachycardia present.  Exam reveals no friction rub.    No murmur heard.  Pulmonary/Chest: He is in respiratory distress. He has decreased breath sounds in the right lower field and the left lower field. He has no wheezes. He exhibits no tenderness.   Abdominal: He exhibits distension and ascites. There is generalized tenderness. There is no guarding.   Feet:   Left Foot:   Skin Integrity: Positive for ulcer (medial side).   Neurological: He is alert and oriented to person, place, and time. No cranial nerve deficit.   Skin: He is not diaphoretic.   Nursing note and vitals reviewed.       Significant Labs:   A1C: No results for input(s): HGBA1C in the last 4320 hours.  CBC:   Recent Labs  Lab 08/03/17  1221   WBC 6.41   HGB 10.7*   HCT 31.0*   *     CMP:   Recent Labs  Lab 08/03/17  1221      K 4.8      CO2 22*   *   BUN 37*   CREATININE 1.2   CALCIUM 8.3*   PROT 6.9   ALBUMIN 2.4*   BILITOT 2.2*   ALKPHOS 125   AST 21   ALT 24    ANIONGAP 8   EGFRNONAA >60.0     Cardiac Markers:   Recent Labs  Lab 08/03/17  1221   BNP 79     Troponin:   Recent Labs  Lab 08/03/17  1221   TROPONINI 0.010     Significant Imaging: CXR: I have reviewed all pertinent results/findings within the past 24 hours and my personal findings are:  mild pulmonary edema    Assessment/Plan:     * Ascites due to alcoholic hepatitis    Patient with worsening ascites. IR consulted for paracentesis tomorrow.  - pain management protocol.  - lasix 40 bid.  - strict Is&Os  - daily weights        Uncontrolled type 2 diabetes mellitus with diabetic polyneuropathy, without long-term current use of insulin    Diabetic neuropathy    Glucose on admit 184.  - holding metformin.  - HbA1c pending.  - wound care consulted for ulcer on patient's medial left foot.  - SSI aspart 0-5 units before meals & nightly PRN  - hypoglycemic protocols ordered.        Essential hypertension    -continue aspirin and lisinopril 20mg daily.        Hep C w/o coma, chronic    CMP showed Tbili 2.2, AST/ALT within normal limits.  - scheduled for liver ultrasound and hepatic fibrascan on 8/10  - patient scheduled for hepatology follow up on 8/15.  - daily CMP  - Mag and Phos pending.        Anemia due to vitamin B12 deficiency    H/H 10.7/31.0, will continue to monitor.  - daily CBC          VTE Risk Mitigation         Ordered     Medium Risk of VTE  Once      08/03/17 1538     Place LYNETTE hose  Until discontinued      08/03/17 1538     Place sequential compression device  Until discontinued      08/03/17 1538           Mckenna Cordova PA-C  Department of Hospital Medicine   [facility]

## 2017-08-03 NOTE — TELEPHONE ENCOUNTER
----- Message from Ghada Cruz PA-C sent at 8/2/2017  6:16 PM CDT -----  Type 1 hep C confimred, keep upcoming appts.

## 2017-08-03 NOTE — ASSESSMENT & PLAN NOTE
Patient with worsening ascites. IR consulted for paracentesis tomorrow.  - pain management protocol.  - lasix 40 bid.  - strict Is&Os  - daily weights

## 2017-08-03 NOTE — ED PROVIDER NOTES
Encounter Date: 8/3/2017    SCRIBE #1 NOTE: I, Ijeoma Nagy, am scribing for, and in the presence of,  Dr. Hayes. I have scribed the following portions of the note - the APC attestation.       History     Chief Complaint   Patient presents with    Shortness of Breath     Fatigue. Abdominal swelling. hx of chf and htn      66-year-old male with medical history of diabetes mellitus type 2, hypertension, diastolic heart failure, hep C presents the ED with shortness of breath.   Patient reports shortness of breath has been present for the past month but began to worsen last night. Patient also reports last night he had nonradiating, midsternal chest pain.  Chest pain has now resolved. States 3 episodes of nausea and vomiting last night that has resolved. Patient also endorses diffuse abdominal pain, abdominal distention and swelling to lower extremities.  Patient has never had a peritoneal tap.  Patient denies fever, chills, nausea, vomiting, changes in urination, changes in bowel, weakness, syncope.            Review of patient's allergies indicates:  No Known Allergies  Past Medical History:   Diagnosis Date    Diabetes mellitus     Hep C w/o coma, chronic     Hypertension     Macular degeneration     Neuropathy      Past Surgical History:   Procedure Laterality Date    EYE SURGERY      HAND SURGERY       History reviewed. No pertinent family history.  Social History   Substance Use Topics    Smoking status: Former Smoker    Smokeless tobacco: Never Used    Alcohol use No      Comment: 2 5th per week- stopped one month ago approx 9/10/16     Review of Systems   Constitutional: Negative for diaphoresis and fever.   HENT: Negative for nosebleeds.    Eyes: Negative for visual disturbance.   Respiratory: Positive for shortness of breath. Negative for cough.    Cardiovascular: Positive for chest pain. Negative for leg swelling.   Gastrointestinal: Positive for abdominal distention, abdominal pain, nausea  and vomiting.   Genitourinary: Negative for dysuria and hematuria.   Musculoskeletal: Negative for neck pain.   Skin: Negative for rash.   Neurological: Negative for syncope, weakness and headaches.   Psychiatric/Behavioral: The patient is not nervous/anxious.        Physical Exam     Initial Vitals [08/03/17 1145]   BP Pulse Resp Temp SpO2   (!) 158/100 90 18 98.7 °F (37.1 °C) (!) 94 %      MAP       119.33         Physical Exam    Vitals reviewed.  Constitutional: Vital signs are normal. He appears well-developed and well-nourished. He is not diaphoretic. No distress.   HENT:   Head: Normocephalic and atraumatic.   Nose: Nose normal.   Eyes: Conjunctivae and lids are normal. Pupils are equal, round, and reactive to light. Lids are everted and swept, no foreign bodies found.   Neck: Trachea normal and normal range of motion. Neck supple.   Cardiovascular: Normal rate, regular rhythm and normal pulses.   No murmur heard.  Pulmonary/Chest: Breath sounds normal. He has no wheezes. He has no rhonchi. He has no rales.   Abdominal: Normal appearance. He exhibits distension, fluid wave and ascites. Bowel sounds are decreased. There is generalized tenderness. There is no rebound, no guarding and no CVA tenderness.   Musculoskeletal: Normal range of motion.   Neurological: He is alert and oriented to person, place, and time. He has normal strength. No sensory deficit.   Skin: Skin is warm and dry. No rash noted. No cyanosis.   Psychiatric: He has a normal mood and affect.         ED Course   Procedures  Labs Reviewed   CBC W/ AUTO DIFFERENTIAL - Abnormal; Notable for the following:        Result Value    RBC 4.11 (*)     Hemoglobin 10.7 (*)     Hematocrit 31.0 (*)     MCV 75 (*)     MCH 26.0 (*)     RDW 16.6 (*)     Platelets 128 (*)     MPV 8.8 (*)     Lymph # 0.7 (*)     Gran% 79.5 (*)     Lymph% 10.6 (*)     All other components within normal limits   COMPREHENSIVE METABOLIC PANEL - Abnormal; Notable for the following:   "   CO2 22 (*)     Glucose 184 (*)     BUN, Bld 37 (*)     Calcium 8.3 (*)     Albumin 2.4 (*)     Total Bilirubin 2.2 (*)     All other components within normal limits   B-TYPE NATRIURETIC PEPTIDE   PROTIME-INR   AMMONIA   LIPASE   TROPONIN I        Imaging Results          X-Ray Chest PA And Lateral (Final result)  Result time 08/03/17 13:29:45    Final result by Kg Contreras MD (08/03/17 13:29:45)                 Impression:     See above.      Electronically signed by: Kg Contreras MD  Date:     08/03/17  Time:    13:29              Narrative:    2 views    Mild cardiomegaly.  No significant air space consolidation identified.  Slightly blunted costophrenic angle posteriorly suggesting small amount of pleural effusion                                 Medical Decision Making:   History:   Old Medical Records: I decided to obtain old medical records.  Clinical Tests:   Lab Tests: Ordered and Reviewed  Radiological Study: Ordered and Reviewed  Medical Tests: Ordered and Reviewed       APC / Resident Notes:   66-year-old male with medical history of diabetes mellitus type 2, hypertension, diastolic heart failure, hep C presents the ED with shortness of breath.  Cardiac exam reveals regular rate and rhythm.  Lungs clear bilaterally on auscultation.  Abdomen is distended, diffusely tender with fluid wave.  Hypoactive bowel sounds present.  2+ pitting edema to bilateral lower extremities.  Distal pulses intact.  Strength intact.  Sensation intact. No CVA tenderness. Fluid wave present. Vitals are stable.    Labs and imaging reviewed.  CBC wnl. CMP wnl. BNP 79. PT 11.5. INR 1.1. Ammonia 32. Lipase 23. Troponin <.010.     CXR shows, "Mild cardiomegaly.  No significant air space consolidation identified.  Slightly blunted costophrenic angle posteriorly suggesting small amount of pleural effusion."    Patient in need of paracentesis.     DDX includes but is not limited to ascites, SBP, CHF exacerbation, UTI, electrolyte " abnormality, ACS, arrhythmia.     Patient placed in observation.    I discussed and reviewed with my supervising physician.       Scribe Attestation:   Scribe #1: I performed the above scribed service and the documentation accurately describes the services I performed. I attest to the accuracy of the note.    Attending Attestation:     Physician Attestation Statement for NP/PA:   I have conducted a face to face encounter with this patient in addition to the NP/PA, due to Medical Complexity    Other NP/PA Attestation Additions:      Medical Decision Makin y.o. male with history of hepatitis B presenting with bilatearl leg edema and abdominal distension. Concern for fluid overload. Pt states he is SOB and initial saturations are 94%. Abdomen soft. I am not concerned for SPB. Pt may likely need symptomatic paracentesis for symptomatic relief.        Physician Attestation for Scribe:  Physician Attestation Statement for Scribe #1: I, Dr. Hayes , reviewed documentation, as scribed by Ijeoma Nagy in my presence, and it is both accurate and complete.                 ED Course     Clinical Impression:   The primary encounter diagnosis was Ascites due to alcoholic hepatitis. A diagnosis of Shortness of breath was also pertinent to this visit.    Disposition:   Disposition: Placed in Observation  Condition: Stable                        Sera Solano PA-C  [phone]       Simona Hayes MD  [phone]

## 2017-08-03 NOTE — TELEPHONE ENCOUNTER
Patient phoned our office stating that he can't move around in his home today because he has generalized edema with abdominal swelling and every time he tries to move around he has to stop because he's SOB.  I spoke with AN Cruz who ordered that patient report to ER.  I spoke with patient and order relayed.

## 2017-08-03 NOTE — HPI
Patient is a 65yo male with PMHx of uncontrolled DM2, HTN, diastolic HF, and hep C being admitted to observation for ascites.  Patient states his ascites began  1 month ago and has gradually worsened. Patient also reports associated worsening SOB. Patient developed midsternal, nonradiating chest pain overnight which made him come to the ED.  Patient also endorses diffuse abdominal pain, abdominal distention and swelling to lower extremities. He states that the swelling is a 9/10 pain. Patient has never had a peritoneal tap.  Patient denies fever, chills, nausea, vomiting,  weakness, syncope. Patient endorses decreased urination and constipation.     Of note, patient was recently diagnosed with diastolic HF (EF 55% 7/2017) and Hepatitis C and was seeking care through [facility]. Patient has a history of alcoholism and states he has not had a drink in 10 years.    In the ED, CBC showed H&H 10.7/31, platelets 128; CMP showed glucose 184, BUN 37, Cr 1.2, calcium 8.3, albumin 2.4, Tbili 2.2, AST 21, ALT 24; UA unremarkable; Troponin and BNP WNL; CXR showed Mild cardiomegaly.  No significant air space consolidation identified.  Slightly blunted costophrenic angle posteriorly suggesting small amount of pleural effusion.

## 2017-08-03 NOTE — TELEPHONE ENCOUNTER
"Was seeing Dr. Swanson and stomach is all swollen out and has  The worse pain.  Was advised to go [place] by Dr. Swanson. Was seen in ED for this a week ago.    Reason for Disposition   [1] SEVERE pain (e.g., excruciating) AND [2] present > 1 hour    Answer Assessment - Initial Assessment Questions  1. LOCATION: "Where does it hurt?"       Front to back of abdomen  2. RADIATION: "Does the pain shoot anywhere else?" (e.g., chest, back)      denies  3. ONSET: "When did the pain begin?" (Minutes, hours or days ago)       About 3 days ago  4. SUDDEN: "Gradual or sudden onset?"        5. PATTERN "Does the pain come and go, or is it constant?"     - If constant: "Is it getting better, staying the same, or worsening?"       (Note: Constant means the pain never goes away completely; most serious pain is constant and it progresses)      - If intermittent: "How long does it last?" "Do you have pain now?"      (Note: Intermittent means the pain goes away completely between bouts)      constant  6. SEVERITY: "How bad is the pain?"  (e.g., Scale 1-10; mild, moderate, or severe)     - MILD (1-3): doesn't interfere with normal activities, abdomen soft and not tender to touch      - MODERATE (4-7): interferes with normal activities or awakens from sleep, tender to touch      - SEVERE (8-10): excruciating pain, doubled over, unable to do any normal activities        10  7. RECURRENT SYMPTOM: "Have you ever had this type of abdominal pain before?" If so, ask: "When was the last time?" and "What happened that time?"       denies  8. CAUSE: "What do you think is causing the abdominal pain?"      Liver and heart  9. RELIEVING/AGGRAVATING FACTORS: "What makes it better or worse?" (e.g., movement, antacids, bowel movement)      Trying to sit down instead of bed  10. OTHER SYMPTOMS: "Has there been any vomiting, diarrhea, constipation, or urine problems?"        Little vomiting, does not urinate as often as used to  Abdominal swelling " pt states a lot of swelling    Protocols used: ST ABDOMINAL PAIN - MALE-A-AH

## 2017-08-03 NOTE — PLAN OF CARE
Problem: Patient Care Overview  Goal: Plan of Care Review  Outcome: Ongoing (interventions implemented as appropriate)  Patient AAOx4. Safety maintained. Bed locked in low with 2 side rails up. Call light within reach. Fall precautions maintained. PRN pain medication given as needed for pain. Blood glucose monitoring ongoing.

## 2017-08-03 NOTE — ASSESSMENT & PLAN NOTE
Diabetic neuropathy    Glucose on admit 184.  - holding metformin.  - HbA1c pending.  - wound care consulted for ulcer on patient's medial left foot.  - SSI aspart 0-5 units before meals & nightly PRN  - hypoglycemic protocols ordered.

## 2017-08-03 NOTE — ASSESSMENT & PLAN NOTE
CMP showed Tbili 2.2, AST/ALT within normal limits.  - scheduled for liver ultrasound and hepatic fibrascan on 8/10  - patient scheduled for hepatology follow up on 8/15.  - daily CMP  - Mag and Phos pending.

## 2017-08-03 NOTE — PROGRESS NOTES
Patient arrived to OBS #3. Patient ambulated well from wheelchair to bed. Bed locked in low with 2 side rails up. Call light within reach. Nonskid socks in place. Diaper placed on patient.

## 2017-08-03 NOTE — SUBJECTIVE & OBJECTIVE
Past Medical History:   Diagnosis Date    Diabetes mellitus     Hep C w/o coma, chronic     Hypertension     Macular degeneration     Neuropathy        Past Surgical History:   Procedure Laterality Date    EYE SURGERY      HAND SURGERY         Review of patient's allergies indicates:  No Known Allergies    No current facility-administered medications on file prior to encounter.      Current Outpatient Prescriptions on File Prior to Encounter   Medication Sig    aspirin 81 MG Chew Take 81 mg by mouth once daily.    furosemide (LASIX) 20 MG tablet Take 1 tablet (20 mg total) by mouth once daily.    lisinopril (PRINIVIL,ZESTRIL) 20 MG tablet Take 1 tablet (20 mg total) by mouth once daily.    metformin (GLUCOPHAGE-XR) 500 MG 24 hr tablet Take 4 tablets (2,000 mg total) by mouth daily with breakfast.     Family History     None        Social History Main Topics    Smoking status: Former Smoker    Smokeless tobacco: Never Used    Alcohol use No      Comment: 2 5th per week- stopped one month ago approx 9/10/16    Drug use: No    Sexual activity: Not on file     Review of Systems   Constitutional: Positive for activity change and unexpected weight change (fluid accumulation). Negative for appetite change, chills and fever.   HENT: Negative for congestion, facial swelling, hearing loss, sore throat and trouble swallowing.    Eyes: Negative for photophobia, pain and visual disturbance.   Respiratory: Positive for chest tightness, shortness of breath and wheezing. Negative for cough.    Cardiovascular: Positive for leg swelling. Negative for chest pain and palpitations.   Gastrointestinal: Positive for abdominal distention, abdominal pain and constipation. Negative for diarrhea, nausea and vomiting.   Genitourinary: Positive for decreased urine volume. Negative for flank pain, frequency and hematuria.   Musculoskeletal: Negative for arthralgias, joint swelling and myalgias.   Skin: Positive for wound (left  foot). Negative for rash.   Neurological: Positive for numbness (diabetic foot neuropathy bilaterally). Negative for dizziness, tremors, syncope, weakness and light-headedness.   Psychiatric/Behavioral: Negative for confusion.     Objective:     Vital Signs (Most Recent):  Temp: 98.6 °F (37 °C) (08/03/17 1554)  Pulse: 106 (08/03/17 1554)  Resp: 18 (08/03/17 1554)  BP: (!) 167/92 (08/03/17 1554)  SpO2: 99 % (08/03/17 1554) Vital Signs (24h Range):  Temp:  [98.6 °F (37 °C)-98.7 °F (37.1 °C)] 98.6 °F (37 °C)  Pulse:  [] 106  Resp:  [18-19] 18  SpO2:  [94 %-99 %] 99 %  BP: (158-198)/() 167/92     Weight: 93 kg (205 lb)  Body mass index is 29.41 kg/m².    Physical Exam   Constitutional: He is oriented to person, place, and time. He appears cachectic.   HENT:   Head: Normocephalic and atraumatic.   Nose: Nose normal.   Mouth/Throat: No oropharyngeal exudate.   Eyes: Conjunctivae and EOM are normal. Pupils are equal, round, and reactive to light. No scleral icterus.   Neck: Normal range of motion.   Cardiovascular: Regular rhythm, normal heart sounds and intact distal pulses.  Tachycardia present.  Exam reveals no friction rub.    No murmur heard.  Pulmonary/Chest: He is in respiratory distress. He has decreased breath sounds in the right lower field and the left lower field. He has no wheezes. He exhibits no tenderness.   Abdominal: He exhibits distension and ascites. There is generalized tenderness. There is no guarding.   Feet:   Left Foot:   Skin Integrity: Positive for ulcer (medial side).   Neurological: He is alert and oriented to person, place, and time. No cranial nerve deficit.   Skin: He is not diaphoretic.   Nursing note and vitals reviewed.       Significant Labs:   A1C: No results for input(s): HGBA1C in the last 4320 hours.  CBC:   Recent Labs  Lab 08/03/17  1221   WBC 6.41   HGB 10.7*   HCT 31.0*   *     CMP:   Recent Labs  Lab 08/03/17  1221      K 4.8      CO2 22*   *    BUN 37*   CREATININE 1.2   CALCIUM 8.3*   PROT 6.9   ALBUMIN 2.4*   BILITOT 2.2*   ALKPHOS 125   AST 21   ALT 24   ANIONGAP 8   EGFRNONAA >60.0     Cardiac Markers:   Recent Labs  Lab 08/03/17  1221   BNP 79     Troponin:   Recent Labs  Lab 08/03/17  1221   TROPONINI 0.010     Significant Imaging: CXR: I have reviewed all pertinent results/findings within the past 24 hours and my personal findings are:  mild pulmonary edema

## 2017-08-04 PROBLEM — T14.8XXA WOUND, OPEN: Status: ACTIVE | Noted: 2017-01-01

## 2017-08-04 NOTE — PROGRESS NOTES
[facility] Hospital Medicine  Progress Note    Patient Name: Soy Reza  MRN: [id number]  Patient Class: OP- Observation   Admission Date: 8/3/2017  Length of Stay: 0 days  Attending Physician: Jeffy Liao MD  Primary Care Provider: Jean Carlos Swanson MD    [facility] Medicine Team: [facility] MED E Mckenna Cordova PA-C    Subjective:     Principal Problem:Ascites due to alcoholic hepatitis    HPI:  Patient is a 65yo male with PMHx of uncontrolled DM2, HTN, diastolic HF, and hep C being admitted to observation for ascites.  Patient  states his ascites began  1 month ago and has gradually worsened. Patient also reports associated worsening SOB. Patient developed midsternal, nonradiating chest pain overnight which made him come to the ED.  Patient also endorses diffuse abdominal pain, abdominal distention and swelling to lower extremities. He states that the swelling is a 9/10 pain. Patient has never had a peritoneal tap.  Patient denies fever, chills, nausea, vomiting,  weakness, syncope. Patient endorses decreased urination and constipation.     Of note, patient was recently diagnosed with diastolic HF (EF 55% 7/2017) and Hepatitis C and was seeking care through [facility]. Patient has a history of alcoholism and states he has not had a drink in 10 years.    In the ED, CBC showed H&H 10.7/31, platelets 128; CMP showed glucose 184, BUN 37, Cr 1.2, calcium 8.3, albumin 2.4, Tbili 2.2, AST 21, ALT 24; UA unremarkable; Troponin and BNP WNL; CXR showed Mild cardiomegaly.  No significant air space consolidation identified.  Slightly blunted costophrenic angle posteriorly suggesting small amount of pleural effusion.    Hospital Course:  Patient admitted to observation for ascites. IR was consulted for paracentesis and removed 5L of fluid. Patient will need more diuresis overnight. Patient started on coreg for elevated blood pressure and rate control.    Interval History:   No acute events overnight,  s/p paracentesis this morning with 5L removed. Patient endorses feeling better but ongoing SOB. No fevers or chills. Pain better controlled.     Past Medical History:   Diagnosis Date    Diabetes mellitus     Hep C w/o coma, chronic     Hypertension     Macular degeneration     Neuropathy        Past Surgical History:   Procedure Laterality Date    EYE SURGERY      HAND SURGERY         Review of patient's allergies indicates:  No Known Allergies    No current facility-administered medications on file prior to encounter.      Current Outpatient Prescriptions on File Prior to Encounter   Medication Sig    aspirin 81 MG Chew Take 81 mg by mouth once daily.    furosemide (LASIX) 20 MG tablet Take 1 tablet (20 mg total) by mouth once daily.    lisinopril (PRINIVIL,ZESTRIL) 20 MG tablet Take 1 tablet (20 mg total) by mouth once daily.    metformin (GLUCOPHAGE-XR) 500 MG 24 hr tablet Take 4 tablets (2,000 mg total) by mouth daily with breakfast.     Family History     None        Social History Main Topics    Smoking status: Former Smoker    Smokeless tobacco: Never Used    Alcohol use No      Comment: 2 5th per week- stopped one month ago approx 9/10/16    Drug use: No    Sexual activity: Not on file     Review of Systems   Constitutional: Positive for activity change and unexpected weight change (fluid accumulation). Negative for appetite change and fever.   Respiratory: Positive for shortness of breath (improved). Negative for cough, chest tightness and wheezing.    Cardiovascular: Positive for leg swelling. Negative for chest pain and palpitations.   Gastrointestinal: Positive for abdominal distention (improved) and abdominal pain (improved). Negative for constipation, nausea and vomiting.   Musculoskeletal: Negative for arthralgias, joint swelling and myalgias.   Skin: Positive for wound (left foot). Negative for rash.   Neurological: Positive for numbness (diabetic foot neuropathy bilaterally).  Negative for dizziness, syncope and weakness.      Objective:     Vital Signs (Most Recent):  Temp: 98.5 °F (36.9 °C) (08/04/17 0930)  Pulse: 91 (08/04/17 0930)  Resp: 18 (08/04/17 0930)  BP: (!) 149/72 (08/04/17 0930)  SpO2: 98 % (08/04/17 0930) Vital Signs (24h Range):  Temp:  [97.8 °F (36.6 °C)-99 °F (37.2 °C)] 98.5 °F (36.9 °C)  Pulse:  [] 91  Resp:  [18-20] 18  SpO2:  [94 %-99 %] 98 %  BP: (146-198)/(72-94) 149/72     Weight: 93 kg (205 lb)  Body mass index is 29.41 kg/m².    Physical Exam   Constitutional: He is oriented to person, place, and time. He appears cachectic.   Cardiovascular: Regular rhythm, normal heart sounds and intact distal pulses.  Tachycardia present.  Exam reveals no friction rub.    No murmur heard.  Pulmonary/Chest: He is in respiratory distress (improved). He has decreased breath sounds in the right lower field and the left lower field. He has no wheezes. He exhibits no tenderness.   Abdominal: He exhibits distension (moderate) and ascites. There is generalized tenderness (moderate). There is no guarding.   Musculoskeletal: He exhibits edema (BLE, moderate).   Feet:   Left Foot:   Skin Integrity: Positive for ulcer (medial side).   Neurological: He is alert and oriented to person, place, and time.   Nursing note and vitals reviewed.       Significant Labs:   A1C:     Recent Labs  Lab 08/04/17  0523   HGBA1C 6.3*     CBC:     Recent Labs  Lab 08/03/17  1221 08/04/17  0523   WBC 6.41 4.43   HGB 10.7* 9.3*   HCT 31.0* 27.3*   * 115*     CMP:     Recent Labs  Lab 08/03/17  1221 08/04/17  0523    137   K 4.8 4.4    110   CO2 22* 21*   * 142*   BUN 37* 36*   CREATININE 1.2 1.1   CALCIUM 8.3* 7.9*   PROT 6.9 5.8*   ALBUMIN 2.4* 1.9*   BILITOT 2.2* 1.6*   ALKPHOS 125 100   AST 21 17   ALT 24 19   ANIONGAP 8 6*   EGFRNONAA >60.0 >60.0     Cardiac Markers:     Recent Labs  Lab 08/03/17  1221   BNP 79     Troponin:     Recent Labs  Lab 08/03/17  1221   TROPONINI  0.010     Significant Imaging: CXR: I have reviewed all pertinent results/findings within the past 24 hours and my personal findings are:  mild pulmonary edema    Assessment/Plan:      * Ascites due to alcoholic hepatitis    Patient with worsening ascites. IR consulted and removed 5L of fluid.   - pain management protocol.  - lasix 40 bid.  - strict Is&Os  - daily weights.  - continue diuresis overnight.        Uncontrolled type 2 diabetes mellitus with diabetic polyneuropathy, without long-term current use of insulin    Diabetic neuropathy    Glucose on admit 184.  - holding metformin.  - HbA1c 6.3.  - wound care consulted for ulcer on patient's medial left foot.  - SSI aspart 0-5 units before meals & nightly PRN  - hypoglycemic protocols ordered.        Essential hypertension    - continue aspirin and lisinopril 20mg daily.  - added coreg 6.25 for rhythm and further BP control.        Hep C w/o coma, chronic    CMP showed Tbili 2.2, AST/ALT within normal limits.  - scheduled for liver ultrasound and hepatic fibrascan on 8/10  - patient scheduled for hepatology follow up on 8/15.  - daily CMP  - Mag and Phos WNL.        Anemia due to vitamin B12 deficiency    H/H 9.3/27.3, likely 2/2 diureses, will continue to monitor.  - daily CBC          VTE Risk Mitigation         Ordered     Medium Risk of VTE  Once      08/03/17 1538     Place LYNETTE hose  Until discontinued      08/03/17 1538     Place sequential compression device  Until discontinued      08/03/17 1538          Lazara Neil PA-C  Department of Hospital Medicine   [facility]

## 2017-08-04 NOTE — ASSESSMENT & PLAN NOTE
Diabetic neuropathy    Glucose on admit 184.  - holding metformin.  - HbA1c 6.3.  - wound care consulted for ulcer on patient's medial left foot.  - SSI aspart 0-5 units before meals & nightly PRN  - hypoglycemic protocols ordered.

## 2017-08-04 NOTE — ASSESSMENT & PLAN NOTE
- continue aspirin and lisinopril 20mg daily.  - added coreg 6.25 for rhythm and further BP control.

## 2017-08-04 NOTE — NURSING
Report given to Jacqueline JOY in [facility]. Pt transferred to room 1063 via w/c. Pt accompanied by transport associate. No distress noted. All personal belongings, chart and meds sent with pt.

## 2017-08-04 NOTE — PT/OT/SLP EVAL
"Physical Therapy  Evaluation    Soy Reza   MRN: [id number]   Admitting Diagnosis: Ascites due to alcoholic hepatitis    PT Received On: 08/04/17  PT Start Time: 1348     PT Stop Time: 1402    PT Total Time (min): 14 min       Billable Minutes:  Evaluation 14 minutes    Diagnosis: Ascites due to alcoholic hepatitis    Past Medical History:   Diagnosis Date    Diabetes mellitus     Hep C w/o coma, chronic     Hypertension     Macular degeneration     Neuropathy       Past Surgical History:   Procedure Laterality Date    EYE SURGERY      HAND SURGERY         Referring physician: Lazara Neil PA-C  Date referred to PT: 8/4/17    General Precautions: Standard, fall  Orthopedic Precautions: N/A   Braces: N/A       Do you have any cultural, spiritual, Islam conflicts, given your current situation?: None stated     Patient History:  Lives With: parent(s)  Living Arrangements: house  Home Accessibility: stairs to enter home, stairs within home  Home Layout: Able to live on 1st floor  Number of Stairs to Enter Home: 3  Number of Stairs Within Home: 3  Stair Railings at Home: none  Living Environment Comment: Pt reports living with mother in multi level home with 3 AMADO and 3 steps to bedroom/bathroom. Bathroom has tub/shower combo with grab bars. PTA, pt was mod (I) using rollator or SPC for mobility and required assistance for ADLs. Pt's mother provided 24/7 assistance. No recent falls.   Equipment Currently Used at Home: cane, straight, rollator    Previous Level of Function:  Ambulation Skills: needs device  Transfer Skills: needs device  ADL Skills: needs assist  Work/Leisure Activity: needs assist    Subjective:  Communicated with RN prior to session.  Pt agreeable to PT/OT evaluation. Pt states "Oh man I wish you would come do exercises with me more often!"   Chief Complaint: abdominal swelling   Patient goals: none stated    Pain/Comfort  Pain Rating 1: 0/10  Pain Rating Post-Intervention 1: " 0/10      Objective:   Patient found with: peripheral IV, telemetry     Cognitive Exam:  Oriented to: Person, Place, Time and Situation    Follows Commands/attention: Easily distracted and Follows two-step commands  Communication: clear/fluent  Safety awareness/insight to disability: impaired    Physical Exam:  Postural examination/scapula alignment: Rounded shoulder and Head forward    Skin integrity: Thin and Dry  Edema: Moderate in abdomen and BLE    Sensation:   Intact    Lower Extremity Range of Motion:  Right Lower Extremity: WFL  Left Lower Extremity: WFL    Lower Extremity Strength:  Right Lower Extremity: Deficits: grossly 3+/5  Left Lower Extremity: Deficits: grossly 3+/5    Functional Mobility:  Bed Mobility:  Rolling/Turning to Left: Contact guard assistance, With side rail  Scooting/Bridging: Contact Guard Assistance (anterior scoot towards EOB)  Supine to Sit: Minimum Assistance  Sit to Supine:  (Pt UIC)    Transfers:  Sit <> Stand Assistance: Contact Guard Assistance  Sit <> Stand Assistive Device: Rolling Walker  Bed <> Chair Technique: Stand Pivot  Bed <> Chair Assistance: Contact Guard Assistance  Bed <> Chair Assistive Device: Rolling Walker    Gait:   Gait Distance: 130'   Assistance 1: Contact Guard Assistance  Gait Assistive Device: Rolling walker  Gait Pattern: swing-to gait  Gait Deviation(s): decreased sandeep, increased time in double stance, decreased velocity of limb motion, decreased step length, decreased stride length, decreased swing-to-stance ratio    Stairs:  Pt ascended/descend 3 stair(s) with No Assistive Device with left with Contact Guard Assistance.   Pt ascended/descended stairs with L handrails and step-to gait pattern. HHA assist     Balance:   Static Sit: GOOD-: Takes MODERATE challenges from all directions but inconsistently  Dynamic Sit: FAIR+: Maintains balance through MINIMAL excursions of active trunk motion  Static Stand: FAIR+: Takes MINIMAL challenges from all  directions  Dynamic stand: FAIR: Needs CONTACT GUARD during gait    Therapeutic Activities and Exercises:  Pt educated on role of PT and POC/goals for therapy as well as safety with mobility. Pt verbalized understanding. Pt expressed no further concerns/questions.     AM-PAC 6 CLICK MOBILITY  How much help from another person does this patient currently need?   1 = Unable, Total/Dependent Assistance  2 = A lot, Maximum/Moderate Assistance  3 = A little, Minimum/Contact Guard/Supervision  4 = None, Modified [place]/Independent    Turning over in bed (including adjusting bedclothes, sheets and blankets)?: 4  Sitting down on and standing up from a chair with arms (e.g., wheelchair, bedside commode, etc.): 4  Moving from lying on back to sitting on the side of the bed?: 3  Moving to and from a bed to a chair (including a wheelchair)?: 3  Need to walk in hospital room?: 3  Climbing 3-5 steps with a railing?: 3  Total Score: 20    AM-PAC Raw Score CMS G-Code Modifier Level of Impairment Assistance   6 % Total / Unable   7 - 9 CM 80 - 100% Maximal Assist   10 - 14 CL 60 - 80% Moderate Assist   15 - 19 CK 40 - 60% Moderate Assist   20 - 22 CJ 20 - 40% Minimal Assist   23 CI 1-20% SBA / CGA   24 CH 0% Independent/ Mod I     Patient left up in chair with all lines intact, call button in reach and RN notified.    Assessment:   Soy Reza is a 66 y.o. male with a medical diagnosis of Ascites due to alcoholic hepatitis. Upon initial PT evaluation, pt presents with decreased safety awareness, impaired cognition, decreased functional mobility, imbalance, weakness, and gait instability. Pt completed bed mobility with Min A, transfers with CGA, ambulated 120' with CGA using RW, and completed stair training with CGA. Upon discharge, patient will have 24/7 assistance. Pt would benefit from skilled PT services to address these deficits and improve return to PLOF. Anticipate d/c to home with home health.     Rehab  identified problem list/impairments: Rehab identified problem list/impairments: weakness, impaired endurance, impaired self care skills, impaired functional mobilty, gait instability, impaired balance, impaired cognition, decreased coordination, decreased upper extremity function, decreased lower extremity function, decreased safety awareness, decreased ROM    Rehab potential is good.    Activity tolerance: Good    Discharge recommendations: Discharge Facility/Level Of Care Needs: home with home health     Barriers to discharge: Barriers to Discharge: Inaccessible home environment    Equipment recommendations: Equipment Needed After Discharge: bedside commode, bath bench     GOALS:    Physical Therapy Goals        Problem: Physical Therapy Goal    Goal Priority Disciplines Outcome Goal Variances Interventions   Physical Therapy Goal     PT/OT, PT Ongoing (interventions implemented as appropriate)     Description:  Goals to be met by: 17     Patient will increase functional independence with mobility by performin. Supine to sit with Stand-by Assistance  2. Sit to supine with Stand-by Assistance  3. Sit to stand transfer with Stand-by Assistance  4. Bed to chair transfer with Stand-by Assistance using Rolling Walker  5. Gait  x 200 feet with Stand-by Assistance using Rolling Walker.   6. Ascend/descend 3 stair with no handrails Contact Guard Assistance   7. Lower extremity exercise program x 15 reps per handout, with independence                      PLAN:    Patient to be seen 3 x/week to address the above listed problems via gait training, therapeutic activities, therapeutic exercises, neuromuscular re-education  Plan of Care expires: 17  Plan of Care reviewed with: patient      Keiry Gage, PT  2017

## 2017-08-04 NOTE — PROGRESS NOTES
Consult received on patient's wound to left foot. Unknown etiology. Recommend dressing with mepilex foam dressing and change every Tuesday/friday. Nursing to continue care.         08/04/17 1205       Wound 08/04/17 1205 Ulceration medial foot   Date First Assessed/Time First Assessed: 08/04/17 1205   Pre-existing: Yes  Wound Type: Ulceration  Side: Left  Orientation: medial  Location: foot   Wound WDL ex   Drainage Amount none   Drainage Characteristics/Odor no odor   Wound Base moist;pink   Periwound Area normal skin tone   Wound Edges open   Wound Length (cm) 0.5   Wound Width (cm) 0.5   Depth (cm) 0   Non-staged Wound Description Partial thickness   Cleansed W/ sterile normal saline   Dressing foam  (mepilex)

## 2017-08-04 NOTE — H&P
Inpatient Radiology Pre-procedure Note    History of Present Illness:  Soy Reza is a 66 y.o. male who presents with new onset ascites.    Pt with h/o HF and HepC. Patient  states his ascites began  1 month ago and has gradually worsened.     Admission H&P reviewed.  Past Medical History:   Diagnosis Date    Diabetes mellitus     Hep C w/o coma, chronic     Hypertension     Macular degeneration     Neuropathy      Past Surgical History:   Procedure Laterality Date    EYE SURGERY      HAND SURGERY         Review of Systems:   As documented in primary team H&P    Home Meds:   Prior to Admission medications    Medication Sig Start Date End Date Taking? Authorizing Provider   aspirin 81 MG Chew Take 81 mg by mouth once daily.   Yes Historical Provider, MD   furosemide (LASIX) 20 MG tablet Take 1 tablet (20 mg total) by mouth once daily. 7/19/17 7/19/18 Yes Nesha Abel MD   lisinopril (PRINIVIL,ZESTRIL) 20 MG tablet Take 1 tablet (20 mg total) by mouth once daily. 1/16/17  Yes Jean Carlos Swanson MD   metformin (GLUCOPHAGE-XR) 500 MG 24 hr tablet Take 4 tablets (2,000 mg total) by mouth daily with breakfast. 1/19/17 1/19/18 Yes Jean Carlos Swanson MD     Scheduled Meds:    aspirin  81 mg Oral Daily    carvedilol  6.25 mg Oral BID    furosemide  40 mg Intravenous BID    lisinopril  20 mg Oral Daily    senna-docusate 8.6-50 mg  1 tablet Oral BID    sodium chloride 0.9%  3 mL Intravenous Q8H     Continuous Infusions:    PRN Meds:dextrose 50%, dextrose 50%, glucagon (human recombinant), glucose, glucose, INV hydrALAZINE, insulin aspart, ondansetron, ondansetron, oxycodone, oxycodone, polyethylene glycol, ramelteon  Anticoagulants/Antiplatelets: no anticoagulation    Allergies: Review of patient's allergies indicates:  No Known Allergies  Sedation Hx: have not been any systemic reactions    Labs:    Recent Labs  Lab 08/03/17  1221   INR 1.1       Recent Labs  Lab 08/04/17  0523   WBC 4.43   HGB 9.3*    HCT 27.3*   MCV 76*   *      Recent Labs  Lab 08/04/17 0523   *      K 4.4      CO2 21*   BUN 36*   CREATININE 1.1   CALCIUM 7.9*   MG 1.6   ALT 19   AST 17   ALBUMIN 1.9*   BILITOT 1.6*         Vitals:  Temp: 99 °F (37.2 °C) (08/04/17 0458)  Pulse: 77 (08/04/17 0458)  Resp: 18 (08/03/17 1937)  BP: (!) 165/86 (08/04/17 0458)  SpO2: (!) 94 % (08/04/17 0458)     Physical Exam:  ASA: 2  Mallampati: 2    General: no acute distress  Mental Status: alert and oriented to person, place and time  HEENT: normocephalic, atraumatic  Chest: unlabored breathing  Heart: regular heart rate  Abdomen: nondistended  Extremity: moves all extremities    Plan: therapeutic paracentesis   Sedation Plan: Local    Nancy Small   Interventional radiology  PGY-2  7:58 AM

## 2017-08-04 NOTE — PLAN OF CARE
Jean Carlos Swanson MD  [address] [facility] [address] FLOOR / [place] [address]    Future Appointments  Date Time Provider Department Center   8/10/2017 8:45 AM [facility] US 11 ALL [facility] ULS[address]   8/10/2017 9:30 AM [facility] HEPATOLOGY, FIBROSCAN [facility] HEPAT [address]   8/10/2017 10:00 AM Ghada Cruz PA-C [facility]   8/15/2017 1:40 PM Jean Carlos Swanson MD [facility] [place]         [id number] [place] - [address]  [address]  [place] [address]  Phone: [phone] Fax: [phone]      Payor: MEDICARE / Plan: MEDICARE PART A & B / Product Type: Government /        08/04/17 1147   Discharge Assessment   Assessment Type Discharge Planning Assessment   Confirmed/corrected address and phone number on facesheet? Yes   Assessment information obtained from? Patient   Expected Length of Stay (days) 2   Communicated expected length of stay with patient/caregiver yes   Prior to hospitilization cognitive status: Alert/Oriented   Prior to hospitalization functional status: Assistive Equipment   Current cognitive status: Alert/Oriented   Current Functional Status: Assistive Equipment   Arrived From home or self-care   Lives With parent(s)  (his mother)   Able to Return to Prior Arrangements yes   Is patient able to care for self after discharge? Yes   Who are your caregiver(s) and their phone number(s)? (Sharon Reza  mother [phone])   Patient's perception of discharge disposition home or selfcare   Patient currently receives home health services? No   Patient currently receives any other outside agency services? No   Equipment Currently Used at Home rollator;cane, straight   Does the patient have transportation to healthcare appointments? Yes   Transportation Available car   On Dialysis? No   Discharge Plan A Home with family   Discharge Plan B Home with family   Patient/Family In Agreement With Plan yes

## 2017-08-04 NOTE — ASSESSMENT & PLAN NOTE
CMP showed Tbili 2.2, AST/ALT within normal limits.  - scheduled for liver ultrasound and hepatic fibrascan on 8/10  - patient scheduled for hepatology follow up on 8/15.  - daily CMP  - Mag and Phos WNL.

## 2017-08-04 NOTE — PLAN OF CARE
Problem: Physical Therapy Goal  Goal: Physical Therapy Goal  Goals to be met by: 17     Patient will increase functional independence with mobility by performin. Supine to sit with Stand-by Assistance  2. Sit to supine with Stand-by Assistance  3. Sit to stand transfer with Stand-by Assistance  4. Bed to chair transfer with Stand-by Assistance using Rolling Walker  5. Gait  x 200 feet with Stand-by Assistance using Rolling Walker.   6. Ascend/descend 3 stair with no handrails Contact Guard Assistance   7. Lower extremity exercise program x 15 reps per handout, with independence    Outcome: Ongoing (interventions implemented as appropriate)  PT evaluation complete and goals established.     Keiry Gage, SEVEN, PT  2017

## 2017-08-04 NOTE — NURSING
Pt return from IR via stretcher. Pt accompanied by transport associate. Pt s/p paracenthisis. Dressing dry and intact to left abd. No distress noted.

## 2017-08-04 NOTE — PROCEDURES
Radiology Post-Procedure Note    Pre Op Diagnosis: Ascites  Post Op Diagnosis: Same    Procedure: Paracentesis    Procedure performed by: Del NICOLE, Kaitlin    Written Informed Consent Obtained: Yes  Specimen Removed: YES thin yellow   Estimated Blood Loss: Minimal    Findings:   Successful paracentesis.  Albumin administered PRN per protocol.    Patient tolerated procedure well.    Nancy Small   Interventional radiology  PGY-2  8:19 AM

## 2017-08-04 NOTE — PLAN OF CARE
Problem: Occupational Therapy Goal  Goal: Occupational Therapy Goal  Goals to be met by: 8/11/2017     Patient will increase functional independence with ADLs by performing:    UE Dressing with Minimal Assistance.  LE Dressing with Moderate Assistance.  Grooming while standing with Set-up Assistance.  Toileting from toilet with Minimal Assistance for hygiene and clothing management.   Toilet transfer to toilet with Stand-by Assistance.    Initiate OT POC    Comments: Amol Monroe OTR/L  8/4/2017

## 2017-08-04 NOTE — ASSESSMENT & PLAN NOTE
Patient with worsening ascites. IR consulted and removed 5L of fluid.   - pain management protocol.  - lasix 40 bid.  - strict Is&Os  - daily weights.  - continue diuresis overnight.

## 2017-08-04 NOTE — PT/OT/SLP EVAL
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    OT Date of Treatment: 08/04/17   OT Start Time: 1348  OT Stop Time: 1400  OT Total Time (min): 12 min    Billable Minutes:  Evaluation 12 minutes    Diagnosis: Ascites due to alcoholic hepatitis       Past Medical History:   Diagnosis Date    Diabetes mellitus     Hep C w/o coma, chronic     Hypertension     Macular degeneration     Neuropathy       Past Surgical History:   Procedure Laterality Date    EYE SURGERY      HAND SURGERY       General Precautions: Standard, fall  Orthopedic Precautions: N/A  Braces: N/A          Patient History:  Living Environment  Lives With: parent(s)  Living Arrangements: house  Home Accessibility: stairs to enter home, stairs within home  Number of Stairs to Enter Home: 3  Number of Stairs Within Home: 3  Stair Railings at Home: none  Transportation Available: car  Living Environment Comment: Pt states he lives w/ mother in a multi-level home w/ 3 steps into home and within home. PLOF requires assist w/ ADL's and t/f.   Equipment Currently Used at Home: cane, straight, rollator    Prior level of function:   Bed Mobility/Transfers: needs device and assist  Grooming: needs assist  Bathing: needs assist  Upper Body Dressing: needs assist  Lower Body Dressing: needs assist  Toileting: needs assist  Home Management Skills: needs assist        Dominant hand: right    Subjective:  Communicated with nurse prior to session.  Pt agreeable to skilled OT services.    Chief Complaint: debility  Patient/Family stated goals: return home    Pain/Comfort  Pain Rating 1: 0/10  Pain Rating Post-Intervention 1: 0/10    Objective:  Patient found with: peripheral IV, telemetry  Pt received w/ HOB elevated.    Cognitive Exam:  Oriented to: Person  Follows Commands/attention: Follows one-step commands  Communication: pt appeared confused   Memory:  No Deficits noted  Safety  awareness/insight to disability: impaired  Coping skills/emotional control: Appropriate to situation    Visual/perceptual:  Intact    Physical Exam:  Postural examination/scapula alignment: Rounded shoulder and No postural abnormalities identified  Skin integrity: Visible skin intact  Edema: None noted     Sensation:   Intact    Upper Extremity Range of Motion:  Right Upper Extremity: WFL  Left Upper Extremity: WFL    Upper Extremity Strength:  Right Upper Extremity: WFL  Left Upper Extremity: WFL   Strength: WFL    Fine motor coordination:   Intact    Gross motor coordination: WFL    Functional Mobility:  Bed Mobility:  Rolling/Turning to Left: With side rail, Contact guard assistance  Scooting/Bridging: Stand by Assistance  Supine to Sit: Minimum Assistance  Sit to Supine: Stand by Assistance    Transfers:  Sit <> Stand Assistance: Contact Guard Assistance  Sit <> Stand Assistive Device: Rolling Walker  Bed <> Chair Technique: Stand Pivot  Bed <> Chair Transfer Assistance: Contact Guard Assistance  Bed <> Chair Assistive Device: Rolling Walker    Functional Ambulation: Pt ambulated ~120 ft at cga w/ RW. Pt ascended/decended 3 stairs at cga w/ R HR.    Activities of Daily Living:  UE Dressing Level of Assistance: Minimum assistance (donned gown as robe)    Balance:   Static Sit: GOOD-: Takes MODERATE challenges from all directions but inconsistently  Dynamic Sit: FAIR+: Maintains balance through MINIMAL excursions of active trunk motion  Static Stand: FAIR+: Takes MINIMAL challenges from all directions  Dynamic stand: FAIR: Needs CONTACT GUARD during gait    Therapeutic Activities and Exercises:  Pt educated on POC.    AM-PAC 6 CLICK ADL  How much help from another person does this patient currently need?  1 = Unable, Total/Dependent Assistance  2 = A lot, Maximum/Moderate Assistance  3 = A little, Minimum/Contact Guard/Supervision  4 = None, Modified [place]/Independent    Putting on and taking off  "regular lower body clothing? : 2  Bathing (including washing, rinsing, drying)?: 2  Toileting, which includes using toilet, bedpan, or urinal? : 2  Putting on and taking off regular upper body clothing?: 3  Taking care of personal grooming such as brushing teeth?: 4  Eating meals?: 4  Total Score: 17    AM-PAC Raw Score CMS "G-Code Modifier Level of Impairment Assistance   6 % Total / Unable   7 - 9 CM 80 - 100% Maximal Assist   10-14 CL 60 - 80% Moderate Assist   15 - 19 CK 40 - 60% Moderate Assist   20 - 22 CJ 20 - 40% Minimal Assist   23 CI 1-20% SBA / CGA   24 CH 0% Independent/ Mod I       Patient left up in chair with call button in reach    Assessment:  Soy Reza is a 66 y.o. 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 Pt would benefit from skilled OT services to improve independence and overall occupational functioning.    Rehab identified problem list/impairments: Rehab identified problem list/impairments: weakness, impaired endurance, impaired self care skills, impaired functional mobilty, gait instability, impaired balance, decreased safety awareness, impaired cognition    Rehab potential is good.    Activity tolerance: Good    Discharge recommendations: Discharge Facility/Level Of Care Needs: home with home health     Barriers to discharge: Barriers to Discharge: Inaccessible home environment    Equipment recommendations: bedside commode, bath bench     GOALS:    Occupational Therapy Goals        Problem: Occupational Therapy Goal    Goal Priority Disciplines Outcome Interventions   Occupational Therapy Goal     OT, PT/OT     Description:  Goals to be met by: 8/11/2017     Patient will increase functional independence with ADLs by performing:    UE Dressing with Minimal Assistance.  LE Dressing with Moderate Assistance.  Grooming while standing with Set-up Assistance.  Toileting from toilet with Minimal Assistance for " hygiene and clothing management.   Toilet transfer to toilet with Stand-by Assistance.                      PLAN:  Patient to be seen 3 x/week to address the above listed problems via self-care/home management, therapeutic activities, therapeutic exercises  Plan of Care expires:    Plan of Care reviewed with: patient         Amol MARJ Monroe, OT  08/04/2017

## 2017-08-04 NOTE — SUBJECTIVE & OBJECTIVE
Past Medical History:   Diagnosis Date    Diabetes mellitus     Hep C w/o coma, chronic     Hypertension     Macular degeneration     Neuropathy        Past Surgical History:   Procedure Laterality Date    EYE SURGERY      HAND SURGERY         Review of patient's allergies indicates:  No Known Allergies    No current facility-administered medications on file prior to encounter.      Current Outpatient Prescriptions on File Prior to Encounter   Medication Sig    aspirin 81 MG Chew Take 81 mg by mouth once daily.    furosemide (LASIX) 20 MG tablet Take 1 tablet (20 mg total) by mouth once daily.    lisinopril (PRINIVIL,ZESTRIL) 20 MG tablet Take 1 tablet (20 mg total) by mouth once daily.    metformin (GLUCOPHAGE-XR) 500 MG 24 hr tablet Take 4 tablets (2,000 mg total) by mouth daily with breakfast.     Family History     None        Social History Main Topics    Smoking status: Former Smoker    Smokeless tobacco: Never Used    Alcohol use No      Comment: 2 5th per week- stopped one month ago approx 9/10/16    Drug use: No    Sexual activity: Not on file     Review of Systems   Constitutional: Positive for activity change and unexpected weight change (fluid accumulation). Negative for appetite change and fever.   HENT: Negative for congestion, facial swelling, hearing loss, sore throat and trouble swallowing.    Respiratory: Positive for shortness of breath (improved). Negative for cough, chest tightness and wheezing.    Cardiovascular: Positive for leg swelling. Negative for chest pain and palpitations.   Gastrointestinal: Positive for abdominal distention (improved) and abdominal pain (improved). Negative for constipation, nausea and vomiting.   Genitourinary: Negative for decreased urine volume and flank pain.   Musculoskeletal: Negative for arthralgias, joint swelling and myalgias.   Skin: Positive for wound (left foot). Negative for rash.   Neurological: Positive for numbness (diabetic foot  neuropathy bilaterally). Negative for dizziness, syncope and weakness.   Psychiatric/Behavioral: Negative for confusion.     Objective:     Vital Signs (Most Recent):  Temp: 98.5 °F (36.9 °C) (08/04/17 0930)  Pulse: 91 (08/04/17 0930)  Resp: 18 (08/04/17 0930)  BP: (!) 149/72 (08/04/17 0930)  SpO2: 98 % (08/04/17 0930) Vital Signs (24h Range):  Temp:  [97.8 °F (36.6 °C)-99 °F (37.2 °C)] 98.5 °F (36.9 °C)  Pulse:  [] 91  Resp:  [18-20] 18  SpO2:  [94 %-99 %] 98 %  BP: (146-198)/(72-94) 149/72     Weight: 93 kg (205 lb)  Body mass index is 29.41 kg/m².    Physical Exam   Constitutional: He is oriented to person, place, and time. He appears cachectic.   HENT:   Head: Normocephalic and atraumatic.   Nose: Nose normal.   Mouth/Throat: No oropharyngeal exudate.   Eyes: Conjunctivae and EOM are normal. Pupils are equal, round, and reactive to light. No scleral icterus.   Neck: Normal range of motion.   Cardiovascular: Regular rhythm, normal heart sounds and intact distal pulses.  Tachycardia present.  Exam reveals no friction rub.    No murmur heard.  Pulmonary/Chest: He is in respiratory distress (improved). He has decreased breath sounds in the right lower field and the left lower field. He has no wheezes. He exhibits no tenderness.   Abdominal: He exhibits distension (moderate) and ascites. There is generalized tenderness (moderate). There is no guarding.   Musculoskeletal: He exhibits edema (BLE, moderate).   Feet:   Left Foot:   Skin Integrity: Positive for ulcer (medial side).   Neurological: He is alert and oriented to person, place, and time.   Skin: He is not diaphoretic.   Nursing note and vitals reviewed.       Significant Labs:   A1C:     Recent Labs  Lab 08/04/17  0523   HGBA1C 6.3*     CBC:     Recent Labs  Lab 08/03/17  1221 08/04/17  0523   WBC 6.41 4.43   HGB 10.7* 9.3*   HCT 31.0* 27.3*   * 115*     CMP:     Recent Labs  Lab 08/03/17  1221 08/04/17  0523    137   K 4.8 4.4    110    CO2 22* 21*   * 142*   BUN 37* 36*   CREATININE 1.2 1.1   CALCIUM 8.3* 7.9*   PROT 6.9 5.8*   ALBUMIN 2.4* 1.9*   BILITOT 2.2* 1.6*   ALKPHOS 125 100   AST 21 17   ALT 24 19   ANIONGAP 8 6*   EGFRNONAA >60.0 >60.0     Cardiac Markers:     Recent Labs  Lab 08/03/17  1221   BNP 79     Troponin:     Recent Labs  Lab 08/03/17  1221   TROPONINI 0.010     Significant Imaging: CXR: I have reviewed all pertinent results/findings within the past 24 hours and my personal findings are:  mild pulmonary edema

## 2017-08-04 NOTE — NURSING
Pt arrived to unit at 1805. Pt resting in bed with no pain. Pt VSS. Pt has no complaints at this time. Pt oriented to room and call light.

## 2017-08-05 NOTE — SUBJECTIVE & OBJECTIVE
Interval History: No acute events overnight. Patient continues to get occasional sharp pains in abdomen that come/go but overall continues to feel better. Still has a lot of ascites and IR recommended additional fluid removal 8/7 (discussed inpatient v outpt).     Review of Systems   Constitutional: Positive for activity change and unexpected weight change (fluid accumulation). Negative for appetite change and fever.   HENT: Negative for congestion, facial swelling, hearing loss, sore throat and trouble swallowing.    Respiratory: Positive for shortness of breath (improved). Negative for cough, chest tightness and wheezing.    Cardiovascular: Negative for chest pain, palpitations and leg swelling.   Gastrointestinal: Positive for abdominal distention (improved) and abdominal pain (improved). Negative for constipation, nausea and vomiting.   Genitourinary: Negative for decreased urine volume and flank pain.   Musculoskeletal: Negative for arthralgias, joint swelling and myalgias.   Skin: Positive for wound (left foot). Negative for rash.   Neurological: Positive for numbness (diabetic foot neuropathy bilaterally). Negative for dizziness, syncope and weakness.   Psychiatric/Behavioral: Negative for confusion.     Objective:     Vital Signs (Most Recent):  Temp: 98.6 °F (37 °C) (08/05/17 0736)  Pulse: 67 (08/05/17 0736)  Resp: 16 (08/05/17 0736)  BP: 107/63 (08/05/17 0736)  SpO2: 97 % (08/05/17 0959) Vital Signs (24h Range):  Temp:  [98 °F (36.7 °C)-99 °F (37.2 °C)] 98.6 °F (37 °C)  Pulse:  [65-87] 67  Resp:  [16-18] 16  SpO2:  [95 %-97 %] 97 %  BP: ()/(52-71) 107/63     Weight: 88.7 kg (195 lb 8.8 oz)  Body mass index is 28.06 kg/m².    Intake/Output Summary (Last 24 hours) at 08/05/17 1120  Last data filed at 08/05/17 0900   Gross per 24 hour   Intake              500 ml   Output              680 ml   Net             -180 ml      Physical Exam   Constitutional: He is oriented to person, place, and time. He  appears cachectic.   HENT:   Head: Normocephalic and atraumatic.   Nose: Nose normal.   Mouth/Throat: No oropharyngeal exudate.   Eyes: Conjunctivae and EOM are normal. Pupils are equal, round, and reactive to light. No scleral icterus.   Neck: Normal range of motion.   Cardiovascular: Regular rhythm, normal heart sounds and intact distal pulses.  Tachycardia present.  Exam reveals no friction rub.    No murmur heard.  Pulmonary/Chest: He is in respiratory distress (improved). He has decreased breath sounds in the right lower field and the left lower field. He has no wheezes. He exhibits no tenderness.   Abdominal: He exhibits distension (moderate) and ascites. There is generalized tenderness (moderate). There is no guarding.   Musculoskeletal: He exhibits no edema (SCD/TEDs in place).   Feet:   Left Foot:   Skin Integrity: Positive for ulcer (medial side).   Neurological: He is alert and oriented to person, place, and time.   Skin: He is not diaphoretic.   Nursing note and vitals reviewed.    Significant Labs:   CBC:   Recent Labs  Lab 08/03/17  1221 08/04/17  0523 08/05/17  0426   WBC 6.41 4.43 3.67*   HGB 10.7* 9.3* 8.2*   HCT 31.0* 27.3* 23.9*   * 115* 96*     CMP:   Recent Labs  Lab 08/03/17  1221 08/04/17  0523 08/05/17  0426    137 137   K 4.8 4.4 4.7    110 109   CO2 22* 21* 21*   * 142* 145*   BUN 37* 36* 49*   CREATININE 1.2 1.1 1.4   CALCIUM 8.3* 7.9* 7.5*   PROT 6.9 5.8* 5.1*   ALBUMIN 2.4* 1.9* 1.6*   BILITOT 2.2* 1.6* 0.8   ALKPHOS 125 100 83   AST 21 17 16   ALT 24 19 13   ANIONGAP 8 6* 7*   EGFRNONAA >60.0 >60.0 52.4*     All pertinent labs within the past 24 hours have been reviewed.

## 2017-08-05 NOTE — ASSESSMENT & PLAN NOTE
Diabetic neuropathy  - Glucose labile  - holding metformin and cover with sliding scale insulin  - HbA1c 6.3.  - wound care consulted for ulcer on patient's medial left foot.  - monitor glucose and diabetic diet

## 2017-08-05 NOTE — PLAN OF CARE
Problem: Diabetes, Type 2 (Adult)  Intervention: Support/Optimize Psychosocial Response to Condition  Pt verbalized understanding of teaching about proper foot care and calling nursing station when tray arrives to have blood sugar checked before meal ingestion.  Intervention: Optimize Glycemic Control  Pt demonstrated glycemic control by calling nurse to check blood sugar before meals.      Problem: Patient Care Overview  Goal: Plan of Care Review  Pt c/o pain today where he was medicated for  Full relief which was successful. Pain located to abdomen for sharp pain.  Pt vital signs stable. Pt free of fall,by having call light , valuables, and urinal at bedside. Pt incontinent of bowel. Pt reminded to call for blood sugar check before meds.

## 2017-08-05 NOTE — ASSESSMENT & PLAN NOTE
CMP showed Tbili 2.2, AST/ALT within normal limits.  - scheduled for liver ultrasound and hepatic fibrascan on 8/10  - patient scheduled for hepatology follow up on 8/15 and has initial labs work initiated.   MELD-Na score: 11 at 8/5/2017  4:26 AM  MELD score: 11 at 8/5/2017  4:26 AM  Calculated from:  Serum Creatinine: 1.4 mg/dL at 8/5/2017  4:26 AM  Serum Sodium: 137 mmol/L at 8/5/2017  4:26 AM  Total Bilirubin: 0.8 mg/dL (Rounded to 1) at 8/5/2017  4:26 AM  INR(ratio): 1.1 at 8/3/2017 12:21 PM  Age: 66 years

## 2017-08-05 NOTE — PLAN OF CARE
Problem: Diabetes, Type 2 (Adult)  Intervention: Optimize Glycemic Control  Patient bedtime blood glucose result elevated. Covered as ordered, will continue to monitor.      Problem: Patient Care Overview  Goal: Plan of Care Review  Outcome: Ongoing (interventions implemented as appropriate)  During shift patient A and O x 4, appropriate and cooperative with care. No c/o pain during shift. All VSS throughout shift. Patient incontinent of bowel x 3, incontinent of bladder and able to use urinal x . Skin care provided as needed after soiling. Free from falls/injury during shift.

## 2017-08-05 NOTE — ASSESSMENT & PLAN NOTE
- Worsening ascites prior to admission   - S/P IR paracentesis with 5L removed 8/4 but still needs additional fluid removal likely next week  - Give IV albumin x 3 doses today and holding lasix in setting of mild MAURICE  - pain management PRN  - monitor I/Os and daily weights  - Discussed with patient scheduling inpatient v outpatient paracentesis. He continues to have symptoms related to ascites and difficulty with transportation. Will discuss again tomorrow after IV albumin complete

## 2017-08-05 NOTE — PROGRESS NOTES
[facility] Hospital Medicine  Progress Note    Patient Name: Soy Reza  MRN: [id number]  Patient Class: IP- Inpatient   Admission Date: 8/3/2017  Length of Stay: 1 days  Attending Physician: Jeffy Liao MD  Primary Care Provider: Jean Carlos Swanson MD    [facility] Medicine Team: [facility] MED E Lazara Neil PA-C    Subjective:     Principal Problem:Ascites due to alcoholic hepatitis    HPI:  Patient is a 65yo male with PMHx of uncontrolled DM2, HTN, diastolic HF, and hep C being admitted to observation for ascites.  Patient  states his ascites began  1 month ago and has gradually worsened. Patient also reports associated worsening SOB. Patient developed midsternal, nonradiating chest pain overnight which made him come to the ED.  Patient also endorses diffuse abdominal pain, abdominal distention and swelling to lower extremities. He states that the swelling is a 9/10 pain. Patient has never had a peritoneal tap.  Patient denies fever, chills, nausea, vomiting,  weakness, syncope. Patient endorses decreased urination and constipation.     Of note, patient was recently diagnosed with diastolic HF (EF 55% 7/2017) and Hepatitis C and was seeking care through [facility]. Patient has a history of alcoholism and states he has not had a drink in 10 years.    In the ED, CBC showed H&H 10.7/31, platelets 128; CMP showed glucose 184, BUN 37, Cr 1.2, calcium 8.3, albumin 2.4, Tbili 2.2, AST 21, ALT 24; UA unremarkable; Troponin and BNP WNL; CXR showed Mild cardiomegaly.  No significant air space consolidation identified.  Slightly blunted costophrenic angle posteriorly suggesting small amount of pleural effusion.    Hospital Course:  Patient admitted to observation for ascites. IR was consulted for paracentesis and removed 5L of fluid. Continued on IV diuresis. Upgraded to inpatient for ongoing management. Patient started on coreg for elevated blood pressure and rate control. Noted to have slight  increase in SCr 8/5 and started on IV albumin. Patient continues to have ascites and will likely need additional fluid removed via paracentesis next week (either inpatient or outpatient).     Interval History: No acute events overnight. Patient continues to get occasional sharp pains in abdomen that come/go but overall continues to feel better. Still has a lot of ascites and IR recommended additional fluid removal 8/7 (discussed inpatient v outpt).     Review of Systems   Constitutional: Positive for activity change and unexpected weight change (fluid accumulation). Negative for appetite change and fever.   HENT: Negative for congestion, facial swelling, hearing loss, sore throat and trouble swallowing.    Respiratory: Positive for shortness of breath (improved). Negative for cough, chest tightness and wheezing.    Cardiovascular: Negative for chest pain, palpitations and leg swelling.   Gastrointestinal: Positive for abdominal distention (improved) and abdominal pain (improved). Negative for constipation, nausea and vomiting.   Genitourinary: Negative for decreased urine volume and flank pain.   Musculoskeletal: Negative for arthralgias, joint swelling and myalgias.   Skin: Positive for wound (left foot). Negative for rash.   Neurological: Positive for numbness (diabetic foot neuropathy bilaterally). Negative for dizziness, syncope and weakness.   Psychiatric/Behavioral: Negative for confusion.     Objective:     Vital Signs (Most Recent):  Temp: 98.6 °F (37 °C) (08/05/17 0736)  Pulse: 67 (08/05/17 0736)  Resp: 16 (08/05/17 0736)  BP: 107/63 (08/05/17 0736)  SpO2: 97 % (08/05/17 0959) Vital Signs (24h Range):  Temp:  [98 °F (36.7 °C)-99 °F (37.2 °C)] 98.6 °F (37 °C)  Pulse:  [65-87] 67  Resp:  [16-18] 16  SpO2:  [95 %-97 %] 97 %  BP: ()/(52-71) 107/63     Weight: 88.7 kg (195 lb 8.8 oz)  Body mass index is 28.06 kg/m².    Intake/Output Summary (Last 24 hours) at 08/05/17 1120  Last data filed at 08/05/17  0900   Gross per 24 hour   Intake              500 ml   Output              680 ml   Net             -180 ml      Physical Exam   Constitutional: He is oriented to person, place, and time. He appears cachectic.   HENT:   Head: Normocephalic and atraumatic.   Nose: Nose normal.   Mouth/Throat: No oropharyngeal exudate.   Eyes: Conjunctivae and EOM are normal. Pupils are equal, round, and reactive to light. No scleral icterus.   Neck: Normal range of motion.   Cardiovascular: Regular rhythm, normal heart sounds and intact distal pulses.  Tachycardia present.  Exam reveals no friction rub.    No murmur heard.  Pulmonary/Chest: He is in respiratory distress (improved). He has decreased breath sounds in the right lower field and the left lower field. He has no wheezes. He exhibits no tenderness.   Abdominal: He exhibits distension (moderate) and ascites. There is generalized tenderness (moderate). There is no guarding.   Musculoskeletal: He exhibits no edema (SCD/TEDs in place).   Feet:   Left Foot:   Skin Integrity: Positive for ulcer (medial side).   Neurological: He is alert and oriented to person, place, and time.   Skin: He is not diaphoretic.   Nursing note and vitals reviewed.    Significant Labs:   CBC:   Recent Labs  Lab 08/03/17  1221 08/04/17  0523 08/05/17  0426   WBC 6.41 4.43 3.67*   HGB 10.7* 9.3* 8.2*   HCT 31.0* 27.3* 23.9*   * 115* 96*     CMP:   Recent Labs  Lab 08/03/17  1221 08/04/17  0523 08/05/17  0426    137 137   K 4.8 4.4 4.7    110 109   CO2 22* 21* 21*   * 142* 145*   BUN 37* 36* 49*   CREATININE 1.2 1.1 1.4   CALCIUM 8.3* 7.9* 7.5*   PROT 6.9 5.8* 5.1*   ALBUMIN 2.4* 1.9* 1.6*   BILITOT 2.2* 1.6* 0.8   ALKPHOS 125 100 83   AST 21 17 16   ALT 24 19 13   ANIONGAP 8 6* 7*   EGFRNONAA >60.0 >60.0 52.4*     All pertinent labs within the past 24 hours have been reviewed.    Assessment/Plan:      * Ascites due to alcoholic hepatitis    - Worsening ascites prior to admission    - S/P IR paracentesis with 5L removed 8/4 but still needs additional fluid removal likely next week  - Give IV albumin x 3 doses today and holding lasix in setting of mild MAURICE  - pain management PRN  - monitor I/Os and daily weights  - Discussed with patient scheduling inpatient v outpatient paracentesis. He continues to have symptoms related to ascites and difficulty with transportation. Will discuss again tomorrow after IV albumin complete         Wound, open    - wound care        Essential hypertension    - BP better controlled  - Slight rise in SCr and hold lasix and lisinopril dose today, recheck in AM  - Continue Coreg 6.25 mg BID        Anemia due to vitamin B12 deficiency    - H/H slowly trending down in setting of volume shifts.   - No GI blood loss reported        Hep C w/o coma, chronic    CMP showed Tbili 2.2, AST/ALT within normal limits.  - scheduled for liver ultrasound and hepatic fibrascan on 8/10  - patient scheduled for hepatology follow up on 8/15 and has initial labs work initiated.   MELD-Na score: 11 at 8/5/2017  4:26 AM  MELD score: 11 at 8/5/2017  4:26 AM  Calculated from:  Serum Creatinine: 1.4 mg/dL at 8/5/2017  4:26 AM  Serum Sodium: 137 mmol/L at 8/5/2017  4:26 AM  Total Bilirubin: 0.8 mg/dL (Rounded to 1) at 8/5/2017  4:26 AM  INR(ratio): 1.1 at 8/3/2017 12:21 PM  Age: 66 years        Uncontrolled type 2 diabetes mellitus with diabetic polyneuropathy, without long-term current use of insulin    Diabetic neuropathy  - Glucose labile  - holding metformin and cover with sliding scale insulin  - HbA1c 6.3.  - wound care consulted for ulcer on patient's medial left foot.  - monitor glucose and diabetic diet           VTE Risk Mitigation         Ordered     Medium Risk of VTE  Once      08/03/17 1538     Place LYNETTE hose  Until discontinued      08/03/17 1538     Place sequential compression device  Until discontinued      08/03/17 1538              Lazara Neil PA-C  Department of  Hospital Medicine [facility]

## 2017-08-05 NOTE — ASSESSMENT & PLAN NOTE
- BP better controlled  - Slight rise in SCr and hold lasix and lisinopril dose today, recheck in AM  - Continue Coreg 6.25 mg BID

## 2017-08-06 PROBLEM — N17.9 AKI (ACUTE KIDNEY INJURY): Status: ACTIVE | Noted: 2017-01-01

## 2017-08-06 NOTE — ASSESSMENT & PLAN NOTE
- Worsening ascites prior to admission   - S/P IR paracentesis with 5L removed 8/4 but still needs additional fluid removal likely next week  - re-consult IR for paracentesis 8/7  - Give IV albumin x 3 doses 8/5 and holding lasix in setting of mild MAURICE  - pain management PRN  - monitor I/Os and daily weights

## 2017-08-06 NOTE — SUBJECTIVE & OBJECTIVE
Interval History: No acute events overnight. Patient up in chair but laying flat due to discomfort in back and abdomen.     Review of Systems   Constitutional: Positive for activity change and unexpected weight change (fluid accumulation). Negative for appetite change and fever.   HENT: Negative for congestion, facial swelling, hearing loss, sore throat and trouble swallowing.    Respiratory: Positive for shortness of breath (improved). Negative for cough, chest tightness and wheezing.    Cardiovascular: Negative for chest pain, palpitations and leg swelling.   Gastrointestinal: Positive for abdominal distention (improved) and abdominal pain (improved). Negative for constipation, nausea and vomiting.   Genitourinary: Negative for decreased urine volume and flank pain.   Musculoskeletal: Positive for back pain. Negative for arthralgias, joint swelling and myalgias.   Skin: Positive for wound (left foot). Negative for rash.   Neurological: Positive for numbness (diabetic foot neuropathy bilaterally). Negative for dizziness, syncope and weakness.   Psychiatric/Behavioral: Negative for confusion.     Objective:     Vital Signs (Most Recent):  Temp: 99.7 °F (37.6 °C) (08/06/17 1527)  Pulse: 82 (08/06/17 1527)  Resp: 18 (08/06/17 1527)  BP: 106/75 (08/06/17 1527)  SpO2: 95 % (08/06/17 1527) Vital Signs (24h Range):  Temp:  [98 °F (36.7 °C)-99.7 °F (37.6 °C)] 99.7 °F (37.6 °C)  Pulse:  [71-88] 82  Resp:  [18] 18  SpO2:  [94 %-97 %] 95 %  BP: (106-160)/(58-75) 106/75     Weight: 88.7 kg (195 lb 8.8 oz)  Body mass index is 28.06 kg/m².    Intake/Output Summary (Last 24 hours) at 08/06/17 1604  Last data filed at 08/06/17 0752   Gross per 24 hour   Intake           348.33 ml   Output              400 ml   Net           -51.67 ml      Physical Exam   Constitutional: He is oriented to person, place, and time. He appears cachectic.   HENT:   Head: Normocephalic and atraumatic.   Nose: Nose normal.   Mouth/Throat: No  oropharyngeal exudate.   Eyes: Conjunctivae and EOM are normal. Pupils are equal, round, and reactive to light. No scleral icterus.   Neck: Normal range of motion.   Cardiovascular: Normal rate, regular rhythm, normal heart sounds and intact distal pulses.  Exam reveals no friction rub.    No murmur heard.  Pulmonary/Chest: Effort normal. Respiratory distress: improved. He has decreased breath sounds in the right lower field and the left lower field. He has no wheezes. He exhibits no tenderness.   Abdominal: Bowel sounds are normal. He exhibits distension (moderate) and ascites. There is generalized tenderness (moderate). There is no guarding.   Musculoskeletal: He exhibits no edema (SCD/TEDs in place).   Feet:   Left Foot:   Skin Integrity: Positive for ulcer (medial side).   Neurological: He is alert and oriented to person, place, and time.   Skin: He is not diaphoretic.   Nursing note and vitals reviewed.    Significant Labs:   BMP:   Recent Labs  Lab 08/06/17  0444   *   *   K 4.8      CO2 19*   BUN 59*   CREATININE 1.8*   CALCIUM 7.5*   MG 2.3     CBC:   Recent Labs  Lab 08/05/17  0426 08/06/17  0444   WBC 3.67* 4.08   HGB 8.2* 8.2*   HCT 23.9* 24.4*   PLT 96* 109*     All pertinent labs within the past 24 hours have been reviewed.

## 2017-08-06 NOTE — PROGRESS NOTES
[facility] Hospital Medicine  Progress Note    Patient Name: Soy Reza  MRN: [id number]  Patient Class: IP- Inpatient   Admission Date: 8/3/2017  Length of Stay: 2 days  Attending Physician: Vic Shaw MD  Primary Care Provider: Jean Carlos Swanson MD    [facility] Medicine Team: [facility] MED E Lazara Neil PA-C    Subjective:     Principal Problem:Ascites due to alcoholic hepatitis    HPI:  Patient is a 67yo male with PMHx of uncontrolled DM2, HTN, diastolic HF, and hep C being admitted to observation for ascites.  Patient  states his ascites began  1 month ago and has gradually worsened. Patient also reports associated worsening SOB. Patient developed midsternal, nonradiating chest pain overnight which made him come to the ED.  Patient also endorses diffuse abdominal pain, abdominal distention and swelling to lower extremities. He states that the swelling is a 9/10 pain. Patient has never had a peritoneal tap.  Patient denies fever, chills, nausea, vomiting,  weakness, syncope. Patient endorses decreased urination and constipation.     Of note, patient was recently diagnosed with diastolic HF (EF 55% 7/2017) and Hepatitis C and was seeking care through [facility]. Patient has a history of alcoholism and states he has not had a drink in 10 years.    In the ED, CBC showed H&H 10.7/31, platelets 128; CMP showed glucose 184, BUN 37, Cr 1.2, calcium 8.3, albumin 2.4, Tbili 2.2, AST 21, ALT 24; UA unremarkable; Troponin and BNP WNL; CXR showed Mild cardiomegaly.  No significant air space consolidation identified.  Slightly blunted costophrenic angle posteriorly suggesting small amount of pleural effusion.    Hospital Course:  Patient admitted to observation for ascites. IR was consulted for paracentesis and removed 5L of fluid. Started on IV diuresis. Upgraded to inpatient for ongoing management. Patient started on coreg for elevated blood pressure and rate control. Noted to have increase in  SCr (1.1>1.4) 8/5 and held ACEi, lasix, and started on IV albumin. 8/6 patient SCr increased to 1.8. Monitoring I/Os and continue to hold diuretics. Patient continues to have ascites and re-consult IR for Paracentesis 8/7.     Interval History: No acute events overnight. Patient up in chair but laying flat due to discomfort in back and abdomen.     Review of Systems   Constitutional: Positive for activity change and unexpected weight change (fluid accumulation). Negative for appetite change and fever.   HENT: Negative for congestion, facial swelling, hearing loss, sore throat and trouble swallowing.    Respiratory: Positive for shortness of breath (improved). Negative for cough, chest tightness and wheezing.    Cardiovascular: Negative for chest pain, palpitations and leg swelling.   Gastrointestinal: Positive for abdominal distention (improved) and abdominal pain (improved). Negative for constipation, nausea and vomiting.   Genitourinary: Negative for decreased urine volume and flank pain.   Musculoskeletal: Positive for back pain. Negative for arthralgias, joint swelling and myalgias.   Skin: Positive for wound (left foot). Negative for rash.   Neurological: Positive for numbness (diabetic foot neuropathy bilaterally). Negative for dizziness, syncope and weakness.   Psychiatric/Behavioral: Negative for confusion.     Objective:     Vital Signs (Most Recent):  Temp: 99.7 °F (37.6 °C) (08/06/17 1527)  Pulse: 82 (08/06/17 1527)  Resp: 18 (08/06/17 1527)  BP: 106/75 (08/06/17 1527)  SpO2: 95 % (08/06/17 1527) Vital Signs (24h Range):  Temp:  [98 °F (36.7 °C)-99.7 °F (37.6 °C)] 99.7 °F (37.6 °C)  Pulse:  [71-88] 82  Resp:  [18] 18  SpO2:  [94 %-97 %] 95 %  BP: (106-160)/(58-75) 106/75     Weight: 88.7 kg (195 lb 8.8 oz)  Body mass index is 28.06 kg/m².    Intake/Output Summary (Last 24 hours) at 08/06/17 1604  Last data filed at 08/06/17 0752   Gross per 24 hour   Intake           348.33 ml   Output              400 ml    Net           -51.67 ml      Physical Exam   Constitutional: He is oriented to person, place, and time. He appears cachectic.   HENT:   Head: Normocephalic and atraumatic.   Nose: Nose normal.   Mouth/Throat: No oropharyngeal exudate.   Eyes: Conjunctivae and EOM are normal. Pupils are equal, round, and reactive to light. No scleral icterus.   Neck: Normal range of motion.   Cardiovascular: Normal rate, regular rhythm, normal heart sounds and intact distal pulses.  Exam reveals no friction rub.    No murmur heard.  Pulmonary/Chest: Effort normal. Respiratory distress: improved. He has decreased breath sounds in the right lower field and the left lower field. He has no wheezes. He exhibits no tenderness.   Abdominal: Bowel sounds are normal. He exhibits distension (moderate) and ascites. There is generalized tenderness (moderate). There is no guarding.   Musculoskeletal: He exhibits no edema (SCD/TEDs in place).   Feet:   Left Foot:   Skin Integrity: Positive for ulcer (medial side).   Neurological: He is alert and oriented to person, place, and time.   Skin: He is not diaphoretic.   Nursing note and vitals reviewed.    Significant Labs:   BMP:   Recent Labs  Lab 08/06/17  0444   *   *   K 4.8      CO2 19*   BUN 59*   CREATININE 1.8*   CALCIUM 7.5*   MG 2.3     CBC:   Recent Labs  Lab 08/05/17  0426 08/06/17  0444   WBC 3.67* 4.08   HGB 8.2* 8.2*   HCT 23.9* 24.4*   PLT 96* 109*     All pertinent labs within the past 24 hours have been reviewed.    Assessment/Plan:      * Ascites due to alcoholic hepatitis    - Worsening ascites prior to admission   - S/P IR paracentesis with 5L removed 8/4 but still needs additional fluid removal likely next week  - re-consult IR for paracentesis 8/7  - Give IV albumin x 3 doses 8/5 and holding lasix in setting of mild MAURICE  - pain management PRN  - monitor I/Os and daily weights         MAURICE (acute kidney injury)    - SCr 1.1>>1.8  - holding lasix, ACEi, and  given IV albumin 8/5  - Monitor I/Os  - repeat labs in AM        Wound, open    - wound care        Essential hypertension    - BP better controlled  - holding ACE and lasix in setting of MAURICE  - Continue Coreg 6.25 mg BID        Anemia due to vitamin B12 deficiency    - H/H slowly trending down in setting of volume shifts.   - No GI blood loss reported        Hep C w/o coma, chronic    - CMP showed Tbili 2.2, AST/ALT within normal limits.  - scheduled for liver ultrasound and hepatic fibrascan on 8/10  - patient scheduled for hepatology follow up on 8/15 and has initial labs work initiated.   MELD-Na score: 11 at 8/5/2017  4:26 AM  MELD score: 11 at 8/5/2017  4:26 AM  Calculated from:  Serum Creatinine: 1.4 mg/dL at 8/5/2017  4:26 AM  Serum Sodium: 137 mmol/L at 8/5/2017  4:26 AM  Total Bilirubin: 0.8 mg/dL (Rounded to 1) at 8/5/2017  4:26 AM  INR(ratio): 1.1 at 8/3/2017 12:21 PM  Age: 66 years          Uncontrolled type 2 diabetes mellitus with diabetic polyneuropathy, without long-term current use of insulin    Diabetic neuropathy  - Glucose labile  - holding metformin and cover with sliding scale insulin  - HbA1c 6.3.  - wound care consulted for ulcer on patient's medial left foot.  - monitor glucose and diabetic diet           VTE Risk Mitigation         Ordered     Medium Risk of VTE  Once      08/03/17 1538     Place LYNETTE hose  Until discontinued      08/03/17 1538     Place sequential compression device  Until discontinued      08/03/17 1538        Lazara Neil PA-C  Department of Hospital Medicine   [facility]

## 2017-08-06 NOTE — PLAN OF CARE
Problem: Diabetes, Type 2 (Adult)  Intervention: Optimize Glycemic Control  Pt Bg checked per order. Pt requiring reinforcement to remind staff of when meals arrive. Insulin given as needed.       Problem: Patient Care Overview  Goal: Plan of Care Review  Outcome: Ongoing (interventions implemented as appropriate)  Pt VSS. Pt remains free of falls. Pt up OOB to chair with 1 assist by staff. While in bed, alarm set, BS table, urinal, personal belongings, and call light within reach. Pt has no complaints of pain. Will continue to monitor.

## 2017-08-06 NOTE — ASSESSMENT & PLAN NOTE
- BP better controlled  - holding ACE and lasix in setting of MAURICE  - Continue Coreg 6.25 mg BID

## 2017-08-06 NOTE — ASSESSMENT & PLAN NOTE
- SCr 1.1>>1.8  - holding lasix, ACEi, and given IV albumin 8/5  - Monitor I/Os  - repeat labs in AM

## 2017-08-06 NOTE — ASSESSMENT & PLAN NOTE
- CMP showed Tbili 2.2, AST/ALT within normal limits.  - scheduled for liver ultrasound and hepatic fibrascan on 8/10  - patient scheduled for hepatology follow up on 8/15 and has initial labs work initiated.   MELD-Na score: 11 at 8/5/2017  4:26 AM  MELD score: 11 at 8/5/2017  4:26 AM  Calculated from:  Serum Creatinine: 1.4 mg/dL at 8/5/2017  4:26 AM  Serum Sodium: 137 mmol/L at 8/5/2017  4:26 AM  Total Bilirubin: 0.8 mg/dL (Rounded to 1) at 8/5/2017  4:26 AM  INR(ratio): 1.1 at 8/3/2017 12:21 PM  Age: 66 years

## 2017-08-07 NOTE — ASSESSMENT & PLAN NOTE
- Worsening ascites prior to admission   - S/P IR paracentesis with 5L removed 8/4 but still needs additional fluid removal likely next week  - re-consulted IR for paracentesis 8/7 and removed 10L.  - Give IV albumin x 3 doses 8/5 and holding lasix in setting of mild MAURICE  - pain management PRN  - monitor I/Os and daily weights

## 2017-08-07 NOTE — ASSESSMENT & PLAN NOTE
- BP better controlled  - holding ACE and lasix in setting of MAURICE; resume after BMP lab on 8/9.  - Continue Coreg 6.25 mg BID

## 2017-08-07 NOTE — PLAN OF CARE
AMANDA called Marlee with [facility] and notified her pt was d/c today. AMANDA will arrange [facility] for this pm.     Miranda Chen LCSW

## 2017-08-07 NOTE — PLAN OF CARE
[facility]    HOME HEALTH ORDERS  FACE TO FACE ENCOUNTER    Patient Name: Soy Reza  YOB: 1950    PCP: Jean Carlos Swanson MD   PCP Address: [address] / [place] [address]  PCP Phone Number: [phone]  PCP Fax: [phone]    Encounter Date: 08/07/2017    Admit to Home Health    Diagnoses:  Active Hospital Problems    Diagnosis  POA    *Ascites due to alcoholic hepatitis [K70.11]  Yes     Priority: 1 - High    MAURICE (acute kidney injury) [N17.9]  Yes    Wound, open [T14.8]  Yes    Anemia due to vitamin B12 deficiency [D51.9]  Yes     Chronic    Essential hypertension [I10]  Yes     Chronic    Hep C w/o coma, chronic [B18.2]  Yes     Chronic    Uncontrolled type 2 diabetes mellitus with diabetic polyneuropathy, without long-term current use of insulin [E11.42, E11.65]  Yes     Chronic      Resolved Hospital Problems    Diagnosis Date Resolved POA   No resolved problems to display.       Future Appointments  Date Time Provider Department Center   8/10/2017 8:45 AM [facility] US 11 ALL [facility] ULSOUND [facility]   8/10/2017 9:30 AM [facility] HEPATOLOGY, FIBROSCAN [facility] HEPAT [facility]   8/10/2017 10:00 AM Ghada Cruz PA-C [facility] HEPAT [facility]   8/15/2017 1:40 PM Jean Carlos Swanson MD [facility] [place]     Follow-up Information     Jean Carlos Swanson MD On 8/15/2017.    Specialty:  Family Medicine  Why:  1:40pm  Primary Care  Contact information:  [address] [facility]  [address]  [place] [address]  [phone]             [facility] - Hepatology.    Specialty:  Hepatology  Why:  As scheduled  Contact information:  [address] [address] [phone]  Additional information:  1st Floor - [facility], located by clinic tower elevators                   I have seen and examined this patient face to face today. My clinical findings that support the need for the home health skilled services and home bound  status are the following:  Weakness/numbness causing balance and gait disturbance due to Weakness/Debility making it taxing to leave home.  Requiring assistive device to leave home due to unsteady gait caused by  Weakness/Debility.  Patient with medication mismanagement issues requiring home bound status as evidenced by  Poor understanding of medication regimen/dosage.    Allergies:Review of patient's allergies indicates:  No Known Allergies    Diet: diabetic diet: 2000 calorie and 2 gram sodium diet    Activities: activity as tolerated    Nursing:   SN to complete comprehensive assessment including routine vital signs. Instruct on disease process and s/s of complications to report to MD. Review/verify medication list sent home with the patient at time of discharge  and instruct patient/caregiver as needed. Frequency may be adjusted depending on start of care date.    Notify MD if SBP > 160 or < 90; DBP > 90 or < 50; HR > 120 or < 50; Temp > 101; Other:       CONSULTS:    Physical Therapy to evaluate and treat. Evaluate for home safety and equipment needs; Establish/upgrade home exercise program. Perform / instruct on therapeutic exercises, gait training, transfer training, and Range of Motion.  Occupational Therapy to evaluate and treat. Evaluate home environment for safety and equipment needs. Perform/Instruct on transfers, ADL training, ROM, and therapeutic exercises.   to evaluate for community resources/long-range planning.    WOUND CARE ORDERS  yes:  Pressure Ulcer(s) Stage I :   Location: left medial foot                         Dress with mepilex foam dressing and change every Tuesday/friday.    Medications: Review discharge medications with patient and family and provide education.    Current Discharge Medication List      START taking these medications    Details   carvedilol (COREG) 6.25 MG tablet Take 1 tablet (6.25 mg total) by mouth 2 (two) times daily.  Qty: 60 tablet, Refills: 3       oxycodone (ROXICODONE) 5 MG immediate release tablet Take 1 tablet (5 mg total) by mouth every 6 (six) hours as needed for Pain.  Qty: 15 tablet, Refills: 0         CONTINUE these medications which have CHANGED    Details   furosemide (LASIX) 20 MG tablet Take 1 tablet (20 mg total) by mouth once daily. Hold until 8/9 as directed by PCP or hepatology.  Qty: 7 tablet, Refills: 0      lisinopril (PRINIVIL,ZESTRIL) 20 MG tablet Take 1 tablet (20 mg total) by mouth once daily. Hold until 8/9 as directed by PCP or hepatology.  Qty: 90 tablet, Refills: 3         CONTINUE these medications which have NOT CHANGED    Details   aspirin 81 MG Chew Take 81 mg by mouth once daily.      metformin (GLUCOPHAGE-XR) 500 MG 24 hr tablet Take 4 tablets (2,000 mg total) by mouth daily with breakfast.  Qty: 360 tablet, Refills: 3             I certify that this patient is confined to his home and needs intermittent skilled nursing care, physical therapy and occupational therapy.

## 2017-08-07 NOTE — H&P
Inpatient Radiology Pre-procedure Note    History of Present Illness:  Soy Reza is a 66 y.o. male who presents for ultrasound guided paracentesis.  Admission H&P reviewed.  Past Medical History:   Diagnosis Date    Diabetes mellitus     Hep C w/o coma, chronic     Hypertension     Macular degeneration     Neuropathy      Past Surgical History:   Procedure Laterality Date    EYE SURGERY      HAND SURGERY         Review of Systems:   As documented in primary team H&P    Home Meds:   Prior to Admission medications    Medication Sig Start Date End Date Taking? Authorizing Provider   aspirin 81 MG Chew Take 81 mg by mouth once daily.   Yes Historical Provider, MD   furosemide (LASIX) 20 MG tablet Take 1 tablet (20 mg total) by mouth once daily. 7/19/17 7/19/18 Yes Nesha Abel MD   lisinopril (PRINIVIL,ZESTRIL) 20 MG tablet Take 1 tablet (20 mg total) by mouth once daily. 1/16/17  Yes Jean Carlos Swanson MD   metformin (GLUCOPHAGE-XR) 500 MG 24 hr tablet Take 4 tablets (2,000 mg total) by mouth daily with breakfast. 1/19/17 1/19/18 Yes Jean Carlos Swanson MD     Scheduled Meds:    aspirin  81 mg Oral Daily    carvedilol  6.25 mg Oral BID    senna-docusate 8.6-50 mg  1 tablet Oral BID    sodium chloride 0.9%  3 mL Intravenous Q8H     Continuous Infusions:    PRN Meds:dextrose 50%, dextrose 50%, glucagon (human recombinant), glucose, glucose, INV hydrALAZINE, insulin aspart, ondansetron, ondansetron, oxycodone, oxycodone, polyethylene glycol, ramelteon  Anticoagulants/Antiplatelets: aspirin    Allergies: Review of patient's allergies indicates:  No Known Allergies  Sedation Hx: have not been any systemic reactions    Labs:    Recent Labs  Lab 08/03/17  1221   INR 1.1       Recent Labs  Lab 08/07/17  0347   WBC 4.14   HGB 8.4*   HCT 24.0*   MCV 78*   PLT 98*      Recent Labs  Lab 08/07/17  0347   *      K 4.3      CO2 20*   BUN 64*   CREATININE 1.5*   CALCIUM 7.4*   MG 2.3   ALT 13    AST 17   ALBUMIN 2.1*   BILITOT 1.3*         Vitals:  Temp: 98.5 °F (36.9 °C) (08/07/17 0808)  Pulse: 72 (08/07/17 0808)  Resp: 16 (08/07/17 0808)  BP: (!) 146/75 (08/07/17 0808)  SpO2: 97 % (08/07/17 0808)     Physical Exam:  ASA: 2  Mallampati: n/a    General: no acute distress  Mental Status: alert and oriented to person, place and time  HEENT: normocephalic, atraumatic  Chest: unlabored breathing  Heart: regular heart rate  Abdomen: distended  Extremity: moves all extremities    Plan: ultrasound guided paracentesis  Sedation Plan: local    ISIS Travis, BERTRANDP  Interventional Radiology  [phone] [employer]

## 2017-08-07 NOTE — NURSING
Pt. Discharge instructions given for follow- up appointments, medications and warning signs. Pt given paper prescription for oxycodone. Case management arranging transportation per MD. IV discontinued as ordered. Pt in good spirits.

## 2017-08-07 NOTE — DISCHARGE SUMMARY
[facility] Hospital Medicine  Discharge Summary      Patient Name: Soy Reza  MRN: [id number]  Admission Date: 8/3/2017  Hospital Length of Stay: 3 days  Discharge Date and Time:  08/07/2017 2:30 PM  Attending Physician: Vic Sahw MD   Discharging Provider: Lazara Neil PA-C  Primary Care Provider: Jean Carlos Swanson MD  Hospital Medicine Team: [facility] MED E Mckenna Cordova PA-C    HPI:   Patient is a 67yo male with PMHx of uncontrolled DM2, HTN, diastolic HF, and hep C being admitted to observation for ascites.  Patient  states his ascites began  1 month ago and has gradually worsened. Patient also reports associated worsening SOB. Patient developed midsternal, nonradiating chest pain overnight which made him come to the ED.  Patient also endorses diffuse abdominal pain, abdominal distention and swelling to lower extremities. He states that the swelling is a 9/10 pain. Patient has never had a peritoneal tap.  Patient denies fever, chills, nausea, vomiting,  weakness, syncope. Patient endorses decreased urination and constipation.     Of note, patient was recently diagnosed with diastolic HF (EF 55% 7/2017) and Hepatitis C and was seeking care through [facility]. Patient has a history of alcoholism and states he has not had a drink in 10 years.    In the ED, CBC showed H&H 10.7/31, platelets 128; CMP showed glucose 184, BUN 37, Cr 1.2, calcium 8.3, albumin 2.4, Tbili 2.2, AST 21, ALT 24; UA unremarkable; Troponin and BNP WNL; CXR showed Mild cardiomegaly.  No significant air space consolidation identified.  Slightly blunted costophrenic angle posteriorly suggesting small amount of pleural effusion.    * No surgery found *      Indwelling Lines/Drains at time of discharge:   Lines/Drains/Airways          No matching active lines, drains, or airways        Hospital Course:   Patient admitted to observation for ascites. IR was consulted for paracentesis and removed 5L of fluid.  Started on IV diuresis. Upgraded to inpatient for ongoing management. Patient started on coreg for elevated blood pressure and rate control. Noted to have increase in SCr (1.1>1.4) 8/5 and held ACEi, lasix, and started on IV albumin. 8/6 patient SCr increased to 1.8. Monitoring I/Os and continue to hold diuretics. Patient continues to have ascites, IR re-consulted and removed 10L of fluid on 8/7. Patient stable for discharge. Close follow up with PCP and Hepatology scheduled and arrange home health and follow up with complex case management.     Consults:     Significant Diagnostic Studies: Labs:   CMP   Recent Labs  Lab 08/06/17 0444 08/07/17  0347   * 136   K 4.8 4.3    108   CO2 19* 20*   * 151*   BUN 59* 64*   CREATININE 1.8* 1.5*   CALCIUM 7.5* 7.4*   PROT 5.5* 5.4*   ALBUMIN 2.2* 2.1*   BILITOT 1.0 1.3*   ALKPHOS 93 105   AST 16 17   ALT 13 13   ANIONGAP 8 8   ESTGFRAFRICA 44.7* 55.7*   EGFRNONAA 38.6* 48.2*    and CBC   Recent Labs  Lab 08/06/17 0444 08/07/17 0347   WBC 4.08 4.14   HGB 8.2* 8.4*   HCT 24.4* 24.0*   * 98*       Pending Diagnostic Studies:     Procedure Component Value Units Date/Time    Cytology Specimen-Medical Cytology (Fluid/Wash/Brush) [[id number]] Collected:  08/04/17 0833    Order Status:  Sent Lab Status:  In process Updated:  08/04/17 1049    Specimen:  Ascites     IR Paracentesis with Imaging [[id number]] Resulted:  08/07/17 0903    Order Status:  Sent Lab Status:  In process Updated:  08/07/17 1044        Final Active Diagnoses:    Diagnosis Date Noted POA    PRINCIPAL PROBLEM:  Ascites due to alcoholic hepatitis [K70.11] 08/03/2017 Yes    Uncontrolled type 2 diabetes mellitus with diabetic polyneuropathy, without long-term current use of insulin [E11.42, E11.65] 01/19/2017 Yes     Chronic    Essential hypertension [I10] 01/19/2017 Yes     Chronic    Hep C w/o coma, chronic [B18.2] 01/19/2017 Yes     Chronic    Anemia due to vitamin B12 deficiency  [D51.9] 01/19/2017 Yes     Chronic    MAURICE (acute kidney injury) [N17.9] 08/06/2017 Yes    Wound, open [T14.8] 08/04/2017 Yes      Problems Resolved During this Admission:    Diagnosis Date Noted Date Resolved POA      * Ascites due to alcoholic hepatitis    - Worsening ascites prior to admission   - S/P IR paracentesis with 5L removed 8/4 but still needs additional fluid removal likely next week  - re-consulted IR for paracentesis 8/7 and removed 10L.  - Give IV albumin x 3 doses 8/5 and holding lasix in setting of mild MAURICE  - pain management PRN  - monitor I/Os and daily weights         Uncontrolled type 2 diabetes mellitus with diabetic polyneuropathy, without long-term current use of insulin    Diabetic neuropathy  - Glucose labile  - holding metformin and cover with sliding scale insulin  - HbA1c 6.3.  - wound care consulted for ulcer on patient's medial left foot.  - monitor glucose and diabetic diet         Essential hypertension    - BP better controlled  - holding ACE and lasix in setting of MAURICE; resume after BMP lab on 8/9.  - Continue Coreg 6.25 mg BID        Hep C w/o coma, chronic    - CMP showed Tbili 2.2, AST/ALT within normal limits.  - scheduled for liver ultrasound and hepatic fibrascan on 8/10  - patient scheduled for hepatology follow up on 8/15 and has initial labs work initiated.   MELD-Na score: 11 at 8/5/2017  4:26 AM  MELD score: 11 at 8/5/2017  4:26 AM  Calculated from:  Serum Creatinine: 1.4 mg/dL at 8/5/2017  4:26 AM  Serum Sodium: 137 mmol/L at 8/5/2017  4:26 AM  Total Bilirubin: 0.8 mg/dL (Rounded to 1) at 8/5/2017  4:26 AM  INR(ratio): 1.1 at 8/3/2017 12:21 PM  Age: 66 years        Anemia due to vitamin B12 deficiency    - H/H slowly trending down in setting of volume shifts.   - No GI blood loss reported        MAURICE (acute kidney injury)    - SCr 1.1>>1.5  - holding lasix, ACEi, and given IV albumin 8/5  - Monitor I/Os        Wound, open    - wound care  - see discharge instructions  for dressing            Discharged Condition: good    Disposition: Home or Self Care    Follow Up:  Follow-up Information     Jean Carlos Swanson MD On 8/15/2017.    Specialty:  Family Medicine  Why:  1:40pm  Primary Care  Contact information:  [address] [facility]  [address]TH FLOOR  [place] [address]  [phone]             [facility] - Hepatology.    Specialty:  Hepatology  Why:  As scheduled  Contact information:  [address]  [facility] [address] [phone]  Additional information:  1st Floor - [facility], located by clinic tower elevators               Patient Instructions:     COMPREHENSIVE METABOLIC PANEL   Standing Status: Future  Standing Exp. Date: 10/06/18   Order Comments: Fax results to PCP/Dr. Swanson.     Ambulatory referral to Outpatient Case Management   Referral Priority: Routine Referral Type: Consultation   Referral Reason: Specialty Services Required    Number of Visits Requested: 1      Diet Diabetic 2000 Calories   Order Comments: Low sodium     Activity as tolerated     Call MD for:  temperature >100.4     Call MD for:  persistent nausea and vomiting or diarrhea     Call MD for:  severe uncontrolled pain     Call MD for:  difficulty breathing or increased cough     Call MD for:  persistent dizziness, light-headedness, or visual disturbances     Call MD for:  increased confusion or weakness       Medications:  Reconciled Home Medications:   Current Discharge Medication List      START taking these medications    Details   carvedilol (COREG) 6.25 MG tablet Take 1 tablet (6.25 mg total) by mouth 2 (two) times daily.  Qty: 60 tablet, Refills: 3      oxycodone (ROXICODONE) 5 MG immediate release tablet Take 1 tablet (5 mg total) by mouth every 6 (six) hours as needed for Pain.  Qty: 15 tablet, Refills: 0         CONTINUE these medications which have CHANGED    Details   furosemide (LASIX) 20 MG tablet Take 1 tablet (20 mg total) by mouth once daily. Hold  until 8/10 as directed by PCP or hepatology.  Qty: 7 tablet, Refills: 0      lisinopril (PRINIVIL,ZESTRIL) 20 MG tablet Take 1 tablet (20 mg total) by mouth once daily. Hold until 8/10 as directed by PCP or hepatology.  Qty: 90 tablet, Refills: 3         CONTINUE these medications which have NOT CHANGED    Details   aspirin 81 MG Chew Take 81 mg by mouth once daily.      metformin (GLUCOPHAGE-XR) 500 MG 24 hr tablet Take 4 tablets (2,000 mg total) by mouth daily with breakfast.  Qty: 360 tablet, Refills: 3           Time spent on the discharge of patient: 36 minutes    HOS POC IP DISCHARGE SUMMARY    Lazara Neil PA-C  Department of Hospital Medicine  [facility]

## 2017-08-07 NOTE — PROCEDURES
Radiology Post-Procedure Note    Pre Op Diagnosis: Ascites  Post Op Diagnosis: Same    Procedure: Ultrasound Guided Paracentesis    Procedure performed by: Nakia ALCANTAR, Ama     Written Informed Consent Obtained: Yes  Specimen Removed: YES cloudy peach  Estimated Blood Loss: Minimal    Findings:   Successful paracentesis.  Albumin administered PRN per protocol.    Patient tolerated procedure well.    Ama Yee, APRN, FNP  Interventional Radiology  [phone] spectralink

## 2017-08-08 NOTE — PT/OT/SLP DISCHARGE
Physical Therapy Discharge Summary    Soy Reza  MRN: [id number]   Ascites due to alcoholic hepatitis   Patient Discharged from acute Physical Therapy on 17.  Please refer to prior PT noted date on 17 for functional status.     Assessment:   Patient appropriate for care in another setting. Patient has not met goals.  GOALS:    Physical Therapy Goals     Not on file          Multidisciplinary Problems (Resolved)        Problem: Physical Therapy Goal    Goal Priority Disciplines Outcome Goal Variances Interventions   Physical Therapy Goal   (Resolved)     PT/OT, PT Outcome(s) achieved     Description:  Goals to be met by: 17     Patient will increase functional independence with mobility by performin. Supine to sit with Stand-by Assistance  2. Sit to supine with Stand-by Assistance  3. Sit to stand transfer with Stand-by Assistance  4. Bed to chair transfer with Stand-by Assistance using Rolling Walker  5. Gait  x 200 feet with Stand-by Assistance using Rolling Walker.   6. Ascend/descend 3 stair with no handrails Contact Guard Assistance   7. Lower extremity exercise program x 15 reps per handout, with independence                    Reasons for Discontinuation of Therapy Services  Transfer to alternate level of care.      Plan:  Patient Discharged to: Home with Home Health Service.    Keiry Gage DPT, PT  2017

## 2017-08-08 NOTE — PLAN OF CARE
08/08/17 1034   Final Note   Assessment Type Final Discharge Note   Discharge Disposition Home-Health   Discharge planning education complete? Yes   What phone number can be called within the next 1-3 days to see how you are doing after discharge? [phone]   Hospital Follow Up  Appt(s) scheduled? Yes   Discharge/Hospital Encounter Summary [facility] PCP n/a   Referral to / orders for Home Health Complete? Yes   Any social issues identified prior to discharge? No   Did you assess the readiness or willingness of the family or caregiver to support self management of care? Yes     [facility]  [address]  [address]  [place] [address]  [phone]        Future Appointments  Date Time Provider Department Center   8/10/2017 8:45 AM [facility] US 11 ALL [facility] ULSOUND [facility]   8/10/2017 9:30 AM [facility] HEPATOLOGY, FIBROSCAN [facility] HEPAT [facility]   8/10/2017 10:00 AM Ghada Cruz PA-C [facility] HEPAT [facility]   8/15/2017 1:40 PM Jean Carlos Swanson MD [facility] BEKA [facility]     Met with patient prior to discharge to provide Medicaid transport number for patient to call and schedule transportation prior to future appointments.

## 2017-08-09 NOTE — TELEPHONE ENCOUNTER
----- Message from Alva Yeager sent at 8/9/2017  9:15 AM CDT -----  Contact: [place] - [facility] at [phone]  Pt does not has his carvedilol (COREG) 6.25 MG tablet, oxycodone (ROXICODONE) 5 MG immediate release tablet, and furosemide (LASIX) 20 MG tablet. Pt can not make it to the pharmacy to  his scripts, monitor blood glucose levels, needs another battery for glucometer. Pt had a neg monofilament to bilateral feet and needs referral to new podiatrist.

## 2017-08-09 NOTE — PROGRESS NOTES
"[facility] Post-Discharge call completed with patient today. C3 nurse noted the following concerns:  · Patient reports he has not been able to purchase his medications from [facility]. Informed writer he doesn't have transportation and will" not go back to [facility] ever again!" Requesting assist with locating a new pharmacy ASAP, stating "I need my meds."  · RN located pharmacy in pt's neighborhood that provides free delivery. Phoned pt back and gave him following information & recommended he call today to establish hios account with them:  · [facility] offers free prescription delivery [facility] at [phone]  · Reassured him RN will route note to PCP  requesting all medication prescriptions be resent to [facility] at pt's request.  · Pt verbalized agreement with plan.      "

## 2017-08-09 NOTE — PATIENT INSTRUCTIONS
Your Diabetes Foot Care Program    Every day you depend on your feet to keep you moving. But when you have diabetes, your feet need special care. Even a small foot problem can become very serious. So dont take your feet for granted. By working with your diabetes healthcare team, you can learn how to protect your feet and keep them healthy.  Evaluating your feet  An evaluation helps your healthcare provider check the condition of your feet. The evaluation includes a review of your diabetes history and overall health. It may also include a foot exam, X-rays, or other tests. These can help show problems beneath the skin that you cant see or feel.  Medical history  You will be asked about your overall health and any history of foot problems. Youll also discuss your diabetes history, such as whether your blood sugar level has changed over time. It also includes questions about sensations of pain, tingling, pins and needles, or numbness. Your healthcare provider will also want to know if you have high blood pressure and heart disease, or if you smoke. Be sure to mention any medicines (including over-the-counter), supplements, or herbal remedies you take.  Foot exam  A foot exam checks the condition of different parts of your foot. First, your skin and nails are examined for any signs of infection. Blood flow is checked by feeling for the pulses in each foot. You may also have tests to study the nerves in the foot. These include using a small filament (wire) to see how sensitive your feet are. In certain cases, you will be asked to walk a short distance to check for bone, joint, and muscle problems.  Diagnostic tests  If needed, your healthcare provider will suggest certain tests to learn more about your feet. These include:  · Doppler tests to measure blood flow in the feet and lower leg.  · X-rays, which can show bone or joint problems.  · Other imaging tests, such as an MRI (magnetic resonance imaging), bone scan,  and CT (computed tomography) scan. These can help show bone infections.  · Other tests, such as vascular tests, which study the blood flow in your feet and legs. You may also have nerve studies to learn how sensitive your feet are.  Creating a foot care program  Based on the evaluation, your healthcare provider will create a foot care program for you. Your program may be as simple as starting a daily self-care routine and changing the types of shoes your wear. It may also involve treating minor foot problems, such as a corn or blister. In some cases, surgery will be needed to treat an infection or mechanical problems, such as hammer toes.  Preventing problems  When you have diabetes, its easier to prevent problems than to treat them later on. So see your healthcare team for regular checkups and foot care. Your healthcare team can also help you learn more about caring for your feet at home. For example, you may be told to avoid walking barefoot. Or you may be told that special footwear is needed to protect your feet.  Have regular checkups  Foot problems can develop quickly. So be sure to follow your healthcare teams schedule for regular checkups. During office visits, take off your shoes and socks as soon as you get in the exam room. Ask your healthcare provider to examine your feet for problems. This will make it easier to find and treat small skin irritations before they get worse. Regular checkups can also help keep track of the blood flow and feeling in your feet. If you have neuropathy (lack of feeling in your feet), you will need to have checkups more often.  Learn about self-care  The more you know about diabetes and your feet, the easier it will be to prevent problems. Members of your healthcare team can teach you how to inspect your feet and teach you to look for warning signs. They can also give you other foot care tips. During office visits, be sure to ask any questions you have.  Date Last Reviewed:  7/1/2016  © 4261-9362 [employer] [place]. [address] [place] [address], [place] [address]. All rights reserved. This information is not intended as a substitute for professional medical care. Always follow your healthcare professional's instructions.        Discharge Instructions for Paracentesis  Paracentesis is a procedure to remove extra fluid from your belly (abdomen). This fluid buildup in the abdomen is called ascites. The procedure may have been done to take a sample of the fluid. Or, it may have been done to drain the extra fluid from your abdomen and help make you more comfortable.     Ascites is buildup of excess fluid in the abdomen.   Home care  · If you have pain after the procedure, your healthcare provider can prescribe or recommend pain medicines. Take these exactly as directed. If you stopped taking other medicines before the procedure, ask your provider when you can start them again.  · Take it easy for 24 hours after the procedure. Avoid physical activity until your provider says its OK.  · You will have a small bandage over the puncture site. Stitches (sutures), surgical staples, adhesive tapes, adhesive strips, or surgical glue may be used to close the incision. They also help stop bleeding and speed healing. You may take the bandage off in 24 hours.  · Check the puncture site for the signs of infection listed below.  Follow-up care  Make a follow-up appointment with your healthcare provider as directed. During your follow-up visit, your provider will check your healing. Let your provider know how you are feeling. You can also discuss the cause of your ascites and if you need any further treatment.  When to seek medical advice  Call your healthcare provider if you have any of the following after the procedure:  · A fever of 100.4°F (38.0°C) or higher  · Trouble breathing  · Pain that doesn't go away even after taking pain medicine  · Belly pain not caused by having the skin  punctured  · Bleeding from the puncture site  · More than a small amount of fluid leaking from the puncture site  · Swollen belly  · Signs of infection at the puncture site. These include increased pain, redness, or swelling, warmth, or bad-smelling drainage.  · Blood in your urine  · Feeling dizzy or lightheaded, or fainting   Date Last Reviewed: 7/1/2016  © 0032-7291 [employer], [employer]. [address] [place] [address], [place] [address]. All rights reserved. This information is not intended as a substitute for professional medical care. Always follow your healthcare professional's instructions.

## 2017-08-09 NOTE — PROGRESS NOTES
Thank you for the referral.  Patient has been assigned to JODY Rock  for low risk screening for Outpatient Case Management.     Reason for referral: Low Risk    Ascites due to alcoholic hepatitis  MAURICE (acute kidney injury)  Uncontrolled type 2 diabetes mellitus with diabetic polyneuropathy, without long-term current use of insulin  Hep C w/o coma, chronic  Essential hypertension    Thank you,    Ricarda Strickland, SSC

## 2017-08-09 NOTE — TELEPHONE ENCOUNTER
Dr Swanson, I spoke with mireya with [facility] and she informed me part of their job is to report to his doctors meds, issues and concerns the pt may or may not have in their home.   1.Pt is without meds above b/c he cannot get to pharmacy.   2. Pt not checking glucose b/c no battery in his machine. Unable to get somewhere to purchase.  3. Podiatry referral, pt cannot see the bottom of his feet, had a neg monofilament test bilaterally, and per Roge his toenail on his 2nd digit rt foot is curving and growing back towards the toe.   4. Miryea ordered a  consult who will hopefully see him today  To address transportation issues and other issues in his home. His 92yr old mother lives with him.     Please put in podiatry referral and message Aruna to schedule.  addressing transportation per Mireya.

## 2017-08-10 PROBLEM — M79.672 PAIN IN BOTH FEET: Status: ACTIVE | Noted: 2017-01-01

## 2017-08-10 PROBLEM — M79.671 PAIN IN BOTH FEET: Status: ACTIVE | Noted: 2017-01-01

## 2017-08-10 NOTE — LETTER
August 10, 2017      Jean Carlos Swanson MD  [address] [facility]  6th [address]  [facility] [address]           [facility] - Hepatology  [address]  [place] [address]  Phone: [phone]  Fax: [phone]          Patient: Soy Reza   MR Number: [id number]   YOB: 1950   Date of Visit: 8/10/2017       Dear Dr. Jean Carlos Swanson:    Thank you for referring Soy Reza to me for evaluation. Attached you will find relevant portions of my assessment and plan of care.    If you have questions, please do not hesitate to call me. I look forward to following Soy Reza along with you.    Sincerely,    Ghada Cruz PA-C    Enclosure  CC:  No Recipients    If you would like to receive this communication electronically, please contact [email or web address] or [phone] to request more information on [employer] Link access.    For providers and/or their staff who would like to refer a patient to [facility], please contact us through our one-stop-shop provider referral line, [facility], at [phone].    If you feel you have received this communication in error or would no longer like to receive these types of communications, please e-mail [email or web address]

## 2017-08-10 NOTE — Clinical Note
1. I put in orders for weekly para  2. I also have some labs that i'd like to get linked for when he has a draw next:  Cmp, inr, afp, HepA IgG, HBsAg, HBcore.  3. Dr. Major hepatolgoy clinic in about 2 wks (per Dr. VASQUEZ )

## 2017-08-10 NOTE — PROCEDURES
Radiology Post-Procedure Note    Pre Op Diagnosis: Ascites  Post Op Diagnosis: Same    Procedure: Ultrasound Guided Paracentesis    Procedure performed by: Nakia ALCANTAR, Ama     Written Informed Consent Obtained: Yes  Specimen Removed: YES cloudy peach fluid  Estimated Blood Loss: Minimal    Findings:   Successful paracentesis.  Albumin administered PRN per protocol.    Patient tolerated procedure well.    Ama Yee, APRN, FNP  Interventional Radiology  [phone] [employer]

## 2017-08-10 NOTE — TELEPHONE ENCOUNTER
I spoke with patient and radiology staff.  Labs scheduled 8/17/17.  Para scheduled each Thursday at 9 am up to 8/31/17.  F/u with Dr. Major scheduled 8/25/17.

## 2017-08-10 NOTE — TELEPHONE ENCOUNTER
----- Message from Ghada Cruz PA-C sent at 8/10/2017  2:35 PM CDT -----  1. I put in orders for weekly para   2. I also have some labs that i'd like to get linked for when he has a draw next:   Cmp, inr, afp, HepA IgG, HBsAg, HBcore.   3. Dr. Major hepatolgoy clinic in about 2 wks (per Dr. CHRISTINA Parra

## 2017-08-10 NOTE — PROCEDURES
Procedures Fibroscan Procedure     Name: Soy Reza  Date of Procedure : 08/10/2017   :: Ghada Cruz PA-C  Diagnosis: HCV    Probe: XL    Fibroscan readin.5 KPa    Fibrosis:F4     CAP readin dB/m    Steatosis: :S1

## 2017-08-10 NOTE — PROGRESS NOTES
This CSW attempted to complete assessment with patient . Patient report he is unavailable . Patient is currently at doctors appointment .
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
## 2017-08-10 NOTE — H&P
Radiology History & Physical      SUBJECTIVE:     Chief Complaint: ascites    History of Present Illness:  Soy Reza is a 66 y.o. male who presents for ultrasound guided paracentesis  Past Medical History:   Diagnosis Date    Diabetes mellitus     Hep C w/o coma, chronic     Hypertension     Macular degeneration     Neuropathy      Past Surgical History:   Procedure Laterality Date    EYE SURGERY      HAND SURGERY         Home Meds:   Prior to Admission medications    Medication Sig Start Date End Date Taking? Authorizing Provider   aspirin 81 MG Chew Take 81 mg by mouth once daily.    Historical Provider, MD   carvedilol (COREG) 6.25 MG tablet Take 1 tablet (6.25 mg total) by mouth 2 (two) times daily. 8/10/17 8/10/18  Jean Carlos Swanson MD   furosemide (LASIX) 20 MG tablet Take 1 tablet (20 mg total) by mouth once daily. Hold until 8/10 as directed by PCP or hepatology. 8/7/17 8/7/18  Lazara Neil PA-C   lisinopril (PRINIVIL,ZESTRIL) 20 MG tablet Take 1 tablet (20 mg total) by mouth once daily. Hold until 8/10 as directed by PCP or hepatology. 8/7/17   Lazara Neil PA-C   metformin (GLUCOPHAGE-XR) 500 MG 24 hr tablet Take 4 tablets (2,000 mg total) by mouth daily with breakfast. 8/10/17 8/10/18  Jean Carlos Swanson MD   oxycodone (ROXICODONE) 5 MG immediate release tablet Take 1 tablet (5 mg total) by mouth every 6 (six) hours as needed for Pain. 8/7/17   Lazara Neil PA-C   carvedilol (COREG) 6.25 MG tablet Take 1 tablet (6.25 mg total) by mouth 2 (two) times daily. 8/7/17 8/10/17  Lazara Neil PA-C   metformin (GLUCOPHAGE-XR) 500 MG 24 hr tablet Take 4 tablets (2,000 mg total) by mouth daily with breakfast. 1/19/17 8/10/17  Jean Carlos Swanson MD     Anticoagulants/Antiplatelets: aspirin    Allergies: Review of patient's allergies indicates:  No Known Allergies  Sedation History:  no adverse reactions    Review of Systems:   Hematological: no known  coagulopathies  Respiratory: no shortness of breath  Cardiovascular: no chest pain  Gastrointestinal: no abdominal pain  Genito-Urinary: no dysuria  Musculoskeletal: negative  Neurological: no TIA or stroke symptoms         OBJECTIVE:     Vital Signs (Most Recent)  Pulse: 78 (08/10/17 1149)  Resp: 16 (08/10/17 1149)  BP: 117/72 (08/10/17 1149)  SpO2: 99 % (08/10/17 1149)    Physical Exam:  ASA: 2  Mallampati: n/a    General: no acute distress  Mental Status: alert and oriented to person, place and time  HEENT: normocephalic, atraumatic  Chest: unlabored breathing  Heart: regular heart rate  Abdomen: distended  Extremity: moves all extremities    Laboratory  Lab Results   Component Value Date    INR 1.1 08/03/2017       Lab Results   Component Value Date    WBC 4.14 08/07/2017    HGB 8.4 (L) 08/07/2017    HCT 24.0 (L) 08/07/2017    MCV 78 (L) 08/07/2017    PLT 98 (L) 08/07/2017      Lab Results   Component Value Date     (H) 08/07/2017     08/07/2017    K 4.3 08/07/2017     08/07/2017    CO2 20 (L) 08/07/2017    BUN 64 (H) 08/07/2017    CREATININE 1.5 (H) 08/07/2017    CALCIUM 7.4 (L) 08/07/2017    MG 2.3 08/07/2017    ALT 13 08/07/2017    AST 17 08/07/2017    ALBUMIN 2.1 (L) 08/07/2017    BILITOT 1.3 (H) 08/07/2017       ASSESSMENT/PLAN:     Sedation Plan: local  Patient will undergo ultrasound guided paracentesis.    ISIS Travis, FNP  Interventional Radiology  [phone] [employer]

## 2017-08-11 NOTE — PROGRESS NOTES
Attempt #:  2  This CSW attempted to reach patient/caregiver to provide resource and left msg requesting a return call.  Letter with contact information was sent via Us [employer] to patient/caregiver.  Referral source notified.

## 2017-08-11 NOTE — LETTER
August 11, 2017    Soy Reza  [address] [place] [address]             [facility] [address] [place] [address] Dear:Mr. Soy Reza    I am writing from the Outpatient Complex Care Management Department at [facility].  I received a referral from Dr. Woody Neil to contact you or your caregiver regarding any needs you may have. I have attempted to contact you or your cargeiver by phone two times unsuccessfully.  Please contact the Outpatient Complex Care Management Department at [phone] if you would like to discuss your needs.      Sincerely,         JODY Rock

## 2017-08-15 NOTE — TELEPHONE ENCOUNTER
Called pt to check   Doing well s/p para on 8-  Down to 175 pounds from 198.   Does not think he needs upcoming para on 8-17 as anticipated.   Explained that fluid analysis on this sample did not as of yet show any sign of infected fluid (one culture still in process)     Will leave subsequent para for 8/24 & he will call us 2 d prior if again thinks we need to hold off.       Staff:   Please cancel paracentesis set for 8-   Keep one set for 8-

## 2017-08-17 PROBLEM — N17.9 AKI (ACUTE KIDNEY INJURY): Status: RESOLVED | Noted: 2017-01-01 | Resolved: 2017-01-01

## 2017-08-17 NOTE — PROGRESS NOTES
"Subjective:   Patient ID: Soy Reza is a 66 y.o. male.    Chief Complaint: Follow-up (hosp)      HPI  Nice 67 yo male with cirrhosis and hep c who quit drinking several months ago here because "I don't know why the hell I'm here. Those nurses on the phone told me I gotta see you." He has no complaints and understands his upcoming appts for labs, etc. He is restricting liquids and restricting sodium.     Patient queried and denies any further complaints.    ALLERGIES AND MEDICATIONS: updated and reviewed.  Review of patient's allergies indicates:  No Known Allergies    Current Outpatient Prescriptions:     aspirin 81 MG Chew, Take 81 mg by mouth once daily., Disp: , Rfl:     carvedilol (COREG) 6.25 MG tablet, Take 1 tablet (6.25 mg total) by mouth 2 (two) times daily., Disp: 180 tablet, Rfl: 1    furosemide (LASIX) 20 MG tablet, Take 1 tablet (20 mg total) by mouth once daily. Hold until 8/10 as directed by PCP or hepatology., Disp: 7 tablet, Rfl: 0    lisinopril (PRINIVIL,ZESTRIL) 20 MG tablet, Take 1 tablet (20 mg total) by mouth once daily. Hold until 8/10 as directed by PCP or hepatology., Disp: 90 tablet, Rfl: 3    metformin (GLUCOPHAGE) 1000 MG tablet, , Disp: , Rfl:     oxycodone (ROXICODONE) 5 MG immediate release tablet, Take 1 tablet (5 mg total) by mouth every 6 (six) hours as needed for Pain., Disp: 15 tablet, Rfl: 0    Review of Systems   Constitutional: Negative for activity change, appetite change, chills, diaphoresis, fatigue, fever and unexpected weight change.   HENT: Negative for congestion, ear discharge, ear pain, postnasal drip, rhinorrhea, sneezing and sore throat.    Eyes: Negative for photophobia and discharge.   Respiratory: Negative for cough, chest tightness, shortness of breath and wheezing.    Cardiovascular: Negative for chest pain and palpitations.   Gastrointestinal: Positive for abdominal distention, abdominal pain and diarrhea. Negative for nausea and vomiting. " "  Genitourinary: Negative for dysuria.   Musculoskeletal: Negative for arthralgias and neck pain.   Skin: Negative for rash.   Neurological: Negative for headaches.       Objective:     Vitals:    08/15/17 1455   BP: 121/65   Pulse: 86   Temp: 97.6 °F (36.4 °C)   TempSrc: Oral   Weight: 75.8 kg (167 lb 1.7 oz)   Height: 5' 10.5" (1.791 m)   PainSc:   7     Body mass index is 23.64 kg/m².    Physical Exam   Constitutional: He appears well-developed and well-nourished.   HENT:   Head: Normocephalic and atraumatic.   Right Ear: Hearing, tympanic membrane, external ear and ear canal normal. No drainage or swelling. No decreased hearing is noted.   Left Ear: Hearing, tympanic membrane, external ear and ear canal normal. No drainage or swelling. No decreased hearing is noted.   Nose: Nose normal. No rhinorrhea.   Mouth/Throat: Oropharynx is clear and moist. No oropharyngeal exudate, posterior oropharyngeal edema or posterior oropharyngeal erythema.   Eyes: Conjunctivae, EOM and lids are normal. Pupils are equal, round, and reactive to light. Right eye exhibits no discharge and no exudate. Left eye exhibits no discharge and no exudate. Right conjunctiva is not injected. Left conjunctiva is not injected.   Neck: Trachea normal and full passive range of motion without pain. Normal carotid pulses, no hepatojugular reflux and no JVD present. Carotid bruit is not present. No neck rigidity. No edema and no erythema present. No thyroid mass and no thyromegaly present.   Cardiovascular: Normal rate, regular rhythm and normal heart sounds.    Pulmonary/Chest: Effort normal. No respiratory distress.   Abdominal: Soft. Normal appearance and bowel sounds are normal. There is no tenderness. There is negative Blount's sign.   Lymphadenopathy:     He has no cervical adenopathy.   Neurological: He is alert.   Skin: Skin is warm and dry.   Psychiatric: He has a normal mood and affect. His speech is normal and behavior is normal. "       Assessment and Plan:   Soy Bain was seen today for follow-up.    Diagnoses and all orders for this visit:    Hep C w/o coma, chronic    Uncontrolled type 2 diabetes mellitus with diabetic polyneuropathy, without long-term current use of insulin    Essential hypertension    Alcoholism in remission    Other orders  -     oxycodone (ROXICODONE) 5 MG immediate release tablet; Take 1 tablet (5 mg total) by mouth every 6 (six) hours as needed for Pain.    Time spent in the evaluation and management of this patient exceeded 45min   and greater than 50% of this time was in face-to-face education regarding diagnoses, medications, plan, and follow-up.      Return in about 3 months (around 11/15/2017).    THIS NOTE WILL BE SHARED WITH THE PATIENT.

## 2017-08-18 NOTE — TELEPHONE ENCOUNTER
Requesting order for transfer tub bench-patient has medicaid.     Please fax to [facility] at [phone]

## 2017-08-18 NOTE — TELEPHONE ENCOUNTER
----- Message from Lisandra Valerio sent at 8/18/2017  2:31 PM CDT -----  Contact: Danni with [facility]  [phone]  She need an order to say it's ok to see patient on tomorrow  Saturday 8/19/17 to do his wound care.     Also Requesting order for transfer tub bench-patient has medicaid.     Please fax to [facility] at [phone]

## 2017-08-19 NOTE — TELEPHONE ENCOUNTER
Received a call from the lab- K at 6.3 from earlier today.  Called patient and asked him to go to the ER to get this rechecked and treated.    Patient said that he felt well and was going to delay his ER attendance until tomorrow am.    I explained the severity of a high K level and the cardiac risks but he insisted that he was not going to go until tomorrow.    I asked him to omit his lisinopril but he has run out of it anyway.

## 2017-08-21 NOTE — TELEPHONE ENCOUNTER
"Patient had labs drawn 8/21/17.  AN Cruz phoned stating that patient had a panic K+ of 6.3 and ordered that he report to ER ASAP.  I spoke with patient and order from provider relayed.  Patient stated, "I'm not going."  "It takes to much effort to get there."  "I have to get a cab each time and that's expensive."  " I could have heart attack just trying to get there."  It was stressed that elevated K+ could cause heart arrhythmias and lead to death.  Patient stated, "I sorry, but I'm not going."    "

## 2017-08-24 NOTE — TELEPHONE ENCOUNTER
Hepatitis A Antibody IgG   Date Value Ref Range Status   08/18/2017 Positive (A)  Final       Hepatitis B Surface Ag   Date Value Ref Range Status   08/18/2017 Negative  Final     Hep B Core Total Ab   Date Value Ref Range Status   08/18/2017 Positive (A)  Final     Hep B S Ab   Date Value Ref Range Status   08/18/2017 Negative  Final       There is no immunization history on file for this patient.     Lab Results   Component Value Date    AST 23 08/18/2017    ALT 17 08/18/2017    CREATININE 1.2 08/18/2017    BILITOT 0.8 08/18/2017    INR 1.0 08/18/2017    AFP 1.0 08/18/2017     MELD-Na score: 8 at 8/18/2017 12:55 PM  MELD score: 8 at 8/18/2017 12:55 PM  Calculated from:  Serum Creatinine: 1.2 mg/dL at 8/18/2017 12:55 PM  Serum Sodium: 139 mmol/L (Rounded to 137) at 8/18/2017 12:55 PM  Total Bilirubin: 0.8 mg/dL (Rounded to 1) at 8/18/2017 12:55 PM  INR(ratio): 1.0 at 8/18/2017 12:55 PM  Age: 66 years     K addressed in previous phone note and pt urged by several providers to go to ED   but pt refused.     Called and spoke to pt.   Reviewed labs and critical K value and how that can lead to sudden cardiac arrythmia & possibly death .   He is agreeable only to going to PCP clinic / urgent care near him b/c it is convenient.  recommend he tell them that his K needs to be rechecked because it was too high.   States fluid is building back up in belly, but still not at the point that he feels it should be drained.   Reminded pt of f/u appt tomorrow w/ Dr. Mjaor @ 4pm, for continued care  Pt verbalized understanding.

## 2017-08-25 NOTE — TELEPHONE ENCOUNTER
Pt called, he is at [facility]'s picking up his meds and does not have his pain meds refilled. Pt informed I just received the request at 3pm today, Dr Swanson is out of the office today and we need more time to get refills approved. Pt voiced understanding and stated he would be ok over the weekend. I informed pt his request would be sent to Dr Swanson and I will be in touch Monday with a response.

## 2017-08-25 NOTE — TELEPHONE ENCOUNTER
----- Message from Theodore Momin sent at 8/25/2017  2:35 PM CDT -----  Contact: Self [phone]  RX request - refill or new RX.  Is this a refill or new RX:  Refill  RX name and strength: oxycodone (ROXICODONE) 5 MG   Directions:   Is this a 30 day or 90 day RX:    Pharmacy name and phone #: [facility] [phone]  Comments:

## 2017-08-28 NOTE — TELEPHONE ENCOUNTER
Spoke with pt,  informed of refill approval but will probably not receive anymore b/c he declined pain management  referral today.   Pt voices understanding and states he doesn't want to take it on a regular basis and will not request any other refills for pain meds. Pt reminded of f/u visit 11-08, states he will be here and ty for the bench for his tub, it has helped him so much...

## 2017-08-28 NOTE — TELEPHONE ENCOUNTER
----- Message from Jean Carlos Swanson MD sent at 8/28/2017  1:11 PM CDT -----  Pt declined pain med referral so today's #15 of his pain med will likely be his last from me.     ----- Message -----  From: Aruna Landa  Sent: 8/28/2017  11:28 AM  To: Jean Carlos Swanson MD    Pt declined at this time.  ----- Message -----  From: Jean Carlos Swanson MD  Sent: 8/28/2017  11:18 AM  To: Aruna Landa    Pt referred to pain clinic. thx

## 2017-09-12 NOTE — TELEPHONE ENCOUNTER
Spoke to pt and rescheduled 9/5 appt to 9/26. Pt confirmed. Mailed appt reminder. Forwarded message to dr VASQUEZ because pt  States he has not had a bowel movement in weeks only small amounts. EMS

## 2017-09-19 NOTE — H&P
Radiology History & Physical      SUBJECTIVE:     Chief Complaint: Recurrent ascites.    History of Present Illness:  Soy Reza is a 66 y.o. male who presents for ultrasound guided paracentesis.    Past Medical History:   Diagnosis Date    Diabetes mellitus     Hep C w/o coma, chronic     Hypertension     Macular degeneration     Neuropathy      Past Surgical History:   Procedure Laterality Date    EYE SURGERY      HAND SURGERY         Home Meds:   Prior to Admission medications    Medication Sig Start Date End Date Taking? Authorizing Provider   aspirin 81 MG Chew Take 81 mg by mouth once daily.    Historical Provider, MD   carvedilol (COREG) 6.25 MG tablet Take 1 tablet (6.25 mg total) by mouth 2 (two) times daily. 8/10/17 8/10/18  Jean Carlos Swanson MD   furosemide (LASIX) 20 MG tablet Take 1 tablet (20 mg total) by mouth once daily. Hold until 8/10 as directed by PCP or hepatology. 8/7/17 8/7/18  Lazara Neil PA-C   lisinopril (PRINIVIL,ZESTRIL) 20 MG tablet Take 1 tablet (20 mg total) by mouth once daily. Hold until 8/10 as directed by PCP or hepatology. 8/7/17   Lazara Neil PA-C   metformin (GLUCOPHAGE) 1000 MG tablet  8/8/17   Historical Provider, MD   oxycodone (ROXICODONE) 5 MG immediate release tablet Take 1 tablet (5 mg total) by mouth every 6 (six) hours as needed for Pain. 8/28/17   Jean Carlos Swanson MD     Anticoagulants/Antiplatelets: aspirin    Allergies: Review of patient's allergies indicates:  No Known Allergies  Sedation History:  no adverse reactions    Review of Systems:   Hematological: no known coagulopathies  Respiratory: no shortness of breath  Cardiovascular: no chest pain  Gastrointestinal: no abdominal pain  Genito-Urinary: no dysuria  Musculoskeletal: negative  Neurological: no TIA or stroke symptoms         OBJECTIVE:     Vital Signs (Most Recent)  Pulse: 95 (09/19/17 0941)  Resp: 16 (09/19/17 0941)  BP: (!) 151/94 (09/19/17 0941)  SpO2: 100 % (09/19/17  0941)    Physical Exam:  General: no acute distress  Mental Status: alert and oriented to person, place and time  HEENT: normocephalic, atraumatic  Chest: unlabored breathing  Heart: regular heart rate  Abdomen: distended  Extremity: moves all extremities    Laboratory  Lab Results   Component Value Date    INR 1.0 09/19/2017       Lab Results   Component Value Date    WBC 6.17 09/19/2017    HGB 11.2 (L) 09/19/2017    HCT 32.7 (L) 09/19/2017    MCV 78 (L) 09/19/2017     09/19/2017      Lab Results   Component Value Date     (H) 08/18/2017     08/18/2017    K 6.3 (HH) 08/18/2017     (H) 08/18/2017    CO2 19 (L) 08/18/2017    BUN 36 (H) 08/18/2017    CREATININE 1.2 08/18/2017    CALCIUM 8.7 08/18/2017    MG 2.3 08/07/2017    ALT 17 08/18/2017    AST 23 08/18/2017    ALBUMIN 2.7 (L) 08/18/2017    BILITOT 0.8 08/18/2017       ASSESSMENT/PLAN:     Sedation Plan: Local only.  Patient will undergo ultrasound guided paracentesis.    Eyal Iqbal  Radiology

## 2017-09-19 NOTE — PROCEDURES
Radiology Post-Procedure Note    Pre Op Diagnosis: Ascites  Post Op Diagnosis: Same    Procedure: Paracentesis    Procedure performed by: Kenneth Salcido MD    Written Informed Consent Obtained: Yes  Specimen Removed: YES 20 mL  Estimated Blood Loss: Minimal    Findings:   Successful paracentesis.  Albumin administered PRN per protocol.    Patient tolerated procedure well.    Eyal Iqbal  Radiology

## 2017-09-19 NOTE — DISCHARGE INSTRUCTIONS
For scheduling: Call Esther at [phone]    For questions or concerns call: [facility] 8 AM- 5PM [phone]. Radiology resident on call [phone].    For immediate concerns that are not emergent, you may call our radiology clinic at: [phone]

## 2017-09-19 NOTE — DISCHARGE SUMMARY
Radiology Discharge Summary      Hospital Course: No complications    Admit Date: 9/19/2017  Discharge Date: 09/19/2017     Instructions Given to Patient: Yes  Diet: Resume prior diet  Activity: activity as tolerated    Description of Condition on Discharge: Stable  Vital Signs (Most Recent): Pulse: 95 (09/19/17 0941)  Resp: 16 (09/19/17 0941)  BP: (!) 151/94 (09/19/17 0941)  SpO2: 100 % (09/19/17 0941)    Discharge Disposition: Home    Discharge Diagnosis: Recurrent asites, status post paracentesis.     Follow-up: AN Cruz Ma  Radiology

## 2017-09-19 NOTE — PROGRESS NOTES
Pt stable tolerated paracentesis well 15 L removed (per verbal from ), labs sent 400 mL, albumin given per protocol. Discharge instructions and follow up care reviewed. Pt verbalized understanding, and denies further questions. Provided  With ROCU and after hours. Pt transported via wheelchair

## 2017-09-21 NOTE — TELEPHONE ENCOUNTER
Spoke to pt   Results from paracentesis look good.   Pt reports worsening neuropathy pain and can't get pain meds filled.  States he's using Aleve. Requests RX.  Explained that I do not have license for this.   Advised to d/c aleve and use tylenol up to 2000 mg in 24 hours  stressed that he keep upcoming appt w/ hepatology for continued care
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
## 2017-09-27 NOTE — TELEPHONE ENCOUNTER
MA called patient back, patient stated that Dr. Major suppose to prescribe his Oxycodone. Inform patient that he will need to go back to Dr. Swanson to get that refill. Patient understood. SARITHA

## 2017-09-27 NOTE — TELEPHONE ENCOUNTER
----- Message from Rosa Garcia sent at 9/27/2017 12:16 PM CDT -----  Contact: pt   Pt would like to be called back regarding speaking with nurse.       Pt can be reached at [phone].

## 2017-10-02 PROBLEM — R53.81 DEBILITY: Status: ACTIVE | Noted: 2017-01-01

## 2017-10-02 PROBLEM — R18.8 OTHER ASCITES: Status: ACTIVE | Noted: 2017-01-01

## 2017-10-02 PROBLEM — K70.31 ASCITES DUE TO ALCOHOLIC CIRRHOSIS: Status: ACTIVE | Noted: 2017-01-01

## 2017-10-02 NOTE — PROGRESS NOTES
Pt arrived to unit via stretcher. VSS on 2L/O2 per NC. Heart monitor remains in place.  Dressing to R back and LLQ CDI. Bed in lowest locked position and call light win reach. Pt ordering dinner. NAD noted. Will continue to monitor.

## 2017-10-02 NOTE — SUBJECTIVE & OBJECTIVE
Past Medical History:   Diagnosis Date    Diabetes mellitus     Hep C w/o coma, chronic     Hypertension     Macular degeneration     Neuropathy        Past Surgical History:   Procedure Laterality Date    EYE SURGERY      HAND SURGERY         Review of patient's allergies indicates:  No Known Allergies    No current facility-administered medications on file prior to encounter.      Current Outpatient Prescriptions on File Prior to Encounter   Medication Sig    aspirin 81 MG Chew Take 81 mg by mouth once daily.    metformin (GLUCOPHAGE-XR) 500 MG 24 hr tablet     carvedilol (COREG) 6.25 MG tablet Take 1 tablet (6.25 mg total) by mouth 2 (two) times daily.    furosemide (LASIX) 20 MG tablet Take 1 tablet (20 mg total) by mouth once daily. Hold until 8/10 as directed by PCP or hepatology.    lisinopril (PRINIVIL,ZESTRIL) 20 MG tablet Take 1 tablet (20 mg total) by mouth once daily. Hold until 8/10 as directed by PCP or hepatology.    oxycodone (ROXICODONE) 5 MG immediate release tablet Take 1 tablet (5 mg total) by mouth every 6 (six) hours as needed for Pain.     Family History     None        Social History Main Topics    Smoking status: Former Smoker    Smokeless tobacco: Never Used    Alcohol use No      Comment: 2 5th per week- stopped one month ago approx 9/10/16    Drug use: No    Sexual activity: Not on file     Review of Systems   Constitutional: Positive for activity change and fatigue.   HENT: Negative.    Eyes: Negative.    Respiratory: Positive for shortness of breath.    Cardiovascular: Negative.    Gastrointestinal: Positive for abdominal distention and abdominal pain.   Genitourinary: Negative.    Musculoskeletal: Negative.    Skin: Negative.    Neurological: Positive for weakness and numbness (feet / legs).   Psychiatric/Behavioral: Negative for confusion.     Objective:     Vital Signs (Most Recent):  Temp: 98 °F (36.7 °C) (10/02/17 1720)  Pulse: 78 (10/02/17 1720)  Resp: 18  (10/02/17 1720)  BP: (!) 154/92 (10/02/17 1720)  SpO2: 99 % (10/02/17 1720) Vital Signs (24h Range):  Temp:  [98 °F (36.7 °C)-98.1 °F (36.7 °C)] 98 °F (36.7 °C)  Pulse:  [71-96] 78  Resp:  [18-28] 18  SpO2:  [96 %-100 %] 99 %  BP: (137-179)/() 154/92     Weight: 84.1 kg (185 lb 8.3 oz)  Body mass index is 26.62 kg/m².    Physical Exam   Constitutional: He appears well-developed. He is cooperative. He appears ill.   HENT:   Head: Normocephalic.   Mouth/Throat: Mucous membranes are not pale and not cyanotic.   Eyes: Conjunctivae and lids are normal. Pupils are equal.   Neck: Neck supple.   Cardiovascular: Normal rate, regular rhythm, S1 normal and S2 normal.    Pulmonary/Chest: Effort normal. He has decreased breath sounds in the right middle field and the right lower field.   Abdominal: Soft. Bowel sounds are normal. He exhibits ascites. There is no tenderness.   Musculoskeletal: He exhibits edema.   Neurological: He is alert. He is not disoriented.   Skin: Skin is warm and dry. No cyanosis. Nails show no clubbing.   Psychiatric: He has a normal mood and affect.        Significant Labs:   A1C:   Recent Labs  Lab 08/04/17  0523 10/02/17  0908   HGBA1C 6.3* 6.4*     CBC:   Recent Labs  Lab 10/02/17  0908   WBC 4.71   HGB 11.3*   HCT 33.4*        CMP:   Recent Labs  Lab 10/02/17  0908      K 4.5      CO2 23   *   BUN 40*   CREATININE 1.2   CALCIUM 8.7   PROT 6.8   ALBUMIN 2.7*   BILITOT 2.1*   ALKPHOS 118   AST 22   ALT 19   ANIONGAP 6*   EGFRNONAA >60.0     Cardiac Markers:   Recent Labs  Lab 10/02/17  0908   BNP 69     Coagulation:   Recent Labs  Lab 10/02/17  0908   INR 1.0     Lipase:   Recent Labs  Lab 10/02/17  0908   LIPASE 40     Magnesium:   Recent Labs  Lab 10/02/17  0908   MG 2.0     POCT Glucose:   Recent Labs  Lab 10/02/17  0907 10/02/17  1726   POCTGLUCOSE 162* 147*     Urine Studies:   Recent Labs  Lab 10/02/17  1100   COLORU Marcy   APPEARANCEUA Clear   PHUR 5.0   SPECGRAV  1.020   PROTEINUA 1+*   GLUCUA Negative   KETONESU Negative   BILIRUBINUA Negative   OCCULTUA Negative   NITRITE Negative   UROBILINOGEN 2.0   LEUKOCYTESUR Negative   RBCUA 1   WBCUA 2   BACTERIA Rare   SQUAMEPITHEL 0   HYALINECASTS 10*     MELD-Na score: 11 at 10/2/2017  9:08 AM  MELD score: 11 at 10/2/2017  9:08 AM  Calculated from:  Serum Creatinine: 1.2 mg/dL at 10/2/2017  9:08 AM  Serum Sodium: 138 mmol/L (Rounded to 137) at 10/2/2017  9:08 AM  Total Bilirubin: 2.1 mg/dL at 10/2/2017  9:08 AM  INR(ratio): 1.0 at 10/2/2017  9:08 AM  Age: 66 years    Significant Imaging: CXR: I have reviewed all pertinent results/findings within the past 24 hours and my personal findings are:  large right pleural effusion.  EKG: I have reviewed all pertinent results/findings within the past 24 hours and my personal findings are: NSR     Baselines:  Hemoglobin   Date Value Ref Range Status   10/02/2017 11.3 (L) 14.0 - 18.0 g/dL Final   09/26/2017 11.7 (L) 14.0 - 18.0 g/dL Final   09/19/2017 11.2 (L) 14.0 - 18.0 g/dL Final   08/07/2017 8.4 (L) 14.0 - 18.0 g/dL Final     Creatinine   Date Value Ref Range Status   10/02/2017 1.2 0.5 - 1.4 mg/dL Final   09/26/2017 1.3 0.5 - 1.4 mg/dL Final   08/18/2017 1.2 0.5 - 1.4 mg/dL Final   08/07/2017 1.5 (H) 0.5 - 1.4 mg/dL Final     Diagnostics:  EF   Date Value Ref Range Status   07/26/2017 65 55 - 65

## 2017-10-02 NOTE — PROCEDURES
Radiology Post-Procedure Note    Pre Op Diagnosis: Right pleural effusion  Post Op Diagnosis: Same    Procedure: Right Thoracentesis    Procedure performed by: Edwardo Gibbs    Written Informed Consent Obtained: Yes  Specimen Removed: YES.  20 cc cloudy pink fluid removed.  Additional fluid removed for patient comfort.  Estimated Blood Loss: Minimal    Findings:   Successful Thoracentesis.     Patient tolerated procedure well.    Verna Warren MD  Resident  Department of Radiology  Pager: [phone]

## 2017-10-02 NOTE — ED NOTES
Patient placed on 2L via NC. Patient sat at 100 %. Patient changed to position of comfort. Rounding completed on patient.

## 2017-10-02 NOTE — ED TRIAGE NOTES
Patient states his abdomen is full of fluid and is having trouble breathing.  Patient states he cannot sleep because he cannot breath.  Patient normally gets the fluid removed every 10 days.  Patient states his last drink of alcohol was a month ago.

## 2017-10-02 NOTE — PROGRESS NOTES
Paracentesis complete.  8 Ls peritoneal fluid drained. Pt tolerated well. Dressing to LLQ clean, dry, and intact. Albumin 200 mL required per protocol. Lab specimens ordered and sent to lab for testing. Report called to RUFINO Espinosa

## 2017-10-02 NOTE — H&P
Inpatient Radiology Pre-procedure Note    History of Present Illness:  Soy Reza is a 66 y.o. male who presents for US guided thoracentesis and paracentesis.  Admission H&P reviewed.  Past Medical History:   Diagnosis Date    Diabetes mellitus     Hep C w/o coma, chronic     Hypertension     Macular degeneration     Neuropathy      Past Surgical History:   Procedure Laterality Date    EYE SURGERY      HAND SURGERY         Review of Systems:   As documented in primary team H&P    Home Meds:   Prior to Admission medications    Medication Sig Start Date End Date Taking? Authorizing Provider   aspirin 81 MG Chew Take 81 mg by mouth once daily.   Yes Historical Provider, MD   metformin (GLUCOPHAGE-XR) 500 MG 24 hr tablet  8/16/17  Yes Historical Provider, MD   carvedilol (COREG) 6.25 MG tablet Take 1 tablet (6.25 mg total) by mouth 2 (two) times daily. 8/10/17 8/10/18  Jean Carlos Swanson MD   furosemide (LASIX) 20 MG tablet Take 1 tablet (20 mg total) by mouth once daily. Hold until 8/10 as directed by PCP or hepatology. 9/26/17 9/26/18  Star Major MD   lisinopril (PRINIVIL,ZESTRIL) 20 MG tablet Take 1 tablet (20 mg total) by mouth once daily. Hold until 8/10 as directed by PCP or hepatology. 8/7/17   Lazara Neil PA-C   oxycodone (ROXICODONE) 5 MG immediate release tablet Take 1 tablet (5 mg total) by mouth every 6 (six) hours as needed for Pain. 8/28/17   Jean Carlos Swanson MD     Scheduled Meds:    carvedilol  6.25 mg Oral BID    [START ON 10/3/2017] heparin (porcine)  5,000 Units Subcutaneous Q8H     Continuous Infusions:    PRN Meds:acetaminophen, dextrose 50%, dextrose 50%, glucagon (human recombinant), glucose, glucose, insulin aspart, metoclopramide HCl, morphine, ondansetron, oxycodone, pneumoc 13-marce conj-dip cr(PF), ramelteon  Anticoagulants/Antiplatelets: aspirin    Allergies: Review of patient's allergies indicates:  No Known Allergies  Sedation Hx: have not been any systemic  reactions    Labs:    Recent Labs  Lab 10/02/17  0908   INR 1.0       Recent Labs  Lab 10/02/17  0908   WBC 4.71   HGB 11.3*   HCT 33.4*   MCV 78*         Recent Labs  Lab 10/02/17  0908   *      K 4.5      CO2 23   BUN 40*   CREATININE 1.2   CALCIUM 8.7   MG 2.0   ALT 19   AST 22   ALBUMIN 2.7*   BILITOT 2.1*         Vitals:  Temp: 98 °F (36.7 °C) (10/02/17 1210)  Pulse: 81 (10/02/17 1436)  Resp: 20 (10/02/17 1436)  BP: (!) 154/103 (10/02/17 1436)  SpO2: 100 % (10/02/17 1436)     Physical Exam:  ASA: N/A  Mallampati: N/A    General: no acute distress  Mental Status: alert and oriented to person, place and time  HEENT: normocephalic, atraumatic  Chest: labored breathing on NC  Heart: regular heart rate  Abdomen: distended  Extremity: moves all extremities    Plan: US guided thoracentesis and paracentesis  Sedation Plan: local anesthetic only    Verna Warren MD  Resident  Department of Radiology  Pager: [phone]

## 2017-10-02 NOTE — PROCEDURES
Radiology Post-Procedure Note    Pre Op Diagnosis: Ascites  Post Op Diagnosis: Same    Procedure: Paracentesis    Procedure performed by: Verna Warren MD, Edwardo Gibbs    Written Informed Consent Obtained: Yes  Specimen Removed: YES.  50 cc cloudy pink fluid removed and sent to the lab for analysis  Estimated Blood Loss: Minimal    Findings:   Successful paracentesis.      Patient tolerated procedure well.    Verna Warren MD  Resident  Department of Radiology  Pager: [phone]

## 2017-10-02 NOTE — ED NOTES
Assumed patient care. Pt resting on stretcher, respirations even and unlabored. Stretcher locked and in lowest position, side rails x 2, call bell within reach. Pt placed on portable telemetry for transport to assigned observation bed. Pt updated on plan of care and wait for transport.

## 2017-10-02 NOTE — ED PROVIDER NOTES
Encounter Date: 10/2/2017       History     Chief Complaint   Patient presents with    Ascites    Shortness of Breath     Patient is a 67yo male with PMHx of uncontrolled DM2, HTN, diastolic HF, and ascites due to HCV and ETOH who presents the ED with ascites, SOB, and cough.  Patient states that he has been short of breath and coughing since Friday.  He reports clear mucus productive sputum.  He denies any hemoptysis.  Patient complains of worsening ascites.  Last paracentesis was a couple of weeks ago.  Patient was to receive paracentesis every 7-10 days, but has been unable to due to weakness.  Patient states that it is hard for him to get up and leave the house when his lower extremities are weak and he feels fatigued.  He denies any fever.  He complains of 10/10 generalized abdominal pain.  He has been able to tolerate food; last meal last night for dinner. He endorses constipation.  He denies any confusion. He states he had 1 vodka drink 1 month ago. Patient was evaluated by hepatology here last month for possible liver transplant candidacy.           Review of patient's allergies indicates:  No Known Allergies  Past Medical History:   Diagnosis Date    Diabetes mellitus     Hep C w/o coma, chronic     Hypertension     Macular degeneration     Neuropathy      Past Surgical History:   Procedure Laterality Date    EYE SURGERY      HAND SURGERY       History reviewed. No pertinent family history.  Social History   Substance Use Topics    Smoking status: Former Smoker    Smokeless tobacco: Never Used    Alcohol use No      Comment: 2 5th per week- stopped one month ago approx 9/10/16     Review of Systems   Constitutional: Negative for appetite change and fever.   HENT: Negative for ear pain and sore throat.    Eyes: Negative for visual disturbance.   Respiratory: Positive for cough and shortness of breath.         No hemoptysis.    Cardiovascular: Negative for chest pain and leg swelling.    Gastrointestinal: Positive for abdominal distention, abdominal pain and constipation. Negative for diarrhea, nausea and vomiting.   Genitourinary: Positive for frequency. Negative for dysuria.   Musculoskeletal: Negative for back pain.   Skin: Negative for rash.   Neurological: Positive for numbness (diabetic neuropathy). Negative for weakness.   Hematological: Does not bruise/bleed easily.       Physical Exam     Initial Vitals [10/02/17 0843]   BP Pulse Resp Temp SpO2   (!) 156/83 96 20 98.1 °F (36.7 °C) 96 %      MAP       107.33         Physical Exam    Vitals reviewed.  Constitutional: He is not diaphoretic. He appears cachectic. No distress.   HENT:   Head: Normocephalic and atraumatic.   Nose: Nose normal.   Eyes: Conjunctivae and EOM are normal.   Neck: Normal range of motion.   Cardiovascular: Normal rate, regular rhythm and normal heart sounds. Exam reveals no friction rub.    No murmur heard.  No LE edema.   Pulmonary/Chest: Breath sounds normal. No respiratory distress. He has no wheezes. He has no rales.   Abdominal: Soft. Bowel sounds are normal. He exhibits distension and ascites. There is no tenderness.   Musculoskeletal: Normal range of motion.   Neurological: He is alert and oriented to person, place, and time. He has normal strength. No sensory deficit.   Skin: Skin is warm and dry. No erythema.   Psychiatric: He has a normal mood and affect. Thought content normal.         ED Course   Procedures  Labs Reviewed   CBC W/ AUTO DIFFERENTIAL   COMPREHENSIVE METABOLIC PANEL   LIPASE   URINALYSIS, REFLEX TO URINE CULTURE   PROTIME-INR   MAGNESIUM   PHOSPHORUS   B-TYPE NATRIURETIC PEPTIDE   POCT GLUCOSE MONITORING CONTINUOUS             Medical Decision Making:   History:   Old Medical Records: I decided to obtain old medical records.  Clinical Tests:   Lab Tests: Ordered and Reviewed  Radiological Study: Ordered and Reviewed  Medical Tests: Reviewed and Ordered       APC / Resident Notes:   On exam,  satting at 96%. Abd distended. +ascites. Decrease breath sounds on right. No peripheral edema.     CBC with no leukocytosis.  CMP with no electrolyte abnormalities.  Hyperbilirubinemia at 2.1.  LFTs WNL.  BNP WNL at 69.  Chest x-ray with abundant right pleural fluid which is most likely due to ascites.    10:05 AM  Large right pleural fluid most likely from ascites. Patient will be placed in observation for further evaluation and management for ascites and pleural fluid caused by liver disease.              ED Course      Clinical Impression:   The primary encounter diagnosis was Other ascites. Diagnoses of Cough, SOB (shortness of breath), and Fluid in pleural cavity associated with liver disease were also pertinent to this visit.    Disposition:   Disposition: Placed in Observation                        Natasha Zaragoza PA-C  10/02/17 0418

## 2017-10-02 NOTE — PLAN OF CARE
Problem: Patient Care Overview  Goal: Plan of Care Review  Outcome: Ongoing (interventions implemented as appropriate)  Pt arrived to unit via stretcher. Ambulated from stretcher to bed. BP elevated otherwise VSS on 2L/O2 per NC. Heart monitor in place. AAOx4. C/o SOB and upper abdominal pain. Morphine 2mg IVP administered per order. HOB 45 degrees. Bed in lowest locked position and call light within reach. NAD noted. Will continue to monitor.

## 2017-10-02 NOTE — PROGRESS NOTES
Pt left unit via stretcher with transport for radiology. 2L/O2 per NC in place and heart monitor in place. NAD noted.

## 2017-10-03 PROBLEM — J94.8 HYDROTHORAX: Status: ACTIVE | Noted: 2017-01-01

## 2017-10-03 NOTE — PLAN OF CARE
Plan remains for d/c to home on today per primary staff.  [facility] arranged for pickup at 6pm, nurse to call [facility] at [phone] if adjustments need to be made to time.  CM discussed Medicaid transport availability with pt, pt understands he has to contact agency 48 hrs in advance for pickup for appts.  Verbalizes understanding.  Number placed in AVS, given to pt on sticky as well.      Future Appointments  Date Time Provider Department Center   10/12/2017 3:00 PM Jean Carlos Swanson MD [facility]   10/25/2017 3:00 PM Star Major MD [facility] HEPAT [address]   11/8/2017 10:40 AM Jean Carlos Swanson MD [facility]

## 2017-10-03 NOTE — PLAN OF CARE
Problem: Patient Care Overview  Goal: Plan of Care Review  Outcome: Ongoing (interventions implemented as appropriate)  Pt arrived to unit via stretcher. VSS on 2L/O2 per NC. Heart monitor remains in place. Blood sugars monitored before meals. Dressing to R back and LLQ CDI. Bed in lowest locked position and call light win reach. Voids per urinal. Tolerating diet. Denies N/V. NAD noted. Will continue to monitor.

## 2017-10-03 NOTE — TREATMENT PLAN
Treatment Plan  10/03/2017  11:15 AM    Patient doesn't seem to have SBP but cultures from paracentesis need to be follow.  Patient to continue to have routine paracentesis (order placed).   He already has a follow up appointment with Dr. Major on 10/25/17.    Bill Zamora M.D.  Gastroenterology Fellow, PGY-IV  Pager: [phone]  [facility]

## 2017-10-03 NOTE — PLAN OF CARE
CM has made several attempts to schedule next IR paracentesis appt, message left.  Unable to speak with someone in dept, will continue to attempt.    Cony Benito RN  Case Management  [phone]

## 2017-10-03 NOTE — TELEPHONE ENCOUNTER
----- Message from Vilma Kulkarni MD sent at 10/3/2017  1:29 PM CDT -----  Could you please schedule his outpatients paracentesis starting from 2 weeks time and every 2 weeks? Thank you    ----- Message -----  From: Bill Zamora MD  Sent: 10/3/2017  11:12 AM  To: Vilma Kulkarni MD, [facility] Hepat Clinical Staff    Hi    This is Dr. T patients who needs paracentesis Q2 weeks.    Bill Zamora M.D.  Gastroenterology Fellow, PGY-IV  Pager: [phone]  [facility]

## 2017-10-03 NOTE — ASSESSMENT & PLAN NOTE
Continuing current home management with Coreg.  BP stable with Coreg alone.  Lisinopril and Lasix apparently being held by Hepatology as OP.

## 2017-10-03 NOTE — HPI
66 y.o. 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  He was in his usual state of poor health until the recurrent onset of generalized abdominal pain and distention due to ascites beginning a few days prior to admission with gradually worsening course. Pertinent associated symptoms include shortness of breath. Patient has been requiring paracentesis every 7 - 10 days of late but has not made appointments for OP procedure due to transportation difficulties.

## 2017-10-03 NOTE — ASSESSMENT & PLAN NOTE
Therapeutic paracentesis performed on admission. Fluid not suspicious for SBP. Significant improvement in discomfort.  Patient needs scheduled procedures as OP. Resources for transportation provided.

## 2017-10-03 NOTE — DISCHARGE SUMMARY
[facility] Hospital Medicine  Discharge Summary      Patient Name: Soy Reza  MRN: [id number]  Admission Date: 10/2/2017  Hospital Length of Stay: 0 days  Discharge Date and Time: 10/3/2017  6:20 PM  Attending Physician: Jaimee Foster MD   Discharging Provider: Jaimee Foster MD  Primary Care Provider: Jean Carlos Swanson MD  [facility] Medicine Team: [facility] D Jaimee Foster MD    HPI:   66 y.o. 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  He was in his usual state of poor health until the recurrent onset of generalized abdominal pain and distention due to ascites beginning a few days prior to admission with gradually worsening course. Pertinent associated symptoms include shortness of breath. Patient has been requiring paracentesis every 7 - 10 days of late but has not made appointments for OP procedure due to transportation difficulties.    * No surgery found *      Indwelling Lines/Drains at time of discharge:   Lines/Drains/Airways          No matching active lines, drains, or airways        Hospital Course:      * Ascites due to alcoholic cirrhosis    Therapeutic paracentesis performed on admission. Fluid not suspicious for SBP. Significant improvement in discomfort.  Patient needs scheduled procedures as OP. Resources for transportation provided.        Hydrothorax    Thoracentesis performed 10/2/2017 which relieved symptoms.        Uncontrolled type 2 diabetes mellitus with diabetic polyneuropathy, without long-term current use of insulin    A1C controlled at < 6.5%. Needs follow up with PCP for further management of worsening neuropathy.        Alcoholism in remission    Patient continues to drink alcohol intermittently.        Essential hypertension    Continuing current home management with Coreg.  BP stable with Coreg  alone.  Lisinopril and Lasix apparently being held by Hepatology as OP.        Debility    Worsening functional status contributing to inability to present as OP for paracentesis.   Offered home wheelchair (patient declined) but transportation remains a issue.          Consults:   Consults         Status Ordering Provider     Inpatient consult to Radiology  Once     Provider:  (Not yet assigned)    Acknowledged KARLI URIBE        Significant Diagnostic Studies:  EF   Date Value Ref Range Status   07/26/2017 65 55 - 65      Echocardiogram: 2D echo with color flow doppler:   Results for orders placed or performed in visit on 07/26/17   2D Echo w/ Color Flow Doppler   Result Value Ref Range    EF 65 55 - 65    Mitral Valve Regurgitation TRIVIAL     Diastolic Dysfunction Yes (A)     Aortic Valve Regurgitation TRIVIAL TO MILD     Est. PA Systolic Pressure 32.21     Tricuspid Valve Regurgitation TRIVIAL      Hemoglobin A1C   Date Value Ref Range Status   10/02/2017 6.4 (H) 4.0 - 5.6 % Final   08/04/2017 6.3 (H) 4.0 - 5.6 % Final     CBC:   Recent Labs  Lab 10/03/17  0454   WBC 2.91*   RBC 3.57*   HGB 9.6*   HCT 27.8*   *   MCV 78*   MCH 26.9*   MCHC 34.5     CMP:   Recent Labs  Lab 10/03/17  0454      CALCIUM 8.3*   ALBUMIN 2.6*   PROT 5.5*      K 4.5   CO2 23   *   BUN 36*   CREATININE 1.0   ALKPHOS 94   ALT 15   AST 23   BILITOT 2.1*     Coagulation:   Recent Labs  Lab 10/03/17  0454   LABPROT 11.4   INR 1.1     Microbiology Results (last 7 days)     Procedure Component Value Units Date/Time    AFB Culture & Smear [[id number]] Collected:  10/02/17 1509    Order Status:  Completed Specimen:  Pleural Fluid from Pleural Fluid Updated:  10/03/17 1321     AFB CULTURE STAIN No acid fast bacilli seen.    Gram stain [[id number]] Collected:  10/02/17 1551    Order Status:  Completed Specimen:  Ascites from Ascites Updated:  10/03/17 0148     Gram Stain Result No WBC's      No organisms seen    Gram  stain [[id number]] Collected:  10/02/17 1509    Order Status:  Completed Specimen:  Pleural Fluid from Pleural Fluid Updated:  10/03/17 0119     Gram Stain Result Rare WBC's      No organisms seen    Aerobic culture [[id number]] Collected:  10/02/17 1509    Order Status:  Sent Specimen:  Pleural Fluid from Pleural Fluid Updated:  10/02/17 1710    Culture, Anaerobic [[id number]] Collected:  10/02/17 1509    Order Status:  Sent Specimen:  Pleural Fluid from Pleural Fluid Updated:  10/02/17 1710    Culture, Anaerobic [[id number]] Collected:  10/02/17 1551    Order Status:  Sent Specimen:  Ascites from Ascites Updated:  10/02/17 1701    Aerobic culture [[id number]] Collected:  10/02/17 1551    Order Status:  Sent Specimen:  Ascites from Ascites Updated:  10/02/17 1701          Recent Labs  Lab 10/02/17  1100   COLORU [place]   SPECGRAV 1.020   PHUR 5.0   PROTEINUA 1+*   BACTERIA Rare   NITRITE Negative   LEUKOCYTESUR Negative   UROBILINOGEN 2.0   HYALINECASTS 10*     Imaging Results          IR Paracentesis with Imaging (Preliminary result)  Result time 10/02/17 17:55:57    Preliminary result by Ravi Warren MD (10/02/17 17:55:57)                 Impression:     Successful paracentesis as above.  ______________________________________     Electronically signed by resident: RAVI WARREN MD  Date:     10/02/17  Time:    17:55            As the supervising and teaching physician, I personally reviewed the images and resident's interpretation and I agree with the findings.               Narrative:    Procedure: Paracentesis    History: Ascites    Radiologist: Ravi Rose    Procedure codes: 86773    Findings: Informed consent was obtained prior to the exam after discussion of risk and benefits of the procedure.  No sedation was administered.  Ultrasound demonstrated a moderate collection within the left lower quadrant.  The skin was prepped and draped under sterile conditions and 1% lidocaine  was used for local anesthesia.  A Yueh centesis needle was used to access the fluid collection.  There is return of 8.0 L of pink milky fluid.  The catheter was subsequently removed and hemostasis was achieved.  The patient tolerated the procedure.                             X-Ray Chest 1 View (Final result)  Result time 10/02/17 15:38:59    Final result by Macy Grande MD (10/02/17 15:38:59)                 Impression:      Please see above.      Electronically signed by: Macy Grande MD  Date:     10/02/17  Time:    15:38              Narrative:    Time of Procedure: 10/02/17 15:30:00  Accession # [id number]    Comparison: 10/2/2017, 0928 hrs.    Number of views: 1    Findings:  I detect no pneumothorax or other complication of recent RIGHT thoracentesis.  Quantity of RIGHT pleural fluid appear similar to the study of 0928 hrs. today.                             IR Thoracentesis with Needle (Preliminary result)  Result time 10/02/17 17:56:53    Preliminary result by Edwardo Gibbs MD (10/02/17 17:56:53)                 Impression:     Successful thoracentesis as above.        ______________________________________     Electronically signed by resident: RAVI KENNEDY MD  Date:     10/02/17  Time:    17:56            As the supervising and teaching physician, I personally reviewed the images and resident's interpretation and I agree with the findings.               Narrative:    Procedure: Thoracentesis    History: Pleural Effusion    Radiologist: Edwardo Gibbs    Procedure codes: 33856    Findings: Informed consent was obtained prior to the exam after discussion of risk and benefits of the procedure.  No sedation was administered.  Ultrasound demonstrated a moderate collection within the right hemithorax.  The skin was prepped and draped under sterile conditions and 1% lidocaine was used for local anesthesia.  A Yueh centesis needle was used to access the fluid collection.  There is return of 1 L of  pink milky fluid.  The catheter was subsequently removed and hemostasis was achieved.  The patient tolerated the procedure.                  Preliminary result by Ravi Warren MD (10/02/17 17:54:20)                 Impression:     Successful thoracentesis as above.  ______________________________________     Electronically signed by resident: RAVI WARREN MD  Date:     10/02/17  Time:    17:54            As the supervising and teaching physician, I personally reviewed the images and resident's interpretation and I agree with the findings.               Narrative:    Procedure: Thoracentesis    History: Pleural Effusion    Radiologist: Edwardo Gibbs    Procedure codes: 02512, 01975    Findings: Informed consent was obtained prior to the exam after discussion of risk and benefits of the procedure.  No sedation was administered.  Ultrasound demonstrated a moderate collection within the right hemithorax.  The skin was prepped and draped under sterile conditions and 1% lidocaine was used for local anesthesia.  A [employer] centesis needle was used to access the fluid collection.  There is return of 1 L of pink milky fluid.  The catheter was subsequently removed and hemostasis was achieved.  The patient tolerated the procedure.                             X-Ray Chest 1 View (Final result)  Result time 10/02/17 09:38:39    Final result by Macy Grande MD (10/02/17 09:38:39)                 Impression:      Abnormal examination.      Electronically signed by: Macy Grande MD  Date:     10/02/17  Time:    09:38              Narrative:    Time of Procedure: 10/02/17 09:23:00  Accession # [id number]    Comparison: 8/3/2017.    Number of views: 1.    Findings:  Mediastinal structures are midline.    There is abundant RIGHT pleural fluid causing compressive atelectasis of most of the RIGHT lung; some aeration of the RIGHT upper lung zone persists.  Mediastinal structures appear midline.  Source of the RIGHT  pleural fluid is not established on this study but I note that the patient has a history of ascites requiring paracentesis; ascitic fluid could translocate across the RIGHT hemidiaphragm into the RIGHT pleural space.    I detect no LEFT pulmonary or pleural disease.  Calcific plaque noted at the level of the arch in a 66-year-old man.    I detect no pulmonary edema, pneumomediastinum, pneumoperitoneum or significant osseous abnormality.                            Pending Diagnostic Studies:     Procedure Component Value Units Date/Time    Cytology Specimen-Medical Cytology (Fluid/Wash/Brush) [[id number]] Collected:  10/02/17 1550    Order Status:  Sent Lab Status:  In process Updated:  10/03/17 1014    Specimen:  Ascites from Ascites     Cytology Specimen-Medical Cytology (Fluid/Wash/Brush) [[id number]] Collected:  10/02/17 1508    Order Status:  Sent Lab Status:  In process Updated:  10/03/17 1011    Specimen:  Pleural Fluid from Pleural Fluid         Final Active Diagnoses:    Diagnosis Date Noted POA    PRINCIPAL PROBLEM:  Ascites due to alcoholic cirrhosis [K70.31] 10/02/2017 Yes    Hydrothorax [J94.8] 10/03/2017 Yes    Uncontrolled type 2 diabetes mellitus with diabetic polyneuropathy, without long-term current use of insulin [E11.42, E11.65] 01/19/2017 Yes     Chronic    Hep C w/o coma, chronic [B18.2] 01/19/2017 Yes     Chronic    Alcoholism in remission [F10.21] 01/19/2017 Yes     Chronic    Essential hypertension [I10] 01/19/2017 Yes     Chronic    Debility [R53.81] 10/02/2017 Yes      Problems Resolved During this Admission:    Diagnosis Date Noted Date Resolved POA       Discharged Condition: stable    Disposition: Home or Self Care    Follow Up:  Follow-up Information     Jean Carlos Swanson MD On 10/12/2017.    Specialty:  Family Medicine  Why:  3:00 am , For Hospital Follow-up  Contact information:  [address] [facility]  [address] FLO[address]  [facility] [address]  [phone]             Star  "MD Pedrito On 10/25/2017.    Specialties:  Transplant, Hepatology, Gastroenterology  Why:  3:00   Contact information:  [address]  [facility] [address]  [phone]                 Patient Instructions:   You can call [employer] [place] [employer] at [phone] for transportation to your appointments.  You have to call them at least 2 days before your appointment.      Diet general   Order Specific Question Answer Comments   Na restriction, if any: 2gNa    Fluid restriction: Fluid - 1500mL    Additional restrictions: Diabetic 2000      Activity as tolerated     Call MD for:  temperature >100.4     Call MD for:  persistent nausea and vomiting or diarrhea     Call MD for:  difficulty breathing or increased cough     Call MD for:  persistent dizziness, light-headedness, or visual disturbances     Call MD for:  increased confusion or weakness       Medications:  Reconciled Home Medications:   CONTINUE LASIX AND LISINOPRIL IF INSTRUCTED BY HEPATOLOGY.     Current Discharge Medication List      CONTINUE these medications which have NOT CHANGED    Details   aspirin 81 MG Chew Take 81 mg by mouth once daily.      metformin (GLUCOPHAGE-XR) 500 MG 24 hr tablet       carvedilol (COREG) 6.25 MG tablet Take 1 tablet (6.25 mg total) by mouth 2 (two) times daily.  Qty: 180 tablet, Refills: 1         STOP taking these medications       furosemide (LASIX) 20 MG tablet Comments:   Reason for Stopping:         lisinopril (PRINIVIL,ZESTRIL) 20 MG tablet Comments:   Reason for Stopping:         oxycodone (ROXICODONE) 5 MG immediate release tablet Comments:   Reason for Stopping:             Time spent on the discharge of patient: 35 minutes  Time Spent:  Included reviewing hospital course with patient/family, reviewing discharge medications, and arranging follow-up care.  Face to face services were provided on  10/3/2017  6:20 PM  Physical Exam on 10/3/2017  6:20 PM:  height is 5' 10" (1.778 m) and weight is 75 kg (165 lb " 7.3 oz). His oral temperature is 97.1 °F (36.2 °C). His blood pressure is 130/79 and his pulse is 69. His respiration is 18 and oxygen saturation is 95%.   Cardiovascular: Normal rate, regular rhythm, S1 normal and S2 normal.    Pulmonary/Chest: Effort normal. He has decreased breath sounds in the right lower field.   Abdominal: Soft. Bowel sounds are normal. He exhibits ascites. There is no tenderness.   Musculoskeletal: He exhibits edema.   Neurological: He is alert. He is not disoriented        Jaimee Foster MD  Department of Hospital Medicine  [facility]

## 2017-10-03 NOTE — H&P
[facility] Hospital Medicine  History & Physical    Patient Name: Soy Reza  MRN: [id number]  Admission Date: 10/2/2017  Attending Physician: Jaimee Foster MD   Primary Care Provider: Jean Carlos Swanson MD    [facility] Medicine Team: [facility] D Jaimee Foster MD     Patient information was obtained from patient, past medical records and ER records.     Subjective:     Principal Problem:Ascites due to alcoholic cirrhosis    Chief Complaint:   Chief Complaint   Patient presents with    Ascites    Shortness of Breath        HPI: 66 y.o. 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  He was in his usual state of poor health until the recurrent onset of generalized abdominal pain and distention due to ascites beginning a few days prior to admission with gradually worsening course. Pertinent associated symptoms include shortness of breath. Patient has been requiring paracentesis every 7 - 10 days of late but has not made appointments for OP procedure due to transportation difficulties.    Past Medical History:   Diagnosis Date    Diabetes mellitus     Hep C w/o coma, chronic     Hypertension     Macular degeneration     Neuropathy        Past Surgical History:   Procedure Laterality Date    EYE SURGERY      HAND SURGERY         Review of patient's allergies indicates:  No Known Allergies    No current facility-administered medications on file prior to encounter.      Current Outpatient Prescriptions on File Prior to Encounter   Medication Sig    aspirin 81 MG Chew Take 81 mg by mouth once daily.    metformin (GLUCOPHAGE-XR) 500 MG 24 hr tablet     carvedilol (COREG) 6.25 MG tablet Take 1 tablet (6.25 mg total) by mouth 2 (two) times daily.    furosemide (LASIX) 20 MG tablet Take 1 tablet (20 mg total) by mouth once daily. Hold until 8/10  as directed by PCP or hepatology.    lisinopril (PRINIVIL,ZESTRIL) 20 MG tablet Take 1 tablet (20 mg total) by mouth once daily. Hold until 8/10 as directed by PCP or hepatology.    oxycodone (ROXICODONE) 5 MG immediate release tablet Take 1 tablet (5 mg total) by mouth every 6 (six) hours as needed for Pain.     Family History     None        Social History Main Topics    Smoking status: Former Smoker    Smokeless tobacco: Never Used    Alcohol use No      Comment: 2 5th per week- stopped one month ago approx 9/10/16    Drug use: No    Sexual activity: Not on file     Review of Systems   Constitutional: Positive for activity change and fatigue.   HENT: Negative.    Eyes: Negative.    Respiratory: Positive for shortness of breath.    Cardiovascular: Negative.    Gastrointestinal: Positive for abdominal distention and abdominal pain.   Genitourinary: Negative.    Musculoskeletal: Negative.    Skin: Negative.    Neurological: Positive for weakness and numbness (feet / legs).   Psychiatric/Behavioral: Negative for confusion.     Objective:     Vital Signs (Most Recent):  Temp: 98 °F (36.7 °C) (10/02/17 1720)  Pulse: 78 (10/02/17 1720)  Resp: 18 (10/02/17 1720)  BP: (!) 154/92 (10/02/17 1720)  SpO2: 99 % (10/02/17 1720) Vital Signs (24h Range):  Temp:  [98 °F (36.7 °C)-98.1 °F (36.7 °C)] 98 °F (36.7 °C)  Pulse:  [71-96] 78  Resp:  [18-28] 18  SpO2:  [96 %-100 %] 99 %  BP: (137-179)/() 154/92     Weight: 84.1 kg (185 lb 8.3 oz)  Body mass index is 26.62 kg/m².    Physical Exam   Constitutional: He appears well-developed. He is cooperative. He appears ill.   HENT:   Head: Normocephalic.   Mouth/Throat: Mucous membranes are not pale and not cyanotic.   Eyes: Conjunctivae and lids are normal. Pupils are equal.   Neck: Neck supple.   Cardiovascular: Normal rate, regular rhythm, S1 normal and S2 normal.    Pulmonary/Chest: Effort normal. He has decreased breath sounds in the right middle field and the right lower  field.   Abdominal: Soft. Bowel sounds are normal. He exhibits ascites. There is no tenderness.   Musculoskeletal: He exhibits edema.   Neurological: He is alert. He is not disoriented.   Skin: Skin is warm and dry. No cyanosis. Nails show no clubbing.   Psychiatric: He has a normal mood and affect.        Significant Labs:   A1C:   Recent Labs  Lab 08/04/17  0523 10/02/17  0908   HGBA1C 6.3* 6.4*     CBC:   Recent Labs  Lab 10/02/17  0908   WBC 4.71   HGB 11.3*   HCT 33.4*        CMP:   Recent Labs  Lab 10/02/17  0908      K 4.5      CO2 23   *   BUN 40*   CREATININE 1.2   CALCIUM 8.7   PROT 6.8   ALBUMIN 2.7*   BILITOT 2.1*   ALKPHOS 118   AST 22   ALT 19   ANIONGAP 6*   EGFRNONAA >60.0     Cardiac Markers:   Recent Labs  Lab 10/02/17  0908   BNP 69     Coagulation:   Recent Labs  Lab 10/02/17  0908   INR 1.0     Lipase:   Recent Labs  Lab 10/02/17  0908   LIPASE 40     Magnesium:   Recent Labs  Lab 10/02/17  0908   MG 2.0     POCT Glucose:   Recent Labs  Lab 10/02/17  0907 10/02/17  1726   POCTGLUCOSE 162* 147*     Urine Studies:   Recent Labs  Lab 10/02/17  1100   COLORU Marcy   APPEARANCEUA Clear   PHUR 5.0   SPECGRAV 1.020   PROTEINUA 1+*   GLUCUA Negative   KETONESU Negative   BILIRUBINUA Negative   OCCULTUA Negative   NITRITE Negative   UROBILINOGEN 2.0   LEUKOCYTESUR Negative   RBCUA 1   WBCUA 2   BACTERIA Rare   SQUAMEPITHEL 0   HYALINECASTS 10*     MELD-Na score: 11 at 10/2/2017  9:08 AM  MELD score: 11 at 10/2/2017  9:08 AM  Calculated from:  Serum Creatinine: 1.2 mg/dL at 10/2/2017  9:08 AM  Serum Sodium: 138 mmol/L (Rounded to 137) at 10/2/2017  9:08 AM  Total Bilirubin: 2.1 mg/dL at 10/2/2017  9:08 AM  INR(ratio): 1.0 at 10/2/2017  9:08 AM  Age: 66 years    Significant Imaging: CXR: I have reviewed all pertinent results/findings within the past 24 hours and my personal findings are:  large right pleural effusion.  EKG: I have reviewed all pertinent results/findings within the  past 24 hours and my personal findings are: NSR     Baselines:  Hemoglobin   Date Value Ref Range Status   10/02/2017 11.3 (L) 14.0 - 18.0 g/dL Final   09/26/2017 11.7 (L) 14.0 - 18.0 g/dL Final   09/19/2017 11.2 (L) 14.0 - 18.0 g/dL Final   08/07/2017 8.4 (L) 14.0 - 18.0 g/dL Final     Creatinine   Date Value Ref Range Status   10/02/2017 1.2 0.5 - 1.4 mg/dL Final   09/26/2017 1.3 0.5 - 1.4 mg/dL Final   08/18/2017 1.2 0.5 - 1.4 mg/dL Final   08/07/2017 1.5 (H) 0.5 - 1.4 mg/dL Final     Diagnostics:  EF   Date Value Ref Range Status   07/26/2017 65 55 - 65      Assessment/Plan:     Current Hospital Problem List:    Active Hospital Problems    Diagnosis  POA    *Ascites due to alcoholic cirrhosis [K70.31]  Yes     Priority: 1 - High    Hydrothorax [J94.8]  Yes     Priority: 1 - High    Uncontrolled type 2 diabetes mellitus with diabetic polyneuropathy, without long-term current use of insulin [E11.42, E11.65]  Yes     Priority: 2      Chronic    Hep C w/o coma, chronic [B18.2]  Yes     Priority: 2      Chronic    Alcoholism in remission [F10.21]  Yes     Priority: 3      Chronic    Essential hypertension [I10]  Yes     Priority: 4      Chronic    Debility [R53.81]  Yes     Priority: 5       Resolved Hospital Problems    Diagnosis Date Resolved POA   No resolved problems to display.       Ordered Medications for management of current problems:    carvedilol  6.25 mg Oral BID    heparin (porcine)  5,000 Units Subcutaneous Q8H       Anticipated Disposition: Home or Self Care    Assessment and Plan by Problem:    * Ascites due to alcoholic cirrhosis    Therapeutic paracentesis performed on admission. Fluid not suspicious for SBP. Significant improvement in discomfort.  Patient needs scheduled procedures as OP.        Hydrothorax    Thoracentesis performed 10/2/2017 which relieved symptoms.        Uncontrolled type 2 diabetes mellitus with diabetic polyneuropathy, without long-term current use of insulin    A1C  controlled at < 6.5%        Alcoholism in remission    Patient continues to drink alcohol intermittently.        Essential hypertension    Continuing current home management with Coreg.  Lisinopril and Lasix being held by Hepatology.        Debility    Worsening functional status contributing to inability to present as OP for paracentesis. Offered home wheelchair but transportation remains a issue.          VTE Risk Mitigation         Ordered     heparin (porcine) injection 5,000 Units  Every 8 hours     Route:  Subcutaneous        10/02/17 1324     Medium Risk of VTE  Once      10/02/17 1324             Jaimee Foster MD  Department of Hospital Medicine   [facility]

## 2017-10-03 NOTE — ASSESSMENT & PLAN NOTE
Worsening functional status contributing to inability to present as OP for paracentesis.   Offered home wheelchair (patient declined) but transportation remains a issue.

## 2017-10-03 NOTE — PLAN OF CARE
Problem: Patient Care Overview  Goal: Plan of Care Review  Outcome: Ongoing (interventions implemented as appropriate)  Blood sugar 170 no coverage required at this time. Fall precautions maintained,no falls sustained. Tele monitor maintained,NSR 60's continue plan of care.

## 2017-10-03 NOTE — ASSESSMENT & PLAN NOTE
A1C controlled at < 6.5%. Needs follow up with PCP for further management of worsening neuropathy.

## 2017-10-03 NOTE — PLAN OF CARE
10/03/17 0956   Discharge Assessment   Assessment Type Discharge Planning Assessment   Confirmed/corrected address and phone number on facesheet? Yes   Assessment information obtained from? Patient   Expected Length of Stay (days) 2   Communicated expected length of stay with patient/caregiver yes   Prior to hospitilization cognitive status: Alert/Oriented   Prior to hospitalization functional status: Independent   Current cognitive status: Alert/Oriented   Current Functional Status: Independent   Lives With (93 y/o mother)   Able to Return to Prior Arrangements yes   Is patient able to care for self after discharge? Yes   Readmission Within The Last 30 Days no previous admission in last 30 days   Patient currently being followed by outpatient case management? No   Patient currently receives any other outside agency services? No   Equipment Currently Used at Home cane, straight;rollator   Do you have any problems affording any of your prescribed medications? No   Is the patient taking medications as prescribed? yes   Does the patient have transportation home? No   Does the patient receive services at the [facility]? No   Discharge Plan A Home   Discharge Plan B Home with family   Patient/Family In Agreement With Plan yes

## 2017-10-03 NOTE — DISCHARGE INSTRUCTIONS
You can call [employer] at [phone] for transportation to your appointments.  You have to call them at least 2 days before your appointment.    CONTINUE LASIX AND LISINOPRIL IF INSTRUCTED BY HEPATOLOGY.

## 2017-10-03 NOTE — PLAN OF CARE
"Plan for d/c to home on today.  Pt states he lives with his 89 y/o mother who is independent with all care.  He is NOT her caretaker at home.  Pt states he is here once a week for IR drainage and only misses appointments when he has foot discomfort from "diabetic neuropathy".  Counseled pt about dangers of missing IR taps, this attributes to the "sob" and "fat feeling in my stomach" he describes.  Pt states he takes a cab to app.     Future Appointments  Date Time Provider Department Center   10/12/2017 3:00 PM Jean Carlos Swanson MD [facility] [place]   10/25/2017 3:00 PM Star Major MD [facility] HEPAT [address]   11/8/2017 10:40 AM Jean Carlos Swanson MD [facility] IM [place]        Will need transportation to home, SPD to be arranged once pt is d/c'd.        "

## 2017-10-03 NOTE — TELEPHONE ENCOUNTER
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    Bill Zamora M.D.  Gastroenterology Fellow, PGY-IV  Pager: [phone]  [facility]

## 2017-10-03 NOTE — ASSESSMENT & PLAN NOTE
Worsening functional status contributing to inability to present as OP for paracentesis. Offered home wheelchair but transportation remains a issue.

## 2017-10-03 NOTE — PLAN OF CARE
Problem: Patient Care Overview  Goal: Plan of Care Review  Outcome: Ongoing (interventions implemented as appropriate)  Discharge summary reviewed with pt. Pt verbalized understanding and all questions were answered. IV catheter removed and tip intact. Heart monitor d/c. Pt to leave unit via WC with SPD. NAD noted. Will continue to monitor in the meantime.

## 2017-10-04 NOTE — TELEPHONE ENCOUNTER
----- Message from Cony Benito RN sent at 10/3/2017  3:20 PM CDT -----  Contact: RUFINO Donnelly case manager  Pt needs next IR appt scheduled for paracentesis in 10 days.  Order has been placed by fellow but unable to have someone schedule the appt.  Can you schedule please?  It appears he is to have this done every 10 days but somehow was missed.  I have attempted to schedule with IR but am having lots of difficulty getting someone on the phone to schedule and didn't know if you have ability to schedule.    Cony Benito, RUFINO  Case Management  [phone]

## 2017-10-05 NOTE — TELEPHONE ENCOUNTER
MA called patient to inform him that IR schedule his PARA 10/16 at 10:00 am. Patient accepted the appt. SARITHA

## 2017-10-08 PROBLEM — K85.90 ACUTE PANCREATITIS: Status: ACTIVE | Noted: 2017-01-01

## 2017-10-08 NOTE — ED NOTES
LOC: The patient is awake, alert and aware of environment with an appropriate affect, the patient is oriented to self, place and situation, disoriented to time and speaking appropriately.  APPEARANCE: Patient resting comfortably and in acute distress, patient is clean and well groomed, patient's clothing is properly fastened.  SKIN: The skin is warm and dry, patient has normal skin turgor and moist mucus membranes, skin intact, no breakdown or brusing noted.  MUSKULOSKELETAL: Patient moving all extremities well, no obvious swelling or deformities noted. Pt has generalized weakness and is unable to stand without assistance.   RESPIRATORY: Airway is open and patent, respirations are spontaneous, patient has an increased effort and rate. Breath sounds are present bilaterally and course throughout chest.  CARDIAC: Normal heart sounds. No peripheral edema.  ABDOMEN: Soft and tender to palpation, significant distention noted. Bowel sounds present. Pt c/o of abdominal pain and reports one episode of vomiting this morning.   NEURO: No neuro deficits, hand grasp equal, no drift noted, no facial droop noted. Speech is clear.

## 2017-10-08 NOTE — ED PROVIDER NOTES
Encounter Date: 10/8/2017       History     Chief Complaint   Patient presents with    Shortness of Breath     hx CHF and Hep c     66 y.o. male with medical history of DM, HTN and Hep C presents to ED with shortness of breath.  Patient reports shortness of breath worsened this morning.  Patient states he has had 2 paracentesis the past 3 weeks with over 27L of fluid taken off. He states abdomen today is not as distended as when he got paracentesis. Patient also endorses intermittent chest pain and back pain. Patient denies fever, chills, nausea, vomiting, changes in bowel movements, changes in urination, confusion.           Review of patient's allergies indicates:  No Known Allergies  Past Medical History:   Diagnosis Date    Diabetes mellitus     Hep C w/o coma, chronic     Hypertension     Macular degeneration     Neuropathy      Past Surgical History:   Procedure Laterality Date    EYE SURGERY      HAND SURGERY       No family history on file.  Social History   Substance Use Topics    Smoking status: Former Smoker    Smokeless tobacco: Never Used    Alcohol use No      Comment: 2 5th per week- stopped one month ago approx 9/10/16     Review of Systems   Constitutional: Negative for chills, diaphoresis, fatigue and fever.   HENT: Negative for congestion and nosebleeds.    Eyes: Negative for visual disturbance.   Respiratory: Positive for shortness of breath. Negative for cough.    Cardiovascular: Positive for chest pain. Negative for leg swelling.   Gastrointestinal: Positive for abdominal distention and abdominal pain. Negative for nausea and vomiting.   Genitourinary: Negative for dysuria, frequency and hematuria.   Musculoskeletal: Positive for back pain. Negative for neck pain.   Skin: Negative for rash.   Neurological: Negative for syncope, weakness, light-headedness and headaches.   Psychiatric/Behavioral: The patient is not nervous/anxious.        Physical Exam     Initial Vitals [10/08/17  1652]   BP Pulse Resp Temp SpO2   136/76 80 18 97.7 °F (36.5 °C) 98 %      MAP       96         Physical Exam    Vitals reviewed.  Constitutional: Vital signs are normal. He appears well-developed and well-nourished. He is not diaphoretic. No distress.   HENT:   Head: Normocephalic and atraumatic.   Nose: Nose normal.   Mouth/Throat: Oropharynx is clear and moist.   Eyes: Conjunctivae, EOM and lids are normal. Pupils are equal, round, and reactive to light. Lids are everted and swept, no foreign bodies found. Right eye exhibits no discharge. Left eye exhibits no discharge.   Neck: Trachea normal and normal range of motion. Neck supple.   Cardiovascular: Normal rate, regular rhythm, intact distal pulses and normal pulses.   Pulmonary/Chest: He has no wheezes. He has no rhonchi. He has rales. He exhibits no tenderness.   Abdominal: Soft. Normal appearance. He exhibits distension. Bowel sounds are decreased. There is no tenderness. There is no rebound and no guarding.   Musculoskeletal: Normal range of motion.   Neurological: He is alert and oriented to person, place, and time. He has normal strength. No cranial nerve deficit or sensory deficit.   Skin: Skin is warm and dry. Capillary refill takes less than 2 seconds. No rash noted. No cyanosis.   Psychiatric: He has a normal mood and affect.         ED Course   Procedures  Labs Reviewed   CBC W/ AUTO DIFFERENTIAL - Abnormal; Notable for the following:        Result Value    RBC 4.22 (*)     Hemoglobin 11.3 (*)     Hematocrit 31.9 (*)     MCV 76 (*)     MCH 26.8 (*)     RDW 17.5 (*)     Platelets 142 (*)     MPV 9.0 (*)     Lymph% 15.3 (*)     Mono% 15.3 (*)     All other components within normal limits   COMPREHENSIVE METABOLIC PANEL - Abnormal; Notable for the following:     Sodium 135 (*)     CO2 21 (*)     Glucose 173 (*)     BUN, Bld 48 (*)     Calcium 8.2 (*)     Albumin 2.7 (*)     Total Bilirubin 2.7 (*)     eGFR if non  52.4 (*)     All other  components within normal limits   URINALYSIS, REFLEX TO URINE CULTURE - Abnormal; Notable for the following:     Protein, UA 1+ (*)     All other components within normal limits    Narrative:     Preferred Collection Type->Urine, Clean Catch   LIPASE - Abnormal; Notable for the following:     Lipase 110 (*)     All other components within normal limits    Narrative:     ADD ON LIPASE PER LETICIA MCPHERSON/ORDER# [id number] @ 18:38 10/8/17   POCT GLUCOSE - Abnormal; Notable for the following:     POCT Glucose 215 (*)     All other components within normal limits   GRAM STAIN   CULTURE, BODY FLUID - BACTEC   B-TYPE NATRIURETIC PEPTIDE   MAGNESIUM   PROTIME-INR   TROPONIN I   PHOSPHORUS   LIPASE   URINALYSIS MICROSCOPIC    Narrative:     Preferred Collection Type->Urine, Clean Catch   WBC & DIFF,BODY FLUID   PROTEIN, PERITONEAL, PLEURAL FLUID OR YA DRAINAGE, IN-HOUSE   GLUCOSE, PERITONEAL, PLEURAL FLUID OR AY DRAINAGE, IN-HOUSE   POCT GLUCOSE MONITORING CONTINUOUS             Medical Decision Making:   History:   Old Medical Records: I decided to obtain old medical records.  Old Records Summarized: records from clinic visits.  Clinical Tests:   Lab Tests: Ordered and Reviewed  Radiological Study: Ordered and Reviewed  Medical Tests: Ordered and Reviewed       APC / Resident Notes:   66 y.o. male with medical history of DM, HTN and Hep C presents to ED with shortness of breath.    DDX includes but is not limited to ascites, ACS, CHF, pancreatitis, electrolyte abnormality. Will get abdominal labs, troponin and BNP. Troponin and BNP benign, do not suspect ischemic event. LFT's elevated, lipase elevated at 110. CXR shows right pleural effusion with possible atelectasis vs consolidation. Patient denies fever, URI symptoms, do no suspect pneumonia. Will put into hospital for observation for paracentesis and acute pancreatitis.     I have discussed and reviewed with my supervising physician.         Attending Attestation:      Physician Attestation Statement for NP/PA:   I have conducted a face to face encounter with this patient in addition to the NP/PA, due to Medical Complexity    Other NP/PA Attestation Additions:    History of Present Illness: States feels like when he needs paracentesis  No fevers  Gradually worsening sxs, no sudden-onset dyspnea or cp  Stable edema  States compliant w/ meds.    Cough is chronic/unchanged   Physical Exam: Semi-recumbent, no acute distress  Dry mm  No jvd  Ctab except diminished R base, no wheeze  Rrr, nl s1/2  abd mild epigastric ttp, ascites noted, no peritoneal signs  No rash  axo3                  ED Course      Clinical Impression:   The primary encounter diagnosis was Acute pancreatitis, unspecified complication status, unspecified pancreatitis type. Diagnoses of Shortness of breath and Other ascites were also pertinent to this visit.    Disposition:   Disposition: Placed in Observation  Condition: Stable                        Sera Solano PA-C  10/08/17 2029       Jean Carlos Colon MD  10/09/17 0036

## 2017-10-08 NOTE — ED TRIAGE NOTES
Pt presents with chronic SOB that has worsened over the last day. Pt also reports abdominal pain and back pain.

## 2017-10-09 PROBLEM — K74.60 DECOMPENSATED HEPATIC CIRRHOSIS: Status: ACTIVE | Noted: 2017-01-01

## 2017-10-09 PROBLEM — R10.84 GENERALIZED ABDOMINAL PAIN: Status: ACTIVE | Noted: 2017-01-01

## 2017-10-09 PROBLEM — K72.90 DECOMPENSATED HEPATIC CIRRHOSIS: Status: ACTIVE | Noted: 2017-01-01

## 2017-10-09 PROBLEM — K59.00 CONSTIPATION: Status: ACTIVE | Noted: 2017-01-01

## 2017-10-09 NOTE — H&P
[facility] Medicine  History & Physical    Patient Name: Soy Reza  MRN: [id number]  Admission Date: 10/8/2017  Attending Physician: Jaimee Foster MD   Primary Care Provider: Jean Carlos Swanson MD    [facility] Medicine Team: [facility] D German Rodgers MD     Patient information was obtained from patient and ER records.     Subjective:     Principal Problem:Generalized abdominal pain    Chief Complaint:   Chief Complaint   Patient presents with    Shortness of Breath     hx CHF and Hep c        HPI: Mr. Reza is a 65 yo man with Hep c/alcholic decompensated cirrhosis, hepatic hydrothorax, DM who presented to ED 10/8 for SOB and abdominal pain. Symptoms started on Saturday and have been progressively worsening. Denies fever, chills, decreased PO intake. Patient was recently admitted 10/2-10/3 and underwent thoracentesis (1L removed) and paracentesis (8L removed).      Home meds: asa 81, coreg 6.25 BID, metformin 500 q24    Past Medical History:   Diagnosis Date    Diabetes mellitus     Hep C w/o coma, chronic     Hypertension     Macular degeneration     Neuropathy        Past Surgical History:   Procedure Laterality Date    EYE SURGERY      HAND SURGERY         Review of patient's allergies indicates:  No Known Allergies    No current facility-administered medications on file prior to encounter.      Current Outpatient Prescriptions on File Prior to Encounter   Medication Sig    aspirin 81 MG Chew Take 81 mg by mouth once daily.    carvedilol (COREG) 6.25 MG tablet Take 1 tablet (6.25 mg total) by mouth 2 (two) times daily.    metformin (GLUCOPHAGE-XR) 500 MG 24 hr tablet      Family History     None        Social History Main Topics    Smoking status: Former Smoker    Smokeless tobacco: Never Used    Alcohol use No      Comment: 2 5th per week- stopped one month ago approx 9/10/16    Drug use: No    Sexual activity: Not on file     Review of Systems    Constitutional: Negative for appetite change, chills, fever and unexpected weight change.   HENT: Negative for congestion, rhinorrhea, sinus pain, sneezing and sore throat.    Eyes: Negative for visual disturbance.   Respiratory: Positive for shortness of breath. Negative for cough and wheezing.    Cardiovascular: Negative for chest pain, palpitations and leg swelling.   Gastrointestinal: Positive for abdominal pain. Negative for abdominal distention, blood in stool, constipation, diarrhea, nausea and vomiting.   Genitourinary: Negative for discharge, dysuria and hematuria.   Skin: Negative for rash.   Neurological: Negative for dizziness, seizures, syncope and headaches.   Psychiatric/Behavioral: Negative for suicidal ideas.     Objective:     Vital Signs (Most Recent):  Temp: 98.2 °F (36.8 °C) (10/08/17 2127)  Pulse: 86 (10/08/17 2238)  Resp: (!) 45 (10/08/17 2237)  BP: (!) 151/87 (10/08/17 2238)  SpO2: 95 % (10/08/17 2238) Vital Signs (24h Range):  Temp:  [97.7 °F (36.5 °C)-98.2 °F (36.8 °C)] 98.2 °F (36.8 °C)  Pulse:  [80-87] 86  Resp:  [18-45] 45  SpO2:  [94 %-98 %] 95 %  BP: (133-156)/(76-89) 151/87     Weight: 79.4 kg (175 lb)  Body mass index is 25.11 kg/m².    Physical Exam   Constitutional: He is oriented to person, place, and time. He appears well-developed and well-nourished.   HENT:   Head: Normocephalic and atraumatic.   Nose: Nose normal.   Mouth/Throat: Oropharynx is clear and moist. No oropharyngeal exudate.   Eyes: Pupils are equal, round, and reactive to light. Right eye exhibits no discharge. Left eye exhibits no discharge. No scleral icterus.   Neck: Normal range of motion. Neck supple. No JVD present.   Cardiovascular: Normal rate, regular rhythm, normal heart sounds and intact distal pulses.    Pulmonary/Chest: No respiratory distress. He has no wheezes. He exhibits no tenderness.   Tachypnea, little air movement on right side of lung    Abdominal: Soft. Bowel sounds are normal. He exhibits  distension. There is no tenderness. There is no guarding.   Musculoskeletal: Normal range of motion. He exhibits no edema, tenderness or deformity.   Lymphadenopathy:     He has no cervical adenopathy.   Neurological: He is alert and oriented to person, place, and time. No cranial nerve deficit or sensory deficit.   Skin: Skin is warm and dry. No rash noted. No erythema.   Psychiatric: He has a normal mood and affect. His behavior is normal. Judgment and thought content normal.        Significant Labs: All pertinent labs within the past 24 hours have been reviewed.    Significant Imaging: I have reviewed and interpreted all pertinent imaging results/findings within the past 24 hours.    Assessment/Plan:     * Generalized abdominal pain    Concern for SBP. Patient is also complaining of worsening weakness. Abdomen is distended, but not impressive. Last paracentesis 10/2  - s/p diagnostic tap, results pending   - received 1 dose of ceftriaxone in ED after tap          Constipation    Per patient last bowel movement was 2 weeks ago.    - lactulose           Decompensated hepatic cirrhosis    Secondary to hep c and alcohol. Last drink was 2 weeks ago, so likely not candidate for liver transplant. Alert and oriented, patient is not encephalopathic.  - start diuretics if creatinine improves   - unclear if patient has varices        Hydrothorax    Right hepatic hydrothorax likely cause of SOB. S/p thoracentesis on 10/2 (1 L removed). Unclear why patient is not on diuretics at home   - if Creatinine improves, start lasix and spironolactone   - consider repeat thoracentesis if patient cannot be diuresed    - will hold asa and DVT ppx, in case patient gets procedure tomorrow           MAURICE (acute kidney injury)    Creatinine at discharge on 10/3 1.0, now 1.4. Denies decreased po intake of fluids   - will judiously give 250 cc bolus         Uncontrolled type 2 diabetes mellitus with diabetic polyneuropathy, without long-term  current use of insulin    Only on metformin at home   - accuchecks, sliding scale            VTE Risk Mitigation         Ordered     Low Risk of VTE  Once      10/08/17 2311             JOEY COOK MD  Attending Staff Physician   Hospital Medicine  Pager: [phone]  Spectralink: [phone]   Cell: [phone]

## 2017-10-09 NOTE — CONSULTS
[facility]  Hepatology  Consult Note    Patient Name: Soy Reza  MRN: [id number]  Admission Date: 10/8/2017  Hospital Length of Stay: 0 days  Attending Provider: Karli Foster MD   Primary Care Physician: Jean Carlos Swanson MD  Principal Problem:Generalized abdominal pain    Inpatient consult to Hepatology  Consult performed by: FREDERICK MOONEY  Consult ordered by: KARLI FOSTER        Subjective:     Transplant status: No    HPI:  66 year old male with a history of HepC/Alcoholic cirrhosis complicated with refractory ascites as well as hepatic hydrothorax admitted to the hospital for worsening dyspnea and ascites. Hepatology consulted for further management.    Patient was here on 10/2 due to recurrent ascites. He had a paracentesis and a was sent home. He now returns with worsening dyspnea as well as ascites. Chest Xray shows a worsening right pleural effusion.     He lives with his mother and states that he stopped drinking alcohol 1 month ago. He does not follow a diet low in sodium nor watches his fluid intake.    Review of Systems   Constitutional: Negative.    HENT: Negative.    Respiratory: Positive for shortness of breath.    Cardiovascular: Negative.    Gastrointestinal: Positive for abdominal distention.   Endocrine: Negative.    Genitourinary: Negative.    Musculoskeletal: Negative.    Neurological: Negative.    Hematological: Negative.        Past Medical History:   Diagnosis Date    Diabetes mellitus     Hep C w/o coma, chronic     Hypertension     Macular degeneration     Neuropathy        Past Surgical History:   Procedure Laterality Date    EYE SURGERY      HAND SURGERY         Family history of liver disease: No    Review of patient's allergies indicates:  No Known Allergies    Social History Main Topics    Smoking status: Former Smoker    Smokeless tobacco: Never Used    Alcohol use No      Comment: 2 5th per week- stopped one month ago approx 9/10/16     Drug use: No    Sexual activity: Not on file       Prescriptions Prior to Admission   Medication Sig Dispense Refill Last Dose    aspirin 81 MG Chew Take 81 mg by mouth once daily.   10/7/2017    carvedilol (COREG) 6.25 MG tablet Take 1 tablet (6.25 mg total) by mouth 2 (two) times daily. 180 tablet 1 10/7/2017    metformin (GLUCOPHAGE-XR) 500 MG 24 hr tablet    10/7/2017       Objective:     Vital Signs (Most Recent):  Temp: 96.1 °F (35.6 °C) (10/09/17 1229)  Pulse: 76 (10/09/17 1229)  Resp: 18 (10/09/17 1229)  BP: 131/76 (10/09/17 1229)  SpO2: (!) 94 % (10/09/17 1229) Vital Signs (24h Range):  Temp:  [96.1 °F (35.6 °C)-98.4 °F (36.9 °C)] 96.1 °F (35.6 °C)  Pulse:  [76-87] 76  Resp:  [18-45] 18  SpO2:  [90 %-98 %] 94 %  BP: (116-156)/(73-89) 131/76     Weight: 84.1 kg (185 lb 6.4 oz) (10/09/17 0039)  Body mass index is 26.6 kg/m².    Physical Exam   Constitutional: He is oriented to person, place, and time. No distress.   HENT:   Head: Normocephalic.   Eyes: Pupils are equal, round, and reactive to light.   Neck: Normal range of motion.   Cardiovascular: Normal rate and regular rhythm.    Pulmonary/Chest:   Decreased breath sounds at the right lung base   Abdominal: Bowel sounds are normal. He exhibits distension. He exhibits no mass. There is no tenderness. There is no rebound and no guarding. No hernia.   Musculoskeletal: Normal range of motion. He exhibits no edema.   Neurological: He is alert and oriented to person, place, and time.   Skin: He is not diaphoretic.       MELD-Na score: 16 at 10/9/2017  5:00 AM  MELD score: 14 at 10/9/2017  5:00 AM  Calculated from:  Serum Creatinine: 1.3 mg/dL at 10/9/2017  5:00 AM  Serum Sodium: 135 mmol/L at 10/9/2017  5:00 AM  Total Bilirubin: 2.7 mg/dL at 10/9/2017  5:00 AM  INR(ratio): 1.1 at 10/9/2017  5:00 AM  Age: 66 years    Significant Labs:  CBC:   Recent Labs  Lab 10/09/17  0500   WBC 4.04   RBC 3.42*   HGB 9.2*   HCT 26.0*   PLT 98*     CMP:   Recent Labs  Lab  10/09/17  0500   *   CALCIUM 8.2*   ALBUMIN 3.2*   PROT 6.0   *   K 4.4   CO2 23      BUN 46*   CREATININE 1.3   ALKPHOS 70   ALT 17   AST 22   BILITOT 2.7*     Coagulation:   Recent Labs  Lab 10/09/17  0500   INR 1.1       Significant Imaging:  X-Ray: Reviewed    Assessment/Plan:     Decompensated hepatic cirrhosis    66 year old male with a history of HepC/Alcoholic cirrhosis complicated with refractory ascites as well as hepatic hydrothorax admitted to the hospital for worsening dyspnea and ascites. Hepatology consulted for further management.    Patient has a long standing history of refractory ascites with frequent paracentesis. Last admission was last week. At this point apart from performing a thoracentesis and paracentesis wants to see if having patient undergo a TIPS would be helpful. Furthermore, although patient lacks the support system needed for transplant will start by inquiring more information with a psychosocial eval as well as an addiction psych consult.    Recommendations:  -Low sodium diet with fluid restriction to possibly help with re-accumulation of ascites  -Therapeutic/diagnostic thoracentesis and paracentesis  -Addiction psych consult for risk assessment  -Transplant psychosocial eval  -Possible TIPS             Thank you for your consult. I will follow-up with patient. Please contact us if you have any additional questions.    Bill Zamora M.D.  Gastroenterology Fellow, PGY-IV  Pager: [phone]  [facility]

## 2017-10-09 NOTE — SUBJECTIVE & OBJECTIVE
Review of Systems   Constitutional: Negative.    HENT: Negative.    Respiratory: Positive for shortness of breath.    Cardiovascular: Negative.    Gastrointestinal: Positive for abdominal distention.   Endocrine: Negative.    Genitourinary: Negative.    Musculoskeletal: Negative.    Neurological: Negative.    Hematological: Negative.        Past Medical History:   Diagnosis Date    Diabetes mellitus     Hep C w/o coma, chronic     Hypertension     Macular degeneration     Neuropathy        Past Surgical History:   Procedure Laterality Date    EYE SURGERY      HAND SURGERY         Family history of liver disease: No    Review of patient's allergies indicates:  No Known Allergies    Social History Main Topics    Smoking status: Former Smoker    Smokeless tobacco: Never Used    Alcohol use No      Comment: 2 5th per week- stopped one month ago approx 9/10/16    Drug use: No    Sexual activity: Not on file       Prescriptions Prior to Admission   Medication Sig Dispense Refill Last Dose    aspirin 81 MG Chew Take 81 mg by mouth once daily.   10/7/2017    carvedilol (COREG) 6.25 MG tablet Take 1 tablet (6.25 mg total) by mouth 2 (two) times daily. 180 tablet 1 10/7/2017    metformin (GLUCOPHAGE-XR) 500 MG 24 hr tablet    10/7/2017       Objective:     Vital Signs (Most Recent):  Temp: 96.1 °F (35.6 °C) (10/09/17 1229)  Pulse: 76 (10/09/17 1229)  Resp: 18 (10/09/17 1229)  BP: 131/76 (10/09/17 1229)  SpO2: (!) 94 % (10/09/17 1229) Vital Signs (24h Range):  Temp:  [96.1 °F (35.6 °C)-98.4 °F (36.9 °C)] 96.1 °F (35.6 °C)  Pulse:  [76-87] 76  Resp:  [18-45] 18  SpO2:  [90 %-98 %] 94 %  BP: (116-156)/(73-89) 131/76     Weight: 84.1 kg (185 lb 6.4 oz) (10/09/17 0039)  Body mass index is 26.6 kg/m².    Physical Exam   Constitutional: He is oriented to person, place, and time. No distress.   HENT:   Head: Normocephalic.   Eyes: Pupils are equal, round, and reactive to light.   Neck: Normal range of motion.    Cardiovascular: Normal rate and regular rhythm.    Pulmonary/Chest:   Decreased breath sounds at the right lung base   Abdominal: Bowel sounds are normal. He exhibits distension. He exhibits no mass. There is no tenderness. There is no rebound and no guarding. No hernia.   Musculoskeletal: Normal range of motion. He exhibits no edema.   Neurological: He is alert and oriented to person, place, and time.   Skin: He is not diaphoretic.       MELD-Na score: 16 at 10/9/2017  5:00 AM  MELD score: 14 at 10/9/2017  5:00 AM  Calculated from:  Serum Creatinine: 1.3 mg/dL at 10/9/2017  5:00 AM  Serum Sodium: 135 mmol/L at 10/9/2017  5:00 AM  Total Bilirubin: 2.7 mg/dL at 10/9/2017  5:00 AM  INR(ratio): 1.1 at 10/9/2017  5:00 AM  Age: 66 years    Significant Labs:  CBC:   Recent Labs  Lab 10/09/17  0500   WBC 4.04   RBC 3.42*   HGB 9.2*   HCT 26.0*   PLT 98*     CMP:   Recent Labs  Lab 10/09/17  0500   *   CALCIUM 8.2*   ALBUMIN 3.2*   PROT 6.0   *   K 4.4   CO2 23      BUN 46*   CREATININE 1.3   ALKPHOS 70   ALT 17   AST 22   BILITOT 2.7*     Coagulation:   Recent Labs  Lab 10/09/17  0500   INR 1.1       Significant Imaging:  X-Ray: Reviewed

## 2017-10-09 NOTE — NURSING
Received to observation room 3,awake alert and oriented,skin warm and dry to touch color slightly jaundiced. Abd distended but soft right upper quad . Dressing dry and intact. Oriented to room and call bell system voices no complaints.

## 2017-10-09 NOTE — HPI
Mr. Reza is a 65 yo man with Hep c/alcholic decompensated cirrhosis, hepatic hydrothorax, DM who presented to ED 10/8 for SOB and abdominal pain. Symptoms started on Saturday and have been progressively worsening. Denies fever, chills, decreased PO intake. Patient was recently admitted 10/2-10/3 and underwent thoracentesis (1L removed) and paracentesis (8L removed).      Home meds: asa 81, coreg 6.25 BID, metformin 500 q24

## 2017-10-09 NOTE — ASSESSMENT & PLAN NOTE
Right hepatic hydrothorax likely cause of SOB. S/p thoracentesis on 10/2 (1 L removed). Unclear why patient is not on diuretics at home   - if Creatinine improves, start lasix and spironolactone   - consider repeat thoracentesis if patient cannot be diuresed    - will hold asa and DVT ppx, in case patient gets procedure tomorrow

## 2017-10-09 NOTE — PLAN OF CARE
Problem: Patient Care Overview  Goal: Plan of Care Review  Outcome: Ongoing (interventions implemented as appropriate)  Pt. Has a history of type 2 diabetes,accuchecks ordered q ac and hs with s/s insulin. No verbal complaints of pain,prn medication ordered if needed. Resp unlabored,right middle and lower lobe diminished,sat on RA 95% continue plan of care.

## 2017-10-09 NOTE — HPI
66 year old male with a history of HepC/Alcoholic cirrhosis complicated with refractory ascites as well as hepatic hydrothorax admitted to the hospital for worsening dyspnea and ascites. Hepatology consulted for further management.    Patient was here on 10/2 due to recurrent ascites. He had a paracentesis and a was sent home. He now returns with worsening dyspnea as well as ascites. Chest Xray shows a worsening right pleural effusion.     He lives with his mother and states that he stopped drinking alcohol 1 month ago. He does not follow a diet low in sodium nor watches his fluid intake.

## 2017-10-09 NOTE — ASSESSMENT & PLAN NOTE
Concern for SBP. Patient is also complaining of worsening weakness. Abdomen is distended, but not impressive. Last paracentesis 10/2  - s/p diagnostic tap, results pending   - received 1 dose of ceftriaxone in ED after tap

## 2017-10-09 NOTE — PROCEDURES
Date of Procedure: 9/8/17   Time of Procedure: 21:15    With ultrasound guidance, fluid pocket was identified. Patient was marked and then site was sterilized. After time out was done, needle was advanced at appropriate location and 35 cc of red cloudy fluid was removed. Minimal blood loss. No complications.     JOEY COOK MD  Attending Staff Physician   [facility] Medicine  Pager: [phone]  Spectralink: [phone]   Cell: [phone]

## 2017-10-09 NOTE — SUBJECTIVE & OBJECTIVE
Past Medical History:   Diagnosis Date    Diabetes mellitus     Hep C w/o coma, chronic     Hypertension     Macular degeneration     Neuropathy        Past Surgical History:   Procedure Laterality Date    EYE SURGERY      HAND SURGERY         Review of patient's allergies indicates:  No Known Allergies    No current facility-administered medications on file prior to encounter.      Current Outpatient Prescriptions on File Prior to Encounter   Medication Sig    aspirin 81 MG Chew Take 81 mg by mouth once daily.    carvedilol (COREG) 6.25 MG tablet Take 1 tablet (6.25 mg total) by mouth 2 (two) times daily.    metformin (GLUCOPHAGE-XR) 500 MG 24 hr tablet      Family History     None        Social History Main Topics    Smoking status: Former Smoker    Smokeless tobacco: Never Used    Alcohol use No      Comment: 2 5th per week- stopped one month ago approx 9/10/16    Drug use: No    Sexual activity: Not on file     Review of Systems   Constitutional: Negative for appetite change, chills, fever and unexpected weight change.   HENT: Negative for congestion, rhinorrhea, sinus pain, sneezing and sore throat.    Eyes: Negative for visual disturbance.   Respiratory: Positive for shortness of breath. Negative for cough and wheezing.    Cardiovascular: Negative for chest pain, palpitations and leg swelling.   Gastrointestinal: Positive for abdominal pain. Negative for abdominal distention, blood in stool, constipation, diarrhea, nausea and vomiting.   Genitourinary: Negative for discharge, dysuria and hematuria.   Skin: Negative for rash.   Neurological: Negative for dizziness, seizures, syncope and headaches.   Psychiatric/Behavioral: Negative for suicidal ideas.     Objective:     Vital Signs (Most Recent):  Temp: 98.2 °F (36.8 °C) (10/08/17 2127)  Pulse: 86 (10/08/17 2238)  Resp: (!) 45 (10/08/17 2237)  BP: (!) 151/87 (10/08/17 2238)  SpO2: 95 % (10/08/17 2238) Vital Signs (24h Range):  Temp:  [97.7 °F  (36.5 °C)-98.2 °F (36.8 °C)] 98.2 °F (36.8 °C)  Pulse:  [80-87] 86  Resp:  [18-45] 45  SpO2:  [94 %-98 %] 95 %  BP: (133-156)/(76-89) 151/87     Weight: 79.4 kg (175 lb)  Body mass index is 25.11 kg/m².    Physical Exam   Constitutional: He is oriented to person, place, and time. He appears well-developed and well-nourished.   HENT:   Head: Normocephalic and atraumatic.   Nose: Nose normal.   Mouth/Throat: Oropharynx is clear and moist. No oropharyngeal exudate.   Eyes: Pupils are equal, round, and reactive to light. Right eye exhibits no discharge. Left eye exhibits no discharge. No scleral icterus.   Neck: Normal range of motion. Neck supple. No JVD present.   Cardiovascular: Normal rate, regular rhythm, normal heart sounds and intact distal pulses.    Pulmonary/Chest: No respiratory distress. He has no wheezes. He exhibits no tenderness.   Tachypnea, little air movement on right side of lung    Abdominal: Soft. Bowel sounds are normal. He exhibits distension. There is no tenderness. There is no guarding.   Musculoskeletal: Normal range of motion. He exhibits no edema, tenderness or deformity.   Lymphadenopathy:     He has no cervical adenopathy.   Neurological: He is alert and oriented to person, place, and time. No cranial nerve deficit or sensory deficit.   Skin: Skin is warm and dry. No rash noted. No erythema.   Psychiatric: He has a normal mood and affect. His behavior is normal. Judgment and thought content normal.        Significant Labs: All pertinent labs within the past 24 hours have been reviewed.    Significant Imaging: I have reviewed and interpreted all pertinent imaging results/findings within the past 24 hours.

## 2017-10-09 NOTE — PLAN OF CARE
10/09/17 1409   Discharge Assessment   Assessment Type Discharge Planning Assessment   Confirmed/corrected address and phone number on facesheet? Yes   Assessment information obtained from? Patient   Communicated expected length of stay with patient/caregiver yes   Prior to hospitilization cognitive status: Alert/Oriented   Prior to hospitalization functional status: Independent   Current cognitive status: Alert/Oriented   Current Functional Status: Assistive Equipment   Lives With alone   Able to Return to Prior Arrangements yes   Is patient able to care for self after discharge? Yes   Patient currently being followed by outpatient case management? No   Patient currently receives any other outside agency services? No   Equipment Currently Used at Home cane, straight;rollator   Do you have any problems affording any of your prescribed medications? No   Is the patient taking medications as prescribed? yes   Does the patient have transportation home? No   Does the patient receive services at the [facility]? No   Discharge Plan A Home;Home with family;Home Health   Discharge Plan B Home with family   Patient/Family In Agreement With Plan yes

## 2017-10-09 NOTE — PLAN OF CARE
"Visit with pt to discuss readmission, states he returned bc he could not breathe at home.  Pt's next appt scheduled for 10/16.  States he has used [employer] [facility] since last d/c and will contact them for next appt, pleased with services.  Aware he may now need weekly paracentesis but feels he does not need tap at this time stating "my belly is flat".  Will cont to follow and assess for needs.  Pt will be inpatient, Hepatology has been consulted by primary team.    Cony Benito RN  Case Management  [phone]  "

## 2017-10-09 NOTE — ASSESSMENT & PLAN NOTE
Secondary to hep c and alcohol. Last drink was 2 weeks ago, so likely not candidate for liver transplant. Alert and oriented, patient is not encephalopathic.  - start diuretics if creatinine improves   - unclear if patient has varices

## 2017-10-09 NOTE — ASSESSMENT & PLAN NOTE
66 year old male with a history of HepC/Alcoholic cirrhosis complicated with refractory ascites as well as hepatic hydrothorax admitted to the hospital for worsening dyspnea and ascites. Hepatology consulted for further management.    Patient has a long standing history of refractory ascites with frequent paracentesis. Last admission was last week. At this point apart from performing a thoracentesis and paracentesis wants to see if having patient undergo a TIPS would be helpful. Furthermore, although patient lacks the support system needed for transplant will start by inquiring more information with a psychosocial eval as well as an addiction psych consult.    Recommendations:  -Low sodium diet with fluid restriction to possibly help with re-accumulation of ascites  -Therapeutic/diagnostic thoracentesis and paracentesis  -Addiction psych consult for risk assessment  -Transplant psychosocial eval  -Possible TIPS

## 2017-10-09 NOTE — PLAN OF CARE
Problem: Patient Care Overview  Goal: Plan of Care Review  Outcome: Ongoing (interventions implemented as appropriate)  Patient AAOx4. Safety maintained. Bed locked in low with 2 side rails up. Call light within reach. Blood glucose monitoring ongoing. PRN pain medication administered for 7/10 abdominal pain. Fall precautions maintained.

## 2017-10-10 NOTE — PROGRESS NOTES
Para completed, pt tolerated well. No apparent distress noted. 7.7 Liters removed from right abdomen, mepore applied CDI. Albumin 200 ml given per protocol. Report called to RUFINO Chapa. Pt awaiting transport.

## 2017-10-10 NOTE — ASSESSMENT & PLAN NOTE
Secondary to Hep C and alcohol.   Alert and oriented, patient is not encephalopathic.  Hepatolgy consulted: consider transplant or TIPS. Consulting Addiction Psychiatry for risk assessment.  Paracentesis ordered.

## 2017-10-10 NOTE — ASSESSMENT & PLAN NOTE
66 year old male with a history of HepC/Alcoholic cirrhosis complicated with refractory ascites as well as hepatic hydrothorax admitted to the hospital for worsening dyspnea and ascites. Hepatology consulted for further management.    Patient has a long standing history of refractory ascites with frequent paracentesis.He was just admitted for ascites last week. At this point want to focus on a low sodium/fluid diet as well as initiating low dose diuretics.    Recommendations:  -Low sodium diet with fluid restriction to possibly help with re-accumulation of ascites  -Start low dose diuretics (Lasix 20 mg PO Qdaily and Spironolactone 50 mg PO Qdaily)  -Therapeutic/diagnostic thoracentesis and paracentesis  -Transplant psychosocial eval

## 2017-10-10 NOTE — PLAN OF CARE
Problem: Patient Care Overview  Goal: Plan of Care Review  Outcome: Ongoing (interventions implemented as appropriate)  Patient AAOx4. Patient VSS. Patient free from falls or injury during shift. Patient reports abdominal pain; controlled with PRN medications. Patient in bed, bed in lowest position, call light in reach, and personal items in reach. Will continue to monitor.

## 2017-10-10 NOTE — SUBJECTIVE & OBJECTIVE
Interval History:   No acute events overnight. Patient met with Psychiatry this morning.    Current Facility-Administered Medications   Medication    cefTRIAXone (ROCEPHIN) 1 g in dextrose 5 % 50 mL IVPB    dextrose 50% injection 12.5 g    dextrose 50% injection 25 g    glucagon (human recombinant) injection 1 mg    glucose chewable tablet 16 g    glucose chewable tablet 24 g    hydrocodone-acetaminophen 5-325mg per tablet 1 tablet    insulin aspart pen 0-5 Units    lactulose 20 gram/30 mL solution Soln 15 g    ondansetron injection 4 mg       Objective:     Vital Signs (Most Recent):  Temp: 97.4 °F (36.3 °C) (10/10/17 1101)  Pulse: 79 (10/10/17 1101)  Resp: 16 (10/10/17 1101)  BP: 137/86 (10/10/17 1101)  SpO2: 97 % (10/10/17 1101) Vital Signs (24h Range):  Temp:  [96.9 °F (36.1 °C)-98 °F (36.7 °C)] 97.4 °F (36.3 °C)  Pulse:  [73-79] 79  Resp:  [16-19] 16  SpO2:  [94 %-97 %] 97 %  BP: (131-166)/(76-89) 137/86     Weight: 84.1 kg (185 lb 6.4 oz) (10/09/17 0039)  Body mass index is 26.6 kg/m².    Physical Exam   Constitutional: No distress.   HENT:   Head: Normocephalic.   Eyes: Pupils are equal, round, and reactive to light.   Neck: Normal range of motion.   Cardiovascular: Normal rate and regular rhythm.    Pulmonary/Chest:   Decrease breath sounds on right lung base   Abdominal: Bowel sounds are normal. He exhibits distension. He exhibits no mass. There is no tenderness. There is no rebound and no guarding. No hernia.   Musculoskeletal: Normal range of motion. He exhibits no edema.   Skin: He is not diaphoretic.       MELD-Na score: 9 at 10/10/2017  5:31 AM  MELD score: 9 at 10/10/2017  5:31 AM  Calculated from:  Serum Creatinine: 1.0 mg/dL at 10/10/2017  5:31 AM  Serum Sodium: 139 mmol/L (Rounded to 137) at 10/10/2017  5:31 AM  Total Bilirubin: 2.1 mg/dL at 10/10/2017  5:31 AM  INR(ratio): 1.0 at 10/10/2017  5:31 AM  Age: 66 years    Significant Labs:  CBC:   Recent Labs  Lab 10/10/17  0531   WBC 4.33    RBC 3.68*   HGB 9.9*   HCT 28.3*   *     CMP:   Recent Labs  Lab 10/10/17  0531   *   CALCIUM 8.2*   ALBUMIN 2.9*   PROT 5.8*      K 4.3   CO2 24      BUN 37*   CREATININE 1.0   ALKPHOS 73   ALT 20   AST 26   BILITOT 2.1*     Coagulation:   Recent Labs  Lab 10/10/17  0531   INR 1.0       Significant Imaging:  X-Ray: Reviewed

## 2017-10-10 NOTE — PROCEDURES
Radiology Post-Procedure Note    Pre Op Diagnosis: Ascites  Post Op Diagnosis: Same    Procedure: Ultrasound Guided Paracentesis    Procedure performed by: Nakia ALCANTAR, Ama and Cecilia Zaragoza NP    Written Informed Consent Obtained: Yes  Specimen Removed: YES peach colored  Estimated Blood Loss: Minimal    Findings:   Successful paracentesis.  Albumin administered PRN per protocol.    Patient tolerated procedure well.    Ama Yee, APRN, FNP  Interventional Radiology  [phone] [employer]

## 2017-10-10 NOTE — SUBJECTIVE & OBJECTIVE
Interval History: Patient with no events overnight, no new complaints.  Data Review: Results recorded below were reviewed 10/9/2017.    Review of Systems   Constitutional: Negative for fever.   Respiratory: Negative for shortness of breath.    Cardiovascular: Negative for chest pain.   Gastrointestinal: Negative for abdominal pain.     Objective:     Vital Signs (Most Recent):  Temp: 96.9 °F (36.1 °C) (10/09/17 2010)  Pulse: 74 (10/09/17 2010)  Resp: 18 (10/09/17 2010)  BP: (!) 143/86 (10/09/17 2010)  SpO2: 97 % (10/09/17 2010) Vital Signs (24h Range):  Temp:  [96.1 °F (35.6 °C)-98.4 °F (36.9 °C)] 96.9 °F (36.1 °C)  Pulse:  [74-83] 74  Resp:  [18-20] 18  SpO2:  [90 %-97 %] 97 %  BP: (116-145)/(73-86) 143/86     Weight: 84.1 kg (185 lb 6.4 oz)  Body mass index is 26.6 kg/m².    Intake/Output Summary (Last 24 hours) at 10/09/17 2305  Last data filed at 10/09/17 1600   Gross per 24 hour   Intake              800 ml   Output              925 ml   Net             -125 ml      Physical Exam   Constitutional: He appears well-developed. He is cooperative. No distress.   HENT:   Head: Normocephalic.   Mouth/Throat: Mucous membranes are not pale and not cyanotic.   Eyes: Conjunctivae and lids are normal. Pupils are equal.   Neck: Neck supple.   Cardiovascular: Normal rate, regular rhythm, S1 normal and S2 normal.    Pulmonary/Chest: Effort normal. He has decreased breath sounds in the right lower field.   Abdominal: Soft. Bowel sounds are normal. He exhibits ascites. There is no tenderness.   Musculoskeletal: He exhibits no edema.   Neurological: He is alert. He is not disoriented.   Skin: Skin is warm and dry. No cyanosis. Nails show no clubbing.   Psychiatric: He has a normal mood and affect.       Significant Labs:   A1C:   Recent Labs  Lab 08/04/17  0523 10/02/17  0908   HGBA1C 6.3* 6.4*     CBC:   Recent Labs  Lab 10/08/17  1734 10/09/17  0500   WBC 6.41 4.04   HGB 11.3* 9.2*   HCT 31.9* 26.0*   * 98*     CMP:    Recent Labs  Lab 10/08/17  1734 10/09/17  0500   * 135*   K 4.7 4.4    106   CO2 21* 23   * 177*   BUN 48* 46*   CREATININE 1.4 1.3   CALCIUM 8.2* 8.2*   PROT 6.3 6.0   ALBUMIN 2.7* 3.2*   BILITOT 2.7* 2.7*   ALKPHOS 92 70   AST 30 22   ALT 23 17   ANIONGAP 8 6*   EGFRNONAA 52.4* 57.3*     Cardiac Markers:   Recent Labs  Lab 10/08/17  1734   BNP 88     Coagulation:   Recent Labs  Lab 10/09/17  0500   INR 1.1     Lipase:   Recent Labs  Lab 10/08/17  1734   LIPASE 110*     Magnesium:   Recent Labs  Lab 10/08/17  1734 10/09/17  0500   MG 2.2 2.3     POCT Glucose:   Recent Labs  Lab 10/09/17  0807 10/09/17  1229 10/09/17  1629   POCTGLUCOSE 167* 199* 204*     Troponin:   Recent Labs  Lab 10/08/17  1734   TROPONINI 0.006     Urine Studies:   Recent Labs  Lab 10/08/17  1818   COLORU Yellow   APPEARANCEUA Clear   PHUR 5.0   SPECGRAV 1.015   PROTEINUA 1+*   GLUCUA Negative   KETONESU Negative   BILIRUBINUA Negative   OCCULTUA Negative   NITRITE Negative   UROBILINOGEN 4.0   LEUKOCYTESUR Negative   RBCUA 0   WBCUA 1   BACTERIA None   SQUAMEPITHEL 0   HYALINECASTS 0
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
## 2017-10-10 NOTE — H&P
Inpatient Radiology Pre-procedure Note    History of Present Illness:  Soy Reza is a 66 y.o. male who presents for ultrasound guided paracentesis.  Admission H&P reviewed.  Past Medical History:   Diagnosis Date    Diabetes mellitus     Hep C w/o coma, chronic     Hypertension     Macular degeneration     Neuropathy      Past Surgical History:   Procedure Laterality Date    EYE SURGERY      HAND SURGERY         Review of Systems:   As documented in primary team H&P    Home Meds:   Prior to Admission medications    Medication Sig Start Date End Date Taking? Authorizing Provider   aspirin 81 MG Chew Take 81 mg by mouth once daily.   Yes Historical Provider, MD   carvedilol (COREG) 6.25 MG tablet Take 1 tablet (6.25 mg total) by mouth 2 (two) times daily. 8/10/17 8/10/18 Yes Jean Carlos Swanson MD   metformin (GLUCOPHAGE-XR) 500 MG 24 hr tablet  8/16/17  Yes Historical Provider, MD     Scheduled Meds:    cefTRIAXone (ROCEPHIN) IVPB  1 g Intravenous Q24H    lactulose  15 g Oral TID     Continuous Infusions:    PRN Meds:dextrose 50%, dextrose 50%, glucagon (human recombinant), glucose, glucose, hydrocodone-acetaminophen 5-325mg, insulin aspart, ondansetron  Anticoagulants/Antiplatelets: no anticoagulation    Allergies: Review of patient's allergies indicates:  No Known Allergies  Sedation Hx: have not been any systemic reactions    Labs:    Recent Labs  Lab 10/10/17  0531   INR 1.0       Recent Labs  Lab 10/10/17  0531   WBC 4.33   HGB 9.9*   HCT 28.3*   MCV 77*   *      Recent Labs  Lab 10/10/17  0531   *      K 4.3      CO2 24   BUN 37*   CREATININE 1.0   CALCIUM 8.2*   MG 2.3   ALT 20   AST 26   ALBUMIN 2.9*   BILITOT 2.1*         Vitals:  Temp: 97.4 °F (36.3 °C) (10/10/17 1101)  Pulse: 80 (10/10/17 1509)  Resp: 17 (10/10/17 1509)  BP: (!) 132/96 (10/10/17 1509)  SpO2: 96 % (10/10/17 1509)     Physical Exam:  ASA: 2  Mallampati: n/a    General: no acute distress  Mental Status:  alert and oriented to person, place and time  HEENT: normocephalic, atraumatic  Chest: unlabored breathing  Heart: regular heart rate  Abdomen: distended  Extremity: moves all extremities    Plan: ultrasound guided paracentesis  Sedation Plan: local    ISIS Travis, FNP  Interventional Radiology  [phone] [employer]
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
## 2017-10-10 NOTE — PLAN OF CARE
Problem: Patient Care Overview  Goal: Plan of Care Review  Outcome: Ongoing (interventions implemented as appropriate)  POC reviewed with pt at 2335; able to verbalize acceptance and understanding.  VS stable.  AAOx4.  Questions answered/encouraged; reassurance provided.  Call light in reach, side rails up x2, nonskid socks on, bed alarm set, bed in lowest position with wheels locked.  Remains free from falls, skin breakdown, and injury.  Urinal at bedside.  Accuchecks ACHS.  Denies pain at this time.  No acute events overnight.  Will continue to monitor.

## 2017-10-10 NOTE — PHYSICIAN QUERY
PT Name: Soy Reza  MR #: [id number]     Physician Query Form - Documentation Clarification      CDS: Jaelyn Ho RN, CCDS       Contact information: [email or web address]    This form is a permanent document in the medical record.     Query Date: October 10, 2017    By submitting this query, we are merely seeking further clarification of documentation. Please utilize your independent clinical judgment when addressing the question(s) below.    The Medical record reflects the following:    Supporting Clinical Findings Location in Medical Record   Uncontrolled type 2 diabetes mellitus with diabetic polyneuropathy, without long-term current use of insulin  Only on metformin at home   - accuchecks, sliding scale 10/9-H&P     Glucose=215, 167, 199, 204, 189, 159     10/8, 10/9, 10/10-POCT                                                                          Doctor, Please specify diagnosis or diagnoses associated with above clinical findings.    Please clarify dx of____ Uncontrolled type 2 diabetes mellitus___. Thank you.    Provider Use Only     [  x ] DM Type 2 with Hyperglycemia     [   ] Other clarification (please specify)______________________                                                                                                          [   ] Clinically undetermined

## 2017-10-10 NOTE — PROGRESS NOTES
[facility] Hospital Medicine  Progress Note    Patient Name: Soy Reza  MRN: [id number]  Patient Class: IP- Inpatient   Admission Date: 10/8/2017  Length of Stay: 0 days  Attending Physician: Jaimee Foster MD  Primary Care Provider: Jean Carlos Swanson MD    Hospital Medicine Team: [facility] MED D Jaimee Foster MD    Subjective:     Principal Problem:Generalized abdominal pain    HPI:  Mr. Reza is a 65 yo man with Hep c/alcholic decompensated cirrhosis, hepatic hydrothorax, DM who presented to ED 10/8 for SOB and abdominal pain. Symptoms started on Saturday and have been progressively worsening. Denies fever, chills, decreased PO intake. Patient was recently admitted 10/2-10/3 and underwent thoracentesis (1L removed) and paracentesis (8L removed).      Home meds: asa 81, coreg 6.25 BID, metformin 500 q24    Hospital Course:  No notes on file    Interval History: Patient with no events overnight, no new complaints.  Data Review: Results recorded below were reviewed 10/9/2017.    Review of Systems   Constitutional: Negative for fever.   Respiratory: Negative for shortness of breath.    Cardiovascular: Negative for chest pain.   Gastrointestinal: Negative for abdominal pain.     Objective:     Vital Signs (Most Recent):  Temp: 96.9 °F (36.1 °C) (10/09/17 2010)  Pulse: 74 (10/09/17 2010)  Resp: 18 (10/09/17 2010)  BP: (!) 143/86 (10/09/17 2010)  SpO2: 97 % (10/09/17 2010) Vital Signs (24h Range):  Temp:  [96.1 °F (35.6 °C)-98.4 °F (36.9 °C)] 96.9 °F (36.1 °C)  Pulse:  [74-83] 74  Resp:  [18-20] 18  SpO2:  [90 %-97 %] 97 %  BP: (116-145)/(73-86) 143/86     Weight: 84.1 kg (185 lb 6.4 oz)  Body mass index is 26.6 kg/m².    Intake/Output Summary (Last 24 hours) at 10/09/17 2305  Last data filed at 10/09/17 1600   Gross per 24 hour   Intake              800 ml   Output              925 ml   Net             -125 ml      Physical Exam   Constitutional: He appears well-developed. He is  cooperative. No distress.   HENT:   Head: Normocephalic.   Mouth/Throat: Mucous membranes are not pale and not cyanotic.   Eyes: Conjunctivae and lids are normal. Pupils are equal.   Neck: Neck supple.   Cardiovascular: Normal rate, regular rhythm, S1 normal and S2 normal.    Pulmonary/Chest: Effort normal. He has decreased breath sounds in the right lower field.   Abdominal: Soft. Bowel sounds are normal. He exhibits ascites. There is no tenderness.   Musculoskeletal: He exhibits no edema.   Neurological: He is alert. He is not disoriented.   Skin: Skin is warm and dry. No cyanosis. Nails show no clubbing.   Psychiatric: He has a normal mood and affect.       Significant Labs:   A1C:   Recent Labs  Lab 08/04/17  0523 10/02/17  0908   HGBA1C 6.3* 6.4*     CBC:   Recent Labs  Lab 10/08/17  1734 10/09/17  0500   WBC 6.41 4.04   HGB 11.3* 9.2*   HCT 31.9* 26.0*   * 98*     CMP:   Recent Labs  Lab 10/08/17  1734 10/09/17  0500   * 135*   K 4.7 4.4    106   CO2 21* 23   * 177*   BUN 48* 46*   CREATININE 1.4 1.3   CALCIUM 8.2* 8.2*   PROT 6.3 6.0   ALBUMIN 2.7* 3.2*   BILITOT 2.7* 2.7*   ALKPHOS 92 70   AST 30 22   ALT 23 17   ANIONGAP 8 6*   EGFRNONAA 52.4* 57.3*     Cardiac Markers:   Recent Labs  Lab 10/08/17  1734   BNP 88     Coagulation:   Recent Labs  Lab 10/09/17  0500   INR 1.1     Lipase:   Recent Labs  Lab 10/08/17  1734   LIPASE 110*     Magnesium:   Recent Labs  Lab 10/08/17  1734 10/09/17  0500   MG 2.2 2.3     POCT Glucose:   Recent Labs  Lab 10/09/17  0807 10/09/17  1229 10/09/17  1629   POCTGLUCOSE 167* 199* 204*     Troponin:   Recent Labs  Lab 10/08/17  1734   TROPONINI 0.006     Urine Studies:   Recent Labs  Lab 10/08/17  1818   COLORU Yellow   APPEARANCEUA Clear   PHUR 5.0   SPECGRAV 1.015   PROTEINUA 1+*   GLUCUA Negative   KETONESU Negative   BILIRUBINUA Negative   OCCULTUA Negative   NITRITE Negative   UROBILINOGEN 4.0   LEUKOCYTESUR Negative   RBCUA 0   WBCUA 1   BACTERIA  None   SQUAMEPITHEL 0   HYALINECASTS 0     Assessment/Plan:      Current Hospital Problem List:    Active Hospital Problems    Diagnosis  POA    *Generalized abdominal pain [R10.84]  Yes    Hydrothorax [J94.8]  Yes     Priority: 1 - High    Uncontrolled type 2 diabetes mellitus with diabetic polyneuropathy, without long-term current use of insulin [E11.42, E11.65]  Yes     Priority: 2      Chronic    Decompensated hepatic cirrhosis [K72.90]  Yes    Constipation [K59.00]  Yes    Acute pancreatitis [K85.90]  Yes    MAURCIE (acute kidney injury) [N17.9]  Yes      Resolved Hospital Problems    Diagnosis Date Resolved POA   No resolved problems to display.       Ordered Medications for management of current problems:    cefTRIAXone (ROCEPHIN) IVPB  1 g Intravenous Q24H    lactulose  15 g Oral TID       Anticipated Disposition: Home or Self Care    Assessment and Plan by Problem:       Decompensated hepatic cirrhosis    Secondary to Hep C and alcohol.   Alert and oriented, patient is not encephalopathic.  Hepatolgy consulted: consider transplant or TIPS. Consulting Addiction Psychiatry for risk assessment.  Paracentesis (therapeutic) ordered. Empiric ceftriaxone for possible SBP awaiting cultures.       VTE Risk Mitigation         Ordered     Low Risk of VTE  Once      10/08/17 4174              Jaimee Foster MD  Department of Hospital Medicine   [facility]

## 2017-10-11 NOTE — PLAN OF CARE
Problem: Pain, Acute (Adult)  Goal: Identify Related Risk Factors and Signs and Symptoms  Related risk factors and signs and symptoms are identified upon initiation of Human Response Clinical Practice Guideline (CPG)   Outcome: Ongoing (interventions implemented as appropriate)  Patient c/o abdominal pain 6/10, intermittent and dull in nature. Requested prn po Norco apprx 1 1/2 hours before next dose due; informed that dose could be given at 9:30 am. When time arrived, patient declined offer of medication.

## 2017-10-11 NOTE — PLAN OF CARE
Sw did provide Medicaid transport to Pt in the room, highlighted number and provided 2 days to call ahead on Pt's paper and verbalized to Pt. Pt did verbalize understanding, Pt ok to d.c.

## 2017-10-11 NOTE — PLAN OF CARE
Shirin did setup w/c van for 1530pm by Matilde with [employer] at . Nurse Alejandre aware of d.c time with w.c transport.

## 2017-10-11 NOTE — PLAN OF CARE
CM contacted to assist with pt covering rx using his Credit card.  Pharmacy contacted, cost transfer form faxed to pharm at [id number] for cost of $3.60.  Pt will d/c to home today.   to arrange transport once d/c'd.    Future Appointments  Date Time Provider Department Center   10/12/2017 3:00 PM Jean Carlos Swanson MD [facility] IM [place]   10/16/2017 10:00 AM [facility] [id number] [facility] RAD [facility]   10/20/2017 11:00 AM EVELIA Chacon [facility] IMPRICL [place] [facility]   10/25/2017 3:00 PM Star Major MD [facility] HEPAT [place]   11/8/2017 10:40 AM Jean Carlos Swanson MD [facility]

## 2017-10-11 NOTE — PROGRESS NOTES
SW Consult:    SW presented to pt's room to initiate psychosocial assessment. Pt presented as alert, oriented x 4, pleasant, and cooperative. Pt presented with delayed speech, but was able to engage in conversation appropriately. SW reviewed caregiver roles and expectations and inquired who would be pt's caregiver in the event he receives a transplant. Pt reported the only person who would be able to care for him is his 92 year old mother. Pt reported she does not drive. SW advised pt that caregiver would need to be able bodied and able to drive. Pt was able to recognize that his mother would not be able to be his caregiver post transplant. Pt reported he does not have anyone else in his life that could provide care post transplant but agreed to look into it further. Pt agreed to contact SW if he is able to identify a solid caregiver plan. This assessment cannot move forward until pt identifies a caregiver and that caregiver can present for assessment. AMANDA explained this to pt, who expressed understanding. SW provided opportunity for pt to voice questions/concerns. Pt denied any needs at this time. SW remains available for psychosocial, resource, educational, and discharge needs as appropriate.

## 2017-10-11 NOTE — TREATMENT PLAN
10/11/2017  2:30 PM    Discharge Recommendations:  -Low sodium diet  -Fluid restrict to 1.2L  -Continue diuretics  -Continue daily paracentesis (order is in placed)  -Follow up with Hepatology (appt made)    Bill Zamora M.D.  Gastroenterology Fellow, PGY-IV  Pager: [phone]  [facility]

## 2017-10-11 NOTE — CONSULTS
Consult received regarding low salt diet education. Provided and explained handout outlining strategies for decreasing Na intake. Pt voiced understanding.

## 2017-10-11 NOTE — DISCHARGE SUMMARY
[facility]  Discharge Summary  Hospital Medicine      Admit Date: 10/8/2017    Discharge Date and Time: 10/11/2017  3:17 PM    Discharge Provider: Lisandra Sutton    Reason for Admission: symptomatic ascites    Hospital Course (synopsis of major diagnoses, care, treatment, and services provided during the course of the hospital stay):     Decompensated hepatic cirrhosis  Symptomatic ascites  Secondary to Hep C and alcohol. Alert and oriented, patient is not encephaloplathic. Hepatolgy consulted: consider transplant or TIPS. Addiction psychiatry consulted and found to be high risk for relapse. Intensive outpatient therapy recommended. He opts for outpatient alcohol rehab, but admits to limited transportation options. Will need psychosocial eval (to be done as outpatient). Paracentesis (therapeutic) 10/11 yielded 7.7L of fluid. Empiric ceftriaxone for possible SBP during stay. Symptoms resolved after paracentesis. Started patient on furosemide and spironolactone. Paracentesis planned for next week. Will follow up with hepatology as outpatient for diuretic titration.     DM II with hyperglycemia  HgbA1c 6.4% - metformin  mg daily  SSI while here     Consults: hepatology    Final Diagnoses:    Principal Problem: Generalized abdominal pain   Secondary Diagnoses:   Active Hospital Problems    Diagnosis  POA    *Generalized abdominal pain [R10.84]  Yes    Decompensated hepatic cirrhosis [K72.90]  Yes    Constipation [K59.00]  Yes    Acute pancreatitis [K85.90]  Yes    Hydrothorax [J94.8]  Yes    MAURICE (acute kidney injury) [N17.9]  Yes    Uncontrolled type 2 diabetes mellitus with diabetic polyneuropathy, without long-term current use of insulin [E11.42, E11.65]  Yes     Chronic      Resolved Hospital Problems    Diagnosis Date Resolved POA   No resolved problems to display.       Discharged Condition: stable    Disposition: Home or Self Care    Follow Up/Patient Instructions:      Medications:  Reconciled Home Medications:   Discharge Medication List as of 10/11/2017 10:29 AM      START taking these medications    Details   furosemide (LASIX) 20 MG tablet Take 1 tablet (20 mg total) by mouth once daily., Starting Thu 10/12/2017, Until Fri 10/12/2018, Normal      lactulose (CHRONULAC) 10 gram/15 mL solution Take 15 mLs (10 g total) by mouth once daily., Starting Wed 10/11/2017, Normal      spironolactone (ALDACTONE) 25 MG tablet Take 1 tablet (25 mg total) by mouth once daily., Starting Thu 10/12/2017, Until Fri 10/12/2018, Normal         CONTINUE these medications which have NOT CHANGED    Details   aspirin 81 MG Chew Take 81 mg by mouth once daily., Until Discontinued, Historical Med      carvedilol (COREG) 6.25 MG tablet Take 1 tablet (6.25 mg total) by mouth 2 (two) times daily., Starting Thu 8/10/2017, Until Fri 8/10/2018, Normal      metformin (GLUCOPHAGE-XR) 500 MG 24 hr tablet Starting Wed 8/16/2017, Historical Med             Discharge Procedure Orders  Diet general     Activity as tolerated     Call MD for:  temperature >100.4     Call MD for:  persistent nausea and vomiting or diarrhea     Call MD for:  severe uncontrolled pain     Call MD for:  redness, tenderness, or signs of infection (pain, swelling, redness, odor or green/yellow discharge around incision site)     Call MD for:  difficulty breathing or increased cough     Call MD for:  severe persistent headache     Call MD for:  worsening rash     Call MD for:  persistent dizziness, light-headedness, or visual disturbances     Call MD for:  increased confusion or weakness       Follow-up Information     EVELIA Batres On 10/20/2017.    Specialty:  Internal Medicine  Why:  @ 11:00  Contact information:  [address]  [facility] [id number]  [phone]                   I spent more than 30 minutes total on this discharge including seeing and examining patient, note writing, reconcilling medications, and  discussion with other health providers on team.

## 2017-10-11 NOTE — PROGRESS NOTES
Progress Note  Hospital Medicine      Admit Date: 10/8/2017    SUBJECTIVE:     Follow-up For:  Generalized abdominal pain    HPI/Interval history: feeling week from paracentesis.     Review of Systems: Gen: no fever, no chills, Heart: no chest pain, palpitations; Resp: no SOB, no cough    OBJECTIVE:     Vital Signs Range (Last 24H):  Temp:  [97.4 °F (36.3 °C)-97.6 °F (36.4 °C)]   Pulse:  [73-80]   Resp:  [16-20]   BP: (119-137)/(71-96)   SpO2:  [95 %-97 %]     Physical Exam:  General appearance: NAD, conversant  Neck: FROM, supple   Lungs: Clear to auscultation, no accessory muscle use  CV: RRR, no heave  Abdomen: Soft, non-tender; no masses or HSM  Extremities: No peripheral edema or digital cyanosis  Skin: no rash, lesions or ulcers  Psych: Alert and oriented to person, place and time      Recent Labs  Lab 10/08/17  1734 10/09/17  0500 10/10/17  0531   * 135* 139   K 4.7 4.4 4.3    106 110   CO2 21* 23 24   BUN 48* 46* 37*   CREATININE 1.4 1.3 1.0   * 177* 132*   CALCIUM 8.2* 8.2* 8.2*   MG 2.2 2.3 2.3   PHOS 2.9 3.0 3.5       Recent Labs  Lab 10/08/17  1734 10/09/17  0500 10/10/17  0531   ALKPHOS 92 70 73   ALT 23 17 20   AST 30 22 26   ALBUMIN 2.7* 3.2* 2.9*   PROT 6.3 6.0 5.8*   BILITOT 2.7* 2.7* 2.1*   INR 1.1 1.1 1.0         Recent Labs  Lab 10/08/17  1734 10/08/17  2230 10/09/17  0500 10/10/17  0531   WBC 6.41  --  4.04 4.33   HGB 11.3*  --  9.2* 9.9*   HCT 31.9*  --  26.0* 28.3*   *  --  98* 108*   GRAN 67.3  4.3  --  66.5  2.7 65.8  2.8   SEGS  --  2  --   --    LYMPH 15.3*  1.0  --  20.0  0.8* 20.1  0.9*   LYMPHS  --  65  --   --    MONO 15.3*  1.0  --  11.4  0.5 10.9  0.5         Recent Labs  Lab 10/09/17  1629 10/09/17  2013 10/10/17  0750 10/10/17  1259 10/10/17  1806 10/10/17  2111   POCTGLUCOSE 204* 189* 159* 181* 193* 203*       ASSESSMENT/PLAN:     Decompensated hepatic cirrhosis    Secondary to Hep C and alcohol.   Alert and oriented, patient is not  encephaloplathic.  Hepatolgy consulted: consider transplant or TIPS. Consulting Addiction Psychiatry - high risk for relapse. Intensive outpatient therapy recommended.   Will need psychosocial eval.  Paracentesis (therapeutic) 10/11 yielded 7L of fluid. Empiric ceftriaxone for possible SBP awaiting cultures.     DM II with hyperglycemia  HgbA1c 6.4% - metformin  mg daily  SSI while here

## 2017-10-11 NOTE — PLAN OF CARE
Problem: Fall Risk (Adult)  Goal: Identify Related Risk Factors and Signs and Symptoms  Related risk factors and signs and symptoms are identified upon initiation of Human Response Clinical Practice Guideline (CPG)   Outcome: Ongoing (interventions implemented as appropriate)  No falls noted this shift; fall precautions maintained.    Problem: Pressure Ulcer Risk (Shane Scale) (Adult,Obstetrics,Pediatric)  Goal: Identify Related Risk Factors and Signs and Symptoms  Related risk factors and signs and symptoms are identified upon initiation of Human Response Clinical Practice Guideline (CPG)   Outcome: Ongoing (interventions implemented as appropriate)  No skin breakdown noted.

## 2017-10-11 NOTE — PLAN OF CARE
Problem: Patient Care Overview  Goal: Plan of Care Review  Plan is to discharge patient home today. Patient in agreement with plan.

## 2017-10-12 NOTE — PATIENT INSTRUCTIONS
Discharge Instructions for Acute Pancreatitis  You have been diagnosed with acute pancreatitis. Your pancreas is inflamed or swollen. The pancreas is an organ that makes digestive juices and hormones. Gallstones are a common cause of pancreatitis. These hard stones form in the gallbladder. The gallbladder shares a tube with the pancreas into the small intestine. If gallstones block this tube, fluid cant leave the pancreas. The fluid backs up and causes redness and swelling (inflammation). There are other causes of pancreatitis. Make sure you understand the cause of your pancreatitis. Then you can try to stop it from happening again.  Immediate home care  · Find someone to drive you to appointments. Acute pancreatitis is a serious condition, and you should never drive if you are experiencing symptoms.  · Stop drinking if your illness was caused by alcohol.  ¨ Ask your healthcare provider about alcohol abuse programs and support groups such as Alcoholics Anonymous.  ¨ Ask your provider about prescription medicines that can help you stop drinking.  ¨ Tell your provider about the alcohol withdrawal symptoms you have when you stop drinking. This is very important. You may need close medical supervision and special medicines when you stop drinking. This will depend on your alcohol withdrawal history.   · Take your medicines exactly as directed. Dont skip doses.  · Eat a low-fat diet. Ask your provider for menus and other diet information.  · Learn to take your own pulse. Keep a record of your results. Ask your provider which readings mean that you need medical attention.  Ongoing care  · Tell your provider about any medicines you are taking. Some medicines can cause this condition.  · Before starting any new medicine, ask your provider if it will harm your pancreas. This includes any new over-the-counter medicines, vitamins, or herbal supplements.    · Tell your provider if you lose weight without dieting.  · Be aware  of symptoms that may mean your pancreatitis has come back. These symptoms include belly pain, nausea and vomiting, and fever.  · Keep all follow-up appointments with your provider. Problems can often show up later.  Follow-up  Follow up with your healthcare provider, or as advised.  When to call your provider  Call your healthcare provider right away if you have any of the following:  · Fever of 100.4°F (38.0°C) or higher, or as advised by your provider  · Severe pain from your upper belly to your back  · Nausea and vomiting  · Feely dizzy or lightheaded  · Yellowing of your skin or eyes (jaundice)  · Bruises on your belly or back  · Belly swelling and tenderness  · Rapid pulse  · Shallow, fast breathing   Date Last Reviewed: 8/1/2016  © 7830-9293 [employer]. [address] [place] [address], [place] [address]. All rights reserved. This information is not intended as a substitute for professional medical care. Always follow your healthcare professional's instructions.

## 2017-10-13 PROBLEM — K85.90 ACUTE PANCREATITIS: Status: RESOLVED | Noted: 2017-01-01 | Resolved: 2017-01-01

## 2017-10-13 NOTE — PROGRESS NOTES
Subjective:   Patient ID: Soy Reza is a 66 y.o. male.    Chief Complaint: Hospital Follow Up (feeling better today/ sob is much improved per pt )      HPI  65 yo male here for hosp fu after pericentesis for cirrhosis. No complaints today other than chronic abd pain.    Patient queried and denies any further complaints.        ALLERGIES AND MEDICATIONS: updated and reviewed.  Review of patient's allergies indicates:  No Known Allergies    Current Outpatient Prescriptions:     aspirin 81 MG Chew, Take 81 mg by mouth once daily., Disp: , Rfl:     carvedilol (COREG) 6.25 MG tablet, Take 1 tablet (6.25 mg total) by mouth 2 (two) times daily., Disp: 180 tablet, Rfl: 1    furosemide (LASIX) 20 MG tablet, Take 1 tablet (20 mg total) by mouth once daily., Disp: 30 tablet, Rfl: 11    lactulose (CHRONULAC) 10 gram/15 mL solution, Take 15 mLs (10 g total) by mouth once daily., Disp: 236 mL, Rfl: 0    metformin (GLUCOPHAGE-XR) 500 MG 24 hr tablet, , Disp: , Rfl:     spironolactone (ALDACTONE) 25 MG tablet, Take 1 tablet (25 mg total) by mouth once daily., Disp: 30 tablet, Rfl: 11    hydrocodone-acetaminophen 5-325mg (NORCO) 5-325 mg per tablet, Take 1 tablet by mouth every 6 (six) hours as needed for Pain., Disp: 30 tablet, Rfl: 0    ondansetron (ZOFRAN-ODT) 8 MG TbDL, Take 1 tablet (8 mg total) by mouth every 6 (six) hours as needed., Disp: 30 tablet, Rfl: 1    Review of Systems   Constitutional: Negative for activity change, appetite change, chills, diaphoresis, fatigue, fever and unexpected weight change.   HENT: Negative for congestion, ear discharge, ear pain, postnasal drip, rhinorrhea, sneezing and sore throat.    Eyes: Negative for photophobia and discharge.   Respiratory: Negative for cough, chest tightness, shortness of breath and wheezing.    Cardiovascular: Negative for chest pain and palpitations.   Gastrointestinal: Positive for abdominal pain. Negative for abdominal distention, diarrhea, nausea and  "vomiting.   Genitourinary: Negative for dysuria.   Musculoskeletal: Negative for arthralgias and neck pain.   Skin: Negative for rash.   Neurological: Negative for headaches.       Objective:     Vitals:    10/12/17 1448   BP: 98/61   Pulse: 79   Resp: 20   Temp: 98.7 °F (37.1 °C)   TempSrc: Oral   SpO2: (!) 94%   Weight: 84.1 kg (185 lb 6.5 oz)   Height: 5' 10" (1.778 m)   PainSc: 10-Worst pain ever   PainLoc: Abdomen     Body mass index is 26.6 kg/m².    Physical Exam   Constitutional: He appears well-developed and well-nourished.   HENT:   Head: Normocephalic and atraumatic.   Right Ear: Hearing, tympanic membrane, external ear and ear canal normal. No drainage or swelling. No decreased hearing is noted.   Left Ear: Hearing, tympanic membrane, external ear and ear canal normal. No drainage or swelling. No decreased hearing is noted.   Nose: Nose normal. No rhinorrhea.   Mouth/Throat: Oropharynx is clear and moist. No oropharyngeal exudate, posterior oropharyngeal edema or posterior oropharyngeal erythema.   Eyes: Conjunctivae, EOM and lids are normal. Pupils are equal, round, and reactive to light. Right eye exhibits no discharge and no exudate. Left eye exhibits no discharge and no exudate. Right conjunctiva is not injected. Left conjunctiva is not injected.   Neck: Trachea normal and full passive range of motion without pain. Normal carotid pulses, no hepatojugular reflux and no JVD present. Carotid bruit is not present. No neck rigidity. No edema and no erythema present. No thyroid mass and no thyromegaly present.   Cardiovascular: Normal rate, regular rhythm and normal heart sounds.    Pulmonary/Chest: Effort normal. No respiratory distress.   Abdominal: Soft. Normal appearance and bowel sounds are normal. There is no tenderness. There is negative Blount's sign.   Lymphadenopathy:     He has no cervical adenopathy.   Neurological: He is alert.   Skin: Skin is warm and dry.   Psychiatric: He has a normal mood " and affect. His speech is normal and behavior is normal.       Assessment and Plan:   Soy Bain was seen today for hospital follow up.    Diagnoses and all orders for this visit:    Alcoholism in remission    Essential hypertension    Uncontrolled type 2 diabetes mellitus with diabetic polyneuropathy, without long-term current use of insulin    Hep C w/o coma, chronic    Ascites due to alcoholic cirrhosis    Ascites due to alcoholic hepatitis    Decompensated hepatic cirrhosis    Generalized abdominal pain    Debility    Other orders  -     hydrocodone-acetaminophen 5-325mg (NORCO) 5-325 mg per tablet; Take 1 tablet by mouth every 6 (six) hours as needed for Pain.  -     ondansetron (ZOFRAN-ODT) 8 MG TbDL; Take 1 tablet (8 mg total) by mouth every 6 (six) hours as needed.        Return in about 3 months (around 1/12/2018).    THIS NOTE WILL BE SHARED WITH THE PATIENT.

## 2017-10-15 PROBLEM — E11.22 TYPE 2 DIABETES MELLITUS WITH STAGE 2 CHRONIC KIDNEY DISEASE AND HYPERTENSION: Status: ACTIVE | Noted: 2017-01-01

## 2017-10-15 PROBLEM — I12.9 TYPE 2 DIABETES MELLITUS WITH STAGE 2 CHRONIC KIDNEY DISEASE AND HYPERTENSION: Status: ACTIVE | Noted: 2017-01-01

## 2017-10-15 PROBLEM — K65.2 SPONTANEOUS BACTERIAL PERITONITIS: Status: ACTIVE | Noted: 2017-01-01

## 2017-10-15 PROBLEM — N18.2 TYPE 2 DIABETES MELLITUS WITH STAGE 2 CHRONIC KIDNEY DISEASE AND HYPERTENSION: Status: ACTIVE | Noted: 2017-01-01

## 2017-10-15 PROBLEM — K70.31 ALCOHOLIC CIRRHOSIS OF LIVER WITH ASCITES: Status: ACTIVE | Noted: 2017-01-01

## 2017-10-15 NOTE — ED TRIAGE NOTES
"Pt reports upper abdominal pain for several months as well as n/v. Pt reports being a "frequent patient in the ED for similar pain." pt denies CP,SOB.  "

## 2017-10-15 NOTE — ED NOTES
LOC: The patient is awake, alert, and oriented to place, time, situation. Affect is appropriate. Speech is appropriate and clear.       APPEARANCE: Patient resting comfortably in no acute distress. Patient is clean and well groomed.     SKIN: The skin is warm and dry; color consistent with ethnicity. Patient has normal skin turgor and moist mucus membranes. Skin intact; no breakdown or bruising noted.      MUSCULOSKELETAL: Patient moving upper and lower extremities without difficulty. Denies weakness.       RESPIRATORY: Airway is open and patent. Respirations spontaneous, even, easy, and non-labored. Patient has a normal effort and rate. No accessory muscle use noted. Denies cough.       CARDIAC: Normal rhythm and rate noted. No peripheral edema noted. No complaints of chest pain.       ABDOMEN: Soft and tender to palpation. No distention noted.  nausea, vomiting, upper abdominal pain.     NEUROLOGIC: Eyes open spontaneously. Behavior appropriate to situation. Follows commands; facial expression symmetrical. Purposeful motor response noted; normal sensation in all extremities.

## 2017-10-15 NOTE — ED PROVIDER NOTES
Encounter Date: 10/15/2017       History     Chief Complaint   Patient presents with    Abdominal Pain     Pt prsesented to the ED via [facility]. Pt c/o abdominal pain x 3 months. Pt denies nausea and vomiting.      Mr. Reza is a 65 yo man with HTN, DM2, Hep C & alcoholic decompensated liver cirrhosis who presents to the ED with generalized abdominal pain that has worsened since yesterday.  Patient was recently admitted 10/8-10/11 for symptomatic ascites and had 7.7L of fluid removed.  Patient reports feeling better after the tap but still complains of weakness, SOB, decreased appetite, N/V last night, and generalized abdominal pain.  He denies any fevers, chills, lightheadedness, dizziness, confusion, extremity edema.           Review of patient's allergies indicates:  No Known Allergies  Past Medical History:   Diagnosis Date    Diabetes mellitus     Hep C w/o coma, chronic     Hypertension     Macular degeneration     Neuropathy      Past Surgical History:   Procedure Laterality Date    EYE SURGERY      HAND SURGERY       No family history on file.  Social History   Substance Use Topics    Smoking status: Former Smoker    Smokeless tobacco: Never Used    Alcohol use No      Comment: 2 5th per week- stopped one month ago approx 9/10/16     Review of Systems   Constitutional: Positive for appetite change and fatigue. Negative for chills and fever.   HENT: Negative for congestion, sore throat and trouble swallowing.    Eyes: Negative for visual disturbance.   Respiratory: Negative for cough and shortness of breath.    Cardiovascular: Negative for chest pain and leg swelling.   Gastrointestinal: Negative for abdominal distention, abdominal pain, constipation, diarrhea and nausea.   Genitourinary: Negative for difficulty urinating, dysuria and hematuria.   Skin: Negative for rash.   Neurological: Positive for weakness. Negative for dizziness, light-headedness and headaches.   Psychiatric/Behavioral: Negative  for confusion.       Physical Exam     Initial Vitals [10/15/17 1344]   BP Pulse Resp Temp SpO2   125/73 81 17 98 °F (36.7 °C) 98 %      MAP       90.33         Physical Exam    Constitutional:   Cachetic appearing   HENT:   Head: Normocephalic and atraumatic.   Mouth/Throat: Oropharynx is clear and moist.   Eyes: Conjunctivae and EOM are normal. Pupils are equal, round, and reactive to light. No scleral icterus.   Neck: Normal range of motion. Neck supple.   Cardiovascular: Normal rate, regular rhythm and normal heart sounds.   Pulmonary/Chest: Breath sounds normal. No respiratory distress.   Abdominal: Soft. Bowel sounds are normal. He exhibits distension (+ ascites but not tense). There is tenderness (generalized). There is no rebound and no guarding.   Musculoskeletal: Normal range of motion. He exhibits no edema or tenderness.   Neurological: He is alert.   Oriented to person, place, month and year only. +asterixis   Skin: Skin is warm and dry.         ED Course   Procedures  Labs Reviewed   CULTURE, BLOOD   CULTURE, BLOOD   CBC W/ AUTO DIFFERENTIAL   COMPREHENSIVE METABOLIC PANEL   URINALYSIS, REFLEX TO URINE CULTURE   PROTIME-INR   LIPASE                   APC / Resident Notes:   67 yo man with HTN, DM2, Hep C & alcoholic decompensated liver cirrhosis who presents to the ED with generalized abdominal pain that has worsened since yesterday.     VSS. Generalized abdominal tenderness, nausea, asterixis on exam. Will give Zofran.  Will obtain basic labs and give one dose of Rocephin for SBP ppx.  CBC shows no leukocytosis and CMP significant for mild hyponatremia which is chronic, mild hyperkalemia (5.4), and elevated TBili (1.4) which is chronic.  EKG normal.  Will admit to observation for further management of decompensated cirrhosis.               ED Course      Clinical Impression:   The primary encounter diagnosis was Decompensated hepatic cirrhosis. Diagnoses of Hyperkalemia, Abdominal pain, unspecified  abdominal location, and Chylous ascites were also pertinent to this visit.                           Vanessa Bryan MD  Resident  10/17/17 0981

## 2017-10-16 PROBLEM — I89.8 CHYLOUS ASCITES: Status: ACTIVE | Noted: 2017-01-01

## 2017-10-16 NOTE — PT/OT/SLP EVAL
Physical Therapy  Evaluation/Treatment    Soy Reza   MRN: [id number]   Admitting Diagnosis: Chylous ascites    PT Received On: 10/16/17  PT Start Time: 1136     PT Stop Time: 1156    PT Total Time (min): 20 min       Billable Minutes:  Evaluation 12 and Therapeutic Exercise 8    Diagnosis: Chylous ascites      Past Medical History:   Diagnosis Date    Diabetes mellitus     Hep C w/o coma, chronic     Hypertension     Macular degeneration     Neuropathy       Past Surgical History:   Procedure Laterality Date    EYE SURGERY      HAND SURGERY         Referring physician: Lisandra Sutton MD  Date referred to PT: 10/15/17    General Precautions: Standard, fall  Orthopedic Precautions: N/A   Braces: N/A       Do you have any cultural, spiritual, Congregation conflicts, given your current situation?: none    Patient History:  Lives With: parent(s)  Living Arrangements: house  Home Accessibility: stairs to enter home  Home Layout: Able to live on 1st floor  Number of Stairs to Enter Home: 2  Stair Railings at Home: none  Transportation Available: car, family or friend will provide  Living Environment Comment: pt lives with mother in [facility] with 2STE without rail, PLOF mod I with Rollator and SPC including driving and community ambulation, pt has had multiple hospitalizations with varying functional ability and independence, pt has walk in shower with bench and mother is able to assist as needed  Equipment Currently Used at Home: rollator, bath bench, cane, straight  DME owned (not currently used): none    Previous Level of Function:  Ambulation Skills: needs device  Transfer Skills: needs device  ADL Skills: needs device  Work/Leisure Activity: needs device    Subjective:  Communicated with pt and nurse prior to session.  Pt agreeable to PT  Chief Complaint: weakness, fatigue, and abdominal pain  Patient goals: to return home with increased independence    Pain/Comfort  Pain Rating 1: 6/10  Location 1: abdomen  Pain  Addressed 1: Pre-medicate for activity, Reposition, Distraction      Objective:   Patient found with: peripheral IV   Pt found supine in bed with nurse present     Cognitive Exam:  Oriented to: Person, Place, Time and Situation    Follows Commands/attention: Follows two-step commands  Communication: clear/fluent  Safety awareness/insight to disability: impaired    Physical Exam:  Postural examination/scapula alignment: Posterior pelvic tilt and Kyphosis    Skin integrity: Visible skin intact  Edema: None noted     Sensation:   Intact      Lower Extremity Range of Motion:  Right Lower Extremity: WFL  Left Lower Extremity: WFL    Lower Extremity Strength:  Right Lower Extremity: Deficits: 3+/5 gross LE strength  Left Lower Extremity: Deficits: 3+/5 gross LE strength     Fine motor coordination:  Intact    Gross motor coordination: WFL    Functional Mobility:  Bed Mobility:  Rolling/Turning Right: Modified independent  Scooting/Bridging: Modified Independent  Supine to Sit: Stand by Assistance    Transfers:  Sit <> Stand Assistance: Contact Guard Assistance  Sit <> Stand Assistive Device: Rolling Walker    Gait:   Gait Distance: ~100 feet  Assistance 1: Contact Guard Assistance  Gait Assistive Device: Rolling walker  Gait Pattern: reciprocal  Gait Deviation(s): decreased sandeep, increased time in double stance, decreased velocity of limb motion, decreased toe-to-floor clearance, forward lean    Balance:   Static Sit: GOOD: Takes MODERATE challenges from all directions  Dynamic Sit: GOOD: Maintains balance through MODERATE excursions of active trunk movement  Static Stand: FAIR: Maintains without assist but unable to take challenges  Dynamic stand: FAIR: Needs CONTACT GUARD during gait    Therapeutic Activities and Exercises:  Seated therex including:  Ankle pumps 10  LAQ 10  Hip flexion 10    PT educated pt on:   -role of PT and POC    -importance of OOB activity   -LE exercises to perform independently   -call for  assistance for transfers      AM-PAC 6 CLICK MOBILITY  How much help from another person does this patient currently need?   1 = Unable, Total/Dependent Assistance  2 = A lot, Maximum/Moderate Assistance  3 = A little, Minimum/Contact Guard/Supervision  4 = None, Modified [place]/Independent    Turning over in bed (including adjusting bedclothes, sheets and blankets)?: 4  Sitting down on and standing up from a chair with arms (e.g., wheelchair, bedside commode, etc.): 3  Moving from lying on back to sitting on the side of the bed?: 3  Moving to and from a bed to a chair (including a wheelchair)?: 3  Need to walk in hospital room?: 3  Climbing 3-5 steps with a railing?: 3  Total Score: 19     AM-PAC Raw Score CMS G-Code Modifier Level of Impairment Assistance   6 % Total / Unable   7 - 9 CM 80 - 100% Maximal Assist   10 - 14 CL 60 - 80% Moderate Assist   15 - 19 CK 40 - 60% Moderate Assist   20 - 22 CJ 20 - 40% Minimal Assist   23 CI 1-20% SBA / CGA   24 CH 0% Independent/ Mod I     Patient left up in chair with call button in reach and nurse notified.    Assessment:   Soy Reza is a 66 y.o. male with a medical diagnosis of Chylous ascites and presents with generalized weakness, impaired balance, gait, and endurance with decreased coordination and safety awareness with complaints of abdominal pain requiring SBA for bed mobility and CGA for transfers and ambulation using RW for short distances at this time. Pt fatigues easily and requires frequent rest breaks. Pt will benefit from skilled PT treatment to address impairments and improve functional mobility and independence in order to return home.    History: chylous ascites that impacts POC  Eval of Body Systems:  musculoskeletal, neuromuscular  Functional Outcome Tools: AMPAC, MMT, ROM, VAS  Clinical Presentation: stable  Eval Complexity: Low      Rehab identified problem list/impairments: Rehab identified problem list/impairments: weakness,  impaired endurance, impaired self care skills, impaired functional mobilty, gait instability, impaired balance, decreased coordination, decreased safety awareness, pain    Rehab potential is good.    Activity tolerance: Good    Discharge recommendations: Discharge Facility/Level Of Care Needs: home with home health     Barriers to discharge: Barriers to Discharge: None    Equipment recommendations: Equipment Needed After Discharge: walker, rolling     GOALS:    Physical Therapy Goals        Problem: Physical Therapy Goal    Goal Priority Disciplines Outcome Goal Variances Interventions   Physical Therapy Goal     PT/OT, PT Ongoing (interventions implemented as appropriate)     Description:  Goals to be met by:  10/23/17    Patient will increase functional independence with mobility by performin. Supine to sit with Modified [place]  2. Sit to supine with Modified [place]  3. Sit to stand transfer with Supervision  4. Bed to chair transfer with Supervision using Rolling Walker  5. Gait  x 150 feet with Supervision using Rolling Walker.   6. Ascend/descend 2 stair without Handrails Minimal Assistance using Rolling Walker.   7. Lower extremity exercise program x20 reps per handout, with independence                      PLAN:    Patient to be seen 4 x/week to address the above listed problems via gait training, therapeutic activities, therapeutic exercises, neuromuscular re-education  Plan of Care expires: 11/15/17  Plan of Care reviewed with: patient          Meghann Phillips, PT  10/16/2017

## 2017-10-16 NOTE — NURSING
Patient admitted to Room 1125 A. No pain or distress noted. Vital signs stable. Will continue to monitor

## 2017-10-16 NOTE — PROGRESS NOTES
Progress Note  [facility] Medicine      Admit Date: 10/15/2017    SUBJECTIVE:     Follow-up For:  Chylous ascites    HPI/Interval history: no new complaints. Feel much improved with paracentesis    Review of Systems: Gen: no fever, no chills, Heart: no chest pain, palpitations; Resp: no SOB, no cough    OBJECTIVE:     Vital Signs Range (Last 24H):  Temp:  [96.3 °F (35.7 °C)-98.3 °F (36.8 °C)]   Pulse:  [72-96]   Resp:  [13-20]   BP: (101-160)/(62-85)   SpO2:  [95 %-100 %]     Physical Exam:  General appearance: NAD, conversant  Neck: FROM, supple   Lungs: Clear to auscultation, no accessory muscle use  CV: RRR, no heave  Abdomen: Soft, non-tender; no masses or HSM, less distended with normal active bowel sounds  Extremities: No peripheral edema or digital cyanosis  Skin: no rash, lesions or ulcers  Psych: Alert and oriented to person, place and time    Imaging Results          IR Paracentesis with Imaging (In process)                US Abdomen Limited with Doppler (xpd) (Final result)  Result time 10/16/17 10:20:41    Final result by Hawa Zuñiga MD (10/16/17 10:20:41)                 Impression:        No evidence of portal vein thrombosis as clinically questioned.    Satisfactory Doppler status of the liver.    Sequela of portal hypertension including splenomegaly, ascites and splenic collaterals.    Bilateral pleural effusion.    Distended gallbladder containing significant amount of sludge and stones with minimally thickened gallbladder wall.  No definite evidence of cholecystitis.  Clinical correlation advised.  ______________________________________     Electronically signed by resident: LUCRECIA MEJIA MD  Date:     10/16/17  Time:    09:39            As the supervising and teaching physician, I personally reviewed the images and resident's interpretation and I agree with the findings.          Electronically signed by: Hawa Zuñiga MD  Date:     10/16/17  Time:    10:20              Narrative:     ULTRASOUND ABDOMEN COMPLETE WITH DOPPLER    INDICATION: Chylous ascites; evaluate for portal thrombosis    COMPARISON: Ultrasound abdomen 10/8/2017.    FINDINGS:   The pancreas is obscured by bowel gas and therefore not visualized.  The liver demonstrates nodular contour and heterogeneous parenchyma and measures 16.5 cm, suggestive of cirrhosis.  No evidence of focal hepatic abnormalities.  Ascites as well as bilateral pleural effusions.    The gallbladder is distended and filled with sludge and small stones.  Gallbladder wall is thickened but likely secondary to underlying liver disease and ascites.  No evidence of gallbladder hyperemia.  Sonographic Blount's sign is negative.  No evidence of intra-or extrahepatic biliary ductal dilatation with the common bile duct measuring 0.4 cm.    Vascular:  The main, right and left portal veins as well as the middle, right and left hepatic veins are patent and demonstrate appropriate flow.  The main portal vein is dilated measuring 1.5 cm.  IVC is visualized.  The main hepatic artery demonstrates a peak systolic velocity of 60.8 cm/s with a resistive arterial of 0.70.  Splenic vascular collaterals are noted.  No evidence of recanalized umbilical vein.                              ASSESSMENT/PLAN:   Chylous ascites  Hepatology requesting work up of source. Most likely due to portal HTN. Discontinued antibiotics as WBC in 500s. Triple phase contrast CT of abdomen and pelvis.     Alcoholic cirrhosis with recurring ascites  Hepatic hydrothroax  Therapeutic paracentesis tap to day yielded 5L. Continue furosemide 20 mg daily. Typically needs paracentesis q5 days.      DM II with HTN and CKD II  HgbA1c 6.4% - metformin  mg daily  Low dose SSI while here      Asterixis - lactulose 20 g TID and rifaxamin 550 mg BID    Essential HTN - continue carvedilol 6.25 mg BID     Debility - PT/OT     Alcohol abuse in remission  9/26/2017 PETH negative   Check  PETH     Hyperkalemia  Continue furosemide  Consider albumin infusion + furosemide if not improving

## 2017-10-16 NOTE — PLAN OF CARE
Problem: Patient Care Overview  Goal: Plan of Care Review  Outcome: Ongoing (interventions implemented as appropriate)  Patient resting in bed. Alert and oriented x 4. Denies pain and reports that he is feeling much better after the paracentesis this AM. NPO maintained for CT of abdomen and pelvis this PM. Oral contrast x 2 doses taken by patient. Right abdomen dressing remains clean, dry and intact. Fall precautions maintained at all times.    Patient has Kayexalate ordered for K level of 5.6. Will administer Kayexalate when patient completes CT of abdomen and pelvis.

## 2017-10-16 NOTE — PLAN OF CARE
Problem: Occupational Therapy Goal  Goal: Occupational Therapy Goal  Goals to be met by: 10/23/2017    Patient will increase functional independence with ADLs by performing:    UE Dressing with Modified [place].  LE Dressing with Modified [place].  Grooming while standing at sink with Modified [place].  Toileting from toilet with Supervision for hygiene and clothing management.   Bathing with Supervision.  Supine to sit with Modified [place].  Stand pivot transfers with Modified [place].  Toilet transfer to toilet with Modified [place] using RW.    Outcome: Ongoing (interventions implemented as appropriate)  Patient's goals are set.  KYLIE Leija  10/16/2017

## 2017-10-16 NOTE — H&P
History & Physical  Hospital Medicine      SUBJECTIVE:     Chief Complaint/Reason for Admission: abdominal pain    History of Present Illness:  Patient is a 66 y.o. male with alcoholic cirrhosis and recurring ascites presents with abdominal pain and SOB since yesterday. He was recently admitted 10/2 and 10/8 for symptomatic ascites requiring paracentesis. He denies fever or chills. Patient has had loose stool yesterday and nausea as well. No vomiting. He denies chest pain. SOB is on minimal exertion and has associated PND. There has been some worsening BLE and abdominal swelling since discharge. Abdominal pain is in lower quadrants. Pain is worsened with activity and is unaware for improving factors.    Allergies: Patient has No Known Allergies.     PTA Medications   Medication Sig    aspirin 81 MG Chew Take 81 mg by mouth once daily.    carvedilol (COREG) 6.25 MG tablet Take 1 tablet (6.25 mg total) by mouth 2 (two) times daily.    furosemide (LASIX) 20 MG tablet Take 1 tablet (20 mg total) by mouth once daily.    hydrocodone-acetaminophen 5-325mg (NORCO) 5-325 mg per tablet Take 1 tablet by mouth every 6 (six) hours as needed for Pain.    lactulose (CHRONULAC) 10 gram/15 mL solution Take 15 mLs (10 g total) by mouth once daily.    metformin (GLUCOPHAGE-XR) 500 MG 24 hr tablet     ondansetron (ZOFRAN-ODT) 8 MG TbDL Take 1 tablet (8 mg total) by mouth every 6 (six) hours as needed.    spironolactone (ALDACTONE) 25 MG tablet Take 1 tablet (25 mg total) by mouth once daily.     Past Medical History: Patient has a past medical history of Diabetes mellitus; Hep C w/o coma, chronic; Hypertension; Macular degeneration; and Neuropathy.  Past Surgical History: Patient has a past surgical history that includes Eye surgery and Hand surgery.  Social History: Patient reports that he has quit smoking. He has never used smokeless tobacco. He reports that he does not drink alcohol or use drugs.  Family History: family  history is not on file.  PCP: Jean Carols Swanson MD    Review of Systems:  General/Constitutional - no fevers, chills, no night sweats  Skin - no rash  ENT - no rhinorrhea, no sore throat  Cardiovascular - no chest pain, no palpitations  Respiratory - no SOB, no cough  Gastrointestinal - no nausea, no vomiting  Genitourinary - no dysuria, no urinary retention  Musculoskeletal - see HPI  Neurologic - no generalized weakness, no dizziness  Psychiatric - no depression, no anxiety  Endocrine - no polyuria, no polydypsia  All other systems reviewed and are negative    OBJECTIVE:     Vital Signs (Most Recent)  Temp: 98.3 °F (36.8 °C) (10/15/17 1853)  Pulse: 90 (10/15/17 1853)  Resp: 16 (10/15/17 1853)  BP: (!) 147/81 (10/15/17 1853)  SpO2: 98 % (10/15/17 1853)    Physical Exam:  General Appearance: Well-developed well-nourished in no apparent distress, Well-groomed in hospital gown.  Eyes: anicteric, no scleral or conjunctival injection, no discharge  Ears, Nose, Mouth and Throat- Ears symmetric without pits, nose symmetric, MMM, pink, OP clear, no thrush  Neck: supple, non-tender, no goiter  Respiratory: no accessory muscle use, lungs clear to auscultation  Cardiovascular: no thrills, no heave, regular rate and rhythm without murmur  GI: normal active bowel sounds, tender to palpation throughout, no organomegaly, non-distended, no mass, distended but soft, shifting dullness to percussion  Lymphatic- no cervical or groin lymphadenopathy  Musculoskeletal- no clubbing, no cyanosis; no edema  Skin: Normal temperature, turgor and texture; no rash, no subcutaneous nodules  Neurology: speech normal, sensation grossly intact, A&O x3    Laboratory    Recent Labs  Lab 10/09/17  0500 10/10/17  0531 10/11/17  0522 10/15/17  1412   * 139 135* 134*   K 4.4 4.3 4.3 5.4*    110 106 105   CO2 23 24 22* 20*   BUN 46* 37* 36* 55*   CREATININE 1.3 1.0 1.3 1.2   * 132* 170* 243*   CALCIUM 8.2* 8.2* 8.2* 8.6*   MG 2.3 2.3 2.3   --    PHOS 3.0 3.5 4.0  --        Recent Labs  Lab 10/10/17  0531 10/11/17  0522 10/15/17  1412   ALKPHOS 73 68 108   ALT 20 20 23   AST 26 26 22   ALBUMIN 2.9* 3.1* 2.8*   PROT 5.8* 5.7* 6.6   BILITOT 2.1* 1.6* 1.4*   INR 1.0 1.1 0.9         Recent Labs  Lab 10/08/17  2230  10/10/17  0531 10/11/17  0522 10/15/17  1412   WBC  --   < > 4.33 5.59 5.38   HGB  --   < > 9.9* 10.5* 10.7*   HCT  --   < > 28.3* 30.3* 30.4*   PLT  --   < > 108* 110* 89*   GRAN  --   < > 65.8  2.8 65.3  3.7 74.9*  4.0   SEGS 2  --   --   --   --    LYMPH  --   < > 20.1  0.9* 17.4*  1.0 9.7*  0.5*   LYMPHS 65  --   --   --   --    MONO  --   < > 10.9  0.5 13.4  0.8 14.3  0.8   < > = values in this interval not displayed.      Recent Labs  Lab 10/10/17  1806 10/10/17  2111 10/11/17  0806 10/11/17  1213 10/15/17  1547 10/15/17  1741   POCTGLUCOSE 193* 203* 186* 196* 209* 243*       Peritoneal fluid milky pink    ASSESSMENT/PLAN:   Spontaneous bacterial peritonitis  Ceftriaxone 2 g IV daily. Diagnostic tap showed fluid very cloudy. Awaiting cell count. Hepatology consult.    Alcoholic cirrhosis with recurring ascites  Hepatic hydrothroax  Therapeutic paracentesis tap tomorrow.   Furosemide 20 mg daily    DM II with HTN and CKD II  HgbA1c 6.4% - metformin  mg daily  Low dose SSI while here     Essential HTN - continue carvedilol 6.25 mg BID    Debility - PT/OT    Alcohol abuse in remission  9/26/2017 PETH negative   Check PETH    Hyperkalemia  Continue furosemide  Consider albumin infusion + furosemide if not improving

## 2017-10-16 NOTE — CONSULTS
[facility]  Hepatology  Consult Note    Patient Name: Soy Reza  MRN: [id number]  Admission Date: 10/15/2017  Hospital Length of Stay: 1 days  Attending Provider: Niko Ordonez MD   Primary Care Physician: Jean Carlos Swanson MD  Principal Problem:Chylous ascites    Inpatient consult to Hepatology  Consult performed by: BANDAR EVERETT  Consult ordered by: NIKO ORDONEZ        Subjective:     Transplant status: No    HPI:  This is a 66 year old male with a history of HepC/Alcoholic cirrhosis complicated with refractory ascites as well as hepatic hydrothorax admitted to the hospital for generalized abdominal pain that has worsened since yesterday.  Patient was recently admitted 10/8-10/11 for symptomatic ascites and had 7.7L of fluid removed. Seen by hepatology team at that time.  Associated symptoms of weakness, SOB, decreased appetite, N/V last night, and generalized abdominal pain.    Diagnostic tap reportedly very cloudy. 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    He lives with his mother and states that he stopped drinking alcohol 1 month ago. He does not follow a diet low in sodium nor watches his fluid intake.    Review of Systems   Constitutional: Positive for fatigue. Negative for chills and fever.   HENT: Negative for facial swelling, mouth sores and trouble swallowing.    Eyes: Negative for pain and redness.   Respiratory: Positive for shortness of breath. Negative for cough.    Cardiovascular: Negative for chest pain and leg swelling.   Gastrointestinal: Positive for abdominal distention and nausea.   Genitourinary: Negative for dysuria and hematuria.   Musculoskeletal: Negative for gait problem and neck stiffness.   Skin: Negative for rash and wound.   Neurological: Negative for seizures and headaches.   Psychiatric/Behavioral: Negative for confusion and hallucinations.       Past Medical  History:   Diagnosis Date    Diabetes mellitus     Hep C w/o coma, chronic     Hypertension     Macular degeneration     Neuropathy        Past Surgical History:   Procedure Laterality Date    EYE SURGERY      HAND SURGERY         Family history of liver disease: No    Review of patient's allergies indicates:  No Known Allergies    Social History Main Topics    Smoking status: Former Smoker    Smokeless tobacco: Never Used    Alcohol use No      Comment: 2 5th per week- stopped one month ago approx 9/10/16    Drug use: No    Sexual activity: Not on file       Prescriptions Prior to Admission   Medication Sig Dispense Refill Last Dose    aspirin 81 MG Chew Take 81 mg by mouth once daily.   10/15/2017    carvedilol (COREG) 6.25 MG tablet Take 1 tablet (6.25 mg total) by mouth 2 (two) times daily. 180 tablet 1 10/15/2017    furosemide (LASIX) 20 MG tablet Take 1 tablet (20 mg total) by mouth once daily. 30 tablet 11 10/15/2017    hydrocodone-acetaminophen 5-325mg (NORCO) 5-325 mg per tablet Take 1 tablet by mouth every 6 (six) hours as needed for Pain. 30 tablet 0 10/15/2017    lactulose (CHRONULAC) 10 gram/15 mL solution Take 15 mLs (10 g total) by mouth once daily. 236 mL 0 10/15/2017    metformin (GLUCOPHAGE-XR) 500 MG 24 hr tablet    10/15/2017    ondansetron (ZOFRAN-ODT) 8 MG TbDL Take 1 tablet (8 mg total) by mouth every 6 (six) hours as needed. 30 tablet 1 10/15/2017    spironolactone (ALDACTONE) 25 MG tablet Take 1 tablet (25 mg total) by mouth once daily. 30 tablet 11 10/15/2017       Objective:     Vital Signs (Most Recent):  Temp: 97.6 °F (36.4 °C) (10/16/17 1536)  Pulse: 67 (10/16/17 1536)  Resp: 16 (10/16/17 1536)  BP: 111/69 (10/16/17 1536)  SpO2: 98 % (10/16/17 1536) Vital Signs (24h Range):  Temp:  [96.3 °F (35.7 °C)-98.3 °F (36.8 °C)] 97.6 °F (36.4 °C)  Pulse:  [67-96] 67  Resp:  [16-20] 16  SpO2:  [95 %-100 %] 98 %  BP: (101-160)/(62-84) 111/69     Weight: 73.2 kg (161 lb 6 oz)  (10/15/17 2029)  Body mass index is 22.51 kg/m².    Physical Exam   Constitutional: He is oriented to person, place, and time. No distress.   Thin and frail   HENT:   Head: Normocephalic and atraumatic.   Eyes: Conjunctivae and EOM are normal.   Neck: Neck supple. No thyromegaly present.   Cardiovascular: Normal rate, regular rhythm and intact distal pulses.    Pulmonary/Chest: Effort normal and breath sounds normal. No respiratory distress.   Diminished breath sounds over right  base   Abdominal: Soft. He exhibits distension (modest distention wtihout rebound). There is no rebound.   Bandage over RLQ    Musculoskeletal: Normal range of motion. He exhibits no tenderness.   Neurological: He is alert and oriented to person, place, and time. No cranial nerve deficit.   Skin: Skin is warm and dry. No rash noted.   Psychiatric: He has a normal mood and affect. His behavior is normal.   Vitals reviewed.      MELD-Na score: 9 at 10/16/2017 11:46 AM  MELD score: 9 at 10/16/2017 11:46 AM  Calculated from:  Serum Creatinine: 1.2 mg/dL at 10/16/2017 11:46 AM  Serum Sodium: 136 mmol/L at 10/16/2017 11:46 AM  Total Bilirubin: 1.3 mg/dL at 10/16/2017 11:46 AM  INR(ratio): 0.9 (Rounded to 1) at 10/15/2017  2:12 PM  Age: 66 years    Significant Labs:  CBC:   Recent Labs  Lab 10/15/17  1412   WBC 5.38   RBC 3.95*   HGB 10.7*   HCT 30.4*   PLT 89*     BMP:   Recent Labs  Lab 10/16/17  1146   *      K 5.6*      CO2 22*   BUN 48*   CREATININE 1.2   CALCIUM 8.8     CMP:   Recent Labs  Lab 10/16/17  1146   *   CALCIUM 8.8   ALBUMIN 3.2*   PROT 6.2      K 5.6*   CO2 22*      BUN 48*   CREATININE 1.2   ALKPHOS 103   ALT 18   AST 20   BILITOT 1.3*       Significant Imaging:  Labs: Reviewed  reviewed    Assessment/Plan:     * Chylous ascites    Concern for chylous ascites given turbid milky appearance as reported.  If confirmed chylous, unclear etiology, although can be related to cirrhosis. (his SAAG of  1.6 supports this)  Can also be found in cardiac disease (right heart failure and constriction) and TB and anatomic abnormalities as well and malignancy (lymphoma) and anatomic abnormalities      Plan:  Await Triglyceride level in ascites  Await ascitic fluid culture  Send ascitic fluid for cytology  Check Echo  Check CT abd pelvis, triple phase, rule out malignancy, eval chylous ascites etc.  Check quant gold  Resume home lasix, hold on addition of aldactone given  Mild hyperK            Thank you for your consult. I will follow-up with patient. Please contact us if you have any additional questions.    Bebo Bustos MD  Hepatology  [facility]

## 2017-10-16 NOTE — PT/OT/SLP EVAL
Occupational Therapy  Evaluation/Treatment    Soy Reza   MRN: [id number]   Admitting Diagnosis: Chylous ascites    OT Date of Treatment: 10/16/17   OT Start Time: 1200  OT Stop Time: 1223  OT Total Time (min): 23 min    Billable Minutes:  Evaluation 15  Self Care/Home Management 8    Diagnosis: Chylous ascites     Past Medical History:   Diagnosis Date    Diabetes mellitus     Hep C w/o coma, chronic     Hypertension     Macular degeneration     Neuropathy       Past Surgical History:   Procedure Laterality Date    EYE SURGERY      HAND SURGERY       General Precautions: Standard, fall  Orthopedic Precautions: N/A  Braces: N/A          Patient History:  Living Environment  Lives With: parent(s)  Living Arrangements: house  Home Accessibility: stairs to enter home  Home Layout: Able to live on 1st floor  Number of Stairs to Enter Home: 2  Stair Railings at Home: none  Transportation Available: family or friend will provide, car  Living Environment Comment: Patient lives with mother in a 1  with 2 AMADO and no rail. Patient has a walk in shower with bench and used a standard toilet. Patient was independent with ADLs and used a rollator. Patient also owns a straight cane. Patient was driving up until 2 weeks ago.   Equipment Currently Used at Home: cane, straight, rollator, shower chair    Prior level of function:   Bed Mobility/Transfers: independent  Grooming: independent  Bathing: needs device  Upper Body Dressing: independent  Lower Body Dressing: independent  Toileting: independent        Dominant hand: right    Subjective:  Communicated with patient prior to session.    Chief Complaint: no complaints at this time   Patient/Family stated goals: to go home    Pain/Comfort  Pain Rating 1: 0/10  Pain Rating Post-Intervention 1: 0/10    Objective:       Cognitive Exam:  Oriented to: Person, Place, Time and Situation  Follows Commands/attention: Follows multistep  commands  Communication:  clear/fluent  Memory:  No Deficits noted  Safety awareness/insight to disability: impaired  Coping skills/emotional control: Appropriate to situation    Visual/perceptual:  Intact    Physical Exam:  Postural examination/scapula alignment: No postural abnormalities identified  Skin integrity: Visible skin intact  Edema: None noted     Sensation:   Intact    Upper Extremity Range of Motion:  Right Upper Extremity: WFL  Left Upper Extremity: WFL    Upper Extremity Strength:  Right Upper Extremity: WFL  Left Upper Extremity: WFL   Strength: WFL    Fine motor coordination:   Intact    Gross motor coordination: WFL    Functional Mobility:  Transfers:  Sit <> Stand Assistance: Contact Guard Assistance  Sit <> Stand Assistive Device: Rolling Walker  Bed <> Chair Technique: Stand Pivot  Bed <> Chair Transfer Assistance: Stand By Assistance  Bed <> Chair Assistive Device: No Assistive Device    Activities of Daily Living:     UE Dressing Level of Assistance: Stand by assistance ([place] and donning pullover shirt)  LE Dressing Level of Assistance: Supervision ([place] and donning socks and shoes)  Grooming Position: Standing at sink  Grooming Level of Assistance: Contact guard assistance (oral care, washing face, and washing/drying hands)  Toileting Where Assessed: Toilet  Toileting Level of Assistance: Moderate assistance    Balance:   Static Sit: GOOD: Takes MODERATE challenges from all directions  Dynamic Sit: GOOD: Maintains balance through MODERATE excursions of active trunk movement  Static Stand: FAIR+: Takes MINIMAL challenges from all directions  Dynamic stand: FAIR: Needs CONTACT GUARD during gait      AM-PAC 6 CLICK ADL  How much help from another person does this patient currently need?  1 = Unable, Total/Dependent Assistance  2 = A lot, Maximum/Moderate Assistance  3 = A little, Minimum/Contact Guard/Supervision  4 = None, Modified [place]/Independent    Putting on and taking off regular lower body  "clothing? : 3  Bathing (including washing, rinsing, drying)?: 3  Toileting, which includes using toilet, bedpan, or urinal? : 2  Putting on and taking off regular upper body clothing?: 3  Taking care of personal grooming such as brushing teeth?: 3  Eating meals?: 4  Total Score: 18    AM-PAC Raw Score CMS "G-Code Modifier Level of Impairment Assistance   6 % Total / Unable   7 - 9 CM 80 - 100% Maximal Assist   10-14 CL 60 - 80% Moderate Assist   15 - 19 CK 40 - 60% Moderate Assist   20 - 22 CJ 20 - 40% Minimal Assist   23 CI 1-20% SBA / CGA   24 CH 0% Independent/ Mod I       Patient left up in chair with call button in reach and all needs met.     Assessment:  Soy Reza is a 66 y.o. male with a medical diagnosis of Chylous ascites and presents with the deficits listed below. Patient will benefit from skilled OT services to achieve maximal independence prior to discharge. Low complexity evaluation due to decreased self care performance, decreased functional mobility, and decreased safety awareness.     Rehab identified problem list/impairments: Rehab identified problem list/impairments: weakness, impaired endurance, impaired self care skills, impaired functional mobilty, gait instability, impaired balance, decreased coordination, decreased safety awareness    Rehab potential is good.    Activity tolerance: Good    Discharge recommendations: Discharge Facility/Level Of Care Needs: home with home health     Barriers to discharge: Barriers to Discharge: None    Equipment recommendations: walker, rolling     GOALS:    Occupational Therapy Goals        Problem: Occupational Therapy Goal    Goal Priority Disciplines Outcome Interventions   Occupational Therapy Goal     OT, PT/OT Ongoing (interventions implemented as appropriate)    Description:  Goals to be met by: 10/23/2017    Patient will increase functional independence with ADLs by performing:    UE Dressing with Modified [place].  LE Dressing with " Modified [place].  Grooming while standing at sink with Modified [place].  Toileting from toilet with Supervision for hygiene and clothing management.   Bathing with Supervision.  Supine to sit with Modified [place].  Stand pivot transfers with Modified [place].  Toilet transfer to toilet with Modified [place] using RW.                      PLAN:  Patient to be seen 4 x/week to address the above listed problems via self-care/home management, therapeutic activities, therapeutic exercises  Plan of Care expires: 11/16/17  Plan of Care reviewed with: patient    OT G-codes  Functional Assessment Tool Used: [facility]  Score: 18  Functional Limitation: Self care  Self Care Current Status (): CK  Self Care Goal Status (): KYLIE Martinez  10/16/2017

## 2017-10-16 NOTE — UM SECONDARY REVIEW
Physician Advisor External    Level of Care Issue    Approved Inpatient- 10/16/2017 @ 1020- per Dr. Solo Veloz, EHR, approves Inpatient.   LMD, RN

## 2017-10-16 NOTE — SUBJECTIVE & OBJECTIVE
Review of Systems   Constitutional: Positive for fatigue. Negative for chills and fever.   HENT: Negative for facial swelling, mouth sores and trouble swallowing.    Eyes: Negative for pain and redness.   Respiratory: Positive for shortness of breath. Negative for cough.    Cardiovascular: Negative for chest pain and leg swelling.   Gastrointestinal: Positive for abdominal distention and nausea.   Genitourinary: Negative for dysuria and hematuria.   Musculoskeletal: Negative for gait problem and neck stiffness.   Skin: Negative for rash and wound.   Neurological: Negative for seizures and headaches.   Psychiatric/Behavioral: Negative for confusion and hallucinations.       Past Medical History:   Diagnosis Date    Diabetes mellitus     Hep C w/o coma, chronic     Hypertension     Macular degeneration     Neuropathy        Past Surgical History:   Procedure Laterality Date    EYE SURGERY      HAND SURGERY         Family history of liver disease: No    Review of patient's allergies indicates:  No Known Allergies    Social History Main Topics    Smoking status: Former Smoker    Smokeless tobacco: Never Used    Alcohol use No      Comment: 2 5th per week- stopped one month ago approx 9/10/16    Drug use: No    Sexual activity: Not on file       Prescriptions Prior to Admission   Medication Sig Dispense Refill Last Dose    aspirin 81 MG Chew Take 81 mg by mouth once daily.   10/15/2017    carvedilol (COREG) 6.25 MG tablet Take 1 tablet (6.25 mg total) by mouth 2 (two) times daily. 180 tablet 1 10/15/2017    furosemide (LASIX) 20 MG tablet Take 1 tablet (20 mg total) by mouth once daily. 30 tablet 11 10/15/2017    hydrocodone-acetaminophen 5-325mg (NORCO) 5-325 mg per tablet Take 1 tablet by mouth every 6 (six) hours as needed for Pain. 30 tablet 0 10/15/2017    lactulose (CHRONULAC) 10 gram/15 mL solution Take 15 mLs (10 g total) by mouth once daily. 236 mL 0 10/15/2017    metformin (GLUCOPHAGE-XR) 500  MG 24 hr tablet    10/15/2017    ondansetron (ZOFRAN-ODT) 8 MG TbDL Take 1 tablet (8 mg total) by mouth every 6 (six) hours as needed. 30 tablet 1 10/15/2017    spironolactone (ALDACTONE) 25 MG tablet Take 1 tablet (25 mg total) by mouth once daily. 30 tablet 11 10/15/2017       Objective:     Vital Signs (Most Recent):  Temp: 97.6 °F (36.4 °C) (10/16/17 1536)  Pulse: 67 (10/16/17 1536)  Resp: 16 (10/16/17 1536)  BP: 111/69 (10/16/17 1536)  SpO2: 98 % (10/16/17 1536) Vital Signs (24h Range):  Temp:  [96.3 °F (35.7 °C)-98.3 °F (36.8 °C)] 97.6 °F (36.4 °C)  Pulse:  [67-96] 67  Resp:  [16-20] 16  SpO2:  [95 %-100 %] 98 %  BP: (101-160)/(62-84) 111/69     Weight: 73.2 kg (161 lb 6 oz) (10/15/17 2029)  Body mass index is 22.51 kg/m².    Physical Exam   Constitutional: He is oriented to person, place, and time. No distress.   Thin and frail   HENT:   Head: Normocephalic and atraumatic.   Eyes: Conjunctivae and EOM are normal.   Neck: Neck supple. No thyromegaly present.   Cardiovascular: Normal rate, regular rhythm and intact distal pulses.    Pulmonary/Chest: Effort normal and breath sounds normal. No respiratory distress.   Diminished breath sounds over right  base   Abdominal: Soft. He exhibits distension (modest distention wtihout rebound). There is no rebound.   Bandage over RLQ    Musculoskeletal: Normal range of motion. He exhibits no tenderness.   Neurological: He is alert and oriented to person, place, and time. No cranial nerve deficit.   Skin: Skin is warm and dry. No rash noted.   Psychiatric: He has a normal mood and affect. His behavior is normal.   Vitals reviewed.      MELD-Na score: 9 at 10/16/2017 11:46 AM  MELD score: 9 at 10/16/2017 11:46 AM  Calculated from:  Serum Creatinine: 1.2 mg/dL at 10/16/2017 11:46 AM  Serum Sodium: 136 mmol/L at 10/16/2017 11:46 AM  Total Bilirubin: 1.3 mg/dL at 10/16/2017 11:46 AM  INR(ratio): 0.9 (Rounded to 1) at 10/15/2017  2:12 PM  Age: 66 years    Significant  Labs:  CBC:   Recent Labs  Lab 10/15/17  1412   WBC 5.38   RBC 3.95*   HGB 10.7*   HCT 30.4*   PLT 89*     BMP:   Recent Labs  Lab 10/16/17  1146   *      K 5.6*      CO2 22*   BUN 48*   CREATININE 1.2   CALCIUM 8.8     CMP:   Recent Labs  Lab 10/16/17  1146   *   CALCIUM 8.8   ALBUMIN 3.2*   PROT 6.2      K 5.6*   CO2 22*      BUN 48*   CREATININE 1.2   ALKPHOS 103   ALT 18   AST 20   BILITOT 1.3*       Significant Imaging:  Labs: Reviewed  reviewed

## 2017-10-16 NOTE — ASSESSMENT & PLAN NOTE
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  If confirmed chylous, unclear etiology, although can be related to cirrhosis. (his SAAG of 1.6 supports this)  Can also be found in cardiac disease (right heart failure and constriction) and TB and anatomic abnormalities as well and malignancy (lymphoma) and anatomic abnormalities      Plan:  Await Triglyceride level in ascites  Await ascitic fluid culture  Send ascitic fluid for cytology  Check Echo  Check CT abd pelvis, triple phase, rule out malignancy, eval chylous ascites etc.  Check quant gold  Resume home lasix, hold on addition of aldactone given  Mild hyperK

## 2017-10-16 NOTE — PROGRESS NOTES
Pt arrived to procedure room. Pt oriented to unit and staff. Comfort measures utilized. Pt safely transferred from wheelchair to stretcher. Blankets applied. Pt prepped and draped utilizing standard sterile technique. Pt resting comfortably. Denies pain/discomfort. Will continue to monitor. See flow sheets for monitoring.

## 2017-10-16 NOTE — HPI
This is a 66 year old male with a history of HepC/Alcoholic cirrhosis complicated with refractory ascites as well as hepatic hydrothorax admitted to the hospital for generalized abdominal pain that has worsened since yesterday.  Patient was recently admitted 10/8-10/11 for symptomatic ascites and had 7.7L of fluid removed. Seen by hepatology team at that time.  Associated symptoms of weakness, SOB, decreased appetite, N/V last night, and generalized abdominal pain.    Diagnostic tap reportedly very cloudy. 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    He lives with his mother and states that he stopped drinking alcohol 1 month ago. He does not follow a diet low in sodium nor watches his fluid intake.

## 2017-10-16 NOTE — PLAN OF CARE
Problem: Fall Risk (Adult)  Goal: Absence of Falls  Patient will demonstrate the desired outcomes by discharge/transition of care.   Outcome: Ongoing (interventions implemented as appropriate)  No falls or injuries noted this shift. VSS. No pain or distress noted. Will continue to monitor.

## 2017-10-16 NOTE — PROGRESS NOTES
Paracentesis complete. 5000 mLs peritoneal fluid drained. Pt tolerated well. Dressing to right abd clean, dry, and intact. Albumin 25% given 150mLs. Specimens sent per lab order. Report called to floor nurse.

## 2017-10-16 NOTE — PROGRESS NOTES
Patient returned from IR per stretcher. Alert and oriented x 4. Denies pain. Right abdomen dressing is clean, dry, and intact.

## 2017-10-16 NOTE — H&P
Inpatient Radiology Pre-procedure Note    History of Present Illness:  Soy Reza is a 66 y.o. male who presents for ultrasound guided paracentesis.  Admission H&P reviewed.  Past Medical History:   Diagnosis Date    Diabetes mellitus     Hep C w/o coma, chronic     Hypertension     Macular degeneration     Neuropathy      Past Surgical History:   Procedure Laterality Date    EYE SURGERY      HAND SURGERY         Review of Systems:   As documented in primary team H&P    Home Meds:   Prior to Admission medications    Medication Sig Start Date End Date Taking? Authorizing Provider   aspirin 81 MG Chew Take 81 mg by mouth once daily.   Yes Historical Provider, MD   carvedilol (COREG) 6.25 MG tablet Take 1 tablet (6.25 mg total) by mouth 2 (two) times daily. 8/10/17 8/10/18 Yes Jean Carlos Swanson MD   furosemide (LASIX) 20 MG tablet Take 1 tablet (20 mg total) by mouth once daily. 10/12/17 10/12/18 Yes Lisandra Sutton MD   hydrocodone-acetaminophen 5-325mg (NORCO) 5-325 mg per tablet Take 1 tablet by mouth every 6 (six) hours as needed for Pain. 10/12/17  Yes Jean Carlos Swanson MD   lactulose (CHRONULAC) 10 gram/15 mL solution Take 15 mLs (10 g total) by mouth once daily. 10/11/17  Yes Lisandra Sutton MD   metformin (GLUCOPHAGE-XR) 500 MG 24 hr tablet  8/16/17  Yes Historical Provider, MD   ondansetron (ZOFRAN-ODT) 8 MG TbDL Take 1 tablet (8 mg total) by mouth every 6 (six) hours as needed. 10/12/17  Yes Jean Carlos Swanson MD   spironolactone (ALDACTONE) 25 MG tablet Take 1 tablet (25 mg total) by mouth once daily. 10/12/17 10/12/18 Yes Lisandra Sutton MD     Scheduled Meds:    aspirin  81 mg Oral Daily    carvedilol  6.25 mg Oral BID    furosemide  20 mg Oral Daily    lactulose  20 g Oral TID    rifAXImin  550 mg Oral BID     Continuous Infusions:    PRN Meds:acetaminophen, dextrose 50%, dextrose 50%, glucagon (human recombinant), glucose, glucose, influenza, insulin aspart, ondansetron, pneumoc 13-marce  conj-dip cr(PF), ramelteon  Anticoagulants/Antiplatelets: aspirin    Allergies: Review of patient's allergies indicates:  No Known Allergies  Sedation Hx: have not been any systemic reactions    Labs:    Recent Labs  Lab 10/15/17  1412   INR 0.9       Recent Labs  Lab 10/15/17  1412   WBC 5.38   HGB 10.7*   HCT 30.4*   MCV 77*   PLT 89*      Recent Labs  Lab 10/11/17  0522 10/15/17  1412   * 243*   * 134*   K 4.3 5.4*    105   CO2 22* 20*   BUN 36* 55*   CREATININE 1.3 1.2   CALCIUM 8.2* 8.6*   MG 2.3  --    ALT 20 23   AST 26 22   ALBUMIN 3.1* 2.8*   BILITOT 1.6* 1.4*         Vitals:  Temp: 96.6 °F (35.9 °C) (10/16/17 0734)  Pulse: 73 (10/16/17 0734)  Resp: 16 (10/16/17 0734)  BP: 101/62 (10/16/17 0734)  SpO2: 98 % (10/16/17 0734)     Physical Exam:  ASA: 2  Mallampati: n/a    General: no acute distress  Mental Status: alert and oriented to person, place and time  HEENT: normocephalic, atraumatic  Chest: unlabored breathing  Heart: regular heart rate  Abdomen: distended  Extremity: moves all extremities    Plan: ultrasound guided paracentesis  Sedation Plan: local    ISIS Travis, BERTRANDP  Interventional Radiology  [phone] [employer]

## 2017-10-17 NOTE — PLAN OF CARE
Problem: Occupational Therapy Goal  Goal: Occupational Therapy Goal  Goals to be met by: 10/23/2017    Patient will increase functional independence with ADLs by performing:    UE Dressing with Modified [place].  LE Dressing with Modified [place].  Grooming while standing at sink with Modified [place].  Toileting from toilet with Supervision for hygiene and clothing management.   Bathing with Supervision.  Supine to sit with Modified [place].  Stand pivot transfers with Modified [place].  Toilet transfer to toilet with Modified [place] using RW.     Outcome: Ongoing (interventions implemented as appropriate)  Patient's goals are appropriate.   KYLIE Leija  10/17/2017

## 2017-10-17 NOTE — SUBJECTIVE & OBJECTIVE
Interval History:     No events.  Feels much better.   breathing much better    Current Facility-Administered Medications   Medication    acetaminophen tablet 650 mg    aspirin chewable tablet 81 mg    carvedilol tablet 6.25 mg    dextrose 50% injection 12.5 g    dextrose 50% injection 25 g    furosemide tablet 20 mg    glucagon (human recombinant) injection 1 mg    glucose chewable tablet 16 g    glucose chewable tablet 24 g    influenza (FLUZONE HIGH-DOSE) vaccine 0.5 mL    insulin aspart pen 0-5 Units    lactulose 20 gram/30 mL solution Soln 20 g    ondansetron disintegrating tablet 8 mg    pneumoc 13-marce conj-dip cr(PF) 0.5 mL    ramelteon tablet 8 mg    rifAXIMin tablet 550 mg       Objective:     Vital Signs (Most Recent):  Temp: 97.6 °F (36.4 °C) (10/17/17 1040)  Pulse: 76 (10/17/17 1518)  Resp: 17 (10/17/17 1518)  BP: 114/70 (10/17/17 1518)  SpO2: 99 % (10/17/17 1518) Vital Signs (24h Range):  Temp:  [96.4 °F (35.8 °C)-98 °F (36.7 °C)] 97.6 °F (36.4 °C)  Pulse:  [68-76] 76  Resp:  [16-18] 17  SpO2:  [97 %-100 %] 99 %  BP: (113-130)/(60-73) 114/70     Weight: 73.2 kg (161 lb 6 oz) (10/15/17 2029)  Body mass index is 22.51 kg/m².    Physical Exam   Constitutional: He is oriented to person, place, and time. No distress.   Thin and frail   HENT:   Head: Normocephalic and atraumatic.   Eyes: Conjunctivae are normal.   Cardiovascular: Normal rate, regular rhythm and intact distal pulses.    Pulmonary/Chest: Effort normal and breath sounds normal. No respiratory distress.   Diminished breath sounds over right  base   Abdominal: Soft. He exhibits distension (modest distention; stable). There is no rebound.   Bandage over RLQ    Musculoskeletal: Normal range of motion. He exhibits no tenderness.   Neurological: He is alert and oriented to person, place, and time. No cranial nerve deficit.   Skin: Skin is warm and dry. No rash noted.   Vitals reviewed.      MELD-Na score: 9 at 10/17/2017  4:51 AM  MELD  score: 9 at 10/17/2017  4:51 AM  Calculated from:  Serum Creatinine: 1.2 mg/dL at 10/17/2017  4:51 AM  Serum Sodium: 135 mmol/L at 10/17/2017  4:51 AM  Total Bilirubin: 1.1 mg/dL at 10/17/2017  4:51 AM  INR(ratio): 0.9 (Rounded to 1) at 10/17/2017  4:51 AM  Age: 66 years    Significant Labs:  CBC:   Recent Labs  Lab 10/17/17  0451   WBC 3.45*   RBC 3.37*   HGB 9.3*   HCT 27.1*   *     BMP:   Recent Labs  Lab 10/17/17  0451   *   *   K 4.9      CO2 22*   BUN 47*   CREATININE 1.2   CALCIUM 8.4*     CMP:   Recent Labs  Lab 10/17/17  0451   *   CALCIUM 8.4*   ALBUMIN 2.8*   PROT 6.0   *   K 4.9   CO2 22*      BUN 47*   CREATININE 1.2   ALKPHOS 107   ALT 19   AST 21   BILITOT 1.1*     Coagulation:   Recent Labs  Lab 10/17/17  0451   INR 0.9       Significant Imaging:  Labs: Reviewed

## 2017-10-17 NOTE — PLAN OF CARE
"CM met with pt to discuss multiple admissions in past 30 days.  Pt states he "keep getting short of breath and can't walk."  CM questioned if pt is having difficulty caring for himself to which he admits "yes".  States he is having difficulty checking his blood sugars, eating the way he should, and remembering to take his medications.  We discussed possibility of going home with home health, pt states he may need to "go somewhere where I can be taken of."     Pt lives at home with his 91 y/o mother who is independent with care but admits through phone conversation that she can no longer help pt care for himself.  States pt is having difficulty with ADL's, cannot cook or prepare his own meals, is mostly staying in bed as he is finding it difficult to get around.  We discussed possible home health or NH placement, pt and mother would like to attempt NH placement with understanding that if CM is having difficulty with placement he would return home with home health.  Mother states pt is no longer drinking alcohol but is noticing pt is taking more pain meds than prescribed.  Does not feel this is due to addiction but b/c he is not remembering to take it correctly.    Pt and mother do not have a preference but would like placement close to mother's home where her granddaughter can transport her for visits.  PPD placed, will obtain PASRR.         10/16/17 2039   Discharge Assessment   Assessment Type Discharge Planning Assessment   Confirmed/corrected address and phone number on facesheet? Yes   Assessment information obtained from? Patient;Caregiver  (and mother via telephone)   Expected Length of Stay (days) 3   Communicated expected length of stay with patient/caregiver yes   Prior to hospitilization cognitive status: Alert/Oriented   Prior to hospitalization functional status: Independent   Current cognitive status: Alert/Oriented   Current Functional Status: Needs Assistance   Lives With (91 y/o mother)   Able to " Return to Prior Arrangements unable to determine at this time (comments)   Is patient able to care for self after discharge? Unable to determine at this time (comments)   Who are your caregiver(s) and their phone number(s)? (mother Sharon [phone])   Readmission Within The Last 30 Days previous discharge plan unsuccessful   Patient currently being followed by outpatient case management? No   Patient currently receives any other outside agency services? No   Equipment Currently Used at Home cane, straight;rollator;shower chair   Do you have any problems affording any of your prescribed medications? No   Does the patient have transportation home? No   Does the patient receive services at [facility]? No   Discharge Plan A Home with family;Home Health   Discharge Plan B New Nursing Home placement - senior care care facility   Patient/Family In Agreement With Plan yes   Readmission Questionnaire   At the time of your discharge, did someone talk to you about what your health problems were? Yes   At the time of discharge, did someone talk to you about what to watch out for regarding worsening of your health problem? Yes   At the time of discharge, did someone talk to you about what to do if you experienced worsening of your health problem? Yes   At the time of discharge, did someone talk to you about which medication to take when you left the hospital and which ones to stop taking? Yes   At the time of discharge, did someone talk to you about when and where to follow up with a doctor after you left the hospital? Yes   How often do you need to have someone help you when you read instructions, pamphlets, or other written material from your doctor or pharmacy? Sometimes   Do you have problems taking your medications as prescribed? Yes   Do you have any problems affording any of  your prescribed medications? No   Do you have problems obtaining/receiving your medications? No   Does the patient have transportation  to healthcare appointments? No   Living Arrangements house   Does the patient have family/friends to help with healtcare needs after discharge? other (comments)  (p lives iwth elderly mother who is independent with care but cannot provide assistance to him)   Does your caregiver provide all the help you need? No   Are you currently feeling confused? No   Are you currently having problems thinking? No   Are you currently having memory problems? Yes   Have you felt down, depressed, or hopeless? 0   Have you felt little interest or pleasure in doing things? 0   In the last 7 days, my sleep quality was: fair

## 2017-10-17 NOTE — PT/OT/SLP PROGRESS
Occupational Therapy  Treatment    Soy Reza   MRN: [id number]   Admitting Diagnosis: Chylous ascites    OT Date of Treatment: 10/17/17   OT Start Time: 1340  OT Stop Time: 1403  OT Total Time (min): 23 min    Billable Minutes:  Self Care/Home Management 23    General Precautions: Standard, fall  Orthopedic Precautions: N/A  Braces: N/A         Subjective:  Communicated with patient prior to session.    Pain/Comfort  Pain Rating 1: 0/10  Pain Rating Post-Intervention 1: 0/10    Objective:        Functional Mobility:  Bed Mobility:  Rolling/Turning to Left: Supervision  Scooting/Bridging: Supervision  Supine to Sit: Supervision    Transfers:   Sit <> Stand Assistance: Stand By Assistance Patient needed verbal cues for hand placement and safety during sit<>stand  Sit <> Stand Assistive Device: Rolling Walker  Toilet Transfer Assistance: Contact Guard Assistance bed>toilet using RW /c functional ambulation (Patient then performed personal hygiene task in standing and then ambulated to bedside chair using RW  Toilet Transfer Assistive Device: Rolling Walker    Activities of Daily Living:   UE Dressing Level of Assistance: Supervision (Donning back gown)  LE Dressing Level of Assistance: Supervision (Donning shoes)  Grooming Position: Standing at sink  Grooming Level of Assistance: Stand by assistance (for oral care, washing face, and washing/drying hands)  Toileting Where Assessed: Toilet  Toileting Level of Assistance: Minimum assistance    Balance:   Static Sit: GOOD: Takes MODERATE challenges from all directions  Dynamic Sit: GOOD: Maintains balance through MODERATE excursions of active trunk movement  Static Stand: FAIR+: Takes MINIMAL challenges from all directions  Dynamic stand: FAIR: Needs CONTACT GUARD during gait    AM-PAC 6 CLICK ADL   How much help from another person does this patient currently need?   1 = Unable, Total/Dependent Assistance  2 = A lot, Maximum/Moderate Assistance  3 = A little,  "Minimum/Contact Guard/Supervision  4 = None, Modified [place]/Independent    Putting on and taking off regular lower body clothing? : 3  Bathing (including washing, rinsing, drying)?: 3  Toileting, which includes using toilet, bedpan, or urinal? : 3  Putting on and taking off regular upper body clothing?: 3  Taking care of personal grooming such as brushing teeth?: 3  Eating meals?: 4  Total Score: 19     AM-PAC Raw Score CMS "G-Code Modifier Level of Impairment Assistance   6 % Total / Unable   7 - 8 CM 80 - 100% Maximal Assist   9-13 CL 60 - 80% Moderate Assist   14 - 19 CK 40 - 60% Moderate Assist   20 - 22 CJ 20 - 40% Minimal Assist   23 CI 1-20% SBA / CGA   24 CH 0% Independent/ Mod I       Patient left up in chair with call button in reach, nursing notified and all needs met.     ASSESSMENT:  Soy Reza is a 66 y.o. male with a medical diagnosis of Chylous ascites and presents with the deficits listed below. Patient tolerated treatment session and was motivated to complete tasks. Patient continues to benefit from skilled OT services to achieve maximal independence.    Rehab identified problem list/impairments: Rehab identified problem list/impairments: weakness, impaired endurance, impaired self care skills, impaired functional mobilty, gait instability, impaired balance, decreased coordination, decreased safety awareness    Rehab potential is good.    Activity tolerance: Good    Discharge recommendations: Discharge Facility/Level Of Care Needs: home with home health     Barriers to discharge: Barriers to Discharge: None    Equipment recommendations: walker, rolling     GOALS:    Occupational Therapy Goals        Problem: Occupational Therapy Goal    Goal Priority Disciplines Outcome Interventions   Occupational Therapy Goal     OT, PT/OT Ongoing (interventions implemented as appropriate)    Description:  Goals to be met by: 10/23/2017    Patient will increase functional independence with " ADLs by performing:    UE Dressing with Modified [place].  LE Dressing with Modified [place].  Grooming while standing at sink with Modified [place].  Toileting from toilet with Supervision for hygiene and clothing management.   Bathing with Supervision.  Supine to sit with Modified [place].  Stand pivot transfers with Modified [place].  Toilet transfer to toilet with Modified [place] using RW.                      Plan:  Patient to be seen 4 x/week to address the above listed problems via self-care/home management, therapeutic activities, therapeutic exercises  Plan of Care expires: 11/16/17  Plan of Care reviewed with: patient         KYLIE Leija  10/17/2017

## 2017-10-17 NOTE — PROGRESS NOTES
[facility]  Hepatology  Progress Note    Patient Name: Soy Reza  MRN: [id number]  Admission Date: 10/15/2017  Hospital Length of Stay: 2 days  Attending Provider: Jaimee Foster MD   Primary Care Physician: Jean Carlos Swanson MD  Principal Problem:Chylous ascites    Subjective:     Transplant status: No    HPI: This is a 66 year old male with a history of HepC/Alcoholic cirrhosis complicated with refractory ascites as well as hepatic hydrothorax admitted to the hospital for generalized abdominal pain that has worsened since yesterday.  Patient was recently admitted 10/8-10/11 for symptomatic ascites and had 7.7L of fluid removed. Seen by hepatology team at that time.  Associated symptoms of weakness, SOB, decreased appetite, N/V last night, and generalized abdominal pain.    Diagnostic tap reportedly very cloudy. 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    He lives with his mother and states that he stopped drinking alcohol 1 month ago. He does not follow a diet low in sodium nor watches his fluid intake.    Interval History:     No events.  Feels much better.   breathing much better    Current Facility-Administered Medications   Medication    acetaminophen tablet 650 mg    aspirin chewable tablet 81 mg    carvedilol tablet 6.25 mg    dextrose 50% injection 12.5 g    dextrose 50% injection 25 g    furosemide tablet 20 mg    glucagon (human recombinant) injection 1 mg    glucose chewable tablet 16 g    glucose chewable tablet 24 g    influenza (FLUZONE HIGH-DOSE) vaccine 0.5 mL    insulin aspart pen 0-5 Units    lactulose 20 gram/30 mL solution Soln 20 g    ondansetron disintegrating tablet 8 mg    pneumoc 13-marce conj-dip cr(PF) 0.5 mL    ramelteon tablet 8 mg    rifAXIMin tablet 550 mg       Objective:     Vital Signs (Most Recent):  Temp: 97.6 °F (36.4 °C) (10/17/17 1040)  Pulse: 76  (10/17/17 1518)  Resp: 17 (10/17/17 1518)  BP: 114/70 (10/17/17 1518)  SpO2: 99 % (10/17/17 1518) Vital Signs (24h Range):  Temp:  [96.4 °F (35.8 °C)-98 °F (36.7 °C)] 97.6 °F (36.4 °C)  Pulse:  [68-76] 76  Resp:  [16-18] 17  SpO2:  [97 %-100 %] 99 %  BP: (113-130)/(60-73) 114/70     Weight: 73.2 kg (161 lb 6 oz) (10/15/17 2029)  Body mass index is 22.51 kg/m².    Physical Exam   Constitutional: He is oriented to person, place, and time. No distress.   Thin and frail   HENT:   Head: Normocephalic and atraumatic.   Eyes: Conjunctivae are normal.   Cardiovascular: Normal rate, regular rhythm and intact distal pulses.    Pulmonary/Chest: Effort normal and breath sounds normal. No respiratory distress.   Diminished breath sounds over right  base   Abdominal: Soft. He exhibits distension (modest distention; stable). There is no rebound.   Bandage over RLQ    Musculoskeletal: Normal range of motion. He exhibits no tenderness.   Neurological: He is alert and oriented to person, place, and time. No cranial nerve deficit.   Skin: Skin is warm and dry. No rash noted.   Vitals reviewed.      MELD-Na score: 9 at 10/17/2017  4:51 AM  MELD score: 9 at 10/17/2017  4:51 AM  Calculated from:  Serum Creatinine: 1.2 mg/dL at 10/17/2017  4:51 AM  Serum Sodium: 135 mmol/L at 10/17/2017  4:51 AM  Total Bilirubin: 1.1 mg/dL at 10/17/2017  4:51 AM  INR(ratio): 0.9 (Rounded to 1) at 10/17/2017  4:51 AM  Age: 66 years    Significant Labs:  CBC:   Recent Labs  Lab 10/17/17  0451   WBC 3.45*   RBC 3.37*   HGB 9.3*   HCT 27.1*   *     BMP:   Recent Labs  Lab 10/17/17  0451   *   *   K 4.9      CO2 22*   BUN 47*   CREATININE 1.2   CALCIUM 8.4*     CMP:   Recent Labs  Lab 10/17/17  0451   *   CALCIUM 8.4*   ALBUMIN 2.8*   PROT 6.0   *   K 4.9   CO2 22*      BUN 47*   CREATININE 1.2   ALKPHOS 107   ALT 19   AST 21   BILITOT 1.1*     Coagulation:   Recent Labs  Lab 10/17/17  0454   INR 0.9        Significant Imaging:  Labs: Reviewed    Assessment/Plan:     * Chylous ascites    Concern for chylous ascites given turbid milky appearance as reported.  If confirmed chylous, unclear etiology, although can be related to cirrhosis. (his SAAG of 1.6 supports this)  Can also be found in cardiac disease (right heart failure and constriction) and TB and anatomic abnormalities as well and malignancy (lymphoma) and anatomic abnormalities    CT negative for obvious malignancy or anatomic abnormality  TB testing pending    Pt will need to show commitment to outpatient follow up for further consideration of liver transplant and workup.  Pt has failed multiple times outpatient plan for follow up and paracentesis.    Do not feel that patient is a good TIPs candiate as there is history of hepatic encephalopathy (already on rifaximin and ammonia > 100 couple months back)     Will need to stick to outpatient plan for regular paracentesis and follow up and will need uptitration of diuretics as possible.    Plan:  Await Triglyceride level in ascites  Await ascitic fluid culture  Send ascitic fluid for cytology  Check Echo  TB testing pending  Resume home lasix, hold on addition of aldactone given  Mild hyperK   - titrate diuretics to maximal toleration based on Cr    OK for discharge from hepatology perspective and we will arrange close follow up and standing orders for routine weekly paracentesis.            Thank you for your consult. I will follow-up with patient. Please contact us if you have any additional questions.    Bebo Bustos MD  Hepatology  [facility]

## 2017-10-17 NOTE — PLAN OF CARE
Sw was informed by ROBINSON Donnelly that Pt and mother agreeable to NH placement in [place]. Sw uploaded NH referrals to [facility] [place] and [facility]. Sw to follow.

## 2017-10-17 NOTE — ASSESSMENT & PLAN NOTE
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  If confirmed chylous, unclear etiology, although can be related to cirrhosis. (his SAAG of 1.6 supports this)  Can also be found in cardiac disease (right heart failure and constriction) and TB and anatomic abnormalities as well and malignancy (lymphoma) and anatomic abnormalities    CT negative for obvious malignancy or anatomic abnormality  TB testing pending    Pt will need to show commitment to outpatient follow up for further consideration of liver transplant and workup.  Pt has failed multiple times outpatient plan for follow up and paracentesis.    Do not feel that patient is a good TIPs candiate as there is history of hepatic encephalopathy (already on rifaximin and ammonia > 100 couple months back)     Will need to stick to outpatient plan for regular paracentesis and follow up and will need uptitration of diuretics as possible.    Plan:  Await Triglyceride level in ascites  Await ascitic fluid culture  Send ascitic fluid for cytology  Check Echo  TB testing pending  Resume home lasix, hold on addition of aldactone given  Mild hyperK   - titrate diuretics to maximal toleration based on Cr    OK for discharge from hepatology perspective and we will arrange close follow up and standing orders for routine weekly paracentesis.

## 2017-10-18 NOTE — ASSESSMENT & PLAN NOTE
"Work up pending. Per Hepatology: "patient is a poor TIPS candiate as there is history of hepatic encephalopathy (already on rifaximin and ammonia > 100 couple months back). Plan for outpatient scheduled paracenteses and follow up as he will likely need uptitration of diuretics."  "

## 2017-10-18 NOTE — SUBJECTIVE & OBJECTIVE
Interval History: Patient with no events overnight, no new complaints.  Data Review: Results recorded below were reviewed 10/17/2017.    Review of Systems   Constitutional: Negative for fever.   Respiratory: Negative for shortness of breath.    Gastrointestinal: Negative for abdominal pain.     Objective:     Vital Signs (Most Recent):  Temp: 97.6 °F (36.4 °C) (10/17/17 1040)  Pulse: 76 (10/17/17 1518)  Resp: 17 (10/17/17 1518)  BP: 114/70 (10/17/17 1518)  SpO2: 99 % (10/17/17 1518) Vital Signs (24h Range):  Temp:  [96.4 °F (35.8 °C)-98 °F (36.7 °C)] 97.6 °F (36.4 °C)  Pulse:  [68-76] 76  Resp:  [16-18] 17  SpO2:  [97 %-100 %] 99 %  BP: (113-130)/(60-73) 114/70     Weight: 73.2 kg (161 lb 6 oz)  Body mass index is 22.51 kg/m².    Intake/Output Summary (Last 24 hours) at 10/1951  Last data filed at 10/17/17 1800   Gross per 24 hour   Intake             1300 ml   Output                0 ml   Net             1300 ml      Physical Exam   Constitutional: He appears well-developed.   HENT:   Head: Normocephalic.   Mouth/Throat: Mucous membranes are not pale and not cyanotic.   Eyes: Conjunctivae and lids are normal. Pupils are equal.   Neck: Neck supple.   Cardiovascular: S1 normal and S2 normal.    Pulmonary/Chest: Effort normal. He has decreased breath sounds in the right lower field.   Abdominal: Soft. Bowel sounds are normal. He exhibits ascites. There is no tenderness.   Musculoskeletal: He exhibits no edema.   Neurological: He is alert. He is not disoriented.   Skin: Skin is warm and dry. He is not diaphoretic. No cyanosis. Nails show no clubbing.   Psychiatric: He has a normal mood and affect.       Significant Labs:   A1C:   Recent Labs  Lab 08/04/17  0523 10/02/17  0908   HGBA1C 6.3* 6.4*     CBC:   Recent Labs  Lab 10/17/17  0451   WBC 3.45*   HGB 9.3*   HCT 27.1*   *     CMP:   Recent Labs  Lab 10/16/17  1146 10/17/17  0451    135*   K 5.6* 4.9    104   CO2 22* 22*   * 145*   BUN  48* 47*   CREATININE 1.2 1.2   CALCIUM 8.8 8.4*   PROT 6.2 6.0   ALBUMIN 3.2* 2.8*   BILITOT 1.3* 1.1*   ALKPHOS 103 107   AST 20 21   ALT 18 19   ANIONGAP 8 9   EGFRNONAA >60.0 >60.0     Coagulation:   Recent Labs  Lab 10/17/17  0451   INR 0.9     Magnesium: No results for input(s): MG in the last 48 hours.  POCT Glucose:   Recent Labs  Lab 10/17/17  0816 10/17/17  1255 10/17/17  1641   POCTGLUCOSE 181* 284* 112*

## 2017-10-18 NOTE — PT/OT/SLP PROGRESS
Physical Therapy  Treatment    Soy Reza   MRN: [id number]   Admitting Diagnosis: Chylous ascites    PT Received On: 10/18/17  PT Start Time: 0844     PT Stop Time: 0915    PT Total Time (min): 31 min       Billable Minutes:  Gait [id number] and Therapeutic Exercise 11    Treatment Type: Treatment  PT/PTA: PT             General Precautions: Standard, fall  Orthopedic Precautions: N/A   Braces: N/A    Do you have any cultural, spiritual, Taoist conflicts, given your current situation?: none    Subjective:  Communicated with pt and nurse prior to session.  Pt agreeable to PT and states he would like to return home upon discharge    Pain/Comfort  Pain Rating 1: 0/10  Pain Rating Post-Intervention 1: 0/10    Objective:   Patient found with: peripheral IV   Pt found supine in bed with nurse present    Functional Mobility:  Bed Mobility:   Scooting/Bridging: Modified Independent  Supine to Sit: Modified Independent   Performed with HOB slightly elevated without use of rail    Transfers:  Sit <> Stand Assistance: Supervision  Sit <> Stand Assistive Device: No Assistive Device   Performed from bed     Gait:   Gait Distance: ~200 feet  Assistance 1: Stand by Assistance  Gait Assistive Device: Rolling walker  Gait Pattern: reciprocal  Gait Deviation(s): decreased sandeep, forward lean   Pt was able to maneuver in room/bathroom, change directions, start/stop gait, and avoid foot traffic and obstacles in hallway without LOB    Stairs:  Pt ascended/descend 2 stair(s) with No Assistive Device with unilateral handrail with Stand-by Assistance.     Balance:   Static Sit: GOOD+: Takes MAXIMAL challenges from all directions.    Dynamic Sit: GOOD+: Maintains balance through MAXIMAL excursions of active trunk motion  Static Stand: FAIR+: Takes MINIMAL challenges from all directions  Dynamic stand: FAIR+: Needs CLOSE SUPERVISION during gait and is able to right self with minor LOB   Pt was able to stand briefly without UE  support without LOB and retrieve item from floor using unilateral UE support with SBA for safety    Therapeutic Activities and Exercises:  Seated therex including:  Ankle pumps 20  LAQ 20  Hip abduction and adduction 20  Glute sets 20  Hip flexion 20    PT provided pt education on:   -importance of OOB activity   -safety with stair negotiation   -technique for LE exercises       AM-PAC 6 CLICK MOBILITY  How much help from another person does this patient currently need?   1 = Unable, Total/Dependent Assistance  2 = A lot, Maximum/Moderate Assistance  3 = A little, Minimum/Contact Guard/Supervision  4 = None, Modified [place]/Independent    Turning over in bed (including adjusting bedclothes, sheets and blankets)?: 4  Sitting down on and standing up from a chair with arms (e.g., wheelchair, bedside commode, etc.): 4  Moving from lying on back to sitting on the side of the bed?: 4  Moving to and from a bed to a chair (including a wheelchair)?: 3  Need to walk in hospital room?: 3  Climbing 3-5 steps with a railing?: 3  Total Score: 21    AM-PAC Raw Score CMS G-Code Modifier Level of Impairment Assistance   6 % Total / Unable   7 - 9 CM 80 - 100% Maximal Assist   10 - 14 CL 60 - 80% Moderate Assist   15 - 19 CK 40 - 60% Moderate Assist   20 - 22 CJ 20 - 40% Minimal Assist   23 CI 1-20% SBA / CGA   24 CH 0% Independent/ Mod I     Patient left up in chair with call button in reach.    Assessment:  Soy Reza is a 66 y.o. male with a medical diagnosis of Chylous ascites and presents with generalized weakness, impaired balance, gait, and endurance with decreased safety awareness requiring supervision for transfers and SBA for ambulation using RW and stair negotiation. Pt demonstrates an improvement in functional mobility requiring less assistance for transfers and ambulation today with ability to safely negotiate steps. Pt states that there is a column near the steps to enter his home that he is able to  hold onto for support and will have assistance upon return home. Pt will benefit from continued PT treatment to address remaining impairments and improve functional mobility and independence.       Rehab identified problem list/impairments: Rehab identified problem list/impairments: weakness, impaired endurance, gait instability, impaired balance, decreased safety awareness    Rehab potential is good.    Activity tolerance: Good    Discharge recommendations: Discharge Facility/Level Of Care Needs: home health PT     Barriers to discharge: Barriers to Discharge: None    Equipment recommendations: Equipment Needed After Discharge: walker, rolling     GOALS:    Physical Therapy Goals        Problem: Physical Therapy Goal    Goal Priority Disciplines Outcome Goal Variances Interventions   Physical Therapy Goal     PT/OT, PT Ongoing (interventions implemented as appropriate)     Description:  Goals to be met by:  10/23/17    Patient will increase functional independence with mobility by performin. Supine to sit with Modified [place] Met 10/18/17  2. Sit to supine with Modified [place]  3. Sit to stand transfer with Supervision Met 10/18/17  4. Bed to chair transfer with Supervision using Rolling Walker  5. Gait  x 150 feet with Supervision using Rolling Walker.   6. Ascend/descend 2 stair without Handrails Minimal Assistance using Rolling Walker.   7. Lower extremity exercise program x20 reps per handout, with independence                       PLAN:    Patient to be seen 4 x/week  to address the above listed problems via gait training, therapeutic activities, therapeutic exercises, neuromuscular re-education  Plan of Care expires: 11/15/17  Plan of Care reviewed with: patient         Meghann FARIBA Phillips, PT  10/18/2017

## 2017-10-18 NOTE — PROGRESS NOTES
[facility] Hospital Medicine  Progress Note    Patient Name: Soy Reza  MRN: [id number]  Patient Class: IP- Inpatient   Admission Date: 10/15/2017  Length of Stay: 2 days  Attending Physician: Jaimee Fostre MD  Primary Care Provider: Jean Carlos Swanson MD    [facility] Medicine Team: [facility] D Jaimee Foster MD    Subjective:     Principal Problem:Chylous ascites    HPI:  No notes on file    Hospital Course:  No notes on file    Interval History: Patient with no events overnight, no new complaints.  Data Review: Results recorded below were reviewed 10/17/2017.    Review of Systems   Constitutional: Negative for fever.   Respiratory: Negative for shortness of breath.    Gastrointestinal: Negative for abdominal pain.     Objective:     Vital Signs (Most Recent):  Temp: 97.6 °F (36.4 °C) (10/17/17 1040)  Pulse: 76 (10/17/17 1518)  Resp: 17 (10/17/17 1518)  BP: 114/70 (10/17/17 1518)  SpO2: 99 % (10/17/17 1518) Vital Signs (24h Range):  Temp:  [96.4 °F (35.8 °C)-98 °F (36.7 °C)] 97.6 °F (36.4 °C)  Pulse:  [68-76] 76  Resp:  [16-18] 17  SpO2:  [97 %-100 %] 99 %  BP: (113-130)/(60-73) 114/70     Weight: 73.2 kg (161 lb 6 oz)  Body mass index is 22.51 kg/m².    Intake/Output Summary (Last 24 hours) at 10/1951  Last data filed at 10/17/17 1800   Gross per 24 hour   Intake             1300 ml   Output                0 ml   Net             1300 ml      Physical Exam   Constitutional: He appears well-developed.   HENT:   Head: Normocephalic.   Mouth/Throat: Mucous membranes are not pale and not cyanotic.   Eyes: Conjunctivae and lids are normal. Pupils are equal.   Neck: Neck supple.   Cardiovascular: S1 normal and S2 normal.    Pulmonary/Chest: Effort normal. He has decreased breath sounds in the right lower field.   Abdominal: Soft. Bowel sounds are normal. He exhibits ascites. There is no tenderness.   Musculoskeletal: He exhibits no edema.   Neurological: He is alert. He is not  "disoriented.   Skin: Skin is warm and dry. He is not diaphoretic. No cyanosis. Nails show no clubbing.   Psychiatric: He has a normal mood and affect.       Significant Labs:   A1C:   Recent Labs  Lab 08/04/17  0523 10/02/17  0908   HGBA1C 6.3* 6.4*     CBC:   Recent Labs  Lab 10/17/17  0451   WBC 3.45*   HGB 9.3*   HCT 27.1*   *     CMP:   Recent Labs  Lab 10/16/17  1146 10/17/17  0451    135*   K 5.6* 4.9    104   CO2 22* 22*   * 145*   BUN 48* 47*   CREATININE 1.2 1.2   CALCIUM 8.8 8.4*   PROT 6.2 6.0   ALBUMIN 3.2* 2.8*   BILITOT 1.3* 1.1*   ALKPHOS 103 107   AST 20 21   ALT 18 19   ANIONGAP 8 9   EGFRNONAA >60.0 >60.0     Coagulation:   Recent Labs  Lab 10/17/17  0451   INR 0.9     Magnesium: No results for input(s): MG in the last 48 hours.  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       Ordered Medications for management of current problems:    aspirin  81 mg Oral Daily    carvedilol  6.25 mg Oral BID    furosemide  20 mg Oral Daily    lactulose  20 g Oral TID    rifAXImin  550 mg Oral BID       Anticipated Disposition: Home or Self Care    Assessment and Plan by Problem:    * Chylous ascites    Work up pending. Per Hepatology: "patient is a poor TIPS candiate as there is " "history of hepatic encephalopathy (already on rifaximin and ammonia > 100 couple months back). 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        Alcoholic cirrhosis of liver with ascites    Continue Lasix; holding aldactone due to hyperkalemia.        Essential hypertension    Continuing current home management with [facility].        Type 2 diabetes mellitus with stage 2 chronic kidney disease and hypertension    Treated with metformin as OP.          VTE Risk Mitigation         Ordered     Medium Risk of VTE  Once      10/15/17 1651     Place sequential compression device  Until discontinued      10/15/17 1651              Jaimee Foster MD  Department of Hospital Medicine   [facility]  "

## 2017-10-18 NOTE — PLAN OF CARE
Sw did complete locet with office of aging, CM informed that Pt will go home one more time with hh and mother and see if he can manage care at his home. Sw to follow with hh provider and arrange. Pt is accepted to [facility] in [place] [facility] with [facility].

## 2017-10-18 NOTE — PLAN OF CARE
Problem: Patient Care Overview  Goal: Plan of Care Review  Outcome: Ongoing (interventions implemented as appropriate)  No acute events throughout shift. VS and assessment performed per orders. Pain medication given as ordered, moderate relief. Plan of care reviewed with pt, all concerns addressed.  No BM this shift as per pt, lactulose given as ordered.  Pt free from injury or any falls.

## 2017-10-18 NOTE — PLAN OF CARE
"CM met with pt in room, has decided to return home with mother with her approval.  State they would like to attempt home with home health and "if things don't work out I will go to a nursing home".  Dr Foster aware of pt's wishes, will order home health at time of d/c.      Cony Benito, RN  Case Management  [id number]  "

## 2017-10-18 NOTE — PLAN OF CARE
Problem: Physical Therapy Goal  Goal: Physical Therapy Goal  Goals to be met by:  10/23/17    Patient will increase functional independence with mobility by performin. Supine to sit with Modified [place] Met 10/18/17  2. Sit to supine with Modified [place]  3. Sit to stand transfer with Supervision Met 10/18/17  4. Bed to chair transfer with Supervision using Rolling Walker  5. Gait  x 150 feet with Supervision using Rolling Walker.   6. Ascend/descend 2 stair without Handrails Minimal Assistance using Rolling Walker.   7. Lower extremity exercise program x20 reps per handout, with independence     Outcome: Ongoing (interventions implemented as appropriate)  2 goals met today

## 2017-10-19 NOTE — ASSESSMENT & PLAN NOTE
Continued Lasix; holding aldactone due to hyperkalemia.  Creatinine increased after a few days of Lasix; discontinuing and fluid restricting alone.

## 2017-10-19 NOTE — PLAN OF CARE
Problem: Patient Care Overview  Goal: Plan of Care Review  Outcome: Ongoing (interventions implemented as appropriate)  Pt AA&Ox4 and free from injury/falls this shift. VSS, afebrile. One time dose of medication administered for neuropathy in BLE. CBG monitored AC/HS with insulin coverage administered per SS. Pt given lactulose x2 doses with no BMs this shift. Pt repositioned independently, skin remains intact. Bed locked in lowest position, SR upx2. Call light within reach. Will continue to monitor.

## 2017-10-19 NOTE — PROGRESS NOTES
[facility] Hospital Medicine  Progress Note    Patient Name: Soy Reza  MRN: [id number]  Patient Class: IP- Inpatient   Admission Date: 10/15/2017  Length of Stay: 3 days  Attending Physician: Jaimee Foster MD  Primary Care Provider: Jean Carlos Swanson MD    [facility] Medicine Team: [facility] MED D Jaimee Foster MD    Subjective:     Principal Problem:Chylous ascites    HPI:  No notes on file    Hospital Course:  No notes on file    Interval History: Patient with no events overnight, no new complaints.  Data Review: Results recorded below were reviewed 10/18/2017.    Review of Systems   Constitutional: Negative for fever.   Respiratory: Negative for shortness of breath.    Gastrointestinal: Negative for abdominal pain.     Objective:     Vital Signs (Most Recent):  Temp: 97.7 °F (36.5 °C) (10/18/17 1936)  Pulse: 73 (10/18/17 1936)  Resp: 16 (10/18/17 1600)  BP: 130/76 (10/18/17 1936)  SpO2: 95 % (10/18/17 1936) Vital Signs (24h Range):  Temp:  [97.5 °F (36.4 °C)-98.6 °F (37 °C)] 97.7 °F (36.5 °C)  Pulse:  [73-79] 73  Resp:  [15-16] 16  SpO2:  [95 %-99 %] 95 %  BP: (112-132)/(65-83) 130/76     Weight: 73.2 kg (161 lb 6 oz)  Body mass index is 22.51 kg/m².    Intake/Output Summary (Last 24 hours) at 10/18/17 2057  Last data filed at 10/18/17 1800   Gross per 24 hour   Intake             1100 ml   Output             1575 ml   Net             -475 ml      Physical Exam   Constitutional: He appears well-developed.   HENT:   Head: Normocephalic.   Mouth/Throat: Mucous membranes are not pale and not cyanotic.   Eyes: Conjunctivae and lids are normal. Pupils are equal.   Neck: Neck supple.   Cardiovascular: S1 normal and S2 normal.    Pulmonary/Chest: Effort normal. He has decreased breath sounds in the right lower field.   Abdominal: Soft. Bowel sounds are normal. He exhibits ascites. There is no tenderness.   Musculoskeletal: He exhibits no edema.   Neurological: He is alert. He is not  disoriented.   Skin: Skin is warm and dry. He is not diaphoretic. No cyanosis. Nails show no clubbing.   Psychiatric: He has a normal mood and affect.       Significant Labs:   A1C:     Recent Labs  Lab 08/04/17  0523 10/02/17  0908   HGBA1C 6.3* 6.4*     CBC:     Recent Labs  Lab 10/17/17  0451 10/18/17  0351   WBC 3.45* 4.80   HGB 9.3* 10.0*   HCT 27.1* 29.5*   * 136*     CMP:     Recent Labs  Lab 10/17/17  0451 10/18/17  0351   * 134*   K 4.9 4.6    102   CO2 22* 24   * 184*   BUN 47* 48*   CREATININE 1.2 1.5*   CALCIUM 8.4* 8.3*   PROT 6.0 5.8*   ALBUMIN 2.8* 2.7*   BILITOT 1.1* 1.1*   ALKPHOS 107 107   AST 21 24   ALT 19 18   ANIONGAP 9 8   EGFRNONAA >60.0 48.2*     Coagulation:     Recent Labs  Lab 10/18/17  0351   INR 0.9     Magnesium:     Recent Labs  Lab 10/18/17  0351   MG 2.2     POCT Glucose:     Recent Labs  Lab 10/18/17  0850 10/18/17  1210 10/18/17  1725   POCTGLUCOSE 209* 132* 222*     Assessment/Plan:      Current Hospital Problem List:    Active Hospital Problems    Diagnosis  POA    *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       Ordered Medications for management of current problems:    aspirin  81 mg Oral Daily    carvedilol  6.25 mg Oral BID    [START ON 10/21/2017] furosemide  20 mg Oral Every Tues, Thurs, Sat    lactulose  20 g Oral TID    rifAXImin  550 mg Oral BID       Anticipated Disposition: Home-Health Care c    Assessment and Plan by Problem:    * Chylous  "ascites    Work up unrevealing for etiology. Per Hepatology: "patient is a poor TIPS candiate as there is history of hepatic encephalopathy (already on rifaximin and ammonia > 100 couple months back). 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  Improved.        Alcoholic cirrhosis of liver with ascites    Continued Lasix; holding aldactone due to hyperkalemia.  Creatinine increased after a few days of Lasix; discontinuing and fluid restricting alone.        Essential hypertension    Continuing current home management with [facility].        Type 2 diabetes mellitus with stage 2 chronic kidney disease and hypertension    Treated with metformin as OP.          VTE Risk Mitigation         Ordered     Medium Risk of VTE  Once      10/15/17 1651     Place sequential compression device  Until discontinued      10/15/17 1651              Jaimee Foster MD  Department of Hospital Medicine   [facility]  "

## 2017-10-19 NOTE — PT/OT/SLP PROGRESS
Physical Therapy  Treatment    Soy Reza   MRN: [id number]   Admitting Diagnosis: Chylous ascites    PT Received On: 10/19/17  PT Start Time: 1042     PT Stop Time: 1057    PT Total Time (min): 15 min       Billable Minutes:  Gait [id number]    Treatment Type: Treatment  PT/PTA: PT             General Precautions: Standard, fall  Orthopedic Precautions: N/A   Braces: N/A    Do you have any cultural, spiritual, Methodist conflicts, given your current situation?: none    Subjective:  Communicated with pt and nurse prior to session.  Pt agreeable to PT    Pain/Comfort  Pain Rating 1: 6/10  Location 1: abdomen  Pain Addressed 1: Reposition, Distraction    Objective:    Pt found supine in bed    Functional Mobility:  Bed Mobility:   Scooting/Bridging: Modified Independent  Supine to Sit: Modified Independent   Performed with HOB slightly elevated without use of rail    Transfers:  Sit <> Stand Assistance: Modified Independent  Sit <> Stand Assistive Device: Rolling Walker  Performed from bed    Gait:   Gait Distance: ~250 feet  Assistance 1: Supervision  Gait Assistive Device: Rolling walker  Gait Pattern: reciprocal  Gait Deviation(s): decreased sandeep, forward lean   Pt demonstrates ability to amb within room, make turns, avoid foot traffic in hallways, start/stop gait, and perform head turns without LOB    Stairs:  Pt refused stair training today stating that he will have assistance to get into house and is not concerned with ability to do so    Balance:   Static Sit: GOOD: Takes MODERATE challenges from all directions  Dynamic Sit: GOOD: Maintains balance through MODERATE excursions of active trunk movement  Static Stand: FAIR: Maintains without assist but unable to take challenges  Dynamic stand: FAIR+: Needs CLOSE SUPERVISION during gait and is able to right self with minor LOB   Pt is able to stand with unilateral UE support and reach min to mod excursions for nearby items without LOB    Therapeutic  Activities and Exercises:  Seated therex including:  Ankle pumps 20  LAQ 20  Hip abduction and adduction 20  Glute sets 20  Hip flexion 20       AM-PAC 6 CLICK MOBILITY  How much help from another person does this patient currently need?   1 = Unable, Total/Dependent Assistance  2 = A lot, Maximum/Moderate Assistance  3 = A little, Minimum/Contact Guard/Supervision  4 = None, Modified [place]/Independent    Turning over in bed (including adjusting bedclothes, sheets and blankets)?: 4  Sitting down on and standing up from a chair with arms (e.g., wheelchair, bedside commode, etc.): 4  Moving from lying on back to sitting on the side of the bed?: 4  Moving to and from a bed to a chair (including a wheelchair)?: 4  Need to walk in hospital room?: 3  Climbing 3-5 steps with a railing?: 3  Total Score: 22    AM-PAC Raw Score CMS G-Code Modifier Level of Impairment Assistance   6 % Total / Unable   7 - 9 CM 80 - 100% Maximal Assist   10 - 14 CL 60 - 80% Moderate Assist   15 - 19 CK 40 - 60% Moderate Assist   20 - 22 CJ 20 - 40% Minimal Assist   23 CI 1-20% SBA / CGA   24 CH 0% Independent/ Mod I     Patient left up in chair with call button in reach.    Assessment:  Soy Reza is a 66 y.o. male with a medical diagnosis of Chylous ascites and presents with generalized weakness, impaired balance, gait and endurance with decreased safety awareness requiring supervision for ambulation using Rw. Pt demonstrates improvement in transfers and stability during standing tasks and ambulation and is appropriate to return home with assistance from mother upon discharge. Pt will benefit from continued PT treatment to address remaining impairments and improve functional mobility and independence.       Rehab identified problem list/impairments: Rehab identified problem list/impairments: weakness, impaired endurance, gait instability, impaired balance, decreased safety awareness    Rehab potential is good.    Activity  tolerance: Good    Discharge recommendations: Discharge Facility/Level Of Care Needs: home health PT     Barriers to discharge: Barriers to Discharge: None    Equipment recommendations: Equipment Needed After Discharge: walker, rolling     GOALS:    Physical Therapy Goals        Problem: Physical Therapy Goal    Goal Priority Disciplines Outcome Goal Variances Interventions   Physical Therapy Goal     PT/OT, PT Ongoing (interventions implemented as appropriate)     Description:  Goals to be met by:  10/23/17    Patient will increase functional independence with mobility by performin. Supine to sit with Modified [place] Met 10/18/17  2. Sit to supine with Modified [place]  3. Sit to stand transfer with Supervision Met 10/18/17  4. Bed to chair transfer with Supervision using Rolling Walker   5. Gait  x 150 feet with Supervision using Rolling Walker. Met 10/19/17  6. Ascend/descend 2 stair without Handrails Minimal Assistance using Rolling Walker.   7. Lower extremity exercise program x20 reps per handout, with independence                        PLAN:    Patient to be seen 4 x/week  to address the above listed problems via gait training, therapeutic activities, therapeutic exercises, neuromuscular re-education  Plan of Care expires: 11/15/17  Plan of Care reviewed with: patient         Meghann Phillips, PT  10/19/2017

## 2017-10-19 NOTE — PROGRESS NOTES
[facility]  Hepatology  Progress Note    Patient Name: Soy Reza  MRN: [id number]  Admission Date: 10/15/2017  Hospital Length of Stay: 4 days  Attending Provider: Jaimee Foster MD   Primary Care Physician: Jean Carlos Swanson MD  Principal Problem:Chylous ascites    Subjective:     Transplant status: No    HPI: This is a 66 year old male with a history of HepC/Alcoholic cirrhosis complicated with refractory ascites as well as hepatic hydrothorax admitted to the hospital for generalized abdominal pain that has worsened since yesterday.  Patient was recently admitted 10/8-10/11 for symptomatic ascites and had 7.7L of fluid removed. Seen by hepatology team at that time.  Associated symptoms of weakness, SOB, decreased appetite, N/V last night, and generalized abdominal pain.    Diagnostic tap reportedly very cloudy. 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    He lives with his mother and states that he stopped drinking alcohol 1 month ago. He does not follow a diet low in sodium nor watches his fluid intake.    Interval History:     Still with abdominal distention.  Otherwise feels well.  Walking in halls    Current Facility-Administered Medications   Medication    acetaminophen tablet 650 mg    aspirin chewable tablet 81 mg    carvedilol tablet 6.25 mg    dextrose 50% injection 12.5 g    dextrose 50% injection 25 g    glucagon (human recombinant) injection 1 mg    glucose chewable tablet 16 g    glucose chewable tablet 24 g    insulin aspart pen 0-5 Units    lactulose 20 gram/30 mL solution Soln 20 g    ondansetron disintegrating tablet 8 mg    ramelteon tablet 8 mg    rifAXIMin tablet 550 mg       Objective:     Vital Signs (Most Recent):  Temp: 98.5 °F (36.9 °C) (10/19/17 1100)  Pulse: 76 (10/19/17 1100)  Resp: 16 (10/19/17 1100)  BP: 111/72 (10/19/17 1100)  SpO2: 95 % (10/19/17 1100) Vital  Signs (24h Range):  Temp:  [97.7 °F (36.5 °C)-98.6 °F (37 °C)] 98.5 °F (36.9 °C)  Pulse:  [71-78] 76  Resp:  [16-18] 16  SpO2:  [93 %-99 %] 95 %  BP: (104-130)/(57-79) 111/72     Weight: 73.2 kg (161 lb 6 oz) (10/15/17 2029)  Body mass index is 22.51 kg/m².    Physical Exam   Constitutional: He is oriented to person, place, and time. No distress.   Thin and frail   HENT:   Head: Normocephalic.   Eyes: Conjunctivae are normal.   Cardiovascular: Normal rate and regular rhythm.    Pulmonary/Chest: Effort normal and breath sounds normal. No respiratory distress.   Diminished breath sounds over right  base   Abdominal: Soft. He exhibits distension. There is no rebound.   Bandage over RLQ    Neurological: He is alert and oriented to person, place, and time. No cranial nerve deficit.   Skin: Skin is warm and dry. No rash noted.   Vitals reviewed.      MELD-Na score: 10 at 10/19/2017  4:32 AM  MELD score: 10 at 10/19/2017  4:32 AM  Calculated from:  Serum Creatinine: 1.4 mg/dL at 10/19/2017  4:32 AM  Serum Sodium: 134 mmol/L at 10/19/2017  4:32 AM  Total Bilirubin: 1.1 mg/dL at 10/19/2017  4:32 AM  INR(ratio): 0.9 (Rounded to 1) at 10/19/2017  4:32 AM  Age: 66 years    Significant Labs:  CBC:   Recent Labs  Lab 10/19/17  0432   WBC 5.31   RBC 3.57*   HGB 9.9*   HCT 28.6*   *     BMP:   Recent Labs  Lab 10/19/17  0432   *   *   K 4.8      CO2 24   BUN 50*   CREATININE 1.4   CALCIUM 8.2*     CMP:   Recent Labs  Lab 10/19/17  0432   *   CALCIUM 8.2*   ALBUMIN 2.5*   PROT 5.8*   *   K 4.8   CO2 24      BUN 50*   CREATININE 1.4   ALKPHOS 102   ALT 20   AST 24   BILITOT 1.1*     Coagulation:   Recent Labs  Lab 10/19/17  0432   INR 0.9       Significant Imaging:  Labs: Reviewed    Assessment/Plan:     * Chylous ascites    Concern for chylous ascites given turbid milky appearance as reported.  If confirmed chylous, unclear etiology, although can be related to cirrhosis. (his SAAG of 1.6  supports this)  Can also be found in cardiac disease (right heart failure and constriction) and TB and anatomic abnormalities as well and malignancy (lymphoma) and anatomic abnormalities  Do not feel that patient is a good TIPs candiate as there is history of hepatic encephalopathy (already on rifaximin and ammonia > 100 couple months back)     Plan:  Given poor social and caregiver support, pt is deemed NOT a liver transplant candidate.  The best option going forward would be either nursing home placement and attempted outpatient paracentesis as scheduled vs. Peritoneal catheter placement with self drainage at home with home health.    Pt still wishes to give 1 more attempt at going home, with home health, and will attempt to keep his outpatient appts and outpatient scheduled paracentesis.    OK for discharge from hepatology perspective and we will arrange close follow up and standing orders for routine weekly paracentesis.            Thank you for your consult. I will sign off. Please contact us if you have any additional questions.    Bebo Bustos MD  Hepatology  [facility]

## 2017-10-19 NOTE — TELEPHONE ENCOUNTER
Spoke with Avani, pt is in the hospital,discharging today. Yes Dr Swanson will over see HH and avani informed about future appt for hosp f/u on 11-8  Dr Swanson informed

## 2017-10-19 NOTE — ASSESSMENT & PLAN NOTE
"Work up unrevealing for etiology. Per Hepatology: "patient is a poor TIPS candiate as there is history of hepatic encephalopathy (already on rifaximin and ammonia > 100 couple months back). Plan for outpatient scheduled paracenteses and follow up as he will likely need uptitration of diuretics."  "

## 2017-10-19 NOTE — PLAN OF CARE
[facility]    HOME HEALTH ORDERS  FACE TO FACE ENCOUNTER    Patient Name: Soy Reza  YOB: 1950    PCP: Jean Carlos Swanson MD   PCP Address: [address] [place] [address] [address]LO[address] [facility] [place] [address]  PCP Phone Number: [phone]  PCP Fax: [phone]    Encounter Date: 10/19/2017    Admit to Home Health    Diagnoses:  Active Hospital Problems    Diagnosis  POA    *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       Future Appointments  Date Time Provider Department Center   10/23/2017 10:00 AM [facility] [id number] [facility] RAD IR [facility]   10/25/2017 2:00 PM EVELIA Chacon [facility] IMPRICL [facility] PCW   10/25/2017 3:00 PM Star Major MD [facility] HEPAT [facility]   11/8/2017 10:40 AM Jean Carlos Swanson MD [facility] IM [place]       Follow-up Information     EVELIA Batres On 10/25/2017.    Specialty:  Internal Medicine  Why:  @ 2:00  Contact information:  [address] [place]  [place] [address]  [phone]             [facility] - Interventional Rad On 10/23/2017.    Specialty:  Interventional Radiology  Why:  10:00 am for your weekly paracentesis on the 2nd floor  Contact information:  [address] [facility]  [facility] [address] [phone]  Additional information:  2nd Floor           Star Major MD. Schedule an appointment as soon as possible for a visit in 2 weeks.    Specialties:  Transplant, Hepatology, Gastroenterology  Why:  For discharge  from hospital follow up  Contact information:  [address] [place] [address]  [facility] [address]  [phone]                     I have seen and examined this patient face to face today. My clinical findings that support the need for the home health skilled services and home bound status are the following:  Weakness/numbness causing balance and gait disturbance due to Liver Disease making it taxing to leave home.  Patient with medication mismanagement issues requiring home bound status as evidenced by  Poor understanding of medication regimen/dosage and Poor adherence to medication regimen/dosage.  Mental confusion making it unsafe for patient to leave home alone due to  Hepatic encephalopathy.    Allergies:Review of patient's allergies indicates:  No Known Allergies    Diet: diabetic diet: 2000 calorie, fluid restriction: 1500 and 2 gram sodium diet    Activities: activity as tolerated    Nursing:   SN to complete comprehensive assessment including routine vital signs. Instruct on disease process and s/s of complications to report to MD. Review/verify medication list sent home with the patient at time of discharge  and instruct patient/caregiver as needed. Frequency may be adjusted depending on start of care date.    Notify MD if SBP > 160 or < 90; DBP > 90 or < 50; HR > 120 or < 50; Temp > 101      CONSULTS:    Physical Therapy to evaluate and treat. Evaluate for home safety and equipment needs; Establish/upgrade home exercise program. Perform / instruct on therapeutic exercises, gait training, transfer training, and Range of Motion.  Occupational Therapy to evaluate and treat. Evaluate home environment for safety and equipment needs. Perform/Instruct on transfers, ADL training, ROM, and therapeutic exercises.   to evaluate for community resources/long-range planning.  Aide to provide assistance with personal care, ADLs, and vital signs.    MISCELLANEOUS CARE:  Diabetic Care:   SN to perform and educate  Diabetic management with blood glucose monitoring:      Medications: Review discharge medications with patient and family and provide education.      Current Discharge Medication List      START taking these medications    Details   rifAXIMin (XIFAXAN) 550 mg Tab Take 1 tablet (550 mg total) by mouth 2 (two) times daily.  Qty: 30 tablet, Refills: 0         CONTINUE these medications which have CHANGED    Details   lactulose (CHRONULAC) 10 gram/15 mL solution Take 30 mLs (20 g total) by mouth 3 (three) times daily.  Qty: 236 mL, Refills: 0         CONTINUE these medications which have NOT CHANGED    Details   aspirin 81 MG Chew Take 81 mg by mouth once daily.      carvedilol (COREG) 6.25 MG tablet Take 1 tablet (6.25 mg total) by mouth 2 (two) times daily.  Qty: 180 tablet, Refills: 1      hydrocodone-acetaminophen 5-325mg (NORCO) 5-325 mg per tablet Take 1 tablet by mouth every 6 (six) hours as needed for Pain.  Qty: 30 tablet, Refills: 0      metformin (GLUCOPHAGE-XR) 500 MG 24 hr tablet       ondansetron (ZOFRAN-ODT) 8 MG TbDL Take 1 tablet (8 mg total) by mouth every 6 (six) hours as needed.  Qty: 30 tablet, Refills: 1         STOP taking these medications       furosemide (LASIX) 20 MG tablet Comments:   Reason for Stopping:         spironolactone (ALDACTONE) 25 MG tablet Comments:   Reason for Stopping:               I certify that this patient is confined to his home and needs intermittent skilled nursing care, physical therapy and occupational therapy.      Jaimee Foster MD

## 2017-10-19 NOTE — DISCHARGE SUMMARY
"[facility] Hospital Medicine  Discharge Summary      Patient Name: Soy Reza  MRN: [id number]  Admission Date: 10/15/2017  Hospital Length of Stay: 4 days  Discharge Date and Time: 10/19/2017  5:02 PM  Attending Physician: Jaimee Foster MD   Discharging Provider: Jaimee Foster MD  Primary Care Provider: Jean Carlos Swanson MD  Hospital Medicine Team: [facility] HOSP MED D Jaimee Foster MD    HPI:   "66 y.o. male with alcoholic cirrhosis and recurring ascites presents with abdominal pain and SOB since yesterday. He was recently admitted 10/2 and 10/8 for symptomatic ascites requiring paracentesis. He denies fever or chills. Patient had loose stool yesterday and nausea as well. No vomiting. He denies chest pain. SOB with minimal exertion and has associated PND. There has been some worsening BLE and abdominal swelling since discharge. Abdominal pain is in lower quadrants. Pain is worsened with activity and he is unaware of improving factors."    * No surgery found *      Indwelling Lines/Drains at time of discharge:   Lines/Drains/Airways          No matching active lines, drains, or airways        Hospital Course:      * Chylous ascites    "Diagnostic tap reportedly very cloudy. Concern for chylous ascites  Cell count by lab shows red lipemic fluid, with WBC in ascites of 580, 2% segs and > 80% lymphs."  Work up unrevealing for etiology. Per Hepatology: "patient is a poor TIPS candiate as there is history of hepatic encephalopathy (already on rifaximin and ammonia > 100 couple months back). Plan for outpatient scheduled paracenteses and follow up."        Hydrothorax    Right hepatic hydrothorax likely cause of SOB.  Shortness of breath improved.        Alcoholic cirrhosis of liver with ascites    Continued Lasix; holding aldactone due to hyperkalemia.  Creatinine increased after a few days of Lasix; discontinuing and fluid restricting alone.  Per Hepatology:  "Patient is not a candidate for " "liver transplant due to his non-compliance and also due to his lack of caregiver support. Suggested to him that we can either continue with attempts at outpatient paracenteses (which have sometimes not taken place because of his lack of transportation) OR insert a permanent abdominal drain which can be used to drain fluid at home - home health." Patient is demonstrating poor self-management skills and if he fails OP therapy again while living at home, alf NH placement should be considered.  Continue low Na diet and fluid restriction.        Alcoholism in remission    High risk of relapse. Due to patient's declining functional and cognitive skills, obtaining alcohol is becoming more difficult for him.        Essential hypertension    Continuing current home management with [facility].        Type 2 diabetes mellitus with stage 2 chronic kidney disease and hypertension    Treated with metformin as OP.          Consults:   Consults         Status Ordering Provider     Inpatient consult to Hepatology  Once     Provider:  (Not yet assigned)    NIKO Crespo        Significant Diagnostic Studies:  EF   Date Value Ref Range Status   10/17/2017 60 55 - 65    07/26/2017 65 55 - 65      Echocardiogram: 2D echo with color flow doppler:   Results for orders placed or performed during the hospital encounter of 10/15/17   2D echo with color flow doppler   Result Value Ref Range    EF 60 55 - 65    Aortic Valve Regurgitation MILD     Est. PA Systolic Pressure 29.83      Hemoglobin A1C   Date Value Ref Range Status   10/02/2017 6.4 (H) 4.0 - 5.6 % Final   08/04/2017 6.3 (H) 4.0 - 5.6 % Final     CBC:   Recent Labs  Lab 10/19/17  0432   WBC 5.31   RBC 3.57*   HGB 9.9*   HCT 28.6*   *   MCV 80*   MCH 27.7   MCHC 34.6     CMP:   Recent Labs  Lab 10/19/17  0432   *   CALCIUM 8.2*   ALBUMIN 2.5*   PROT 5.8*   *   K 4.8   CO2 24      BUN 50*   CREATININE 1.4   ALKPHOS 102   ALT 20   AST 24   BILITOT " 1.1*     Coagulation:   Recent Labs  Lab 10/19/17  0432   LABPROT 10.2   INR 0.9     Cardiac markers:   Recent Labs  Lab 10/15/17  1729   CPKMB 1.9   TROPONINI 0.012     Microbiology Results (last 7 days)     Procedure Component Value Units Date/Time    Aerobic culture [[id number]] Collected:  10/16/17 0925    Order Status:  Completed Specimen:  Ascites from Ascites Updated:  10/19/17 1224     Aerobic Bacterial Culture No growth    Culture, Anaerobe [[id number]] Collected:  10/15/17 1659    Order Status:  Completed Specimen:  Body Fluid from Ascites Updated:  10/19/17 1057     Anaerobic Culture Culture in progress    Blood culture [[id number]] Collected:  10/15/17 1422    Order Status:  Completed Specimen:  Blood from Peripheral, Hand, Left Updated:  10/18/17 1612     Blood Culture, Routine No Growth to date     Blood Culture, Routine No Growth to date     Blood Culture, Routine No Growth to date     Blood Culture, Routine No Growth to date    Blood culture [[id number]] Collected:  10/15/17 1405    Order Status:  Completed Specimen:  Blood from Peripheral, Antecubital, Right Updated:  10/18/17 1612     Blood Culture, Routine No Growth to date     Blood Culture, Routine No Growth to date     Blood Culture, Routine No Growth to date     Blood Culture, Routine No Growth to date    Aerobic culture [[id number]] Collected:  10/15/17 1659    Order Status:  Completed Specimen:  Body Fluid from Ascites Updated:  10/18/17 1206     Aerobic Bacterial Culture No growth    Culture, Anaerobe [[id number]] Collected:  10/16/17 0925    Order Status:  Completed Specimen:  Ascites from Ascites Updated:  10/18/17 0936     Anaerobic Culture Culture in progress    Culture, Respiratory with Gram Stain [[id number]]     Order Status:  Canceled Specimen:  Respiratory           Recent Labs  Lab 10/15/17  1528   COLORU Yellow   SPECGRAV 1.020   PHUR 5.0   PROTEINUA 2+*   BACTERIA None   NITRITE Negative   LEUKOCYTESUR Negative   UROBILINOGEN 4.0    HYALINECASTS 0     Imaging Results          CT Abdomen Pelvis With Contrast (Final result)  Result time 10/17/17 00:36:08   Procedure changed from CT Abdomen Pelvis  Without Contrast     Final result by Shaun Nix MD (10/17/17 00:36:08)                 Impression:        CT findings of cirrhosis and sequela of portal hypertension including splenomegaly, a recanalized umbilical vein, splenic collateral vessels and a small volume of ascites.  Note is made of nonocclusive thrombosis of the SMV extending to the level of the portal confluence.    No concerning hepatic masses.    Additional findings include:  - Large right-sided pleural effusion with associated compressive atelectasis of the adjacent lung.    - Cholelithiasis.  - Bilateral renal cysts.      Electronically signed by: SHAUN NIX MD  Date:     10/17/17  Time:    00:36              Narrative:    Technique: 5 mm axial images were obtained through the abdomen and pelvis following administration of 75 cc of Omnipaque 350 IV contrast and 900 cc oral contrast according to the triple phase protocol.  Coronal and sagittal reformats were performed.    Comparison: CT 02/05/2011.    Findings:    The visualized heart demonstrates coronary artery atherosclerosis.  No pericardial effusion.    There is a large right-sided dependent pleural effusion with associated compressive atelectasis of the adjacent lung.  Visualized aerated portions of the lungs are clear.    The liver is small and demonstrates a nodular contour with widened fissures.  No enhancing hepatic masses.  No intrahepatic bile that dilatation.  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  Portal vein and splenic veins are patent.  There is a recanalized umbilical vein.    Multiple calcified stones are noted within the gallbladder lumen.  No gallbladder wall thickening or surrounding inflammatory changes.  Common bile  duct is normal in caliber.    The spleen is enlarged.  No focal splenic abnormalities.  There are a few splenic vessels.      The stomach, duodenum, pancreas and adrenal glands are normal.      Kidneys are normal in size and location.  No enhancing renal masses.  There are bilateral nonenhancing low attenuation cortical lesions, presumably cysts.  No nephrolithiasis.  No hydronephrosis or hydroureter.  The bladder is fluid-filled and unremarkable.  The prostate demonstrates a few dystrophic calcifications.    The small bowel is normal in caliber.  There is a moderate amount retained fecal material throughout the visualized colon.  The appendix is normal.  No obstruction or inflammatory changes.    The abdominal aorta tapers normally we mild atherosclerotic calcification.  No aneurysmal dilatation or dissection.  IVC and common iliac veins are patent.    There is a small volume of upper abdominal and pelvic ascites.  No intra-abdominal free air.  No abdominal or pelvic lymph enlargement.  There is mild diffuse mesenteric edema.  No focal mesenteric masses.    Subcutaneous soft tissues are within normal limits.    No acute fractures or osseous destruction.  Degenerative changes of the axial skeleton noted.                             IR Paracentesis with Imaging (Final result)  Result time 10/17/17 15:02:06    Final result by Eloise Keene NP (10/17/17 15:02:06)                 Impression:     Successful ultrasound-guided paracentesis.  ______________________________________     Electronically signed by resident: ELOISE KEENE NP  Date:     10/16/17  Time:    16:08            As the supervising and teaching physician, I personally reviewed the images and resident's interpretation and I agree with the findings.            Electronically signed by: ALISHA BELLAMY MD  Date:     10/17/17  Time:    15:02              Narrative:    History: Recurrent ascites, referred for paracentesis.    CPT codes:  50795    Following informed consent using sterile preparation and 1% lidocaine for local anesthesia and ultrasound guidance a 5-Comoran Yueh catheter was placed into the right lower quadrant peritoneal space.  5000 cc of cloudy peach fluid was removed.  Patient tolerated the procedure well.  Images and report were recorded on the PACS.                             US Abdomen Limited with Doppler (xpd) (Final result)  Result time 10/16/17 10:20:41    Final result by Hawa Zuñiga MD (10/16/17 10:20:41)                 Impression:        No evidence of portal vein thrombosis as clinically questioned.    Satisfactory Doppler status of the liver.    Sequela of portal hypertension including splenomegaly, ascites and splenic collaterals.    Bilateral pleural effusion.    Distended gallbladder containing significant amount of sludge and stones with minimally thickened gallbladder wall.  No definite evidence of cholecystitis.  Clinical correlation advised.  ______________________________________     Electronically signed by resident: LUCRECIA MEJIA MD  Date:     10/16/17  Time:    09:39            As the supervising and teaching physician, I personally reviewed the images and resident's interpretation and I agree with the findings.          Electronically signed by: Hawa Zuñiga MD  Date:     10/16/17  Time:    10:20              Narrative:    ULTRASOUND ABDOMEN COMPLETE WITH DOPPLER    INDICATION: Chylous ascites; evaluate for portal thrombosis    COMPARISON: Ultrasound abdomen 10/8/2017.    FINDINGS:   The pancreas is obscured by bowel gas and therefore not visualized.  The liver demonstrates nodular contour and heterogeneous parenchyma and measures 16.5 cm, suggestive of cirrhosis.  No evidence of focal hepatic abnormalities.  Ascites as well as bilateral pleural effusions.    The gallbladder is distended and filled with sludge and small stones.  Gallbladder wall is thickened but likely secondary to underlying  liver disease and ascites.  No evidence of gallbladder hyperemia.  Sonographic Blount's sign is negative.  No evidence of intra-or extrahepatic biliary ductal dilatation with the common bile duct measuring 0.4 cm.    Vascular:  The main, right and left portal veins as well as the middle, right and left hepatic veins are patent and demonstrate appropriate flow.  The main portal vein is dilated measuring 1.5 cm.  IVC is visualized.  The main hepatic artery demonstrates a peak systolic velocity of 60.8 cm/s with a resistive arterial of 0.70.  Splenic vascular collaterals are noted.  No evidence of recanalized umbilical vein.                              Pending Diagnostic Studies:     Procedure Component Value Units Date/Time    Phosphatidylethanol (PETH) [[id number]] Collected:  10/16/17 0358    Order Status:  Sent Lab Status:  In process Updated:  10/16/17 0445    Specimen:  Blood from Blood         Final Active Diagnoses:    Diagnosis Date Noted POA    PRINCIPAL PROBLEM:  Chylous ascites [I89.8] 10/16/2017 Yes    Hydrothorax [J94.8] 10/03/2017 Yes    Alcoholic cirrhosis of liver with ascites [K70.31] 10/15/2017 Yes    Alcoholism in remission [F10.21] 01/19/2017 Yes     Chronic    Essential hypertension [I10] 01/19/2017 Yes     Chronic    Type 2 diabetes mellitus with stage 2 chronic kidney disease and hypertension [E11.22, I12.9, N18.2] 10/15/2017 Yes      Problems Resolved During this Admission:    Diagnosis Date Noted Date Resolved POA       Discharged Condition: stable    Disposition: Home-Health Care [facility]    Follow Up:  Follow-up Information     EVELIA Batres On 10/25/2017.    Specialty:  Internal Medicine  Why:  @ 2:00  Contact information:  Jacek Alamo  [facility] [address]  [phone]             Angel [facility] - Interventional Rad On 10/23/2017.    Specialty:  Interventional Radiology  Why:  10:00 am for your weekly paracentesis on the 2nd floor  Contact information:  Jacek Rollins  "[address]  [facility] [id number] [phone]  Additional information:  2nd Floor           Star Major MD. Schedule an appointment as soon as possible for a visit in 2 weeks.    Specialties:  Transplant, Hepatology, Gastroenterology  Why:  For discharge from hospital follow up  Contact information:  Jacek DE [place]  [facility] [address]  [phone]                 Patient Instructions:     WALKER FOR HOME USE   Order Comments: Please deliver to room, pt will d/c home early afternoon.    JOSIAS Donnelly [id number]   Order Specific Question Answer Comments   Type of Walker: Adult (5'4"-6'6")    With wheels? Yes    Height: 5' 11" (1.803 m)    Weight: 73.2 kg (161 lb 6 oz)    Length of need (1-99 months): 99    Does patient have medical equipment at home? cane, straight    Does patient have medical equipment at home? rollator    Does patient have medical equipment at home? shower chair    Please check all that apply: Patient is unable to safely ambulate without equipment.    Vendor: Other (use comments) [employer]   Expected Date of Delivery: 10/19/2017      Diet general   Order Specific Question Answer Comments   Fluid restriction: Fluid - 1500mL    Additional restrictions: Diabetic 2000    Additional restrictions: Low Sodium,2gm      Activity as tolerated     Call MD for:  temperature >100.4     Call MD for:  persistent nausea and vomiting or diarrhea     Call MD for:  difficulty breathing or increased cough     Call MD for:  persistent dizziness, light-headedness, or visual disturbances     Call MD for:  increased confusion or weakness       Medications:  Reconciled Home Medications:   Current Discharge Medication List      START taking these medications    Details   rifAXIMin (XIFAXAN) 550 mg Tab Take 1 tablet (550 mg total) by mouth 2 (two) times daily.  Qty: 30 tablet, Refills: 0         CONTINUE these medications which have CHANGED    Details   lactulose (CHRONULAC) 10 gram/15 mL solution Take 30 mLs " "(20 g total) by mouth 3 (three) times daily.  Qty: 236 mL, Refills: 0         CONTINUE these medications which have NOT CHANGED    Details   aspirin 81 MG Chew Take 81 mg by mouth once daily.      carvedilol (COREG) 6.25 MG tablet Take 1 tablet (6.25 mg total) by mouth 2 (two) times daily.  Qty: 180 tablet, Refills: 1      hydrocodone-acetaminophen 5-325mg (NORCO) 5-325 mg per tablet Take 1 tablet by mouth every 6 (six) hours as needed for Pain.  Qty: 30 tablet, Refills: 0      metformin (GLUCOPHAGE-XR) 500 MG 24 hr tablet       ondansetron (ZOFRAN-ODT) 8 MG TbDL Take 1 tablet (8 mg total) by mouth every 6 (six) hours as needed.  Qty: 30 tablet, Refills: 1         STOP taking these medications       furosemide (LASIX) 20 MG tablet Comments:   Reason for Stopping:         spironolactone (ALDACTONE) 25 MG tablet Comments:   Reason for Stopping:             Time spent on the discharge of patient: 45 minutes  Time Spent:  Included reviewing hospital course with patient/family, reviewing discharge medications, and arranging follow-up care.  Face to face services were provided on  10/19/2017  5:02 PM  Physical Exam on 10/19/2017  5:02 PM:  height is 5' 11" (1.803 m) and weight is 73.2 kg (161 lb 6 oz). His oral temperature is 98.5 °F (36.9 °C). His blood pressure is 111/72 and his pulse is 76. His respiration is 16 and oxygen saturation is 95%.   Cardiovascular: S1 normal and S2 normal.    Pulmonary/Chest: Effort normal. He has decreased breath sounds in the right lower field.   Abdominal: Soft. Bowel sounds are normal. He exhibits ascites. There is no tenderness.         Jaimee Foster MD  Department of Hospital Medicine  [facility]  "

## 2017-10-19 NOTE — ASSESSMENT & PLAN NOTE
High risk of relapse. Due to patient's declining functional and cognitive skills, obtaining alcohol is becoming more difficult for him.

## 2017-10-19 NOTE — ASSESSMENT & PLAN NOTE
"Work up unrevealing for etiology. Per Hepatology: "patient is a poor TIPS candiate as there is history of hepatic encephalopathy (already on rifaximin and ammonia > 100 couple months back). Plan for outpatient scheduled paracenteses and follow up."  "

## 2017-10-19 NOTE — TELEPHONE ENCOUNTER
----- Message from Chantelle Ji sent at 10/19/2017 12:55 PM CDT -----  Contact: [place] [phone]  Need to confirm doctor will oversee pt Home Health Service    Please advise

## 2017-10-19 NOTE — PLAN OF CARE
Plan for d/c to home on today with home health.  Outpatient consult to case management as pt has had 3 readmissions.  PCC appt scheduled and in AVS, pt also scheduled for paracentesis 10/23 at 10, his usual time.  SW to arrange home health once orders in place.  Pt will need transportation home with [facility] once ready for d/c.    Future Appointments  Date Time Provider Department Center   10/23/2017 10:00 AM [facility] [id number] [facility] RAD IR [facility]   10/25/2017 2:00 PM EVELIA Chacon [facility] IMPRICL [facility] [place] PCW   10/25/2017 3:00 PM Star Major MD [facility] HEPAT [facility]   11/8/2017 10:40 AM Jean Carlos Swanson MD [facility] Ru Benito, RN  Case Management  [id number]

## 2017-10-19 NOTE — PLAN OF CARE
Problem: Physical Therapy Goal  Goal: Physical Therapy Goal  Goals to be met by:  10/23/17    Patient will increase functional independence with mobility by performin. Supine to sit with Modified [place] Met 10/18/17  2. Sit to supine with Modified [place]  3. Sit to stand transfer with Supervision Met 10/18/17  4. Bed to chair transfer with Supervision using Rolling Walker   5. Gait  x 150 feet with Supervision using Rolling Walker. Met 10/19/17  6. Ascend/descend 2 stair without Handrails Minimal Assistance using Rolling Walker.   7. Lower extremity exercise program x20 reps per handout, with independence      Outcome: Ongoing (interventions implemented as appropriate)  1 goal met today

## 2017-10-19 NOTE — PT/OT/SLP PROGRESS
Occupational Therapy  Treatment    Soy Reza   MRN: [id number]   Admitting Diagnosis: Chylous ascites    OT Date of Treatment: 10/19/17   OT Start Time: 0931  OT Stop Time: 0957  OT Total Time (min): 26 min    Billable Minutes:  Self Care/Home Management 26    General Precautions: Standard, fall  Orthopedic Precautions: N/A  Braces: N/A         Subjective:  Communicated with patient prior to session.    Pain/Comfort  Pain Rating 1: 6/10  Location - Side 1: Bilateral  Location - Orientation 1: generalized  Location 1: abdomen  Pain Addressed 1: Reposition, Distraction, Pre-medicate for activity  Pain Rating Post-Intervention 1: 6/10    Objective:        Functional Mobility:  Bed Mobility:  Rolling/Turning to Left: Modified independent  Supine to Sit: Modified Independent  Sit to Supine: Modified Independent    Transfers:   Sit <> Stand Assistance: Supervision  Sit <> Stand Assistive Device: Rolling Walker  Toilet Transfer Technique:  (bed<>toilet using RW /c functional ambulation)  Toilet Transfer Assistance: Supervision  Toilet Transfer Assistive Device: Rolling Walker    Activities of Daily Living:  UE Dressing Level of Assistance: Set-up Assistance  LE Dressing Level of Assistance: Supervision  Grooming Position: Standing  Grooming Level of Assistance: Modified [place]  Toileting Where Assessed: Toilet  Toileting Level of Assistance: Minimum assistance  Bathing Where Assessed:  (while on toilet and standing sinkside)  Bathing Level of Assistance: Supervision    Balance:   Static Sit: GOOD: Takes MODERATE challenges from all directions  Dynamic Sit: GOOD: Maintains balance through MODERATE excursions of active trunk movement  Static Stand: GOOD: Takes MODERATE challenges from all directions  Dynamic stand: FAIR+: Needs CLOSE SUPERVISION during gait and is able to right self with minor LOB    AM-PAC 6 CLICK ADL   How much help from another person does this patient currently need?   1 = Unable,  "Total/Dependent Assistance  2 = A lot, Maximum/Moderate Assistance  3 = A little, Minimum/Contact Guard/Supervision  4 = None, Modified [place]/Independent    Putting on and taking off regular lower body clothing? : 4  Bathing (including washing, rinsing, drying)?: 3  Toileting, which includes using toilet, bedpan, or urinal? : 3  Putting on and taking off regular upper body clothing?: 4  Taking care of personal grooming such as brushing teeth?: 3  Eating meals?: 4  Total Score: 21     AM-PAC Raw Score CMS "G-Code Modifier Level of Impairment Assistance   6 % Total / Unable   7 - 8 CM 80 - 100% Maximal Assist   9-13 CL 60 - 80% Moderate Assist   14 - 19 CK 40 - 60% Moderate Assist   20 - 22 CJ 20 - 40% Minimal Assist   23 CI 1-20% SBA / CGA   24 CH 0% Independent/ Mod I       Patient left HOB elevated with call button in reach and all needs met.    ASSESSMENT:  Soy Reza is a 66 y.o. male with a medical diagnosis of Chylous ascites and presents with the deficits listed below. Patient tolerated treatment session and was motivated to complete ADLs. Patient continues to benefit from skilled OT services to achieve maximal independence.    Rehab identified problem list/impairments: Rehab identified problem list/impairments: weakness, impaired endurance, impaired self care skills, impaired functional mobilty, gait instability, impaired balance, decreased coordination, decreased safety awareness    Rehab potential is good.    Activity tolerance: Good    Discharge recommendations: Discharge Facility/Level Of Care Needs: home with home health     Barriers to discharge: Barriers to Discharge: None    Equipment recommendations: walker, rolling     GOALS:    Occupational Therapy Goals        Problem: Occupational Therapy Goal    Goal Priority Disciplines Outcome Interventions   Occupational Therapy Goal     OT, PT/OT Ongoing (interventions implemented as appropriate)    Description:  Goals to be met by: " 10/23/2017    Patient will increase functional independence with ADLs by performing:    UE Dressing with Modified [place].  LE Dressing with Modified [place].  Grooming while standing at sink with Modified [place]. Met  Toileting from toilet with Supervision for hygiene and clothing management.   Bathing with Supervision. Met  Supine to sit with Modified [place]. Met  Stand pivot transfers with Modified [place].   Toilet transfer to toilet with Modified [place] using RW.                       Plan:  Patient to be seen 4 x/week to address the above listed problems via self-care/home management, therapeutic activities, therapeutic exercises  Plan of Care expires: 11/16/17  Plan of Care reviewed with: patient         KYLIE Leija  10/19/2017

## 2017-10-19 NOTE — PLAN OF CARE
Sw did upload hh orders, h & p and face sheet to [facility] for acceptance. Sw will arrange w/c van transport once hh has accepted Pt. Pt accepted, Sw did arrange w.c van transport for 15:00pm and nurse and Pt is aware.

## 2017-10-19 NOTE — NURSING
Patient given discharge instructions. Patient understand discharge instructions, where to get prescriptions, and follow up appointments. All questions answered. IV removed. Patient will leave once wheelchair van arrives.

## 2017-10-19 NOTE — PLAN OF CARE
Problem: Occupational Therapy Goal  Goal: Occupational Therapy Goal  Goals to be met by: 10/23/2017    Patient will increase functional independence with ADLs by performing:    UE Dressing with Modified [place].  LE Dressing with Modified [place].  Grooming while standing at sink with Modified [place]. Met  Toileting from toilet with Supervision for hygiene and clothing management.   Bathing with Supervision. Met  Supine to sit with Modified [place]. Met  Stand pivot transfers with Modified [place].   Toilet transfer to toilet with Modified [place] using RW.     Outcome: Ongoing (interventions implemented as appropriate)  Patient's goals are appropriate. Patient is progressing.   KYLIE Leija  10/19/2017

## 2017-10-19 NOTE — SUBJECTIVE & OBJECTIVE
Interval History:     Still with abdominal distention.  Otherwise feels well.  Walking in halls    Current Facility-Administered Medications   Medication    acetaminophen tablet 650 mg    aspirin chewable tablet 81 mg    carvedilol tablet 6.25 mg    dextrose 50% injection 12.5 g    dextrose 50% injection 25 g    glucagon (human recombinant) injection 1 mg    glucose chewable tablet 16 g    glucose chewable tablet 24 g    insulin aspart pen 0-5 Units    lactulose 20 gram/30 mL solution Soln 20 g    ondansetron disintegrating tablet 8 mg    ramelteon tablet 8 mg    rifAXIMin tablet 550 mg       Objective:     Vital Signs (Most Recent):  Temp: 98.5 °F (36.9 °C) (10/19/17 1100)  Pulse: 76 (10/19/17 1100)  Resp: 16 (10/19/17 1100)  BP: 111/72 (10/19/17 1100)  SpO2: 95 % (10/19/17 1100) Vital Signs (24h Range):  Temp:  [97.7 °F (36.5 °C)-98.6 °F (37 °C)] 98.5 °F (36.9 °C)  Pulse:  [71-78] 76  Resp:  [16-18] 16  SpO2:  [93 %-99 %] 95 %  BP: (104-130)/(57-79) 111/72     Weight: 73.2 kg (161 lb 6 oz) (10/15/17 2029)  Body mass index is 22.51 kg/m².    Physical Exam   Constitutional: He is oriented to person, place, and time. No distress.   Thin and frail   HENT:   Head: Normocephalic.   Eyes: Conjunctivae are normal.   Cardiovascular: Normal rate and regular rhythm.    Pulmonary/Chest: Effort normal and breath sounds normal. No respiratory distress.   Diminished breath sounds over right  base   Abdominal: Soft. He exhibits distension. There is no rebound.   Bandage over RLQ    Neurological: He is alert and oriented to person, place, and time. No cranial nerve deficit.   Skin: Skin is warm and dry. No rash noted.   Vitals reviewed.      MELD-Na score: 10 at 10/19/2017  4:32 AM  MELD score: 10 at 10/19/2017  4:32 AM  Calculated from:  Serum Creatinine: 1.4 mg/dL at 10/19/2017  4:32 AM  Serum Sodium: 134 mmol/L at 10/19/2017  4:32 AM  Total Bilirubin: 1.1 mg/dL at 10/19/2017  4:32 AM  INR(ratio): 0.9 (Rounded to 1)  at 10/19/2017  4:32 AM  Age: 66 years    Significant Labs:  CBC:   Recent Labs  Lab 10/19/17  0432   WBC 5.31   RBC 3.57*   HGB 9.9*   HCT 28.6*   *     BMP:   Recent Labs  Lab 10/19/17  0432   *   *   K 4.8      CO2 24   BUN 50*   CREATININE 1.4   CALCIUM 8.2*     CMP:   Recent Labs  Lab 10/19/17  0432   *   CALCIUM 8.2*   ALBUMIN 2.5*   PROT 5.8*   *   K 4.8   CO2 24      BUN 50*   CREATININE 1.4   ALKPHOS 102   ALT 20   AST 24   BILITOT 1.1*     Coagulation:   Recent Labs  Lab 10/19/17  0432   INR 0.9       Significant Imaging:  Labs: Reviewed

## 2017-10-19 NOTE — ASSESSMENT & PLAN NOTE
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  If confirmed chylous, unclear etiology, although can be related to cirrhosis. (his SAAG of 1.6 supports this)  Can also be found in cardiac disease (right heart failure and constriction) and TB and anatomic abnormalities as well and malignancy (lymphoma) and anatomic abnormalities  Do not feel that patient is a good TIPs candiate as there is history of hepatic encephalopathy (already on rifaximin and ammonia > 100 couple months back)     Plan:  Given poor social and caregiver support, pt is deemed NOT a liver transplant candidate.  The best option going forward would be either nursing home placement and attempted outpatient paracentesis as scheduled vs. Peritoneal catheter placement with self drainage at home with home health.    Pt still wishes to give 1 more attempt at going home, with home health, and will attempt to keep his outpatient appts and outpatient scheduled paracentesis.    OK for discharge from hepatology perspective and we will arrange close follow up and standing orders for routine weekly paracentesis.

## 2017-10-19 NOTE — HPI
""66 y.o. male with alcoholic cirrhosis and recurring ascites presents with abdominal pain and SOB since yesterday. He was recently admitted 10/2 and 10/8 for symptomatic ascites requiring paracentesis. He denies fever or chills. Patient had loose stool yesterday and nausea as well. No vomiting. He denies chest pain. SOB with minimal exertion and has associated PND. There has been some worsening BLE and abdominal swelling since discharge. Abdominal pain is in lower quadrants. Pain is worsened with activity and he is unaware of improving factors."  "

## 2017-10-20 NOTE — PATIENT INSTRUCTIONS
Discharge Instructions for Cirrhosis of the Liver  You have been diagnosed with cirrhosis of the liver. This is a long-term (chronic) problem. It occurs when liver tissue is destroyed and replaced by scar tissue. Causes of cirrhosis include:  · Infection such as viral hepatitis  · Chronic alcoholism  · The bodys immune system attacks healthy cells (autoimmune disorders)  · Obesity  · Medicine side effects  · Genetic diseases  Sometimes the exact cause is unknown. You may not have any symptoms at first. Or your symptoms may be mild. But they usually get worse. Cirrhosis is likely to occur if you have a history of long-term alcohol abuse. Cirrhosis cant be cured. But it can be treated.   Home care  Alcohol  · People with liver disease should not drink alcohol. If you stop drinking, you may feel better and live longer.  · If you are a chronic alcohol user, you will have withdrawal symptoms. Talk with your healthcare provider for more information.    · If alcohol is a problem, ask your provider about medicine that can help you quit drinking.    · Find a local Alcoholics Anonymous support group online at www.aa.org.  Diet  · Ask your provider what kind of diet you should follow. You may be asked to limit or not eat certain foods. Do not limit your protein intake.  · Weigh yourself daily and keep a weight log. If you have a sudden change in weight, call your provider.  · Cut back on salt:  ¨ Limit canned, dried, packaged, and fast foods.  ¨ Dont add salt to your food at the table.  ¨ Season foods with herbs instead of salt when you cook.  Medicines, supplements, and vaccines  · Take your medicines exactly as directed.  · Talk to your provider before taking vitamins, over-the-counter medicines, or herbal supplements. Many herbal supplements may be poisonous (toxic) to the liver.  · Avoid aspirin and other blood-thinning medicines.  · Discuss vitamin supplements and deficiencies with your provider.  · Ask your provider  about getting vaccinations for viruses that can cause liver diseases.  Follow-up care  Follow up with your healthcare provider, or as advised. You will likely have the following tests:  · Lab tests  · Blood tests for liver cancer  · Ultrasound of your liver every 6 months  · Endoscopy to check for swollen veins (varices) in your digestive tract  When to call your provider  Call your healthcare provider right away if you have any of the following:  · Fever of 100.4°F (38.0°C) or higher  · Extreme tiredness (fatigue), weakness, or lack of appetite  · Vomiting (with or without blood)  · Yellowing of your skin or eyes (jaundice)  · Itching  · Swelling in your belly or legs  · Black or tarry stools  · Skin that bruises easily  · Confusion or trouble thinking clearly   Date Last Reviewed: 8/1/2016  © 2647-9673 [employer]. [address] [place] [address], [place] [address]. All rights reserved. This information is not intended as a substitute for professional medical care. Always follow your healthcare professional's instructions.

## 2017-10-20 NOTE — PT/OT/SLP DISCHARGE
Physical Therapy Discharge Summary    Soy Reza  MRN: [id number]   Chylous ascites   Patient Discharged from acute Physical Therapy on 10/19/17.  Please refer to prior PT noted date on 10/19/17 for functional status.     Assessment:   Patient appropriate for care in another setting.  GOALS:    Physical Therapy Goals        Problem: Physical Therapy Goal    Goal Priority Disciplines Outcome Goal Variances Interventions   Physical Therapy Goal     PT/OT, PT Ongoing (interventions implemented as appropriate)     Description:  Goals to be met by:  10/23/17    Patient will increase functional independence with mobility by performin. Supine to sit with Modified [place] Met 10/18/17  2. Sit to supine with Modified [place]  3. Sit to stand transfer with Supervision Met 10/18/17  4. Bed to chair transfer with Supervision using Rolling Walker   5. Gait  x 150 feet with Supervision using Rolling Walker. Met 10/19/17  6. Ascend/descend 2 stair without Handrails Minimal Assistance using Rolling Walker.   7. Lower extremity exercise program x20 reps per handout, with independence                      Reasons for Discontinuation of Therapy Services  Transfer to alternate level of care.      Plan:  Patient Discharged to: Home with Home Health Service.    Meghann Phillips, PT  10/20/17

## 2017-10-24 NOTE — TELEPHONE ENCOUNTER
----- Message from Liliana Zamora sent at 10/24/2017  2:37 PM CDT -----  Contact: [phone]  [employer] will like to know the status of the [employer] form that was sent on 9-7-2017 for the pt testing supplies they will fax the form again today,please advise

## 2017-10-26 NOTE — TELEPHONE ENCOUNTER
----- Message from Theodore Momin sent at 10/26/2017 11:29 AM CDT -----  Contact: Jaelyn [facility] [phone]  Jaelyn wanted to let Dr Swanson know she went to visit the pt for home health but he states he was not feeling well and did not want her to come today.

## 2017-10-26 NOTE — TELEPHONE ENCOUNTER
Returned call to aJelyn with [facility]. No answer. Left message informing that Dr. Swanson is out this week, and will inform his staff when back in the office.
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
## 2017-11-01 NOTE — ED NOTES
Appearance:  Pt awake, alert & oriented to person, place & time.  Pt in no acute distress at present time.  Skin:  Skin warm, dry & intact.  Mucous membranes moist.  Skin turgor normal.  Respiratory:  Respirations even, non-labored.    Neurologic:  Pt moving all extremities without difficulty.  Sensation intact.     Peripheral Vascular:  All peripheral pulses present.  Abdomen:  Abdomen distended.  Non tender.

## 2017-11-01 NOTE — ED NOTES
ISTAT not crossing over  Na 136  K 4.7  Cl 105  iCa 1.09  TCO2 ...  Glu 159  BUN 56  Crea 1.4  Hct 30

## 2017-11-01 NOTE — ED PROVIDER NOTES
"Encounter Date: 11/1/2017       History     Chief Complaint   Patient presents with    Abdominal Pain     "abdominal distention"  Hx" cirrhosis of liver. Appointment tomorrow to get tapped.      65yo M with history of Hep C, EtOH abuse, cirrhosis, DM presents sent from home health for high blood glucose. Patient says that his home health nurse noticed his glucose to be in the 360s and arranged to have him sent here. He also endorses abdominal pain and distension, pain is diffuse, dull, progressive since his tap last week, and consistent with his ascites pain. He is due to be tapped again in clinic tomorrow with Dr. Major, for his usual weekly tap. He currently takes only metformin for his diabetes but says that his PCP Dr. Swanson is considering starting insulin. He denies confusion, fever, chills, CP, SOB, N/V, change in bowel movements, urinary symptoms, polydipsia, polyphagia. Does endorse polyuria but is on diuretics. He did not want to come to the hospital today.           Review of patient's allergies indicates:  No Known Allergies  Past Medical History:   Diagnosis Date    Diabetes mellitus     Hep C w/o coma, chronic     Hypertension     Macular degeneration     Neuropathy      Past Surgical History:   Procedure Laterality Date    EYE SURGERY      HAND SURGERY       History reviewed. No pertinent family history.  Social History   Substance Use Topics    Smoking status: Former Smoker    Smokeless tobacco: Never Used    Alcohol use No      Comment: 2 5th per week- stopped one month ago approx 9/10/16     Review of Systems   Constitutional: Negative for chills and fever.   HENT: Negative for congestion and sore throat.    Respiratory: Negative for cough, shortness of breath and wheezing.    Cardiovascular: Negative for chest pain and leg swelling.   Gastrointestinal: Positive for abdominal distention and abdominal pain. Negative for blood in stool, constipation, diarrhea, nausea and vomiting. "   Endocrine: Positive for polyuria. Negative for polydipsia and polyphagia.   Genitourinary: Negative for dysuria and hematuria.   Musculoskeletal: Negative for back pain, gait problem, neck pain and neck stiffness.   Skin: Negative for rash and wound.   Neurological: Negative for syncope, weakness, light-headedness, numbness and headaches.   Psychiatric/Behavioral: Negative for behavioral problems and confusion.       Physical Exam     Initial Vitals [11/01/17 1211]   BP Pulse Resp Temp SpO2   134/88 92 -- 97.9 °F (36.6 °C) 99 %      MAP       103.33         Physical Exam    Constitutional: He appears well-developed and well-nourished. He is not diaphoretic. No distress.   HENT:   Head: Normocephalic and atraumatic.   Mouth/Throat: Oropharynx is clear and moist. No oropharyngeal exudate.   Eyes: EOM are normal. Pupils are equal, round, and reactive to light. No scleral icterus.   Neck: Normal range of motion. No tracheal deviation present.   Cardiovascular: Normal rate, regular rhythm, normal heart sounds and intact distal pulses. Exam reveals no gallop and no friction rub.    No murmur heard.  Pulmonary/Chest: Breath sounds normal. No stridor. No respiratory distress. He has no wheezes. He has no rhonchi. He has no rales.   Abdominal: Soft. He exhibits distension. There is no tenderness. There is no rebound and no guarding.   Musculoskeletal: He exhibits no edema or tenderness.   No asterixis bilaterally   Neurological: He is alert and oriented to person, place, and time.   Skin: Skin is warm. No rash noted. No erythema.   Mild yellowish color diffusely         ED Course   Procedures  Labs Reviewed   POCT GLUCOSE - Abnormal; Notable for the following:        Result Value    POCT Glucose 297 (*)     All other components within normal limits   CBC W/ AUTO DIFFERENTIAL   COMPREHENSIVE METABOLIC PANEL   URINALYSIS, REFLEX TO URINE CULTURE   POCT GLUCOSE MONITORING CONTINUOUS             Medical Decision Making:    Differential Diagnosis:   Ascites, elevated glucose. Low concern for SBP, DKA/HHS  ED Management:  Workup - accucheck, cbc, cmp, ua ordered at triage  Tx - home dose of norco     Initial CMP with potassium of 6.6 but slightly hemolyzed. Will check istat and EKG.  2:43 PM    Istat also with potassium of 6.6. Will move patient to be on a monitor.  3:13 PM    EKG with normal sinus rhythm, no peaked T waves or other hyperkalemic changes.  3:39 PM    Patient had single episode of NBNB emesis. States that his home health nurse gave him four metformin this morning after noting his high glucose. He attributes his nausea to this. Given zofran. Albuterol, insulin and D50 for hyperkalemia.  4:05 PM    Nausea relieved, no current complaints. Will recheck potassium.  5:10 PM    Repeat potassium 4.7.    Accucheck initially at 297. Patient has been consistently in the 200s over the recent months. He is asymptomatic other than his normal ascites distension pain, afebrile and nontender on exam. WBC normal, Hg at baseline. CMP with hyperkalemia as above, normal anion gap. UA unremarkable. Hyperkalemia corrected. He has an appointment tomorrow for paracentesis and primary care. Discussed lab results with patient. He is agreeable with plan to follow up appointments tomorrow and discuss starting insulin with primary care. He is stable for discharge.  LIAM Bright MD - PGY1                     ED Course      Clinical Impression:   The primary encounter diagnosis was Hyperkalemia. Diagnoses of Other ascites and Elevated glucose were also pertinent to this visit.                           Alvaro Bright MD  Resident  11/01/17 2048

## 2017-11-01 NOTE — TELEPHONE ENCOUNTER
----- Message from Jeane Hines sent at 11/1/2017 11:20 AM CDT -----  Contact: [facility]/luis cralos/[phone]  Home health called in regards to talking back with the nurse.      Please advise

## 2017-11-01 NOTE — ED TRIAGE NOTES
Pt c/o abdominal pain that began last night.   Denies n/v/d.  Pt states he gets paracentesis weekly, scheduled for tomorrow.   Pt states his home health nurse told him to come to ED for hyperglycemia-reports 300's BS

## 2017-11-01 NOTE — TELEPHONE ENCOUNTER
10/27 called pt at 1150am, no answer.    Spoke with pt and Luis Carlos MACKAY who walked in while I was on the phone with the pt. Her cell [phone].   Pt agreeded to see Dr Swanson tomorrow for 940, appt made. Per luis carlos, pt is having elevated sugars around 300, 280 and some slight confusion today.. Pt refuses to go to the ER but will come in tomorrow. Luis Carlos states pt voices understanding at the moment.

## 2017-11-02 NOTE — H&P
Radiology History & Physical      SUBJECTIVE:     Chief Complaint: ascites    History of Present Illness:  Soy Reza is a 66 y.o. male who presents for ultrasound guided paracentesis  Past Medical History:   Diagnosis Date    Diabetes mellitus     Hep C w/o coma, chronic     Hypertension     Macular degeneration     Neuropathy      Past Surgical History:   Procedure Laterality Date    EYE SURGERY      HAND SURGERY         Home Meds:   Prior to Admission medications    Medication Sig Start Date End Date Taking? Authorizing Provider   aspirin 81 MG Chew Take 81 mg by mouth once daily.    Historical Provider, MD   carvedilol (COREG) 6.25 MG tablet Take 1 tablet (6.25 mg total) by mouth 2 (two) times daily. 8/10/17 8/10/18  Jean Carlos Swanson MD   hydrocodone-acetaminophen 5-325mg (NORCO) 5-325 mg per tablet Take 1 tablet by mouth every 6 (six) hours as needed for Pain. 10/12/17   Jean Carlos Swanson MD   lactulose (CHRONULAC) 10 gram/15 mL solution Take 30 mLs (20 g total) by mouth 3 (three) times daily. 10/19/17   Jaimee Foster MD   metformin (GLUCOPHAGE-XR) 500 MG 24 hr tablet  8/16/17   Historical Provider, MD   ondansetron (ZOFRAN) 4 MG tablet Take 1 tablet (4 mg total) by mouth every 6 (six) hours as needed for Nausea. 11/1/17   Alvaro Bright MD   ondansetron (ZOFRAN-ODT) 8 MG TbDL Take 1 tablet (8 mg total) by mouth every 6 (six) hours as needed. 10/12/17   Jean Carlos Swanson MD   rifAXIMin (XIFAXAN) 550 mg Tab Take 1 tablet (550 mg total) by mouth 2 (two) times daily. 10/19/17   Jaimee Foster MD   sodium polystyrene (KAYEXALATE) 15 gram/60 mL Susp Take 60 mLs (15 g total) by mouth every 6 (six) hours. 11/1/17 11/2/17  Alvaro Bright MD     Anticoagulants/Antiplatelets: aspirin    Allergies: Review of patient's allergies indicates:  No Known Allergies  Sedation History:  no adverse reactions    Review of Systems:   Hematological: no known coagulopathies  Respiratory: no  shortness of breath  Cardiovascular: no chest pain  Gastrointestinal: no abdominal pain  Genito-Urinary: no dysuria  Musculoskeletal: negative  Neurological: no TIA or stroke symptoms         OBJECTIVE:     Vital Signs (Most Recent)       Physical Exam:  ASA: 2  Mallampati: n/a    General: no acute distress  Mental Status: alert and oriented to person, place and time  HEENT: normocephalic, atraumatic  Chest: unlabored breathing  Heart: regular heart rate  Abdomen: distended  Extremity: moves all extremities    Laboratory  Lab Results   Component Value Date    INR 0.9 10/19/2017       Lab Results   Component Value Date    WBC 5.51 11/01/2017    HGB 10.6 (L) 11/01/2017    HCT 30 (L) 11/01/2017    MCV 83 11/01/2017    PLT 84 (L) 11/01/2017      Lab Results   Component Value Date     (H) 11/01/2017     (L) 11/01/2017    K 6.6 (HH) 11/01/2017     11/01/2017    CO2 17 (L) 11/01/2017    BUN 61 (H) 11/01/2017    CREATININE 1.6 (H) 11/01/2017    CALCIUM 8.3 (L) 11/01/2017    MG 2.3 10/19/2017    ALT 45 (H) 11/01/2017    AST 30 11/01/2017    ALBUMIN 2.2 (L) 11/01/2017    BILITOT 1.7 (H) 11/01/2017       ASSESSMENT/PLAN:     Sedation Plan: local  Patient will undergo ultrasound guided paracentesis.    ISIS Travis, FNP  Interventional Radiology  [phone] [employer]

## 2017-11-02 NOTE — ED NOTES
[facility] called to transport pt home. Pt has no other way to get home. [facility] states it will take 1-2 hours. Pt in [place] in wheelchair, d/c papers discussed. Pt offered a beverage.

## 2017-11-02 NOTE — PROCEDURES
Radiology Post-Procedure Note    Pre Op Diagnosis: Ascites  Post Op Diagnosis: Same    Procedure: Ultrasound Guided Paracentesis    Procedure performed by: Nakia ALCANTAR, Ama     Written Informed Consent Obtained: Yes  Specimen Removed: YES cloudy peach  Estimated Blood Loss: Minimal    Findings:   Successful paracentesis.  Albumin administered PRN per protocol.    Patient tolerated procedure well.    Ama Yee, APRN, FNP  Interventional Radiology  [phone] [employer]

## 2017-11-03 PROBLEM — K65.2 SBP (SPONTANEOUS BACTERIAL PERITONITIS): Status: ACTIVE | Noted: 2017-01-01

## 2017-11-03 NOTE — TELEPHONE ENCOUNTER
Yesterday's para shows SBP. I couldn't get hold of him on the phone but spoke to his mother who will let him know to report to the admissions office to be admitted to hospital for IV antibiotics.  Phone call made around 11:10 am.

## 2017-11-05 PROBLEM — K76.82 HEPATIC ENCEPHALOPATHY: Status: ACTIVE | Noted: 2017-01-01

## 2017-11-05 PROBLEM — K65.2 SPONTANEOUS BACTERIAL PERITONITIS: Chronic | Status: ACTIVE | Noted: 2017-01-01

## 2017-11-05 PROBLEM — E87.5 HYPERKALEMIA: Status: ACTIVE | Noted: 2017-01-01

## 2017-11-05 PROBLEM — K65.2 SPONTANEOUS BACTERIAL PERITONITIS: Status: ACTIVE | Noted: 2017-01-01

## 2017-11-05 NOTE — SUBJECTIVE & OBJECTIVE
Past Medical History:   Diagnosis Date    Diabetes mellitus     Hep C w/o coma, chronic     Hypertension     Macular degeneration     Neuropathy        Past Surgical History:   Procedure Laterality Date    EYE SURGERY      HAND SURGERY         Review of patient's allergies indicates:  No Known Allergies    No current facility-administered medications on file prior to encounter.      Current Outpatient Prescriptions on File Prior to Encounter   Medication Sig    aspirin 81 MG Chew Take 81 mg by mouth once daily.    carvedilol (COREG) 6.25 MG tablet Take 1 tablet (6.25 mg total) by mouth 2 (two) times daily.    hydrocodone-acetaminophen 5-325mg (NORCO) 5-325 mg per tablet Take 1 tablet by mouth every 6 (six) hours as needed for Pain.    lactulose (CHRONULAC) 10 gram/15 mL solution Take 30 mLs (20 g total) by mouth 3 (three) times daily.    metformin (GLUCOPHAGE-XR) 500 MG 24 hr tablet     ondansetron (ZOFRAN) 4 MG tablet Take 1 tablet (4 mg total) by mouth every 6 (six) hours as needed for Nausea.    rifAXIMin (XIFAXAN) 550 mg Tab Take 1 tablet (550 mg total) by mouth 2 (two) times daily.    ondansetron (ZOFRAN-ODT) 8 MG TbDL Take 1 tablet (8 mg total) by mouth every 6 (six) hours as needed.     Family History     None        Social History Main Topics    Smoking status: Former Smoker    Smokeless tobacco: Never Used    Alcohol use No      Comment: 2 5th per week- stopped one month ago approx 9/10/16    Drug use: No    Sexual activity: Not on file     Review of Systems   Unable to perform ROS: Patient nonverbal     Objective:     Vital Signs (Most Recent):  Temp: 97.9 °F (36.6 °C) (11/05/17 0941)  Pulse: 89 (11/05/17 1332)  Resp: 16 (11/05/17 1315)  BP: (!) 165/82 (11/05/17 1332)  SpO2: 99 % (11/05/17 1332) Vital Signs (24h Range):  Temp:  [97.9 °F (36.6 °C)] 97.9 °F (36.6 °C)  Pulse:  [79-90] 89  Resp:  [16-53] 16  SpO2:  [98 %-100 %] 99 %  BP: (121-165)/(69-94) 165/82     Weight: 79.4 kg (175  lb)  Body mass index is 27.41 kg/m².    Physical Exam   Constitutional: He appears well-developed and well-nourished. He is cooperative. He appears ill.   HENT:   Head: Normocephalic and atraumatic.   Eyes: Conjunctivae and EOM are normal. No scleral icterus. Right pupil is not reactive. Left pupil is not reactive.   Pupillary miosis exhibited with minimal responsiveness to confrontation with light.   Neck: Normal range of motion. Neck supple.   Cardiovascular: Normal rate, regular rhythm, normal heart sounds and intact distal pulses.    No murmur heard.  Pulmonary/Chest: Effort normal and breath sounds normal. No respiratory distress. He has no wheezes.   Abdominal: Soft. Bowel sounds are normal. He exhibits no distension and no mass. There is tenderness.   Lymphadenopathy:     He has no cervical adenopathy.   Neurological: He is alert. He is disoriented. GCS eye subscore is 4. GCS verbal subscore is 2. GCS motor subscore is 6.   Skin: Skin is warm and dry.   Vitals reviewed.       Significant Labs: All pertinent labs within the past 24 hours have been reviewed.    Significant Imaging: I have reviewed all pertinent imaging results/findings within the past 24 hours.

## 2017-11-05 NOTE — ED NOTES
Patient given urinal in attempts to collect urine sample. Refuses catheterization in the event that he would have an order for it.

## 2017-11-05 NOTE — ED PROVIDER NOTES
Encounter Date: 11/5/2017    SCRIBE #1 NOTE: I, Amina Ryan, am scribing for, and in the presence of,  Dr. Alvarado . I have scribed the following portions of the note - the Resident attestation and the EKG reading.       History     Chief Complaint   Patient presents with    Abdominal Pain     HPI     65 yo male presents with 2 days of worsening abdominal pain, swelling and N/V.  Pt reports that he received paracentesis on 11/2/17 and has had persistently worsening symptoms since then.  No A/R factors. Rates pain as severe at this time  Does admit to medication non compliance.      Chart check revealed that pt had SBP with last tap and was scheduled to be admitted from clinic but he did not show up.     Review of patient's allergies indicates:  No Known Allergies  Past Medical History:   Diagnosis Date    Diabetes mellitus     Hep C w/o coma, chronic     Hypertension     Macular degeneration     Neuropathy      Past Surgical History:   Procedure Laterality Date    EYE SURGERY      HAND SURGERY       History reviewed. No pertinent family history.  Social History   Substance Use Topics    Smoking status: Former Smoker    Smokeless tobacco: Never Used    Alcohol use No      Comment: 2 5th per week- stopped one month ago approx 9/10/16     Review of Systems   Constitutional: Negative for chills and fever.   HENT: Negative for sore throat and voice change.    Eyes: Negative for pain and visual disturbance.   Respiratory: Negative for cough and shortness of breath.    Cardiovascular: Positive for chest pain. Negative for palpitations and leg swelling.   Gastrointestinal: Positive for abdominal distention, abdominal pain, nausea and vomiting.   Genitourinary: Negative for dysuria and hematuria.   Musculoskeletal: Negative for back pain and neck pain.   Skin: Negative for rash and wound.   Neurological: Negative for weakness and headaches.       Physical Exam     Initial Vitals [11/05/17 0941]   BP Pulse Resp  Temp SpO2   124/84 90 20 97.9 °F (36.6 °C) 98 %      MAP       97.33         Physical Exam    Nursing note and vitals reviewed.  Constitutional: He is not diaphoretic. No distress.   HENT:   Head: Normocephalic and atraumatic.   Mouth/Throat: Oropharynx is clear and moist.   Eyes: Conjunctivae and EOM are normal. Pupils are equal, round, and reactive to light. No scleral icterus.   Neck: Normal range of motion. Neck supple.   Cardiovascular: Normal rate, regular rhythm and normal heart sounds.   No murmur heard.  Pulmonary/Chest: Breath sounds normal. No respiratory distress. He has no wheezes.   Abdominal: He exhibits distension. There is tenderness (diffuse ). There is no rebound and no guarding.   Musculoskeletal: Normal range of motion. He exhibits no edema or tenderness.   Neurological: He is alert and oriented to person, place, and time. No cranial nerve deficit.   Pt does have some mild confusion but answers question appropriately for the most part    +asterixis    Skin: Skin is warm and dry.         ED Course   Procedures  Labs Reviewed   CBC W/ AUTO DIFFERENTIAL - Abnormal; Notable for the following:        Result Value    RBC 3.69 (*)     Hemoglobin 10.2 (*)     Hematocrit 30.5 (*)     RDW 16.9 (*)     Platelets 95 (*)     Lymph # 0.5 (*)     Gran% 78.2 (*)     Lymph% 9.7 (*)     All other components within normal limits   COMPREHENSIVE METABOLIC PANEL - Abnormal; Notable for the following:     Sodium 132 (*)     Potassium 6.1 (*)     CO2 20 (*)     Glucose 302 (*)     BUN, Bld 63 (*)     Calcium 8.5 (*)     Albumin 2.4 (*)     Alkaline Phosphatase 230 (*)     Anion Gap 7 (*)     eGFR if non  52.4 (*)     All other components within normal limits   LIPASE - Abnormal; Notable for the following:     Lipase 99 (*)     All other components within normal limits   AMMONIA - Abnormal; Notable for the following:     Ammonia 96 (*)     All other components within normal limits   ISTAT PROCEDURE -  Abnormal; Notable for the following:     POC PCO2 33.4 (*)     POC PO2 26 (*)     POC HCO3 19.7 (*)     POC SATURATED O2 47 (*)     POC Potassium 5.7 (*)     POC TCO2 21 (*)     POC Hematocrit 27 (*)     All other components within normal limits   LACTIC ACID, PLASMA   PROTIME-INR   TROPONIN I     EKG Readings: (Independently Interpreted)   Previous EKG: Compared with most recent EKG Previous EKG Date: 11/1/17. Rhythm: Normal Sinus Rhythm. Heart Rate: 85. Axis: Normal.   Normal HI, normal QTc. No evidence of acute Mi, no evidence of hypertrophy, no evidence of age indeterminate infarcts. No atrial blocks to compare with. Old EKG from November 1st, essentially unchanged.          Medical Decision Making:   History:   Old Medical Records: I decided to obtain old medical records.  Independently Interpreted Test(s):   I have ordered and independently interpreted EKG Reading(s) - see prior notes  Clinical Tests:   Lab Tests: Ordered and Reviewed  Medical Tests: Ordered and Reviewed      SANDY MDM    A/P:  Pt is a 65 yo male with abdominal pain.  Signs and symptoms consistent with SBP and pt recently had cultures proving dx. Baseline labs including lipase were unremarkable from baseline.  Although pt does have some neuro findings he does not appear to be encephalopathic at this time.  Have started abx and will have pt admitted to medicine for further eval/management  Case Discussed with Dr. Jenny FRANKLIN  11/05/2017 12:37 PM                  Scribe Attestation:   Scribe #1: I performed the above scribed service and the documentation accurately describes the services I performed. I attest to the accuracy of the note.    Attending Attestation:   Physician Attestation Statement for Resident:  As the supervising MD   Physician Attestation Statement: I have personally seen and examined this patient.   I agree with the above history. -: Abdomen was tapped several days ago with signs of infection. He was hoping  to get back in for admission. States he has been vomiting some brown x 1 week. Vomit is not black or bright red. No cough or sore throat. Mild nausea right now. Appetite is fine, though he did not eat this morning. No changes in urination. No palpitations. Yes chest pain.    As the supervising MD I agree with the above PE.   -: Bowel sounds presents. RUQ tenderness.    As the supervising MD I agree with the above treatment, course, plan, and disposition.   -: Patient has SBP, and we will start antibiotics.                     ED Course      Clinical Impression:   The encounter diagnosis was Spontaneous bacterial peritonitis.                           Allison Mccann MD  Resident  11/05/17 0607

## 2017-11-05 NOTE — ED NOTES
Patient breathing at 12 breaths/minute and is very groggy with pin-point pupils. ED doc, Dr. Alvarado, made aware, and went in to evaluate patient. He is OK with his presentation at this time as all VS remain normal.

## 2017-11-05 NOTE — ED NOTES
Hospital med team 4 paged regarding order for blood cultures after patient received flagyl and rocephin.

## 2017-11-05 NOTE — ED TRIAGE NOTES
Arrives per EMS from home w/ c/o n/v/increased abdominal distention. Here 3 days ago and had his abdomen tapped. Staff attempting to reach pt  For  Abnormal labs but were unable to reach pt by phone. Pt also c/o chest pain in sternal area. C/O feeling nauseated despite zofran per EMS. AAOx4, skin w/d, respirations even and unlabored.

## 2017-11-05 NOTE — H&P
[facility] Hospital Medicine  History & Physical    Patient Name: Soy Reza  MRN: [id number]  Admission Date: 11/5/2017  Attending Physician: Roel Alvarado MD   Primary Care Provider: Jean Carlos Swanson MD    [facility] Medicine Team: [facility]OS[facility] MED 4 Behram Khan, MD     Patient information was obtained from parent and ER records.     Subjective:     Principal Problem:Spontaneous bacterial peritonitis    Chief Complaint:   Chief Complaint   Patient presents with    Abdominal Pain        HPI: Mr Libia is a 65 yo M w PMH significant for hepatitis C, EtOH cirrhosis and T2DM presents with two day history of worsening abdominal pain, swelling and nausea vomiting. History was taken largely from chart review and telephone conversation w pt's mother as he was acutely encephalopathic at time of interview. Pt recently underwent paracentesis (11/2) which revealed SBP. Hepatology attempted to telephone the pt for direct admission from clinic (11/3), however, (per mother) pt refused admission as symptoms were not severe at that time. Pt's mother states that he asked her to call an ambulance this morning d/t worsening confusion and abdominal pain. Pt's mother endorses poor compliance with medication on the part of her son.     Per ED staff, pt was alert and oriented to person and place and had been complaining of some abdominal pain localized to the epigastric region. He was then given 25 mg IV phenergan, 4mg injection of morphine and 1mg injection of hydromorphone upon arrival. Pt was nonverbal at time of interview.    Past Medical History:   Diagnosis Date    Diabetes mellitus     Hep C w/o coma, chronic     Hypertension     Macular degeneration     Neuropathy        Past Surgical History:   Procedure Laterality Date    EYE SURGERY      HAND SURGERY         Review of patient's allergies indicates:  No Known Allergies    No current facility-administered medications on file prior to encounter.       Current Outpatient Prescriptions on File Prior to Encounter   Medication Sig    aspirin 81 MG Chew Take 81 mg by mouth once daily.    carvedilol (COREG) 6.25 MG tablet Take 1 tablet (6.25 mg total) by mouth 2 (two) times daily.    hydrocodone-acetaminophen 5-325mg (NORCO) 5-325 mg per tablet Take 1 tablet by mouth every 6 (six) hours as needed for Pain.    lactulose (CHRONULAC) 10 gram/15 mL solution Take 30 mLs (20 g total) by mouth 3 (three) times daily.    metformin (GLUCOPHAGE-XR) 500 MG 24 hr tablet     ondansetron (ZOFRAN) 4 MG tablet Take 1 tablet (4 mg total) by mouth every 6 (six) hours as needed for Nausea.    rifAXIMin (XIFAXAN) 550 mg Tab Take 1 tablet (550 mg total) by mouth 2 (two) times daily.    ondansetron (ZOFRAN-ODT) 8 MG TbDL Take 1 tablet (8 mg total) by mouth every 6 (six) hours as needed.     Family History     None        Social History Main Topics    Smoking status: Former Smoker    Smokeless tobacco: Never Used    Alcohol use No      Comment: 2 5th per week- stopped one month ago approx 9/10/16    Drug use: No    Sexual activity: Not on file     Review of Systems   Unable to perform ROS: Patient nonverbal     Objective:     Vital Signs (Most Recent):  Temp: 97.9 °F (36.6 °C) (11/05/17 0941)  Pulse: 89 (11/05/17 1332)  Resp: 16 (11/05/17 1315)  BP: (!) 165/82 (11/05/17 1332)  SpO2: 99 % (11/05/17 1332) Vital Signs (24h Range):  Temp:  [97.9 °F (36.6 °C)] 97.9 °F (36.6 °C)  Pulse:  [79-90] 89  Resp:  [16-53] 16  SpO2:  [98 %-100 %] 99 %  BP: (121-165)/(69-94) 165/82     Weight: 79.4 kg (175 lb)  Body mass index is 27.41 kg/m².    Physical Exam   Constitutional: He appears well-developed and well-nourished. He is cooperative. He appears ill.   HENT:   Head: Normocephalic and atraumatic.   Eyes: Conjunctivae and EOM are normal. No scleral icterus. Right pupil is not reactive. Left pupil is not reactive.   Pupillary miosis exhibited with minimal responsiveness to confrontation  with light.   Neck: Normal range of motion. Neck supple.   Cardiovascular: Normal rate, regular rhythm, normal heart sounds and intact distal pulses.    No murmur heard.  Pulmonary/Chest: Effort normal and breath sounds normal. No respiratory distress. He has no wheezes.   Abdominal: Soft. Bowel sounds are normal. He exhibits no distension and no mass. There is tenderness.   Lymphadenopathy:     He has no cervical adenopathy.   Neurological: He is alert. He is disoriented. GCS eye subscore is 4. GCS verbal subscore is 2. GCS motor subscore is 6.   Skin: Skin is warm and dry.   Vitals reviewed.       Significant Labs: All pertinent labs within the past 24 hours have been reviewed.    Significant Imaging: I have reviewed all pertinent imaging results/findings within the past 24 hours.    Assessment/Plan:     * Spontaneous bacterial peritonitis    - Most recent ascitic fluid PMN at 610, diagnostic of SBP  - Will start on 1 g rocephin BID           Hyperkalemia    - Pt given two doses of albuterol at 9mg  - Will monitor with CMP        Hepatic encephalopathy    - Ammonia at 96  - Per pt's mother, he is non-adherent to regimen of lactulose and rifaximin regimen at home  - D/t encephalopathy, will administer lactulose and rifaximin rectally  - May be clouded by reception of opioid medication in the ED           Ascites due to alcoholic hepatitis    - Will monitor for worsening distension or accumulation of ascitic fluid  - Most recent paracentesis performed 11/2  - Will perform therapeutic paracentesis if warranted          Essential hypertension    - Pt takes carvedilol at home  - Given hyperkalemia with K of 6.1, will hold beta-blocker until resolution of hyperkalemia           Alcoholism in remission              Anemia due to vitamin B12 deficiency    - Chronic anemia d/t Vit B12 deficiency  - Currently Hgb is 10.2   - Will monitor with daily CBC          Uncontrolled type 2 diabetes mellitus with diabetic  polyneuropathy, without long-term current use of insulin    - Glucose at 302 today  - Will initiate basal insulin with detemir 4 units QHS          VTE Risk Mitigation         Ordered     heparin (porcine) injection 5,000 Units  Every 12 hours     Route:  Subcutaneous        11/05/17 1242     Medium Risk of VTE  Once      11/05/17 1242     Place sequential compression device  Until discontinued      11/05/17 1242     Place LYNETTE hose  Until discontinued      11/05/17 1242     Medium Risk of VTE  Once      11/05/17 1242             Behram Khan, MD  Department of Hospital Medicine   [facility]

## 2017-11-05 NOTE — ED NOTES
LOC: The patient is awake and alert; oriented x 3 and speaking appropriately.  APPEARANCE: Patient resting comfortably, patient is clean and well groomed  SKIN: warm and dry, normal skin turgor & moist mucus membranes, skin intact, no breakdown noted.  MUSCULOSKELETAL: Patient moving all extremities well, no obvious swelling or deformities noted  RESPIRATORY: Airway is open and patent, ; respirations are spontaneous, normal effort and rate  CARDIAC: Patient has a normal rate, no peripheral edema noted, capillary refill < 3 seconds;  complaints of sternal chest pain   ABDOMEN: firm and  tender to palpation, distention noted. Bowel sounds present x 4

## 2017-11-05 NOTE — ASSESSMENT & PLAN NOTE
- Pt takes carvedilol at home  - Given hyperkalemia with K of 6.1, will hold beta-blocker until resolution of hyperkalemia

## 2017-11-05 NOTE — ED NOTES
Assumed care of patient after receiving report from RUFINO Torres. I acknowledge care for this patient. The patient is awake, alert, and cooperating with a calm affect. RR spontaneous, even, and unlabored, with regular effort and rate. Patient is in NAD, and resting calmly on stretcher with blood pressure cuff, pulse ox, and cardiac monitor attached. Bed locked in low position with side rails up x2 for safety, and call bell within reach. Patient is aware of POC, and has no complaints or concerns at this time. Will continue to monitor.

## 2017-11-05 NOTE — ED NOTES
Lab called regarding patient orders for 1st choice of antiemetic being zofran, and 2nd choice being the same order. Admitting team paged.

## 2017-11-05 NOTE — ED NOTES
Patient is resting on stretcher with call bell within reach, bed locked in the low position, and side rails up x2 for safety. Patient is in NAD. RR spontaneous, even, and unlabored. Will continue to monitor.

## 2017-11-05 NOTE — ED NOTES
"Patient C/O nausea and "stomach pain" that is 10/10 on 0-10 scale. Patient points with one finger to epigastric region. States pain is "knawing and annoying."  Handly notified.  "

## 2017-11-05 NOTE — ASSESSMENT & PLAN NOTE
- Ammonia at 96  - Per pt's mother, he is non-adherent to regimen of lactulose and rifaximin regimen at home  - D/t encephalopathy, will administer lactulose and rifaximin rectally  - May be clouded by reception of opioid medication in the ED

## 2017-11-05 NOTE — ASSESSMENT & PLAN NOTE
- Chronic anemia d/t Vit B12 deficiency  - Currently Hgb is 10.2   - Will monitor with daily CBC

## 2017-11-05 NOTE — ASSESSMENT & PLAN NOTE
- Will monitor for worsening distension or accumulation of ascitic fluid  - Most recent paracentesis performed 11/2  - Will perform therapeutic paracentesis if warranted

## 2017-11-06 PROBLEM — E83.39 HYPERPHOSPHATEMIA: Status: ACTIVE | Noted: 2017-01-01

## 2017-11-06 NOTE — PROGRESS NOTES
[facility] Hospital Medicine  Progress Note    Patient Name: Soy Reza  MRN: [id number]  Patient Class: IP- Inpatient   Admission Date: 11/5/2017  Length of Stay: 1 days  Attending Physician: Mckayla Swanson MD  Primary Care Provider: Jean Carlos Swanson MD    [facility] Medicine Team: [facility] HOSP MED 4 Chris Anaya MD    Subjective:     Principal Problem:Spontaneous bacterial peritonitis    HPI:  Mr Reza is a 65 yo M w PMH significant for hepatitis C, EtOH cirrhosis and T2DM presents with two day history of worsening abdominal pain, swelling and nausea vomiting. History was taken largely from chart review and telephone conversation w pt's mother as he was acutely encephalopathic at time of interview. Pt recently underwent paracentesis (11/2) which revealed SBP. Hepatology attempted to telephone the pt for direct admission from clinic (11/3), however, (per mother) pt refused admission as symptoms were not severe at that time. Pt's mother states that he asked her to call an ambulance this morning d/t worsening confusion and abdominal pain. Pt's mother endorses poor compliance with medication on the part of her son.     Per ED staff, pt was alert and oriented to person and place and had been complaining of some abdominal pain localized to the epigastric region. He was then given 25 mg IV phenergan, 4mg injection of morphine and 1mg injection of hydromorphone upon arrival. Pt was nonverbal at time of interview.    Hospital Course:  11/6: Increased lactulose to 30g q6h. Started detemir 5U qhs. Working up MAURICE. Plan to discuss goals of care with mother today    Interval History: Pt states he has not had a BM recently despite lactulose. Pt reports abdominal distension and pain.    Review of Systems   Constitutional: Negative for chills and fever.   HENT: Negative for congestion, rhinorrhea and sore throat.    Eyes: Negative for photophobia and visual disturbance.   Respiratory: Negative for cough,  choking, shortness of breath and wheezing.    Cardiovascular: Negative for chest pain, palpitations and leg swelling.   Gastrointestinal: Positive for abdominal distention and abdominal pain. Negative for blood in stool, constipation, diarrhea, nausea and vomiting.   Endocrine: Negative for polydipsia and polyuria.   Genitourinary: Negative for dysuria and hematuria.   Musculoskeletal: Negative for arthralgias and myalgias.   Skin: Negative for rash and wound.   Neurological: Negative for dizziness, syncope and headaches.   Psychiatric/Behavioral: Negative for agitation, behavioral problems and confusion.     Objective:     Vital Signs (Most Recent):  Temp: 97.8 °F (36.6 °C) (11/06/17 0759)  Pulse: 91 (11/06/17 1002)  Resp: 16 (11/06/17 1002)  BP: 125/76 (11/06/17 0759)  SpO2: (!) 94 % (11/06/17 1002) Vital Signs (24h Range):  Temp:  [97.3 °F (36.3 °C)-97.8 °F (36.6 °C)] 97.8 °F (36.6 °C)  Pulse:  [] 91  Resp:  [16-53] 16  SpO2:  [94 %-100 %] 94 %  BP: (121-177)/(69-94) 125/76     Weight: 57.7 kg (127 lb 3.3 oz)  Body mass index is 19.92 kg/m².    Intake/Output Summary (Last 24 hours) at 11/06/17 1009  Last data filed at 11/06/17 0600   Gross per 24 hour   Intake                0 ml   Output              300 ml   Net             -300 ml      Physical Exam   Constitutional: He is oriented to person, place, and time. He appears well-developed and well-nourished.   HENT:   Head: Normocephalic and atraumatic.   Eyes: EOM are normal. Pupils are equal, round, and reactive to light.   Cardiovascular: Normal rate, regular rhythm and normal heart sounds.    No murmur heard.  Pulmonary/Chest: Effort normal and breath sounds normal. No respiratory distress. He has no wheezes. He has no rales.   Abdominal: Bowel sounds are normal. He exhibits distension. There is tenderness.   Musculoskeletal: Normal range of motion.   Neurological: He is alert and oriented to person, place, and time.   Skin: Skin is warm and dry.    Psychiatric: He has a normal mood and affect. His behavior is normal. Thought content normal.       Significant Labs:   CBC:   Recent Labs  Lab 11/01/17  1332  11/05/17  1009 11/05/17  1020 11/06/17  0410   WBC 5.51  --  5.49  --  5.61   RBC 3.85*  --  3.69*  --  3.73*   HGB 10.6*  --  10.2*  --  10.2*   HCT 32.0*  < > 30.5* 27* 31.5*   PLT 84*  --  95*  --  77*   MCV 83  --  83  --  85   MCH 27.5  --  27.6  --  27.3   MCHC 33.1  --  33.4  --  32.4   < > = values in this interval not displayed.  BMP:   Recent Labs  Lab 11/05/17  1009 11/06/17  0105 11/06/17  0410   * 211* 271*   * 134* 135*   K 6.1* 5.9* 6.0*    108 108   CO2 20* 20* 18*   BUN 63* 63* 63*   CREATININE 1.4 1.6* 1.7*   CALCIUM 8.5* 8.4* 8.7   MG  --   --  2.4     Coagulation:   Recent Labs  Lab 11/06/17  0410   INR 1.0   APTT 22.5     Microbiology Results (last 7 days)     Procedure Component Value Units Date/Time    Blood culture (site 1) [[id number]] Collected:  11/05/17 1448    Order Status:  Completed Specimen:  Blood from Peripheral, Hand, Left Updated:  11/05/17 2145     Blood Culture, Routine No Growth to date    Narrative:       Site # 1, aerobic and anaerobic    Blood culture (site 2) [[id number]] Collected:  11/05/17 1455    Order Status:  Completed Specimen:  Blood from Peripheral, Hand, Right Updated:  11/05/17 2145     Blood Culture, Routine No Growth to date    Narrative:       Site # 2, aerobic only              Assessment/Plan:      * Spontaneous bacterial peritonitis    - Most recent ascitic fluid PMN at 610, diagnostic of SBP  - Will start on 1 g rocephin BID           Hyperkalemia    - Pt given two doses of albuterol at 9mg  - Will monitor with CMP        Hepatic encephalopathy    - Ammonia at 96  - Per pt's mother, he is non-adherent to regimen of lactulose and rifaximin regimen at home  - lactulose 30g q6h: titrate 3-4 bowel movements/ day        MAURICE (acute kidney injury)    - MAURICE workup  - f/u urine sodium,  urine potassium  - f/u renal US          Ascites due to alcoholic hepatitis    - Will monitor for worsening distension or accumulation of ascitic fluid  - Most recent paracentesis performed 11/2  - Will perform therapeutic paracentesis if warranted          Essential hypertension    - Pt takes carvedilol at home  - Given hyperkalemia with K of 6.1, will hold beta-blocker until resolution of hyperkalemia           Alcoholism in remission              Anemia due to vitamin B12 deficiency    - Chronic anemia d/t Vit B12 deficiency  - Currently Hgb is 10.2   - Will monitor with daily CBC          Uncontrolled type 2 diabetes mellitus with diabetic polyneuropathy, without long-term current use of insulin    - Glucose 271 11/6  - Detemir 5U qhs  - SSI 0-5U prn          VTE Risk Mitigation         Ordered     heparin (porcine) injection 5,000 Units  Every 12 hours     Route:  Subcutaneous        11/05/17 1242     Medium Risk of VTE  Once      11/05/17 1242     Place sequential compression device  Until discontinued      11/05/17 1242     Place LYNETTE hose  Until discontinued      11/05/17 1242     Medium Risk of VTE  Once      11/05/17 1242              Chris Anaya MD  Department of Hospital Medicine   [facility]

## 2017-11-06 NOTE — NURSING
"Arrived to unit on stretcher. Aaox4 Fall/safety precautions reviewed. Pt calm and pleasant requesting assistance to use remote control to " Find something good to watch." No s/s of distress, will continue to assess.  Bed in lowest position,call bell in reach.  "

## 2017-11-06 NOTE — PLAN OF CARE
Problem: Occupational Therapy Goal  Goal: Occupational Therapy Goal  Goals to be met by: 11/5/17    Patient will increase functional independence with ADLs by performing:    UE Dressing with [place].  LE Dressing with [place].  Toileting from toilet with Stand-by Assistance for hygiene and clothing management.     Outcome: Ongoing (interventions implemented as appropriate)  Evaluation complete and goals set.  Cont with POC  María Patel OT  11/6/2017

## 2017-11-06 NOTE — HOSPITAL COURSE
11/6: Increased lactulose to 30g q6h. Started detemir 5U qhs. Working up MAURICE. Plan to discuss goals of care with mother today  11/7: Refused lactulose overnight. Mentation improved this AM, K 5.6.   11/8 Day 4 of Ceftriaxone for SBP. Lactulose titrated to TID after multiple bowl movements and improved cognition.  11/9: Paracentesis at bedside: removed 5L. Replaced Albumin. Started SBP PPx for home.

## 2017-11-06 NOTE — ED NOTES
"Medicine team paged again regarding NG tube. Team returned page, and stated "don't try to insert the tube anymore, wait until he gets onto the floor and let the nurse try to do it." Admitting nurse, Abi, notified.  "

## 2017-11-06 NOTE — PLAN OF CARE
Problem: Patient Care Overview  Goal: Plan of Care Review  Outcome: Ongoing (interventions implemented as appropriate)  AA&Ox3 not situation, updated on plan of care, all questions and concerns addressed, remains free from falls, no significant events, reports fullness in abdomen.  MD aware no orders at this time

## 2017-11-06 NOTE — SUBJECTIVE & OBJECTIVE
Interval History: Pt states he has not had a BM recently despite lactulose. Pt reports abdominal distension and pain.    Review of Systems   Constitutional: Negative for chills and fever.   HENT: Negative for congestion, rhinorrhea and sore throat.    Eyes: Negative for photophobia and visual disturbance.   Respiratory: Negative for cough, choking, shortness of breath and wheezing.    Cardiovascular: Negative for chest pain, palpitations and leg swelling.   Gastrointestinal: Positive for abdominal distention and abdominal pain. Negative for blood in stool, constipation, diarrhea, nausea and vomiting.   Endocrine: Negative for polydipsia and polyuria.   Genitourinary: Negative for dysuria and hematuria.   Musculoskeletal: Negative for arthralgias and myalgias.   Skin: Negative for rash and wound.   Neurological: Negative for dizziness, syncope and headaches.   Psychiatric/Behavioral: Negative for agitation, behavioral problems and confusion.     Objective:     Vital Signs (Most Recent):  Temp: 97.8 °F (36.6 °C) (11/06/17 0759)  Pulse: 91 (11/06/17 1002)  Resp: 16 (11/06/17 1002)  BP: 125/76 (11/06/17 0759)  SpO2: (!) 94 % (11/06/17 1002) Vital Signs (24h Range):  Temp:  [97.3 °F (36.3 °C)-97.8 °F (36.6 °C)] 97.8 °F (36.6 °C)  Pulse:  [] 91  Resp:  [16-53] 16  SpO2:  [94 %-100 %] 94 %  BP: (121-177)/(69-94) 125/76     Weight: 57.7 kg (127 lb 3.3 oz)  Body mass index is 19.92 kg/m².    Intake/Output Summary (Last 24 hours) at 11/06/17 1009  Last data filed at 11/06/17 0600   Gross per 24 hour   Intake                0 ml   Output              300 ml   Net             -300 ml      Physical Exam   Constitutional: He is oriented to person, place, and time. He appears well-developed and well-nourished.   HENT:   Head: Normocephalic and atraumatic.   Eyes: EOM are normal. Pupils are equal, round, and reactive to light.   Cardiovascular: Normal rate, regular rhythm and normal heart sounds.    No murmur  heard.  Pulmonary/Chest: Effort normal and breath sounds normal. No respiratory distress. He has no wheezes. He has no rales.   Abdominal: Bowel sounds are normal. He exhibits distension. There is tenderness.   Musculoskeletal: Normal range of motion.   Neurological: He is alert and oriented to person, place, and time.   Skin: Skin is warm and dry.   Psychiatric: He has a normal mood and affect. His behavior is normal. Thought content normal.       Significant Labs:   CBC:   Recent Labs  Lab 11/01/17  1332  11/05/17  1009 11/05/17  1020 11/06/17  0410   WBC 5.51  --  5.49  --  5.61   RBC 3.85*  --  3.69*  --  3.73*   HGB 10.6*  --  10.2*  --  10.2*   HCT 32.0*  < > 30.5* 27* 31.5*   PLT 84*  --  95*  --  77*   MCV 83  --  83  --  85   MCH 27.5  --  27.6  --  27.3   MCHC 33.1  --  33.4  --  32.4   < > = values in this interval not displayed.  BMP:   Recent Labs  Lab 11/05/17  1009 11/06/17  0105 11/06/17  0410   * 211* 271*   * 134* 135*   K 6.1* 5.9* 6.0*    108 108   CO2 20* 20* 18*   BUN 63* 63* 63*   CREATININE 1.4 1.6* 1.7*   CALCIUM 8.5* 8.4* 8.7   MG  --   --  2.4     Coagulation:   Recent Labs  Lab 11/06/17  0410   INR 1.0   APTT 22.5     Microbiology Results (last 7 days)     Procedure Component Value Units Date/Time    Blood culture (site 1) [[id number]] Collected:  11/05/17 1448    Order Status:  Completed Specimen:  Blood from Peripheral, Hand, Left Updated:  11/05/17 2145     Blood Culture, Routine No Growth to date    Narrative:       Site # 1, aerobic and anaerobic    Blood culture (site 2) [[id number]] Collected:  11/05/17 1455    Order Status:  Completed Specimen:  Blood from Peripheral, Hand, Right Updated:  11/05/17 2145     Blood Culture, Routine No Growth to date    Narrative:       Site # 2, aerobic only

## 2017-11-06 NOTE — PLAN OF CARE
I spoke with Mr. Reza's mother, Sharon Reza, regarding his prognosis. She states that she does not have power of  and has never had a discussion with her son regarding code status. We will consult Palliative Care.    Chris Anaya MD  [facility]

## 2017-11-06 NOTE — PT/OT/SLP EVAL
"Occupational Therapy  Evaluation    Soy Reza   MRN: [id number]   Admitting Diagnosis: Spontaneous bacterial peritonitis    OT Date of Treatment: 11/06/17   OT Start Time: 0805  OT Stop Time: 0830  OT Total Time (min): 25 min    Billable Minutes:  Evaluation 10  Therapeutic Activity 15    Diagnosis: Spontaneous bacterial peritonitis     Past Medical History:   Diagnosis Date    Diabetes mellitus     Hep C w/o coma, chronic     Hypertension     Macular degeneration     Neuropathy       Past Surgical History:   Procedure Laterality Date    EYE SURGERY      HAND SURGERY         Referring physician: FARIBA Swanson MD  Date referred to OT: 11/5/17    General Precautions: Standard,    Orthopedic Precautions: N/A  Braces: N/A    Do you have any cultural, spiritual, Christianity conflicts, given your current situation?: none stated     Patient History:  Living Environment  Lives With: parent(s) (Pt mother is 91yo)  Living Arrangements: house  Home Accessibility: stairs to enter home, stairs within home  Number of Stairs to Enter Home: 2  Number of Stairs Within Home: 3  Stair Railings at Home: none  Transportation Available: family or friend will provide  Living Environment Comment: Pt lives with 91yo mother in 1 story house with 2 steps to enter.  Pt has walk in shower and chair.  Pt was I woth ADL PTA  Equipment Currently Used at Home: cane, straight, rollator, shower chair    Prior level of function:   Bed Mobility/Transfers: independent  Grooming: independent  Bathing: independent  Upper Body Dressing: independent  Lower Body Dressing: independent  Toileting: independent  Home Management Skills: independent  Homemaking Responsibilities: No  Driving License: No  Occupation: Retired  IADL Comments: Pt was I with IADL tasks but has asssitnace as needed for transportation and meal preparation     Dominant hand: right    Subjective:  Communicated with RN prior to session.  "i'm so weak"  Educated patient on disease " process  Chief Complaint: weaknes  Patient/Family stated goals: return home    Pain/Comfort  Pain Rating 1: 0/10    Objective:  Patient found with: peripheral IV    Cognitive Exam:  Oriented to: Person, Place, Time and Situation  Follows Commands/attention: Follows two-step commands  Communication: clear/fluent  Memory:  No Deficits noted  Safety awareness/insight to disability: intact  Coping skills/emotional control: Appropriate to situation    Visual/perceptual:  Intact    Physical Exam:  Postural examination/scapula alignment: Rounded shoulder and Head forward  Skin integrity: Visible skin intact  Edema: None noted     Sensation:   Intact    Upper Extremity Range of Motion:  Right Upper Extremity: WFL  Left Upper Extremity: WFL    Upper Extremity Strength:  Right Upper Extremity: WFL  Left Upper Extremity: WFL   Strength: WFL    Fine motor coordination:   Intact    Gross motor coordination: WFL    Functional Mobility:  Bed Mobility:  Rolling/Turning to Left: Contact guard assistance  Scooting/Bridging: Contact Guard Assistance  Supine to Sit: Minimum Assistance  Sit to Supine: Contact Guard Assistance    Transfers:  Sit <> Stand Assistance: Minimum Assistance  Sit <> Stand Assistive Device: Rolling Walker    Functional Ambulation: Pt able to demonstrate safe and effective in room and hallway mobility with CGA and rolling walker     Activities of Daily Living:     UE Dressing Level of Assistance: Minimum assistance  LE Dressing Level of Assistance: Maximum assistance (donning socks)         Balance:   Static Sit: GOOD-: Takes MODERATE challenges from all directions but inconsistently  Dynamic Sit: GOOD-: Maintains balance through MODERATE excursions of active trunk movement,     Static Stand: FAIR+: Takes MINIMAL challenges from all directions  Dynamic stand: FAIR: Needs CONTACT GUARD during gait    Therapeutic Activities and Exercises:  Pt educated on safety, disease process, importance of daily  "participation in self care for gains     AM-PAC 6 CLICK ADL  How much help from another person does this patient currently need?  1 = Unable, Total/Dependent Assistance  2 = A lot, Maximum/Moderate Assistance  3 = A little, Minimum/Contact Guard/Supervision  4 = None, Modified [place]/Independent    Putting on and taking off regular lower body clothing? : 2  Bathing (including washing, rinsing, drying)?: 2  Toileting, which includes using toilet, bedpan, or urinal? : 3  Putting on and taking off regular upper body clothing?: 3  Taking care of personal grooming such as brushing teeth?: 3  Eating meals?: 1 (NPO)  Total Score: 14    AM-PAC Raw Score CMS "G-Code Modifier Level of Impairment Assistance   6 % Total / Unable   7 - 9 CM 80 - 100% Maximal Assist   10-14 CL 60 - 80% Moderate Assist   15 - 19 CK 40 - 60% Moderate Assist   20 - 22 CJ 20 - 40% Minimal Assist   23 CI 1-20% SBA / CGA   24 CH 0% Independent/ Mod I       Patient left supine with all lines intact and call button in reach    Assessment:  Soy Reza is a 66 y.o. male with a medical diagnosis of Spontaneous bacterial peritonitis and presents with weakness due to debility from infection.  Pt was living with his 91 yo mother who is not abl eto porovide heavy assistance if needed. Pt able ot tolerate bed mobility and in room and hallway mobility and was pleased at how well he did.  Pt would benefit from skilled OT 3x week for max functional gains.  DC to SNF (possible home with HH if gains)    Rehab identified problem list/impairments: Rehab identified problem list/impairments: weakness, impaired self care skills, impaired endurance    Rehab potential is good.    Activity tolerance: Good    Discharge recommendations: Discharge Facility/Level Of Care Needs: nursing facility, skilled     Barriers to discharge: Barriers to Discharge: Decreased caregiver support, Inaccessible home environment    Equipment recommendations: none     GOALS:    " Occupational Therapy Goals        Problem: Occupational Therapy Goal    Goal Priority Disciplines Outcome Interventions   Occupational Therapy Goal     OT, PT/OT Ongoing (interventions implemented as appropriate)    Description:  Goals to be met by: 11/5/17    Patient will increase functional independence with ADLs by performing:    UE Dressing with [place].  LE Dressing with [place].  Toileting from toilet with Stand-by Assistance for hygiene and clothing management.                       PLAN:  Patient to be seen 3 x/week to address the above listed problems via self-care/home management, therapeutic activities, therapeutic exercises  Plan of Care expires: 12/06/17  Plan of Care reviewed with: patient         María Patel, OT  11/06/2017

## 2017-11-06 NOTE — ED NOTES
Assumed care. Pt resting on stretcher, respirations even and unlabored, no distress noted. Pt denies complaints at this time, pt updated on POC, will continue to monitor.

## 2017-11-06 NOTE — PLAN OF CARE
"CM to bedside - pt provided assessment info. Pt w/ DME in place, lives w/ mother. Pt is active w/ [facility] h/h. Pt could d/c w/ h/h vs SNF - CM noted that pt was accepted to [facility] in [place] at last admit. Pt reports not checking his CBG b/c his hands shake so much he is unable to use/manage the glucometer. Pt states he will need a ride home at d/c.    CM provided patient anticipated DIANA which will be update by nursing staff. Patient provided a Blue "My Health Packet" for d/c planning and health tool. Patient verbalized understanding.    Future Appointments  Date Time Provider Department Center   11/10/2017 1:40 PM Jean Carlos Swanson MD [facility]        11/06/17 1446   Discharge Assessment   Assessment Type Discharge Planning Assessment   Confirmed/corrected address and phone number on facesheet? Yes   Assessment information obtained from? Patient;Medical Record   Expected Length of Stay (days) 3   Communicated expected length of stay with patient/caregiver yes   Prior to hospitilization cognitive status: Alert/Oriented   Prior to hospitalization functional status: Assistive Equipment;Needs Assistance   Current cognitive status: Alert/Oriented   Current Functional Status: Assistive Equipment;Needs Assistance   Facility Arrived From: N/A   Lives With parent(s)   Able to Return to Prior Arrangements unable to determine at this time (comments)   Is patient able to care for self after discharge? Unable to determine at this time (comments)   Who are your caregiver(s) and their phone number(s)? mother Panda Herrera [phone]   Patient's perception of discharge disposition home health   Readmission Within The Last 30 Days lack of support   If yes, [place] [facility]   Patient currently being followed by outpatient case management? No   Patient currently receives any other outside agency services? Yes   Name and contact number of agency or person providing outside services [facility] h/h   Is it the patient/care giver preference to resume care with the current outside agency? Yes   Equipment Currently Used at Home cane, straight;walker, rolling;rollator;walker, standard;shower chair;raised toilet;glucometer  (pt states he's not using glucometer d/t his hands shaking too much)   Do you have any problems affording any of your prescribed medications? No   Is the patient taking medications as prescribed? no   If no, which medications is patient not taking? pt not taking multiple meds   Does the patient have transportation home? No  (pt will need a ride home)   Transportation Available public transportation  (pt uses [employer] van/bus via [employer] for appts)   Dialysis Name and Scheduled days N/A   Does the patient receive services at the [facility]? No   Discharge Plan A Home with family;Home Health   Discharge Plan B Skilled Nursing Facility   Patient/Family In Agreement With Plan yes   Readmission Questionnaire   At the time of your discharge, did someone talk to you about what your health problems were? Yes  (completed readmit on opening screen)

## 2017-11-06 NOTE — PHARMACY MED REC
"Admission Medication Reconciliation - Pharmacy Consult Note    The home medication history was taken by Seema Snell, Pharmacy Tech.  Based on information gathered and subsequent review by the clinical pharmacist, the items below may need attention.    You may go to "Admission" then "Reconcile Home Medications" tabs to review and/or act upon these items.        No issues noted with the medication reconciliation.        Potential issues to be addressed PRIOR TO DISCHARGE  o Patient lacks understanding of therapy  o Medication list confirmed by mother and pharmacy. Was not taking rifaximin at home and unsure if patient should be receiving lasix 20 mg daily, lisinopril 20 mg daily, spironolactone 25 mg daily based on last clinic visit.    Please address this information as you see fit.  Feel free to contact us if you have any questions or require assistance.    Berny WhiteD, [facility]  Internal Medicine Clinical Pharmacy Specialist  Spectra link: [phone]      Patient's prior to admission medication regimen was as follows:  Medication Sig    aspirin 81 MG Chew Take 81 mg by mouth every other day.     carvedilol (COREG) 6.25 MG tablet Take 1 tablet (6.25 mg total) by mouth 2 (two) times daily.    hydrocodone-acetaminophen 5-325mg (NORCO) 5-325 mg per tablet Take 1 tablet by mouth every 6 (six) hours as needed for Pain.    lactulose (CHRONULAC) 10 gram/15 mL solution Take 30 mLs (20 g total) by mouth 3 (three) times daily.    metformin (GLUCOPHAGE-XR) 500 MG 24 hr tablet Take 500 mg by mouth every other day.          Please add appropriate    SmartPhrase below:        "

## 2017-11-06 NOTE — ASSESSMENT & PLAN NOTE
- Ammonia at 96  - Per pt's mother, he is non-adherent to regimen of lactulose and rifaximin regimen at home  - lactulose 30g q6h: titrate 3-4 bowel movements/ day

## 2017-11-06 NOTE — NURSING
Uneventful night,aaox4. Pt very calm and pleasant. Received IV Zofran for nausea, and Tylenol for abdominal discomfort. Tolerated PO lactulose. Vital signs stable, no s/s of distress. Bed in lowest position, call bell in reach.

## 2017-11-07 PROBLEM — Z71.89 GOALS OF CARE, COUNSELING/DISCUSSION: Status: ACTIVE | Noted: 2017-01-01

## 2017-11-07 NOTE — SUBJECTIVE & OBJECTIVE
Interval History: Refused lactulose overnight. Team had lengthy discussion with patient in regards to goals of care. Discussed adherence to medication. Denies BMs overnight.     Review of Systems   Constitutional: Negative for chills and fever.   HENT: Negative for congestion, rhinorrhea and sore throat.    Eyes: Negative for photophobia and visual disturbance.   Respiratory: Negative for cough, choking, shortness of breath and wheezing.    Cardiovascular: Negative for chest pain, palpitations and leg swelling.   Gastrointestinal: Negative for abdominal distention, abdominal pain, blood in stool, constipation, diarrhea, nausea and vomiting.   Endocrine: Negative for polydipsia and polyuria.   Genitourinary: Negative for dysuria and hematuria.   Musculoskeletal: Negative for arthralgias and myalgias.   Skin: Negative for rash and wound.   Neurological: Negative for dizziness, syncope and headaches.   Psychiatric/Behavioral: Negative for agitation, behavioral problems and confusion.     Objective:     Vital Signs (Most Recent):  Temp: 97.9 °F (36.6 °C) (11/07/17 1137)  Pulse: 89 (11/07/17 1137)  Resp: 20 (11/07/17 1137)  BP: 129/82 (11/07/17 1137)  SpO2: 99 % (11/07/17 1137) Vital Signs (24h Range):  Temp:  [97 °F (36.1 °C)-98.4 °F (36.9 °C)] 97.9 °F (36.6 °C)  Pulse:  [80-99] 89  Resp:  [16-20] 20  SpO2:  [95 %-99 %] 99 %  BP: (110-133)/(62-87) 129/82     Weight: 57.7 kg (127 lb 3.3 oz)  Body mass index is 19.92 kg/m².    Intake/Output Summary (Last 24 hours) at 11/07/17 1331  Last data filed at 11/06/17 1700   Gross per 24 hour   Intake               50 ml   Output              250 ml   Net             -200 ml      Physical Exam   Constitutional: He is oriented to person, place, and time. He appears well-developed and well-nourished.   HENT:   Head: Normocephalic and atraumatic.   Eyes: EOM are normal. Pupils are equal, round, and reactive to light.   Cardiovascular: Normal rate, regular rhythm and normal heart  sounds.    No murmur heard.  Pulmonary/Chest: Effort normal and breath sounds normal. No respiratory distress. He has no wheezes. He has no rales.   Abdominal: Bowel sounds are normal. He exhibits no distension. There is no tenderness.   Musculoskeletal: Normal range of motion.   Neurological: He is alert and oriented to person, place, and time.   Skin: Skin is warm and dry.   Psychiatric: He has a normal mood and affect. His behavior is normal. Thought content normal.       Significant Labs:   CBC:     Recent Labs  Lab 11/05/17  1009 11/05/17  1020 11/06/17  0410 11/07/17  0436   WBC 5.49  --  5.61 4.04   RBC 3.69*  --  3.73* 3.53*   HGB 10.2*  --  10.2* 9.6*   HCT 30.5* 27* 31.5* 29.0*   PLT 95*  --  77* 67*   MCV 83  --  85 82   MCH 27.6  --  27.3 27.2   MCHC 33.4  --  32.4 33.1     BMP:   Recent Labs  Lab 11/06/17  0105  11/06/17  0410 11/07/17  0435   *  --  271* 144*   *  --  135* 134*   K 5.9*  --  6.0* 5.6*     --  108 106   CO2 20*  --  18* 22*   BUN 63*  --  63* 61*   CREATININE 1.6*  --  1.7* 1.6*   CALCIUM 8.4*  --  8.7 8.4*   MG  --   < > 2.4 2.2   < > = values in this interval not displayed.  Coagulation:     Recent Labs  Lab 11/07/17  0435   INR 0.9   APTT 23.2     Microbiology Results (last 7 days)     Procedure Component Value Units Date/Time    Blood culture (site 1) [[id number]] Collected:  11/05/17 1448    Order Status:  Completed Specimen:  Blood from Peripheral, Hand, Left Updated:  11/06/17 1612     Blood Culture, Routine No Growth to date     Blood Culture, Routine No Growth to date    Narrative:       Site # 1, aerobic and anaerobic    Blood culture (site 2) [[id number]] Collected:  11/05/17 1455    Order Status:  Completed Specimen:  Blood from Peripheral, Hand, Right Updated:  11/06/17 1612     Blood Culture, Routine No Growth to date     Blood Culture, Routine No Growth to date    Narrative:       Site # 2, aerobic only

## 2017-11-07 NOTE — ASSESSMENT & PLAN NOTE
- Pt takes carvedilol at home  - Given hyperkalemia with K of 6.1, will hold beta-blocker until resolution of hyperkalemia   -BP stable without meds

## 2017-11-07 NOTE — ASSESSMENT & PLAN NOTE
-lengthy discussion with patient in regards to GOC, appears patient has poor insight into medical comorbidities and complications with HE due to medication noncompliance. Will continue to have GOC discussions with patient. May benefit from outpatient SW assistance.  -team discussed with mother as well and code status was not discussed with her

## 2017-11-07 NOTE — PLAN OF CARE
SW learned from CM, Pili Pineda, that Pt stated he did not want to go to SNF, but wanted to return home with home health. Pt is current with [facility]. Pt anticipated to discharge home on Thursday or Friday after he completes his five days of SBP treatments. SW will need home health orders prior to discharge.     PRUDENCIO OlsonW

## 2017-11-07 NOTE — PT/OT/SLP EVAL
"Physical Therapy  Evaluation    Soy Reza   MRN: [id number]   Admitting Diagnosis: Spontaneous bacterial peritonitis    PT Received On: 11/07/17  PT Start Time: 1242     PT Stop Time: 1300    PT Total Time (min): 18 min       Billable Minutes:  Evaluation 10 min  and Therapeutic Exercise 8 Min    Diagnosis: Spontaneous bacterial peritonitis      Past Medical History:   Diagnosis Date    Diabetes mellitus     Hep C w/o coma, chronic     Hypertension     Macular degeneration     Neuropathy       Past Surgical History:   Procedure Laterality Date    EYE SURGERY      HAND SURGERY         Referring physician: Rj Smallwood MD  Date referred to PT: 11/05/17    General Precautions: Standard, fall  Orthopedic Precautions: N/A   Braces:         Do you have any cultural, spiritual, Baptism conflicts, given your current situation?: none stated    Patient History:  Lives With: parent(s)  Living Arrangements: house  Home Accessibility: not wheelchair accessible, stairs to enter home  Number of Stairs to Enter Home: 2  Number of Stairs Within Home: 3  Stair Railings at Home: none  Transportation Available: public transportation  Living Environment Comment: Pt lives w/ 93 y/o mother in 1story house w/ 2STE. Pt has a walk in shower and chair. Pt was occasionally Christelle w/ ADL PTA. Patient uses a rollator.  Equipment Currently Used at Home: glucometer, raised toilet, shower chair, walker, standard, rollator, walker, rolling, cane, straight (pt states that he has not been using glucometer 2/2 his hands being too shaky.)  DME owned (not currently used): rolling walker, shower chair and rollator    Previous Level of Function:  Ambulation Skills: needs device  Transfer Skills: needs device  ADL Skills: needs assist  Work/Leisure Activity: needs assist    Subjective:  Communicated with nurse prior to session.  "I am doing well. Sure, I'll walk a bit."  Chief Complaint: weakness and impaired endurance  Patient " goals: to get better    Pain/Comfort  Pain Rating 1: 0/10      Objective:   Patient found with: telemetry     Cognitive Exam:  Oriented to: Person, Place, Time and Situation    Follows Commands/attention: Follows multistep  commands  Communication: clear/fluent  Safety awareness/insight to disability: intact    Physical Exam:  Postural examination/scapula alignment: No postural abnormalities identified    Skin integrity: Visible skin intact  Edema: None noted     Sensation:   Intact    Upper Extremity Range of Motion:  Right Upper Extremity: WFL  Left Upper Extremity: WFL    Upper Extremity Strength:  Right Upper Extremity: WFL  Left Upper Extremity: WFL    Lower Extremity Range of Motion:  Right Lower Extremity: WFL  Left Lower Extremity: WFL    Lower Extremity Strength:  Right Lower Extremity: WFL  Left Lower Extremity: WFL     Fine motor coordination:  Intact    Gross motor coordination: WFL    Functional Mobility:  Bed Mobility:  Rolling/Turning to Left: Supervision  Rolling/Turning Right: Supervision  Scooting/Bridging: Stand by Assistance  Supine to Sit: Minimum Assistance  Sit to Supine:  (Pt left sitting up in chair)    Transfers:  Sit <> Stand Assistance: Contact Guard Assistance  Sit <> Stand Assistive Device: Rolling Walker    Gait:   Gait Distance: 130' (one therapist corrected LOB during turn w/ RW)  Assistance 1: Contact Guard Assistance  Gait Assistive Device: Rolling walker  Gait Pattern: 3-point gait  Gait Deviation(s): decreased sandeep, decreased velocity of limb motion, decreased stride length, decreased toe-to-floor clearance    Balance:   Static Sit: FAIR+: Able to take MINIMAL challenges from all directions  Dynamic Sit: FAIR+: Maintains balance through MINIMAL excursions of active trunk motion  Static Stand: FAIR: Maintains without assist but unable to take challenges  Dynamic stand: FAIR: Needs CONTACT GUARD during gait    Therapeutic Activities and Exercises:  PT Zhao completed  Patient  assisted w/ seated therex: x 20 reps   -BLE Hip Flex   -BLE LAQ   -BLE Ankle Pumps    AM-PAC 6 CLICK MOBILITY  How much help from another person does this patient currently need?   1 = Unable, Total/Dependent Assistance  2 = A lot, Maximum/Moderate Assistance  3 = A little, Minimum/Contact Guard/Supervision  4 = None, Modified [place]/Independent    Turning over in bed (including adjusting bedclothes, sheets and blankets)?: 4  Sitting down on and standing up from a chair with arms (e.g., wheelchair, bedside commode, etc.): 3  Moving from lying on back to sitting on the side of the bed?: 3  Moving to and from a bed to a chair (including a wheelchair)?: 3  Need to walk in hospital room?: 3  Climbing 3-5 steps with a railing?: 3  Total Score: 19     AM-PAC Raw Score CMS G-Code Modifier Level of Impairment Assistance   6 % Total / Unable   7 - 9 CM 80 - 100% Maximal Assist   10 - 14 CL 60 - 80% Moderate Assist   15 - 19 CK 40 - 60% Moderate Assist   20 - 22 CJ 20 - 40% Minimal Assist   23 CI 1-20% SBA / CGA   24 CH 0% Independent/ Mod I     Patient left up in chair with all lines intact, call button in reach and nurse notified.    Assessment:   Soy Reza is a 66 y.o. male with a medical diagnosis of Spontaneous bacterial peritonitis and presents with weakness and gait instability. Patient maintains good bed mobility, but requires Min A to transition from Sup>Sit. Patient able to ambulate w/ RW at CGA. Patient had to be educated on taking controlled turns to prevent LOB w/ RW. Patient will benefit from skilled PT services to address his functional mobility, improve independence, and to decrease caregiver burden. Patient's medical status is stable and his eval complexity is low.     Rehab identified problem list/impairments: Rehab identified problem list/impairments: weakness, impaired endurance, impaired self care skills    Rehab potential is good.    Activity tolerance: Good    Discharge  recommendations: Discharge Facility/Level Of Care Needs: home health PT     Barriers to discharge: Barriers to Discharge: Inaccessible home environment    Equipment recommendations: Equipment Needed After Discharge: none     GOALS:    Physical Therapy Goals        Problem: Physical Therapy Goal    Goal Priority Disciplines Outcome Goal Variances Interventions   Physical Therapy Goal     PT/OT, PT Ongoing (interventions implemented as appropriate)     Description:  Goals to be met by: 17    Patient will increase functional independence with mobility by performin. Supine to sit with Set-up [place]  2. Sit to supine with Set-up [place]  3. Sit to stand transfer with Supervision  4. Bed to chair transfer with Supervision using Rolling Walker  5. Gait  x 200 feet with Stand-by Assistance using Rolling Walker.   6. Ascend/descend 3 stair with bilateral Handrails Contact Guard Assistance.  7. Lower extremity exercise program x20 reps per handout, with independence                      PLAN:    Patient to be seen 3 x/week to address the above listed problems via gait training, therapeutic activities, therapeutic exercises, neuromuscular re-education  Plan of Care expires: 17  Plan of Care reviewed with: patient          Elian Leon, PT  2017

## 2017-11-07 NOTE — ASSESSMENT & PLAN NOTE
- MAURICE workup  - f/u urine sodium, urine Cr -> Ucr and Uurea pending  - f/u renal US -> largely unremarkable

## 2017-11-07 NOTE — ASSESSMENT & PLAN NOTE
- Pt given two doses of albuterol at 9mg  - Will monitor with CMP  -encouraged lactulose administration, will avoid lasix given MAURICE

## 2017-11-07 NOTE — PROGRESS NOTES
[facility] Hospital Medicine  Progress Note    Patient Name: Soy Reza  MRN: [id number]  Patient Class: IP- Inpatient   Admission Date: 11/5/2017  Length of Stay: 2 days  Attending Physician: Mckayla Swanosn MD  Primary Care Provider: Jean Carlos Swanson MD    [facility] Medicine Team: [facility] HOSP MED 4 FRANCESCO Saravia    Subjective:     Principal Problem:Spontaneous bacterial peritonitis    HPI:  Mr Reza is a 67 yo M w PMH significant for hepatitis C, EtOH cirrhosis and T2DM presents with two day history of worsening abdominal pain, swelling and nausea vomiting. History was taken largely from chart review and telephone conversation w pt's mother as he was acutely encephalopathic at time of interview. Pt recently underwent paracentesis (11/2) which revealed SBP. Hepatology attempted to telephone the pt for direct admission from clinic (11/3), however, (per mother) pt refused admission as symptoms were not severe at that time. Pt's mother states that he asked her to call an ambulance this morning d/t worsening confusion and abdominal pain. Pt's mother endorses poor compliance with medication on the part of her son.     Per ED staff, pt was alert and oriented to person and place and had been complaining of some abdominal pain localized to the epigastric region. He was then given 25 mg IV phenergan, 4mg injection of morphine and 1mg injection of hydromorphone upon arrival. Pt was nonverbal at time of interview.    Hospital Course:  11/6: Increased lactulose to 30g q6h. Started detemir 5U qhs. Working up MAURICE. Plan to discuss goals of care with mother today  11/7: Refused lactulose overnight. Mentation improved this AM, K 5.6.     Interval History: Refused lactulose overnight. Team had lengthy discussion with patient in regards to goals of care. Discussed adherence to medication. Denies BMs overnight.     Review of Systems   Constitutional: Negative for chills and fever.   HENT: Negative for  congestion, rhinorrhea and sore throat.    Eyes: Negative for photophobia and visual disturbance.   Respiratory: Negative for cough, choking, shortness of breath and wheezing.    Cardiovascular: Negative for chest pain, palpitations and leg swelling.   Gastrointestinal: Negative for abdominal distention, abdominal pain, blood in stool, constipation, diarrhea, nausea and vomiting.   Endocrine: Negative for polydipsia and polyuria.   Genitourinary: Negative for dysuria and hematuria.   Musculoskeletal: Negative for arthralgias and myalgias.   Skin: Negative for rash and wound.   Neurological: Negative for dizziness, syncope and headaches.   Psychiatric/Behavioral: Negative for agitation, behavioral problems and confusion.     Objective:     Vital Signs (Most Recent):  Temp: 97.9 °F (36.6 °C) (11/07/17 1137)  Pulse: 89 (11/07/17 1137)  Resp: 20 (11/07/17 1137)  BP: 129/82 (11/07/17 1137)  SpO2: 99 % (11/07/17 1137) Vital Signs (24h Range):  Temp:  [97 °F (36.1 °C)-98.4 °F (36.9 °C)] 97.9 °F (36.6 °C)  Pulse:  [80-99] 89  Resp:  [16-20] 20  SpO2:  [95 %-99 %] 99 %  BP: (110-133)/(62-87) 129/82     Weight: 57.7 kg (127 lb 3.3 oz)  Body mass index is 19.92 kg/m².    Intake/Output Summary (Last 24 hours) at 11/07/17 1331  Last data filed at 11/06/17 1700   Gross per 24 hour   Intake               50 ml   Output              250 ml   Net             -200 ml      Physical Exam   Constitutional: He is oriented to person, place, and time. He appears well-developed and well-nourished.   HENT:   Head: Normocephalic and atraumatic.   Eyes: EOM are normal. Pupils are equal, round, and reactive to light.   Cardiovascular: Normal rate, regular rhythm and normal heart sounds.    No murmur heard.  Pulmonary/Chest: Effort normal and breath sounds normal. No respiratory distress. He has no wheezes. He has no rales.   Abdominal: Bowel sounds are normal. He exhibits no distension. There is no tenderness.   Musculoskeletal: Normal range of  motion.   Neurological: He is alert and oriented to person, place, and time.   Skin: Skin is warm and dry.   Psychiatric: He has a normal mood and affect. His behavior is normal. Thought content normal.       Significant Labs:   CBC:     Recent Labs  Lab 11/05/17  1009 11/05/17  1020 11/06/17  0410 11/07/17  0436   WBC 5.49  --  5.61 4.04   RBC 3.69*  --  3.73* 3.53*   HGB 10.2*  --  10.2* 9.6*   HCT 30.5* 27* 31.5* 29.0*   PLT 95*  --  77* 67*   MCV 83  --  85 82   MCH 27.6  --  27.3 27.2   MCHC 33.4  --  32.4 33.1     BMP:   Recent Labs  Lab 11/06/17  0105  11/06/17  0410 11/07/17  0435   *  --  271* 144*   *  --  135* 134*   K 5.9*  --  6.0* 5.6*     --  108 106   CO2 20*  --  18* 22*   BUN 63*  --  63* 61*   CREATININE 1.6*  --  1.7* 1.6*   CALCIUM 8.4*  --  8.7 8.4*   MG  --   < > 2.4 2.2   < > = values in this interval not displayed.  Coagulation:     Recent Labs  Lab 11/07/17  0435   INR 0.9   APTT 23.2     Microbiology Results (last 7 days)     Procedure Component Value Units Date/Time    Blood culture (site 1) [[id number]] Collected:  11/05/17 1448    Order Status:  Completed Specimen:  Blood from Peripheral, Hand, Left Updated:  11/06/17 1612     Blood Culture, Routine No Growth to date     Blood Culture, Routine No Growth to date    Narrative:       Site # 1, aerobic and anaerobic    Blood culture (site 2) [[id number]] Collected:  11/05/17 1455    Order Status:  Completed Specimen:  Blood from Peripheral, Hand, Right Updated:  11/06/17 1612     Blood Culture, Routine No Growth to date     Blood Culture, Routine No Growth to date    Narrative:       Site # 2, aerobic only              Assessment/Plan:      * Spontaneous bacterial peritonitis    - Most recent ascitic fluid PMN at 610, diagnostic of SBP  - Will start on 1 g rocephin BID, currently day 3:5  - albumin this evening            Goals of care, counseling/discussion    -lengthy discussion with patient in regards to GOC, appears  patient has poor insight into medical comorbidities and complications with HE due to medication noncompliance. Will continue to have GOC discussions with patient. May benefit from outpatient SW assistance.  -team discussed with mother as well and code status was not discussed with her          Hyperkalemia    - Pt given two doses of albuterol at 9mg  - Will monitor with CMP  -encouraged lactulose administration, will avoid lasix given MAURICE        Hepatic encephalopathy    - Ammonia at 96  - Per pt's mother, he is non-adherent to regimen of lactulose and rifaximin regimen at home  - lactulose 30g q6h: titrate 3-4 bowel movements/ day        MAURICE (acute kidney injury)    - MAURICE workup  - f/u urine sodium, urine Cr -> Ucr and Uurea pending  - f/u renal US -> largely unremarkable          Ascites due to alcoholic hepatitis    - Will monitor for worsening distension or accumulation of ascitic fluid  - Most recent paracentesis performed 11/2  - Will perform therapeutic paracentesis if warranted          Essential hypertension    - Pt takes carvedilol at home  - Given hyperkalemia with K of 6.1, will hold beta-blocker until resolution of hyperkalemia   -BP stable without meds        Alcoholism in remission              Anemia due to vitamin B12 deficiency    - Chronic anemia d/t Vit B12 deficiency  - Will monitor with daily CBC          Uncontrolled type 2 diabetes mellitus with diabetic polyneuropathy, without long-term current use of insulin    - Detemir 5U qhs, 3 aspart TIDWM  - SSI 0-5U prn          VTE Risk Mitigation         Ordered     heparin (porcine) injection 5,000 Units  Every 12 hours     Route:  Subcutaneous        11/05/17 1242     Medium Risk of VTE  Once      11/05/17 1242     Place sequential compression device  Until discontinued      11/05/17 1242     Place LYNETTE hose  Until discontinued      11/05/17 1242     Medium Risk of VTE  Once      11/05/17 1242              FRANCESCO Saravia  Department of  Hospital Medicine [facility]

## 2017-11-07 NOTE — PLAN OF CARE
Problem: Physical Therapy Goal  Goal: Physical Therapy Goal  Goals to be met by: 17    Patient will increase functional independence with mobility by performin. Supine to sit with Set-up [place]  2. Sit to supine with Set-up [place]  3. Sit to stand transfer with Supervision  4. Bed to chair transfer with Supervision using Rolling Walker  5. Gait  x 200 feet with Stand-by Assistance using Rolling Walker.   6. Ascend/descend 3 stair with bilateral Handrails Contact Guard Assistance.  7. Lower extremity exercise program x20 reps per handout, with independence    Outcome: Ongoing (interventions implemented as appropriate)  PT Goals established following initial sosa Leon, PT  2017

## 2017-11-07 NOTE — NURSING
Uneventful night. CBG at  , covered with ss insulin. Vital signs stable. Adamantly refused 0600 Lactulose. Educated on importance of medication, stated he would take the next dose. No BM reported.

## 2017-11-08 NOTE — PLAN OF CARE
Pt anticipated to discharge home tomorrow with home health. Pt was current with [facility] prior to this admissions. SW sent all necessary referral information via [facility] system to [facility] and informed them of Pt's anticipated discharge for tomorrow. SW to continue to follow.     Bridget Hollingsworth LCSW

## 2017-11-08 NOTE — PLAN OF CARE
[facility]    HOME HEALTH ORDERS  FACE TO FACE ENCOUNTER    Patient Name: Soy Reza  YOB: 1950    PCP: Jean Carlos Swanson MD   PCP Address: [address] [facility] [facility] [address] / [place] [address]  PCP Phone Number: [phone]  PCP Fax: [phone]    Encounter Date: 11/08/2017    Admit to Home Health    Diagnoses:  Active Hospital Problems    Diagnosis  POA    *Spontaneous bacterial peritonitis [K65.2]  Yes     Chronic    Goals of care, counseling/discussion [Z71.89]  Not Applicable    Hyperphosphatemia [E83.39]  No    Hepatic encephalopathy [K72.90]  Yes    Hyperkalemia [E87.5]  Yes    MAURICE (acute kidney injury) [N17.9]  Yes    Ascites due to alcoholic hepatitis [K70.11]  Yes    Uncontrolled type 2 diabetes mellitus with diabetic polyneuropathy, without long-term current use of insulin [E11.42, E11.65]  Yes     Chronic    Anemia due to vitamin B12 deficiency [D51.9]  Yes     Chronic    Essential hypertension [I10]  Yes     Chronic      Resolved Hospital Problems    Diagnosis Date Resolved POA   No resolved problems to display.       Future Appointments  Date Time Provider Department Center   11/10/2017 1:40 PM Jean Carlos Swanson MD [facility] [place]           I have seen and examined this patient face to face today. My clinical findings that support the need for the home health skilled services and home bound status are the following:  Weakness/numbness causing balance and gait disturbance due to Liver Disease and Weakness/Debility making it taxing to leave home.  Patient with medication mismanagement issues requiring home bound status as evidenced by  Poor understanding of medication regimen/dosage and Poor adherence to medication regimen/dosage.    Allergies:Review of patient's allergies indicates:  No Known Allergies    Diet: diabetic diet: 1800 calorie    Activities: activity as tolerated    Nursing:   SN to complete comprehensive assessment including  routine vital signs. Instruct on disease process and s/s of complications to report to MD. Review/verify medication list sent home with the patient at time of discharge  and instruct patient/caregiver as needed. Frequency may be adjusted depending on start of care date.    Notify MD if SBP > 160 or < 90; DBP > 90 or < 50; HR > 120 or < 50; Temp > 101;       CONSULTS:    Physical Therapy to evaluate and treat. Evaluate for home safety and equipment needs; Establish/upgrade home exercise program. Perform / instruct on therapeutic exercises, gait training, transfer training, and Range of Motion.  Occupational Therapy to evaluate and treat. Evaluate home environment for safety and equipment needs. Perform/Instruct on transfers, ADL training, ROM, and therapeutic exercises.   to evaluate for community resources/long-range planning.  Aide to provide assistance with personal care, ADLs, vital signs, and medication management. Please have a pill box set up for him weekly for medication management.     MISCELLANEOUS CARE:  N/A    WOUND CARE ORDERS  n/a      Medications: Review discharge medications with patient and family and provide education.      Current Discharge Medication List      CONTINUE these medications which have NOT CHANGED    Details   aspirin 81 MG Chew Take 81 mg by mouth every other day.       lactulose (CHRONULAC) 10 gram/15 mL solution Take 30 mLs (20 g total) by mouth 3 (three) times daily.  Qty: 236 mL, Refills: 0      metformin (GLUCOPHAGE-XR) 500 MG 24 hr tablet Take 500 mg by mouth every other day.          STOP taking these medications       carvedilol (COREG) 6.25 MG tablet Comments:   Reason for Stopping:         hydrocodone-acetaminophen 5-325mg (NORCO) 5-325 mg per tablet Comments:   Reason for Stopping:               I certify that this patient is confined to his home and needs intermittent skilled nursing care, physical therapy and occupational therapy.

## 2017-11-08 NOTE — NURSING
"Patient refused lactulose reporting," Dr. Swanson told me I dont have to take that anymore.  Patient has been having 5-6 BM and patient K+ is 4.6. Abdomen is round, distended and firm. Patient evaluated per Medicine Team 4 this morning rounds.  Notified Medicine Team for further instructions related to patient refusing lactulose.   "

## 2017-11-08 NOTE — PLAN OF CARE
Problem: Diabetes, Type 2 (Adult)  Intervention: Optimize Glycemic Control   11/08/17 1710   Nutrition Interventions   Glycemic Management blood glucose monitoring;supplemental insulin given     Patient is alert and oriented x 4. Patient is able to make needs known. Patient denies pain.  Patient with PMH DMII and POCT ac/hs and 0700- 103,  1100-143, 1700- 183. Patient is on aspart 3 units with meals. Patient abdomen is round, distended and firm.  Patient received a nursing communication to hold rectal  lactulose for BM greater than 4.  Patient receives rocephin 1g via L AC 18 g.  Patient is in agreement with Po. Discharge is TBD.

## 2017-11-09 NOTE — ASSESSMENT & PLAN NOTE
- Will monitor for worsening distension or accumulation of ascitic fluid  - Most recent paracentesis performed 11/2  - plan for therapeutic paracentesis tomorrow 11/9

## 2017-11-09 NOTE — ASSESSMENT & PLAN NOTE
- Pt takes carvedilol at home  - Given hyperkalemia with K of 6.1, will hold beta-blocker until resolution of hyperkalemia   - BP stable without meds

## 2017-11-09 NOTE — SUBJECTIVE & OBJECTIVE
Interval History: Pt reports multiple bowel movements overnight. Pt is alert and oriented x3; cognition much improved. Lactulose down titrated to TID. Pt will finish Ceftriaxone for SBP tomorrow. Plan to DC home with home health.    Review of Systems   Constitutional: Negative for chills and fever.   HENT: Negative for congestion, rhinorrhea and sore throat.    Eyes: Negative for photophobia and visual disturbance.   Respiratory: Negative for cough, choking, shortness of breath and wheezing.    Cardiovascular: Negative for chest pain, palpitations and leg swelling.   Gastrointestinal: Negative for abdominal distention, abdominal pain, blood in stool, constipation, diarrhea, nausea and vomiting.   Endocrine: Negative for polydipsia and polyuria.   Genitourinary: Negative for dysuria and hematuria.   Musculoskeletal: Negative for arthralgias and myalgias.   Skin: Negative for rash and wound.   Neurological: Negative for dizziness, syncope and headaches.   Psychiatric/Behavioral: Negative for agitation, behavioral problems and confusion.     Objective:     Vital Signs (Most Recent):  Temp: 98.3 °F (36.8 °C) (11/08/17 1927)  Pulse: 89 (11/08/17 1927)  Resp: 16 (11/08/17 1927)  BP: 134/81 (11/08/17 1927)  SpO2: 96 % (11/08/17 1927) Vital Signs (24h Range):  Temp:  [96.7 °F (35.9 °C)-98.3 °F (36.8 °C)] 98.3 °F (36.8 °C)  Pulse:  [] 89  Resp:  [16-18] 16  SpO2:  [96 %-99 %] 96 %  BP: (129-139)/(81-92) 134/81     Weight: 57.7 kg (127 lb 3.3 oz)  Body mass index is 19.92 kg/m².    Intake/Output Summary (Last 24 hours) at 11/08/17 2005  Last data filed at 11/08/17 1700   Gross per 24 hour   Intake              960 ml   Output              256 ml   Net              704 ml      Physical Exam   Constitutional: He is oriented to person, place, and time. He appears well-developed and well-nourished.   HENT:   Head: Normocephalic and atraumatic.   Eyes: EOM are normal. Pupils are equal, round, and reactive to light.    Cardiovascular: Normal rate, regular rhythm and normal heart sounds.    No murmur heard.  Pulmonary/Chest: Effort normal and breath sounds normal. No respiratory distress. He has no wheezes. He has no rales.   Abdominal: Bowel sounds are normal. He exhibits distension. There is tenderness.   Musculoskeletal: Normal range of motion.   Neurological: He is alert and oriented to person, place, and time.   Skin: Skin is warm and dry.   Psychiatric: He has a normal mood and affect. His behavior is normal. Thought content normal.       Significant Labs:   CBC:   Recent Labs  Lab 11/06/17  0410 11/07/17  0436 11/08/17 0347   WBC 5.61 4.04 3.67*   RBC 3.73* 3.53* 3.32*   HGB 10.2* 9.6* 9.0*   HCT 31.5* 29.0* 27.6*   PLT 77* 67* 45*   MCV 85 82 83   MCH 27.3 27.2 27.1   MCHC 32.4 33.1 32.6     BMP:   Recent Labs  Lab 11/07/17  0435 11/07/17  1408 11/08/17  0347   * 165* 145*   * 133* 134*   K 5.6* 5.5* 4.6    109 107   CO2 22* 14* 18*   BUN 61* 57* 55*   CREATININE 1.6* 1.5* 1.7*   CALCIUM 8.4* 8.3* 8.4*   MG 2.2  --  2.2     Coagulation:   Recent Labs  Lab 11/07/17  0435 11/08/17 0347   INR 0.9  --    APTT 23.2 24.0     Microbiology Results (last 7 days)     Procedure Component Value Units Date/Time    Blood culture (site 1) [[id number]] Collected:  11/05/17 1448    Order Status:  Completed Specimen:  Blood from Peripheral, Hand, Left Updated:  11/08/17 1612     Blood Culture, Routine No Growth to date     Blood Culture, Routine No Growth to date     Blood Culture, Routine No Growth to date     Blood Culture, Routine No Growth to date    Narrative:       Site # 1, aerobic and anaerobic    Blood culture (site 2) [[id number]] Collected:  11/05/17 1455    Order Status:  Completed Specimen:  Blood from Peripheral, Hand, Right Updated:  11/08/17 1612     Blood Culture, Routine No Growth to date     Blood Culture, Routine No Growth to date     Blood Culture, Routine No Growth to date     Blood Culture, Routine  No Growth to date    Narrative:       Site # 2, aerobic only

## 2017-11-09 NOTE — PROGRESS NOTES
[facility] Hospital Medicine  Progress Note    Patient Name: Soy Reza  MRN: [id number]  Patient Class: IP- Inpatient   Admission Date: 11/5/2017  Length of Stay: 3 days  Attending Physician: Mckayla Swanson MD  Primary Care Provider: Jean Carlos Swanson MD    [facility] Medicine Team: [facility] 4 Chris Anaya MD    Subjective:     Principal Problem:Spontaneous bacterial peritonitis    HPI:  Mr Reza is a 67 yo M w PMH significant for hepatitis C, EtOH cirrhosis and T2DM presents with two day history of worsening abdominal pain, swelling and nausea vomiting. History was taken largely from chart review and telephone conversation w pt's mother as he was acutely encephalopathic at time of interview. Pt recently underwent paracentesis (11/2) which revealed SBP. Hepatology attempted to telephone the pt for direct admission from clinic (11/3), however, (per mother) pt refused admission as symptoms were not severe at that time. Pt's mother states that he asked her to call an ambulance this morning d/t worsening confusion and abdominal pain. Pt's mother endorses poor compliance with medication on the part of her son.     Per ED staff, pt was alert and oriented to person and place and had been complaining of some abdominal pain localized to the epigastric region. He was then given 25 mg IV phenergan, 4mg injection of morphine and 1mg injection of hydromorphone upon arrival. Pt was nonverbal at time of interview.    Hospital Course:  11/6: Increased lactulose to 30g q6h. Started detemir 5U qhs. Working up MAURICE. Plan to discuss goals of care with mother today  11/7: Refused lactulose overnight. Mentation improved this AM, K 5.6.   11/8 Day 4 of Ceftriaxone for SBP. Lactulose titrated to TID after multiple bowl movements and improved cognition.    Interval History: Pt reports multiple bowel movements overnight. Pt is alert and oriented x3; cognition much improved. Lactulose down titrated to TID. Pt  will finish Ceftriaxone for SBP tomorrow. Plan to DC home with home health.    Review of Systems   Constitutional: Negative for chills and fever.   HENT: Negative for congestion, rhinorrhea and sore throat.    Eyes: Negative for photophobia and visual disturbance.   Respiratory: Negative for cough, choking, shortness of breath and wheezing.    Cardiovascular: Negative for chest pain, palpitations and leg swelling.   Gastrointestinal: Negative for abdominal distention, abdominal pain, blood in stool, constipation, diarrhea, nausea and vomiting.   Endocrine: Negative for polydipsia and polyuria.   Genitourinary: Negative for dysuria and hematuria.   Musculoskeletal: Negative for arthralgias and myalgias.   Skin: Negative for rash and wound.   Neurological: Negative for dizziness, syncope and headaches.   Psychiatric/Behavioral: Negative for agitation, behavioral problems and confusion.     Objective:     Vital Signs (Most Recent):  Temp: 98.3 °F (36.8 °C) (11/08/17 1927)  Pulse: 89 (11/08/17 1927)  Resp: 16 (11/08/17 1927)  BP: 134/81 (11/08/17 1927)  SpO2: 96 % (11/08/17 1927) Vital Signs (24h Range):  Temp:  [96.7 °F (35.9 °C)-98.3 °F (36.8 °C)] 98.3 °F (36.8 °C)  Pulse:  [] 89  Resp:  [16-18] 16  SpO2:  [96 %-99 %] 96 %  BP: (129-139)/(81-92) 134/81     Weight: 57.7 kg (127 lb 3.3 oz)  Body mass index is 19.92 kg/m².    Intake/Output Summary (Last 24 hours) at 11/08/17 2005  Last data filed at 11/08/17 1700   Gross per 24 hour   Intake              960 ml   Output              256 ml   Net              704 ml      Physical Exam   Constitutional: He is oriented to person, place, and time. He appears well-developed and well-nourished.   HENT:   Head: Normocephalic and atraumatic.   Eyes: EOM are normal. Pupils are equal, round, and reactive to light.   Cardiovascular: Normal rate, regular rhythm and normal heart sounds.    No murmur heard.  Pulmonary/Chest: Effort normal and breath sounds normal. No respiratory  distress. He has no wheezes. He has no rales.   Abdominal: Bowel sounds are normal. He exhibits distension. There is tenderness.   Musculoskeletal: Normal range of motion.   Neurological: He is alert and oriented to person, place, and time.   Skin: Skin is warm and dry.   Psychiatric: He has a normal mood and affect. His behavior is normal. Thought content normal.       Significant Labs:   CBC:   Recent Labs  Lab 11/06/17  0410 11/07/17 0436 11/08/17 0347   WBC 5.61 4.04 3.67*   RBC 3.73* 3.53* 3.32*   HGB 10.2* 9.6* 9.0*   HCT 31.5* 29.0* 27.6*   PLT 77* 67* 45*   MCV 85 82 83   MCH 27.3 27.2 27.1   MCHC 32.4 33.1 32.6     BMP:   Recent Labs  Lab 11/07/17 0435 11/07/17  1408 11/08/17 0347   * 165* 145*   * 133* 134*   K 5.6* 5.5* 4.6    109 107   CO2 22* 14* 18*   BUN 61* 57* 55*   CREATININE 1.6* 1.5* 1.7*   CALCIUM 8.4* 8.3* 8.4*   MG 2.2  --  2.2     Coagulation:   Recent Labs  Lab 11/07/17 0435 11/08/17 0347   INR 0.9  --    APTT 23.2 24.0     Microbiology Results (last 7 days)     Procedure Component Value Units Date/Time    Blood culture (site 1) [[id number]] Collected:  11/05/17 1448    Order Status:  Completed Specimen:  Blood from Peripheral, Hand, Left Updated:  11/08/17 1612     Blood Culture, Routine No Growth to date     Blood Culture, Routine No Growth to date     Blood Culture, Routine No Growth to date     Blood Culture, Routine No Growth to date    Narrative:       Site # 1, aerobic and anaerobic    Blood culture (site 2) [[id number]] Collected:  11/05/17 1455    Order Status:  Completed Specimen:  Blood from Peripheral, Hand, Right Updated:  11/08/17 1612     Blood Culture, Routine No Growth to date     Blood Culture, Routine No Growth to date     Blood Culture, Routine No Growth to date     Blood Culture, Routine No Growth to date    Narrative:       Site # 2, aerobic only            Assessment/Plan:      * Spontaneous bacterial peritonitis    - Most recent ascitic fluid  PMN at 610, diagnostic of SBP  - Day 5 of Ceftriaxone 11/8          Goals of care, counseling/discussion    -lengthy discussion with patient in regards to GOC, appears patient has poor insight into medical comorbidities and complications with HE due to medication noncompliance. Will continue to have GOC discussions with patient. May benefit from outpatient SW assistance.  -team discussed with mother as well and code status was not discussed with her          Hyperphosphatemia    - Phosphorous 4.5 11/8  - continue to monitor          Hyperkalemia    - Pt given two doses of albuterol at 9mg  - Will monitor with CMP  -encouraged lactulose administration, will avoid lasix given MAURICE        Hepatic encephalopathy    - Ammonia at 96  - Per pt's mother, he is non-adherent to regimen of lactulose and rifaximin regimen at home  - multiple BMs, AAOx3, cognition much improved  - lactulose 30g TID        MAURICE (acute kidney injury)    - MAURICE workup  - f/u urine sodium, urine Cr -> Ucr and Uurea pending  - f/u renal US -> largely unremarkable          Ascites due to alcoholic hepatitis    - Will monitor for worsening distension or accumulation of ascitic fluid  - Most recent paracentesis performed 11/2  - plan for therapeutic paracentesis tomorrow 11/9          Essential hypertension    - Pt takes carvedilol at home  - Given hyperkalemia with K of 6.1, will hold beta-blocker until resolution of hyperkalemia   - BP stable without meds        Alcoholism in remission              Anemia due to vitamin B12 deficiency    - Chronic anemia d/t Vit B12 deficiency  - Hgb 9.0 11/8  - Will monitor with daily CBC          Uncontrolled type 2 diabetes mellitus with diabetic polyneuropathy, without long-term current use of insulin    - Glucose 189 11/8  - Detemir 5U qhs  - SSI 0-5U prn          VTE Risk Mitigation         Ordered     heparin (porcine) injection 5,000 Units  Every 12 hours     Route:  Subcutaneous        11/05/17 1242     Medium  Risk of VTE  Once      11/05/17 1242     Place sequential compression device  Until discontinued      11/05/17 1242     Place LYNETTE hose  Until discontinued      11/05/17 1242     Medium Risk of VTE  Once      11/05/17 1242              Chris Anaya MD  Department of Hospital Medicine   [facility]

## 2017-11-09 NOTE — PLAN OF CARE
"Problem: Diabetes, Type 2 (Adult)  Intervention: Support/Optimize Psychosocial Response to Condition   11/09/17 1510   Coping/Psychosocial Interventions   Supportive Measures active listening utilized;self-care encouraged;self-reflection promoted   Environmental Support calm environment promoted     Patient aler and oriented x4, able to make needs known.  Patient denies pain.  Medicine Team 4 implemented a paracentesis at the bedside.  5500 ml of fluids removed. Patient abdomen is less distended, soft and patient reports, " I look skinny now."  Patient tolerated procedure well.   Patient made aware of pending discharge with home health services and transportation to be called once orders are received            "

## 2017-11-09 NOTE — DISCHARGE SUMMARY
[facility] Hospital Medicine  Discharge Summary      Patient Name: Soy Reza  MRN: [id number]  Admission Date: 11/5/2017  Hospital Length of Stay: 4 days  Discharge Date and Time:  11/09/2017 4:31 PM  Attending Physician: Mckayla Swanson MD   Discharging Provider: Chris Anaya MD  Primary Care Provider: Jean Carlos Swanson MD  Hospital Medicine Team: [facility] 4 Chris Anaya MD    HPI:   Mr Reza is a 67 yo M w PMH significant for hepatitis C, EtOH cirrhosis and T2DM presents with two day history of worsening abdominal pain, swelling and nausea vomiting. History was taken largely from chart review and telephone conversation w pt's mother as he was acutely encephalopathic at time of interview. Pt recently underwent paracentesis (11/2) which revealed SBP. Hepatology attempted to telephone the pt for direct admission from clinic (11/3), however, (per mother) pt refused admission as symptoms were not severe at that time. Pt's mother states that he asked her to call an ambulance this morning d/t worsening confusion and abdominal pain. Pt's mother endorses poor compliance with medication on the part of her son.     Per ED staff, pt was alert and oriented to person and place and had been complaining of some abdominal pain localized to the epigastric region. He was then given 25 mg IV phenergan, 4mg injection of morphine and 1mg injection of hydromorphone upon arrival. Pt was nonverbal at time of interview.    * No surgery found *      Hospital Course:   11/6: Increased lactulose to 30g q6h. Started detemir 5U qhs. Working up MAURICE. Plan to discuss goals of care with mother today  11/7: Refused lactulose overnight. Mentation improved this AM, K 5.6.   11/8 Day 4 of Ceftriaxone for SBP. Lactulose titrated to TID after multiple bowl movements and improved cognition.  11/9: Paracentesis at bedside: removed 5L. Replaced Albumin. Started SBP PPx for home.      Consults:     No new Assessment &  Plan notes have been filed under this hospital service since the last note was generated.  Service: Hospital Medicine    Final Active Diagnoses:    Diagnosis Date Noted POA    PRINCIPAL PROBLEM:  Spontaneous bacterial peritonitis [K65.2] 11/05/2017 Yes     Chronic    Goals of care, counseling/discussion [Z71.89] 11/07/2017 Not Applicable    Hyperphosphatemia [E83.39] 11/06/2017 No    Hepatic encephalopathy [K72.90] 11/05/2017 Yes    Hyperkalemia [E87.5] 11/05/2017 Yes    MAURICE (acute kidney injury) [N17.9] 08/06/2017 Yes    Ascites due to alcoholic hepatitis [K70.11] 08/03/2017 Yes    Uncontrolled type 2 diabetes mellitus with diabetic polyneuropathy, without long-term current use of insulin [E11.42, E11.65] 01/19/2017 Yes     Chronic    Anemia due to vitamin B12 deficiency [D51.9] 01/19/2017 Yes     Chronic    Essential hypertension [I10] 01/19/2017 Yes     Chronic      Problems Resolved During this Admission:    Diagnosis Date Noted Date Resolved POA       Discharged Condition: good    Disposition: Pt has been medically optimized to be discharged home with home health.    Follow Up:  Follow-up Information     [facility] [place].    Specialty:  Home Health Services  Why:  Home Health  Contact information:  [address]  [place] [address]  [phone]             Jean Carlos Swanson MD On 11/15/2017.    Specialty:  Family Medicine  Why:  Hospital Follow-up Wednesday 11/15 @ 1:40pm  Contact information:  [address] [facility]  [address]  [place] [address]  [phone]             [facility] [place].    Specialty:  Home Health Services  Why:  Home Health  Contact information:  [address]  [place] [address]  [phone]             Jean Carlos Swanson MD.    Specialty:  Family Medicine  Why:  Labs  Contact information:  [address] [facility]  [address]  [place] [address]  [phone]                 Patient Instructions:   No discharge procedures on file.    Significant  Diagnostic Studies:   CBC:   Recent Labs  Lab 11/07/17  0436 11/08/17 0347 11/09/17 0343   WBC 4.04 3.67* 4.38   RBC 3.53* 3.32* 3.35*   HGB 9.6* 9.0* 9.1*   HCT 29.0* 27.6* 27.4*   PLT 67* 45* 172   MCV 82 83 82   MCH 27.2 27.1 27.2   MCHC 33.1 32.6 33.2     BMP:   Recent Labs  Lab 11/07/17  1408 11/08/17 0347 11/09/17 0343   * 145* 162*   * 134* 133*   K 5.5* 4.6 4.9    107 108   CO2 14* 18* 19*   BUN 57* 55* 46*   CREATININE 1.5* 1.7* 1.4   CALCIUM 8.3* 8.4* 8.2*   MG  --  2.2 2.1     Coagulation:   Recent Labs  Lab 11/07/17  0435 11/08/17 0347   INR 0.9  --    APTT 23.2 24.0     Microbiology Results (last 7 days)     Procedure Component Value Units Date/Time    Blood culture (site 1) [[id number]] Collected:  11/05/17 1448    Order Status:  Completed Specimen:  Blood from Peripheral, Hand, Left Updated:  11/09/17 1612     Blood Culture, Routine No Growth to date     Blood Culture, Routine No Growth to date     Blood Culture, Routine No Growth to date     Blood Culture, Routine No Growth to date     Blood Culture, Routine No Growth to date    Narrative:       Site # 1, aerobic and anaerobic    Blood culture (site 2) [[id number]] Collected:  11/05/17 1455    Order Status:  Completed Specimen:  Blood from Peripheral, Hand, Right Updated:  11/09/17 1612     Blood Culture, Routine No Growth to date     Blood Culture, Routine No Growth to date     Blood Culture, Routine No Growth to date     Blood Culture, Routine No Growth to date     Blood Culture, Routine No Growth to date    Narrative:       Site # 2, aerobic only            Pending Diagnostic Studies:     None         Medications:  Reconciled Home Medications:   Current Discharge Medication List      START taking these medications    Details   sulfamethoxazole-trimethoprim 400-80mg (BACTRIM,SEPTRA) 400-80 mg per tablet Take 1 tablet by mouth 2 (two) times daily.  Qty: 60 tablet, Refills: 3         CONTINUE these medications which have NOT  CHANGED    Details   aspirin 81 MG Chew Take 81 mg by mouth every other day.       lactulose (CHRONULAC) 10 gram/15 mL solution Take 30 mLs (20 g total) by mouth 3 (three) times daily.  Qty: 236 mL, Refills: 0      metformin (GLUCOPHAGE-XR) 500 MG 24 hr tablet Take 500 mg by mouth every other day.          STOP taking these medications       carvedilol (COREG) 6.25 MG tablet Comments:   Reason for Stopping:         hydrocodone-acetaminophen 5-325mg (NORCO) 5-325 mg per tablet Comments:   Reason for Stopping:               Indwelling Lines/Drains at time of discharge:   Lines/Drains/Airways          No matching active lines, drains, or airways          Time spent on the discharge of patient: 30 minutes  Patient was seen and examined on the date of discharge and determined to be suitable for discharge.         Chris Anaya MD  Department of Hospital Medicine  [facility]

## 2017-11-09 NOTE — PT/OT/SLP PROGRESS
Occupational Therapy  Treatment    Soy Reza   MRN: [id number]   Admitting Diagnosis: Spontaneous bacterial peritonitis    OT Date of Treatment: 11/09/17   OT Start Time: 1145  OT Stop Time: 1200  OT Total Time (min): 15 min    Billable Minutes:  Self Care/Home Management 15    General Precautions: Standard, fall  Orthopedic Precautions: N/A  Braces: N/A    Do you have any cultural, spiritual, Pentec[employer]al conflicts, given your current situation?: none stated    Subjective:  Communicated with RN prior to session.  I think Im supposed to leave today  Pain/Comfort  Pain Rating 1: 0/10    Objective:  Patient found with: telemetry     Functional Mobility:  Bed Mobility:  Rolling/Turning to Left: Stand by assistance  Scooting/Bridging: Stand by Assistance  Supine to Sit: Contact Guard Assistance  Sit to Supine: Stand by Assistance    Transfers:   Sit <> Stand Assistance: Contact Guard Assistance  Sit <> Stand Assistive Device: Rolling Walker      Activities of Daily Living:     UE Dressing Level of Assistance: Stand by assistance  LE Dressing Level of Assistance: Maximum assistance (due to distended belly)         Balance:   Static Sit: NORMAL: No deviations seen in posture held statically  Dynamic Sit: NORMAL: No deviations seen in posture held dynamically  Static Stand: GOOD-: Takes MODERATE challenges from all directions inconsistently  Dynamic stand: GOOD-: Needs SUPERVISION only during gait and able to self right with moderate     Therapeutic Activities and Exercises:  Pt educated on safety, expectations for functional gains.  Pt with limited mobility due to fatigue and expectations for Dc.  Provided education for maximum gains for return to PLOF.  Bed mobility with SBA    AM-PAC 6 CLICK ADL   How much help from another person does this patient currently need?   1 = Unable, Total/Dependent Assistance  2 = A lot, Maximum/Moderate Assistance  3 = A little, Minimum/Contact Guard/Supervision  4 = None, Modified  "Banks/Independent    Putting on and taking off regular lower body clothing? : 2  Bathing (including washing, rinsing, drying)?: 2  Toileting, which includes using toilet, bedpan, or urinal? : 3  Putting on and taking off regular upper body clothing?: 3  Taking care of personal grooming such as brushing teeth?: 3  Eating meals?: 3  Total Score: 16     AM-PAC Raw Score CMS "G-Code Modifier Level of Impairment Assistance   6 % Total / Unable   7 - 8 CM 80 - 100% Maximal Assist   9-13 CL 60 - 80% Moderate Assist   14 - 19 CK 40 - 60% Moderate Assist   20 - 22 CJ 20 - 40% Minimal Assist   23 CI 1-20% SBA / CGA   24 CH 0% Independent/ Mod I       Patient left supine with all lines intact and call button in reach    ASSESSMENT:  Soy Reza is a 66 y.o. male with a medical diagnosis of Spontaneous bacterial peritonitis and presents with improving functional performance and participation in self care.  Pt preparing for DC and did not desire toileting or mobility training. Pt to benefit from continued gains and increased participation in therapy sessions.   .    Rehab identified problem list/impairments: Rehab identified problem list/impairments: weakness, impaired endurance, impaired self care skills    Rehab potential is good.    Activity tolerance: Good    Discharge recommendations: Discharge Facility/Level Of Care Needs: nursing facility, skilled     Barriers to discharge: Barriers to Discharge: Inaccessible home environment    Equipment recommendations: none     GOALS:    Occupational Therapy Goals        Problem: Occupational Therapy Goal    Goal Priority Disciplines Outcome Interventions   Occupational Therapy Goal     OT, PT/OT Ongoing (interventions implemented as appropriate)    Description:  Goals to be met by: 11/5/17    Patient will increase functional independence with ADLs by performing:    UE Dressing with [place].  LE Dressing with [place].  Toileting from toilet with Stand-by " Assistance for hygiene and clothing management.                       Plan:  Patient to be seen 3 x/week to address the above listed problems via self-care/home management, therapeutic activities, therapeutic exercises  Plan of Care expires: 12/06/17  Plan of Care reviewed with: patient         María Patel, OT  11/09/2017

## 2017-11-09 NOTE — NURSING
Patient completed Albumin 25 mg as ordered. Patient PIV removed and discussed with patient discharge orders, discharge medications, and follow up appts.   Patient verbalized an understanding.  Patient reported having a ride. Patient  Will need escort to bring a wheelchair .         Patient transported to the front lobby and ride was not here.  Patient's person to , [place] will not be here until 630 pm.  Patient was brought back up to the room and will be transported down once ride arrives.

## 2017-11-09 NOTE — PLAN OF CARE
AMANDA learned from CM, Pili Pineda, that Pt was getting a paracentesis today and would then be discharge ready after this procedure and will require a wheelchair van to get home. AMANDA placed call to [facility]'s transport ([phone]) and placed wheelchair van ride in will-call status. AMANDA notified Pt's nurse that she would need to call to schedule transport once Pt was discharge ready.     AMANDA also placed call to [facility] ([phone]) to inform them of Pt's discharge for today.     Bridget Hollingsworth, PRUDENCIOW

## 2017-11-09 NOTE — PROCEDURES
"Soy Reza is a 66 y.o. male patient.    Temp: 97.7 °F (36.5 °C) (17 1603)  Pulse: 97 (17 1603)  Resp: 17 (17 1603)  BP: 133/87 (17 1603)  SpO2: 98 % (17 1603)  Weight: 57.7 kg (127 lb 3.3 oz) (17)  Height: 5' 7" (170.2 cm) (17)       Paracentesis  Date/Time: 2017 4:47 PM  Location procedure was performed: [facility] MEDICINE  Performed by: NERISSA STOCK  Authorized by: NERISSA STOCK   Assisting provider: ALEXANDRE HEREDIA  Pre-operative diagnosis: ascites  Post-operative diagnosis: ascites  Consent Done: Yes  Consent: Verbal consent obtained. Written consent obtained.  Consent given by: patient  Patient understanding: patient states understanding of the procedure being performed  Patient consent: the patient's understanding of the procedure matches consent given  Procedure consent: procedure consent matches procedure scheduled  Relevant documents: relevant documents present and verified  Test results: test results available and properly labeled  Site marked: the operative site was marked  Imaging studies: imaging studies available  Patient identity confirmed: , MRN, name and verbally with patient  Time out: Immediately prior to procedure a "time out" was called to verify the correct patient, procedure, equipment, support staff and site/side marked as required.  Initial or subsequent exam: initial  Procedure purpose: therapeutic  Indications: abdominal discomfort secondary to ascites  Anesthesia: local infiltration    Anesthesia:  Local Anesthetic: lidocaine 1% with epinephrine  Patient sedated: no  Preparation: Patient was prepped and draped in the usual sterile fashion.  Puncture site: right lower quadrant  Fluid appearance: serosanguinous  Dressing: 4x4 sterile gauze  Complications: No  Estimated blood loss (mL): 5  Specimens: Yes  Implants: No          Nerissa Stock  2017    "

## 2017-11-09 NOTE — PLAN OF CARE
Problem: Patient Care Overview  Goal: Plan of Care Review  Outcome: Ongoing (interventions implemented as appropriate)  Reviewed plan of care with patient. Held lactulose dose for evening as pt already had 4 BM during day, none during night. Paracentesis planned for AM of 11/9/17, tray and gown placed at bedside. Patient reports pain but has no pain meds ordered, had also complained of bad acid reflux after dinner, alleviated with milk and calcium carbonate. IV antibiotics tolerated well. Patient had no questions or concerns.

## 2017-11-09 NOTE — TELEPHONE ENCOUNTER
----- Message from Lisandra Valerio sent at 11/9/2017 10:12 AM CST -----  Contact: [facility]  [phone]  Requesting a copy if patient clinic notes from yesterday fax to [phone].

## 2017-11-09 NOTE — PLAN OF CARE
Problem: Occupational Therapy Goal  Goal: Occupational Therapy Goal  Goals to be met by: 11/5/17    Patient will increase functional independence with ADLs by performing:    UE Dressing with [place].  LE Dressing with [place].  Toileting from toilet with Stand-by Assistance for hygiene and clothing management.      Outcome: Ongoing (interventions implemented as appropriate)  Goals addressed and unmet.  Cont with POC  María Patel OT  11/9/2017

## 2017-11-09 NOTE — PLAN OF CARE
[facility]    HOME HEALTH ORDERS  FACE TO FACE ENCOUNTER    Patient Name: Soy Reza  YOB: 1950    PCP: Jean Carlos Swanson MD   PCP Address: [address] [facility] [address] / [place] [address]  PCP Phone Number: [phone]  PCP Fax: [phone]    Encounter Date: 11/09/2017    Admit to Home Health    Diagnoses:  Active Hospital Problems    Diagnosis  POA    *Spontaneous bacterial peritonitis [K65.2]  Yes     Chronic    Goals of care, counseling/discussion [Z71.89]  Not Applicable    Hyperphosphatemia [E83.39]  No    Hepatic encephalopathy [K72.90]  Yes    Hyperkalemia [E87.5]  Yes    MAURICE (acute kidney injury) [N17.9]  Yes    Ascites due to alcoholic hepatitis [K70.11]  Yes    Uncontrolled type 2 diabetes mellitus with diabetic polyneuropathy, without long-term current use of insulin [E11.42, E11.65]  Yes     Chronic    Anemia due to vitamin B12 deficiency [D51.9]  Yes     Chronic    Essential hypertension [I10]  Yes     Chronic      Resolved Hospital Problems    Diagnosis Date Resolved POA   No resolved problems to display.       Future Appointments  Date Time Provider Department Center   11/15/2017 1:40 PM Jean Carlos Swanson MD [facility] [place]     Follow-up Information     [facility] [place].    Specialty:  Home Health Services  Why:  Home Health  Contact information:  [address]  [place] [address]  [phone]             Jean Carlos Swanson MD On 11/15/2017.    Specialty:  Family Medicine  Why:  Hospital Follow-up Wednesday 11/15 @ 1:40pm  Contact information:  [address] [facility]  [address]  [place] [address]  [phone]             [facility] [place].    Specialty:  Home Health Services  Why:  Home Health  Contact information:  [address]  [place] [address]  [phone]             Jean Carlos Swanson MD.    Specialty:  Family Medicine  Why:  Labs  Contact information:  [address]  [facility]  [place] [address]  [phone]                     I have seen and examined this patient face to face today. My clinical findings that support the need for the home health skilled services and home bound status are the following:  Patient with medication mismanagement issues requiring home bound status as evidenced by  Poor adherence to medication regimen/dosage.    Allergies:Review of patient's allergies indicates:  No Known Allergies    Diet: cardiac diet    Activities: activity as tolerated    Nursing:   SN to complete comprehensive assessment including routine vital signs. Instruct on disease process and s/s of complications to report to MD. Review/verify medication list sent home with the patient at time of discharge  and instruct patient/caregiver as needed. Frequency may be adjusted depending on start of care date.    Notify MD if SBP > 160 or < 90; DBP > 90 or < 50; HR > 120 or < 50; Temp > 101        Medications: Review discharge medications with patient and family and provide education.      Current Discharge Medication List      START taking these medications    Details   sulfamethoxazole-trimethoprim 400-80mg (BACTRIM,SEPTRA) 400-80 mg per tablet Take 1 tablet by mouth 2 (two) times daily.  Qty: 60 tablet, Refills: 3         CONTINUE these medications which have NOT CHANGED    Details   aspirin 81 MG Chew Take 81 mg by mouth every other day.       lactulose (CHRONULAC) 10 gram/15 mL solution Take 30 mLs (20 g total) by mouth 3 (three) times daily.  Qty: 236 mL, Refills: 0      metformin (GLUCOPHAGE-XR) 500 MG 24 hr tablet Take 500 mg by mouth every other day.          STOP taking these medications       carvedilol (COREG) 6.25 MG tablet Comments:   Reason for Stopping:         hydrocodone-acetaminophen 5-325mg (NORCO) 5-325 mg per tablet Comments:   Reason for Stopping:               I certify that this patient is confined to his home and needs intermittent skilled nursing care.

## 2017-11-10 NOTE — PATIENT INSTRUCTIONS
Uncertain Causes of Fall  You have had a fall today. But the cause of your fall is not certain. Falls can occur due to slipping, tripping or losing your balance. A fall can also occur from a fainting spell or seizure.  While a fall can happen for a simple reason (tripping over something), falls in elderly people are often caused by a combination of things:  · Age-related decline in function with worsening balance, stability, vision, and muscle strength  · Chronic illness such as heart arrhythmias, heart valve disease, vascular disease, COPD, diabetes, strokes, arthritis  · Effects or side effects of medicines  · Dehydration.  · Environmental hazards such as uneven or slippery ground, unfamiliar place, obstacles, uneven surfaces, or slippery ground  · Situational factors (related to the activity being done, e.g., rushing to the bathroom)  Because the cause of your fall today is not certain, it is possible that a fainting spell or seizure was the cause. This means that it could happen again, without warning. If you fall again, without a cause, then you should return to this facility promptly to have further tests. Otherwise, follow up with your doctor as explained below.  It is normal to feel sore and tight in your muscles and back the next day, and not just the muscles you initially injured. Remember, all the parts of your body are connected, so while initially one area hurts, the next day another may hurt. Also, when you injure yourself, it causes inflammation, which then causes the muscles to tighten up and hurt more. After the initial worsening, it should gradually improve over the next few days. However, more severe pain should be reported.  Even without a definite head injury, you can still get a concussion. Concussions and even bleeding can still occur, especially if you have had a recent injury or take blood thinner medicine. It is not unusual to have a mild headache and feel tired and even nauseous or  dizzy.  Home care  · Rest today and resume your normal activities as soon as you are feeling back to normal. It is best to remain with someone who can check on you for the next 24 hours to watch for another episode of falling.  · If you were injured during the fall, follow the advice from your doctor regarding care of your injury.  ·  If you become light-headed or dizzy, lie down immediately or sit and lean forward with your head down.  · As a precaution, do not drive a car or operate dangerous equipment, do not take a bath alone (use a shower instead) and do not swim alone until you see your doctor. A condition causing fainting or seizures must be ruled out before resuming these activities.  · You may use acetaminophen or ibuprofen to control pain, unless another pain medicine was prescribed. If you have chronic liver or kidney disease or ever had a stomach ulcer or gastrointestinal bleeding, talk with your doctor before using these medicines.  · Keep your appointments for any further testing that may have been scheduled for you.  Follow-up care  Follow up with your healthcare provider, or as advised.  If X-rays or CT scan were done, you will be notified if there is a change in the reading, especially if it affects treatment.  Call 911  Call 911 if any of these occur:  · Trouble breathing  · Confused or difficulty arousing  · Fainting or loss of consciousness  · Rapid or very slow heart rate  · Seizure  · Difficulty with speech or vision, weakness of an arm or leg  · Difficulty walking or talking, loss of balance, numbness or weakness in one side of your body, facial droop  When to seek medical advice  Call your healthcare provider right away if any of these occur:  · Another unexplained fall  · Dizziness  · Severe headache  · Nausea and vomiting  · Blood in vomit, stools (black or red color)  Date Last Reviewed: 11/5/2017  © 0880-1341 [employer]. [address] [place] [address], [place] [address]. All rights  reserved. This information is not intended as a substitute for professional medical care. Always follow your healthcare professional's instructions.

## 2017-11-10 NOTE — SUBJECTIVE & OBJECTIVE
Interval History: Performed therapeutic paracentesis at bedside: removed 5L of ascitic fluid. Pt given 25g albumin s/p paracentesis. Pt started on Bactrim for SBP PPx. Pt DC'd home with home health. Will follow up with PCP + labs in one week.    Review of Systems   Constitutional: Negative for chills and fever.   HENT: Negative for congestion, rhinorrhea and sore throat.    Eyes: Negative for photophobia and visual disturbance.   Respiratory: Negative for cough, choking, shortness of breath and wheezing.    Cardiovascular: Negative for chest pain, palpitations and leg swelling.   Gastrointestinal: Positive for abdominal distention and abdominal pain. Negative for blood in stool, constipation, diarrhea, nausea and vomiting.   Endocrine: Negative for polydipsia and polyuria.   Genitourinary: Negative for dysuria and hematuria.   Musculoskeletal: Negative for arthralgias and myalgias.   Skin: Negative for rash and wound.   Neurological: Negative for dizziness, syncope and headaches.   Psychiatric/Behavioral: Negative for agitation, behavioral problems and confusion.     Objective:     Vital Signs (Most Recent):  Temp: 97.7 °F (36.5 °C) (11/09/17 1603)  Pulse: 97 (11/09/17 1603)  Resp: 17 (11/09/17 1603)  BP: 133/87 (11/09/17 1603)  SpO2: 98 % (11/09/17 1603) Vital Signs (24h Range):  Temp:  [96.3 °F (35.7 °C)-98.1 °F (36.7 °C)] 97.7 °F (36.5 °C)  Pulse:  [81-97] 97  Resp:  [17-18] 17  SpO2:  [95 %-100 %] 98 %  BP: (104-133)/(65-88) 133/87     Weight: 57.7 kg (127 lb 3.3 oz)  Body mass index is 19.92 kg/m².    Intake/Output Summary (Last 24 hours) at 11/09/17 2003  Last data filed at 11/09/17 0118   Gross per 24 hour   Intake              800 ml   Output              250 ml   Net              550 ml      Physical Exam   Constitutional: He is oriented to person, place, and time. He appears well-developed and well-nourished.   HENT:   Head: Normocephalic and atraumatic.   Eyes: EOM are normal. Pupils are equal, round, and  reactive to light.   Cardiovascular: Normal rate, regular rhythm and normal heart sounds.    No murmur heard.  Pulmonary/Chest: Effort normal and breath sounds normal. No respiratory distress. He has no wheezes. He has no rales.   Abdominal: Bowel sounds are normal. He exhibits distension. There is tenderness.   Musculoskeletal: Normal range of motion.   Neurological: He is alert and oriented to person, place, and time.   Skin: Skin is warm and dry.   Psychiatric: He has a normal mood and affect. His behavior is normal. Thought content normal.       Significant Labs:   CBC:   Recent Labs  Lab 11/07/17  0436 11/08/17 0347 11/09/17 0343   WBC 4.04 3.67* 4.38   RBC 3.53* 3.32* 3.35*   HGB 9.6* 9.0* 9.1*   HCT 29.0* 27.6* 27.4*   PLT 67* 45* 172   MCV 82 83 82   MCH 27.2 27.1 27.2   MCHC 33.1 32.6 33.2     BMP:   Recent Labs  Lab 11/07/17  1408 11/08/17 0347 11/09/17 0343   * 145* 162*   * 134* 133*   K 5.5* 4.6 4.9    107 108   CO2 14* 18* 19*   BUN 57* 55* 46*   CREATININE 1.5* 1.7* 1.4   CALCIUM 8.3* 8.4* 8.2*   MG  --  2.2 2.1     Coagulation:   Recent Labs  Lab 11/07/17  0435 11/08/17 0347   INR 0.9  --    APTT 23.2 24.0     Microbiology Results (last 7 days)     Procedure Component Value Units Date/Time    Blood culture (site 1) [[id number]] Collected:  11/05/17 1448    Order Status:  Completed Specimen:  Blood from Peripheral, Hand, Left Updated:  11/09/17 1612     Blood Culture, Routine No Growth to date     Blood Culture, Routine No Growth to date     Blood Culture, Routine No Growth to date     Blood Culture, Routine No Growth to date     Blood Culture, Routine No Growth to date    Narrative:       Site # 1, aerobic and anaerobic    Blood culture (site 2) [[id number]] Collected:  11/05/17 1455    Order Status:  Completed Specimen:  Blood from Peripheral, Hand, Right Updated:  11/09/17 1612     Blood Culture, Routine No Growth to date     Blood Culture, Routine No Growth to date     Blood  Culture, Routine No Growth to date     Blood Culture, Routine No Growth to date     Blood Culture, Routine No Growth to date    Narrative:       Site # 2, aerobic only

## 2017-11-10 NOTE — PROGRESS NOTES
[facility] Hospital Medicine  Progress Note    Patient Name: Soy Reza  MRN: [id number]  Patient Class: IP- Inpatient   Admission Date: 11/5/2017  Length of Stay: 4 days  Attending Physician: No att. providers found  Primary Care Provider: Jean Carlos Swanson MD    [facility] Medicine Team: [facility] 4 Chris Anaya MD    Subjective:     Principal Problem:Spontaneous bacterial peritonitis    HPI:  Mr Reza is a 65 yo M w PMH significant for hepatitis C, EtOH cirrhosis and T2DM presents with two day history of worsening abdominal pain, swelling and nausea vomiting. History was taken largely from chart review and telephone conversation w pt's mother as he was acutely encephalopathic at time of interview. Pt recently underwent paracentesis (11/2) which revealed SBP. Hepatology attempted to telephone the pt for direct admission from clinic (11/3), however, (per mother) pt refused admission as symptoms were not severe at that time. Pt's mother states that he asked her to call an ambulance this morning d/t worsening confusion and abdominal pain. Pt's mother endorses poor compliance with medication on the part of her son.     Per ED staff, pt was alert and oriented to person and place and had been complaining of some abdominal pain localized to the epigastric region. He was then given 25 mg IV phenergan, 4mg injection of morphine and 1mg injection of hydromorphone upon arrival. Pt was nonverbal at time of interview.    Hospital Course:  11/6: Increased lactulose to 30g q6h. Started detemir 5U qhs. Working up MAURICE. Plan to discuss goals of care with mother today  11/7: Refused lactulose overnight. Mentation improved this AM, K 5.6.   11/8 Day 4 of Ceftriaxone for SBP. Lactulose titrated to TID after multiple bowl movements and improved cognition.  11/9: Paracentesis at bedside: removed 5L. Replaced Albumin. Started SBP PPx for home.     Interval History: Performed therapeutic paracentesis at  bedside: removed 5L of ascitic fluid. Pt given 25g albumin s/p paracentesis. Pt started on Bactrim for SBP PPx. Pt DC'd home with home health. Will follow up with PCP + labs in one week.    Review of Systems   Constitutional: Negative for chills and fever.   HENT: Negative for congestion, rhinorrhea and sore throat.    Eyes: Negative for photophobia and visual disturbance.   Respiratory: Negative for cough, choking, shortness of breath and wheezing.    Cardiovascular: Negative for chest pain, palpitations and leg swelling.   Gastrointestinal: Positive for abdominal distention and abdominal pain. Negative for blood in stool, constipation, diarrhea, nausea and vomiting.   Endocrine: Negative for polydipsia and polyuria.   Genitourinary: Negative for dysuria and hematuria.   Musculoskeletal: Negative for arthralgias and myalgias.   Skin: Negative for rash and wound.   Neurological: Negative for dizziness, syncope and headaches.   Psychiatric/Behavioral: Negative for agitation, behavioral problems and confusion.     Objective:     Vital Signs (Most Recent):  Temp: 97.7 °F (36.5 °C) (11/09/17 1603)  Pulse: 97 (11/09/17 1603)  Resp: 17 (11/09/17 1603)  BP: 133/87 (11/09/17 1603)  SpO2: 98 % (11/09/17 1603) Vital Signs (24h Range):  Temp:  [96.3 °F (35.7 °C)-98.1 °F (36.7 °C)] 97.7 °F (36.5 °C)  Pulse:  [81-97] 97  Resp:  [17-18] 17  SpO2:  [95 %-100 %] 98 %  BP: (104-133)/(65-88) 133/87     Weight: 57.7 kg (127 lb 3.3 oz)  Body mass index is 19.92 kg/m².    Intake/Output Summary (Last 24 hours) at 11/09/17 2003  Last data filed at 11/09/17 0118   Gross per 24 hour   Intake              800 ml   Output              250 ml   Net              550 ml      Physical Exam   Constitutional: He is oriented to person, place, and time. He appears well-developed and well-nourished.   HENT:   Head: Normocephalic and atraumatic.   Eyes: EOM are normal. Pupils are equal, round, and reactive to light.   Cardiovascular: Normal rate,  regular rhythm and normal heart sounds.    No murmur heard.  Pulmonary/Chest: Effort normal and breath sounds normal. No respiratory distress. He has no wheezes. He has no rales.   Abdominal: Bowel sounds are normal. He exhibits distension. There is tenderness.   Musculoskeletal: Normal range of motion.   Neurological: He is alert and oriented to person, place, and time.   Skin: Skin is warm and dry.   Psychiatric: He has a normal mood and affect. His behavior is normal. Thought content normal.       Significant Labs:   CBC:   Recent Labs  Lab 11/07/17  0436 11/08/17 0347 11/09/17 0343   WBC 4.04 3.67* 4.38   RBC 3.53* 3.32* 3.35*   HGB 9.6* 9.0* 9.1*   HCT 29.0* 27.6* 27.4*   PLT 67* 45* 172   MCV 82 83 82   MCH 27.2 27.1 27.2   MCHC 33.1 32.6 33.2     BMP:   Recent Labs  Lab 11/07/17  1408 11/08/17 0347 11/09/17 0343   * 145* 162*   * 134* 133*   K 5.5* 4.6 4.9    107 108   CO2 14* 18* 19*   BUN 57* 55* 46*   CREATININE 1.5* 1.7* 1.4   CALCIUM 8.3* 8.4* 8.2*   MG  --  2.2 2.1     Coagulation:   Recent Labs  Lab 11/07/17  0435 11/08/17 0347   INR 0.9  --    APTT 23.2 24.0     Microbiology Results (last 7 days)     Procedure Component Value Units Date/Time    Blood culture (site 1) [[id number]] Collected:  11/05/17 1448    Order Status:  Completed Specimen:  Blood from Peripheral, Hand, Left Updated:  11/09/17 1612     Blood Culture, Routine No Growth to date     Blood Culture, Routine No Growth to date     Blood Culture, Routine No Growth to date     Blood Culture, Routine No Growth to date     Blood Culture, Routine No Growth to date    Narrative:       Site # 1, aerobic and anaerobic    Blood culture (site 2) [[id number]] Collected:  11/05/17 1455    Order Status:  Completed Specimen:  Blood from Peripheral, Hand, Right Updated:  11/09/17 1612     Blood Culture, Routine No Growth to date     Blood Culture, Routine No Growth to date     Blood Culture, Routine No Growth to date     Blood  Culture, Routine No Growth to date     Blood Culture, Routine No Growth to date    Narrative:       Site # 2, aerobic only            Assessment/Plan:      * Spontaneous bacterial peritonitis    - Most recent ascitic fluid PMN at 610, diagnostic of SBP  - Day 5 of Ceftriaxone 11/8  - started Bactrim for SBP PPx          Goals of care, counseling/discussion    -lengthy discussion with patient in regards to GOC, appears patient has poor insight into medical comorbidities and complications with HE due to medication noncompliance. Will continue to have GOC discussions with patient. May benefit from outpatient SW assistance.  -team discussed with mother as well and code status was not discussed with her          Hyperphosphatemia    - Phosphorous 4.5 11/8  - continue to monitor          Hyperkalemia    - Pt given two doses of albuterol at 9mg  - Will monitor with CMP  -encouraged lactulose administration, will avoid lasix given MAURICE        Hepatic encephalopathy    - Ammonia at 96  - Per pt's mother, he is non-adherent to regimen of lactulose and rifaximin regimen at home  - multiple BMs, AAOx3, cognition much improved  - lactulose 30g TID        MAURICE (acute kidney injury)    - MAURICE workup  - f/u urine sodium, urine Cr -> Ucr and Uurea pending  - f/u renal US -> largely unremarkable          Ascites due to alcoholic hepatitis    - Will monitor for worsening distension or accumulation of ascitic fluid  - Most recent paracentesis performed 11/2  - therapeutic paracentesis performed at bedside 11/9: removed 5L of ascitic fluid  - 25g albumin given s/p paracentesis          Essential hypertension    - Pt takes carvedilol at home  - Given hyperkalemia with K of 6.1, will hold beta-blocker until resolution of hyperkalemia   - BP stable without meds                  Anemia due to vitamin B12 deficiency    - Chronic anemia d/t Vit B12 deficiency  - Hgb 9.0 11/8  - Will monitor with daily CBC          Uncontrolled type 2 diabetes  mellitus with diabetic polyneuropathy, without long-term current use of insulin    - Glucose 189 11/8  - Detemir 5U qhs  - SSI 0-5U prn          VTE Risk Mitigation         Ordered     heparin (porcine) injection 5,000 Units  Every 12 hours     Route:  Subcutaneous        11/05/17 1242     Medium Risk of VTE  Once      11/05/17 1242     Place sequential compression device  Until discontinued      11/05/17 1242     Place LYNETTE hose  Until discontinued      11/05/17 1242     Medium Risk of VTE  Once      11/05/17 1242              Chris Anaya MD  Department of Hospital Medicine   [facility]

## 2017-11-13 NOTE — NURSING
im 4 notified pt vomited 200 cc light yellowish gastric secretions abd firm and distended potassiuym 5.7 nio order made

## 2017-11-13 NOTE — ED PROVIDER NOTES
"Encounter Date: 11/13/2017    SCRIBE #1 NOTE: I, Ijeoma Nagy, am scribing for, and in the presence of,  Dr. Hayes. I have scribed the following portions of the note - the EKG reading.       History     Chief Complaint   Patient presents with    Abdominal Pain    Shortness of Breath     65 y/o male with history of DM, Hep C with Cirrhosis, HTN, presents to the ER with chief complaint of generalized abdominal pain with distention and shortness of breath worsening for 4 days.  His abdominal pain is rated 9/10. The patient was most recently admitted 11/4-11/9 for treatment of SBP, but says he has not taken his Bactrim since discharge due to difficulty picking up his prescription.  He reports chest pain, which is consistent with pain he was experiencing while in admitted to the hospital recently.  He is taking Lactulose as prescribed.  Patient reports shortness of breath worsening last night. He had  "minimal chest pain" in the substernal area yesterday associated with the shortness of breath, but denies having any chest pain today.  He has nausea, but denies LE swelling, fever, chills,  Vomiting, confusion, or additional complaints at this time.            Review of patient's allergies indicates:  No Known Allergies  Past Medical History:   Diagnosis Date    Diabetes mellitus     Hep C w/o coma, chronic     Hypertension     Macular degeneration     Neuropathy      Past Surgical History:   Procedure Laterality Date    EYE SURGERY      HAND SURGERY       No family history on file.  Social History   Substance Use Topics    Smoking status: Former Smoker    Smokeless tobacco: Never Used    Alcohol use No      Comment: 2 5th per week- stopped one month ago approx 9/10/16     Review of Systems   Constitutional: Negative for chills and fever.   HENT: Negative for sore throat.    Respiratory: Positive for shortness of breath.    Cardiovascular: Positive for chest pain.   Gastrointestinal: Positive for " abdominal pain and nausea. Negative for blood in stool and vomiting.   Genitourinary: Negative for difficulty urinating and dysuria.   Musculoskeletal: Negative for back pain.   Skin: Negative for rash.   Neurological: Negative for dizziness, syncope, weakness and light-headedness.   Hematological: Does not bruise/bleed easily.   Psychiatric/Behavioral: Negative for confusion. The patient is not nervous/anxious.        Physical Exam     Initial Vitals [11/13/17 0740]   BP Pulse Resp Temp SpO2   136/86 86 18 97.5 °F (36.4 °C) 100 %      MAP       102.67         Physical Exam    Nursing note and vitals reviewed.  Constitutional: He appears well-developed and well-nourished.   HENT:   Head: Atraumatic.   Mouth/Throat: Oropharynx is clear and moist.   Eyes: Conjunctivae and EOM are normal. Pupils are equal, round, and reactive to light.   Neck: Normal range of motion. Neck supple.   Cardiovascular: Normal rate, regular rhythm and intact distal pulses.   Pulmonary/Chest: Breath sounds normal. No respiratory distress. He has no wheezes. He has no rhonchi. He has no rales.   Abdominal: Soft. Bowel sounds are normal. There is no tenderness.   Neurological: He is alert and oriented to person, place, and time. He has normal strength. No cranial nerve deficit.   Skin: No rash noted.   Psychiatric: He has a normal mood and affect.         ED Course   Procedures  Labs Reviewed   CBC W/ AUTO DIFFERENTIAL - Abnormal; Notable for the following:        Result Value    RBC 4.27 (*)     Hemoglobin 11.6 (*)     Hematocrit 34.5 (*)     MCV 81 (*)     RDW 16.5 (*)     Platelets 52 (*)     Lymph # 0.8 (*)     Lymph% 13.1 (*)     All other components within normal limits   COMPREHENSIVE METABOLIC PANEL - Abnormal; Notable for the following:     Sodium 131 (*)     Potassium 5.7 (*)     CO2 19 (*)     Glucose 275 (*)     BUN, Bld 58 (*)     Creatinine 1.5 (*)     Albumin 2.7 (*)     Alkaline Phosphatase 208 (*)     AST 41 (*)     ALT 45 (*)      Anion Gap 6 (*)     eGFR if  55.7 (*)     eGFR if non  48.2 (*)     All other components within normal limits   LIPASE - Abnormal; Notable for the following:     Lipase 115 (*)     All other components within normal limits   AMMONIA - Abnormal; Notable for the following:     Ammonia 92 (*)     All other components within normal limits   PROTIME-INR   MAGNESIUM   PHOSPHORUS   AMMONIA   POCT GLUCOSE MONITORING CONTINUOUS     EKG Readings: (Independently Interpreted)   Normal sinus rhythm with PAC's at 87. Normal intervals, normal axis. No ischemic changes.           Medical Decision Making:   History:   Old Medical Records: I decided to obtain old medical records.  Independently Interpreted Test(s):   I have ordered and independently interpreted EKG Reading(s) - see prior notes  Clinical Tests:   Lab Tests: Ordered and Reviewed  Radiological Study: Ordered and Reviewed  Medical Tests: Ordered and Reviewed       APC / Resident Notes:   Patient with history of alcoholic cirrhosis with ascites presents to the ER with chief complaint of abdominal pain and distention, and shortness of breath worsening ×4 days.  Patient was discharged from the hospital 4 days ago after requiring admission for treatment of ascites.  The patient was encephalopathic at that time and improved with increased dosing of his lactulose and paracentesis on 11/9.  Discharge summary documents removal of 5 L of fluid during paracentesis.  The patient reports that he was told they ran out of bottles and that he would likely require a repeat paracentesis.  The patient was given Rocephin IV while in the hospital for treatment of SBP, but has not filled his home prescription for Bactrim.    Patient's labs show mild worsening of Creatinine to 1.5 (1.4 at discharge), hyperkalemia to 5.7 without EKG changes.  Patient will be given zofran and morphine for pain and nausea control.  We will admit to observation as he will likely  require repeat paracentesis due to incomplete treatment for SBP now with worsening abdominal pain and swelling.  He has mild generalized tenderness, moderate-severe distention consistent with ascites.  Abdomen is not rigid and there is no rebound tenderness or fever in the ED.    Patient is in agreement with plan for admission.  I discussed the care of this patient with my supervising MD.         Tien Attestation:   Scribe #1: I performed the above scribed service and the documentation accurately describes the services I performed. I attest to the accuracy of the note.    Attending Attestation:     Physician Attestation Statement for NP/PA:   I have conducted a face to face encounter with this patient in addition to the NP/PA, due to Medical Complexity    Other NP/PA Attestation Additions:      Medical Decision Makin-year-old male presenting with abdominal distention and pain for the past several days.  Last paracentesis was performed on .  Patient is hyperkalemic with no EKG changes.  His abdomen is distended.  He has been noncompliant with his antibiotic regimen.  Patient will be admitted for further treatment and evaluation       Physician Attestation for Scribe:      Comments: I, Dr. Simona Hayes, personally performed the services described in this documentation. All medical record entries made by the scribe were at my direction and in my presence.  I have reviewed the chart and agree that the record reflects my personal performance and is accurate and complete. Simona Hayes MD.  11:41 AM 2017            ED Course      Clinical Impression:   The primary encounter diagnosis was Alcoholic cirrhosis of liver with ascites. A diagnosis of Shortness of breath was also pertinent to this visit.    Disposition:   Disposition: Placed in Observation  Condition: AN Martínez  17 [id number]       Simona Hayes MD  17 [id number]

## 2017-11-13 NOTE — TELEPHONE ENCOUNTER
Spoke with luis carlos from [facility] and informed her that pt needs a face to face with Dr Swanson before hospice can be ordered, not sure why the hospital did not order hospice before he was discharged recently. see notes below  Pt is back in the hospital this morning for SOB. Dr Swanson informed  I will contact pt once he is home and see if he can come in for a visit to discuss hospice, HH, etc...

## 2017-11-13 NOTE — ASSESSMENT & PLAN NOTE
- Hx of alcohol abuse with cirrhosis  - Pt has repeat Hx of hepatic encephalopathy and SBP, largely due to his non- compliance with medication  - last DC'd on 11/9 with Bactrim for SBP PPx (however Pt did not fill Rx) & lactulose (Pt not reporting adequate BM's)  - Pt is AAOx3 today 11/13  - On examination, abdomen is soft, distended, tender to palpation  - plan to have adequate BM's with lactulose PO/NV  - possible therapeutic paracentesis inpatient  - f/u with Hepatology as outpatient

## 2017-11-13 NOTE — PHARMACY MED REC
"Admission Medication Reconciliation - Pharmacy Consult Note    The home medication history was taken by Miranda Nu[facility]ez Pharmacy Tech.  Based on information gathered and subsequent review by the clinical pharmacist, the items below may need attention.    You may go to "Admission" then "Reconcile Home Medications" tabs to review and/or act upon these items.        No issues noted with the medication reconciliation.        Please address this information as you see fit.  Feel free to contact us if you have any questions or require assistance.    Berny WhiteD, [facility]  Internal Medicine Clinical Pharmacy Specialist  Spectra link: [phone]      Patient's prior to admission medication regimen was as follows:  Medication Sig    lactulose (CHRONULAC) 10 gram/15 mL solution Take 30 mLs (20 g total) by mouth 3 (three) times daily.    metformin (GLUCOPHAGE-XR) 500 MG 24 hr tablet Take 500 mg by mouth every other day.     aspirin 81 MG Chew Take 81 mg by mouth every other day.     sulfamethoxazole-trimethoprim 400-80mg (BACTRIM,SEPTRA) 400-80 mg per tablet Take 1 tablet by mouth 2 (two) times daily.         Please add appropriate    SmartPhrase below:        "

## 2017-11-13 NOTE — SUBJECTIVE & OBJECTIVE
Past Medical History:   Diagnosis Date    Diabetes mellitus     Hep C w/o coma, chronic     Hypertension     Macular degeneration     Neuropathy        Past Surgical History:   Procedure Laterality Date    EYE SURGERY      HAND SURGERY         Review of patient's allergies indicates:  No Known Allergies    No current facility-administered medications on file prior to encounter.      Current Outpatient Prescriptions on File Prior to Encounter   Medication Sig    lactulose (CHRONULAC) 10 gram/15 mL solution Take 30 mLs (20 g total) by mouth 3 (three) times daily.    metformin (GLUCOPHAGE-XR) 500 MG 24 hr tablet Take 500 mg by mouth every other day.     aspirin 81 MG Chew Take 81 mg by mouth every other day.     sulfamethoxazole-trimethoprim 400-80mg (BACTRIM,SEPTRA) 400-80 mg per tablet Take 1 tablet by mouth 2 (two) times daily.     Family History     None        Social History Main Topics    Smoking status: Former Smoker    Smokeless tobacco: Never Used    Alcohol use No      Comment: 2 5th per week- stopped one month ago approx 9/10/16    Drug use: No    Sexual activity: Not on file     Review of Systems   Constitutional: Negative for chills and fever.   HENT: Negative for congestion and sore throat.    Eyes: Negative for photophobia.   Respiratory: Positive for shortness of breath. Negative for choking, wheezing and stridor.    Cardiovascular: Positive for chest pain. Negative for palpitations and leg swelling.   Gastrointestinal: Positive for abdominal distention and abdominal pain. Negative for blood in stool, constipation, diarrhea, nausea and vomiting.   Endocrine: Negative for polydipsia and polyuria.   Genitourinary: Negative for dysuria and hematuria.   Musculoskeletal: Negative for arthralgias and myalgias.   Skin: Negative for rash and wound.   Neurological: Negative for dizziness and headaches.   Psychiatric/Behavioral: Negative for agitation, behavioral problems and confusion.      Objective:     Vital Signs (Most Recent):  Temp: 97.5 °F (36.4 °C) (11/13/17 0740)  Pulse: 85 (11/13/17 1002)  Resp: 17 (11/13/17 1002)  BP: 126/81 (11/13/17 1002)  SpO2: 100 % (11/13/17 1002) Vital Signs (24h Range):  Temp:  [97.5 °F (36.4 °C)] 97.5 °F (36.4 °C)  Pulse:  [85-88] 85  Resp:  [15-18] 17  SpO2:  [99 %-100 %] 100 %  BP: (124-136)/(76-89) 126/81     Weight: 79.4 kg (175 lb)  Body mass index is 25.11 kg/m².    Physical Exam   Constitutional: He is oriented to person, place, and time. He appears well-developed and well-nourished.   HENT:   Head: Normocephalic and atraumatic.   Eyes: EOM are normal. Pupils are equal, round, and reactive to light.   Cardiovascular: Normal rate, regular rhythm and normal heart sounds.    No murmur heard.  Pulmonary/Chest: Effort normal and breath sounds normal. No respiratory distress. He has no wheezes. He has no rales.   Abdominal: Soft. Bowel sounds are normal. He exhibits distension. There is tenderness.   Musculoskeletal: Normal range of motion.   Neurological: He is alert and oriented to person, place, and time.   Skin: Skin is warm and dry.   Psychiatric: He has a normal mood and affect. His behavior is normal. Thought content normal.        Significant Labs:   CBC:   Recent Labs  Lab 11/08/17 0347 11/09/17  0343 11/13/17  0805   WBC 3.67* 4.38 5.71   RBC 3.32* 3.35* 4.27*   HGB 9.0* 9.1* 11.6*   HCT 27.6* 27.4* 34.5*   PLT 45* 172 52*   MCV 83 82 81*   MCH 27.1 27.2 27.2   MCHC 32.6 33.2 33.6     BMP:   Recent Labs  Lab 11/08/17 0347 11/09/17  0343 11/13/17  0805   * 162* 275*   * 133* 131*   K 4.6 4.9 5.7*    108 106   CO2 18* 19* 19*   BUN 55* 46* 58*   CREATININE 1.7* 1.4 1.5*   CALCIUM 8.4* 8.2* 8.7   MG 2.2 2.1 2.4     Coagulation:   Recent Labs  Lab 11/08/17  0347 11/13/17  0805   INR  --  0.9   APTT 24.0  --      Microbiology Results (last 7 days)     ** No results found for the last 168 hours. **

## 2017-11-13 NOTE — HPI
"Soy Reza is a 66 y.o. male with history of DM2, HCV, EtOH cirrhosis, HTN who presents to [facility] ED with chief complaint of generalized abdominal pain with distention and shortness of breath worsening for 4 days.  His abdominal pain is rated 9/10. The patient was most recently admitted 11/4-11/9 for treatment of SBP with Ceftriaxone; DC'd on Bactrim for PPx. However, states that he has not taken his Bactrim since discharge due to difficulty picking up his prescription.  He reports chest pain, which is consistent with pain he was experiencing while in admitted to the hospital recently.  He is taking Lactulose as prescribed.  Patient reports shortness of breath worsening last night. He had  "minimal chest pain" in the substernal area yesterday associated with the shortness of breath, but denies having any chest pain today.  He has nausea, but denies LE swelling, fever, chills,  vomiting, confusion, or additional complaints at this time.      On 11/4, he was admitted after presenting with worsening abdominal pain, swelling and nausea vomiting. History was taken largely from chart review and telephone conversation with his mother, as he was acutely encephalopathic at time of interview. Pt recently underwent paracentesis (11/2) which revealed SBP. Pt's mother endorses poor compliance with medication on the part of her son. During his admission, he was treated for SBP with Ceftriaxone, received a therapeutic paracentesis (~5L removed), and dc'd home with Bactrim for SBP PPx.    Na 131, K 5.7 in ED 11/13       "

## 2017-11-13 NOTE — ED NOTES
Patient identifiers have been checked and are correct.    LOC: The patient is awake, alert, and aware of environment. The patient is oriented x 3 and speaking appropriately.   APPEARANCE: No acute distress noted.   PSYCHOSOCIAL: Patient is calm and cooperative. Patients insight and judgement are appropriate to situation. No evidence of delusions, hallucinations, or psychosis.  SKIN: The skin is warm, dry, and intact. Minimal purple bruising noted to right forearm. Pt.states that bruising is from prior IV.  RESPIRATORY: Airway is open and patent. Bilateral chest rise and fall. Respirations are spontaneous, even and unlabored. Normal effort and rate noted. No accessory muscle use noted.   CARDIAC: Patient has a normal rate. No peripheral edema noted. Capillary refill <3 seconds.   ABDOMEN: Firm and Distended. Bowel sounds present in all 4 quadrants.   URINARY: Patient reports routine urination without pain, frequency, or urgency. Voids independently. Reports urine appears eloise/yellow in color.  EXTREMITIES: No redness, heat, swelling, deformity, or pain.  PULSES: 2+ and equal in all extremities.   NEUROLOGIC: Eyes open spontaneously. Speech clear. Tolerating saliva secretions well. Able to follow commands, demonstrating ability to actively and appropriately communicate within context of current conversation. Symmetrical facial muscles. Moving all extremities well with no noted weakness. Adequate muscle tone present. Movement is purposeful. No evidence of impaired sensation. Responds to external stimuli with appropriate reflexes.   MUSCULOSKELETAL: No obvious deformities noted.

## 2017-11-13 NOTE — ED TRIAGE NOTES
Pt reports his abdomen is swollen and having abdominal pain all over. Pt reports sob started Friday due to the fluid. Pt reports he was last here on novemeber 5 for bacterial perotonitis. Pt reports generalized weakness over the past week.

## 2017-11-13 NOTE — TELEPHONE ENCOUNTER
----- Message -----  From: Jeane Hines  Sent: 11/10/2017  11:16 AM  To: [facility] Staff    Home health called in regards to getting the hospice order for the pt . Fax [phone]    [place] [phone]           Please advise

## 2017-11-13 NOTE — TELEPHONE ENCOUNTER
----- Message from Jean Carlos Swanson MD sent at 11/10/2017 12:41 PM CST -----  Contact: [facility]/hanny/[phone] ext [phone]  I cannot just order hospice. It needs to be a face-to-face visit to discuss with pt and to determine if pt is competent to make his own medical decisions in MY opinion. Why was hospice not discussed and ordered in hospital? Pt was just discharged

## 2017-11-13 NOTE — H&P
"[facility] Hospital Medicine  History & Physical    Patient Name: Soy Reza  MRN: [id number]  Admission Date: 11/13/2017  Attending Physician: Simona Hayes MD   Primary Care Provider: Jean Carlos Swanson MD    Hospital Medicine Team: [facility] HOSP MED 4 Chris Anaya MD     Patient information was obtained from patient, past medical records and ER records.     Subjective:     Principal Problem:Alcoholic cirrhosis of liver with ascites    Chief Complaint:   Chief Complaint   Patient presents with    Abdominal Pain    Shortness of Breath        HPI: Soy Reza is a 66 y.o. male with history of DM2, HCV, EtOH cirrhosis, HTN who presents to [facility] ED with chief complaint of generalized abdominal pain with distention and shortness of breath worsening for 4 days.  His abdominal pain is rated 9/10. The patient was most recently admitted 11/4-11/9 for treatment of SBP with Ceftriaxone; DC'd on Bactrim for PPx. However, states that he has not taken his Bactrim since discharge due to difficulty picking up his prescription.  He reports chest pain, which is consistent with pain he was experiencing while in admitted to the hospital recently.  He is taking Lactulose as prescribed.  Patient reports shortness of breath worsening last night. He had  "minimal chest pain" in the substernal area yesterday associated with the shortness of breath, but denies having any chest pain today.  He has nausea, but denies LE swelling, fever, chills,   vomiting, confusion, or additional complaints at this time.      On 11/4, he was admitted after presenting with worsening abdominal pain, swelling and nausea vomiting. History was taken largely from chart review and telephone conversation with his mother, as he was acutely encephalopathic at time of interview. Pt recently underwent paracentesis (11/2) which revealed SBP. Pt's mother endorses poor compliance with medication on the part of her son. During his admission, he " was treated for SBP with Ceftriaxone, received a therapeutic paracentesis (~5L removed), and dc'd home with Bactrim for SBP PPx.    Na 131, K 5.7 in ED 11/13         Past Medical History:   Diagnosis Date    Diabetes mellitus     Hep C w/o coma, chronic     Hypertension     Macular degeneration     Neuropathy        Past Surgical History:   Procedure Laterality Date    EYE SURGERY      HAND SURGERY         Review of patient's allergies indicates:  No Known Allergies    No current facility-administered medications on file prior to encounter.      Current Outpatient Prescriptions on File Prior to Encounter   Medication Sig    lactulose (CHRONULAC) 10 gram/15 mL solution Take 30 mLs (20 g total) by mouth 3 (three) times daily.    metformin (GLUCOPHAGE-XR) 500 MG 24 hr tablet Take 500 mg by mouth every other day.     aspirin 81 MG Chew Take 81 mg by mouth every other day.     sulfamethoxazole-trimethoprim 400-80mg (BACTRIM,SEPTRA) 400-80 mg per tablet Take 1 tablet by mouth 2 (two) times daily.     Family History     None        Social History Main Topics    Smoking status: Former Smoker    Smokeless tobacco: Never Used    Alcohol use No      Comment: 2 5th per week- stopped one month ago approx 9/10/16    Drug use: No    Sexual activity: Not on file     Review of Systems   Constitutional: Negative for chills and fever.   HENT: Negative for congestion and sore throat.    Eyes: Negative for photophobia.   Respiratory: Positive for shortness of breath. Negative for choking, wheezing and stridor.    Cardiovascular: Positive for chest pain. Negative for palpitations and leg swelling.   Gastrointestinal: Positive for abdominal distention and abdominal pain. Negative for blood in stool, constipation, diarrhea, nausea and vomiting.   Endocrine: Negative for polydipsia and polyuria.   Genitourinary: Negative for dysuria and hematuria.   Musculoskeletal: Negative for arthralgias and myalgias.   Skin: Negative for  rash and wound.   Neurological: Negative for dizziness and headaches.   Psychiatric/Behavioral: Negative for agitation, behavioral problems and confusion.     Objective:     Vital Signs (Most Recent):  Temp: 97.5 °F (36.4 °C) (11/13/17 0740)  Pulse: 85 (11/13/17 1002)  Resp: 17 (11/13/17 1002)  BP: 126/81 (11/13/17 1002)  SpO2: 100 % (11/13/17 1002) Vital Signs (24h Range):  Temp:  [97.5 °F (36.4 °C)] 97.5 °F (36.4 °C)  Pulse:  [85-88] 85  Resp:  [15-18] 17  SpO2:  [99 %-100 %] 100 %  BP: (124-136)/(76-89) 126/81     Weight: 79.4 kg (175 lb)  Body mass index is 25.11 kg/m².    Physical Exam   Constitutional: He is oriented to person, place, and time. He appears well-developed and well-nourished.   HENT:   Head: Normocephalic and atraumatic.   Eyes: EOM are normal. Pupils are equal, round, and reactive to light.   Cardiovascular: Normal rate, regular rhythm and normal heart sounds.    No murmur heard.  Pulmonary/Chest: Effort normal and breath sounds normal. No respiratory distress. He has no wheezes. He has no rales.   Abdominal: Soft. Bowel sounds are normal. He exhibits distension. There is tenderness.   Musculoskeletal: Normal range of motion.   Neurological: He is alert and oriented to person, place, and time.   Skin: Skin is warm and dry.   Psychiatric: He has a normal mood and affect. His behavior is normal. Thought content normal.        Significant Labs:   CBC:   Recent Labs  Lab 11/08/17 0347 11/09/17  0343 11/13/17  0805   WBC 3.67* 4.38 5.71   RBC 3.32* 3.35* 4.27*   HGB 9.0* 9.1* 11.6*   HCT 27.6* 27.4* 34.5*   PLT 45* 172 52*   MCV 83 82 81*   MCH 27.1 27.2 27.2   MCHC 32.6 33.2 33.6     BMP:   Recent Labs  Lab 11/08/17  0347 11/09/17 0343 11/13/17  0805   * 162* 275*   * 133* 131*   K 4.6 4.9 5.7*    108 106   CO2 18* 19* 19*   BUN 55* 46* 58*   CREATININE 1.7* 1.4 1.5*   CALCIUM 8.4* 8.2* 8.7   MG 2.2 2.1 2.4     Coagulation:   Recent Labs  Lab 11/08/17 0347 11/13/17  0805   INR   --  0.9   APTT 24.0  --      Microbiology Results (last 7 days)     ** No results found for the last 168 hours. **            Assessment/Plan:     * Alcoholic cirrhosis of liver with ascites    - Hx of alcohol abuse with cirrhosis  - Pt has repeat Hx of hepatic encephalopathy and SBP, largely due to his non- compliance with medication  - last DC'd on 11/9 with Bactrim for SBP PPx (however Pt did not fill Rx) & lactulose (Pt not reporting adequate BM's)  - Pt is AAOx3 today 11/13  - On examination, abdomen is soft, distended, tender to palpation  - plan to have adequate BM's with lactulose PO/DC  - possible therapeutic paracentesis inpatient  - f/u with Hepatology as outpatient            Hyperkalemia    - K 5.7 on admission 11/13  - lactulose PO/DC        Type 2 diabetes mellitus with stage 2 chronic kidney disease and hypertension    - Glucose 275 11/13  - detemir 5u qhs  - aspart 0-5u wm                      VTE Risk Mitigation         Ordered     Medium Risk of VTE  Once      11/13/17 1052     Place sequential compression device  Until discontinued      11/13/17 1052             Chris Anaya MD  Department of Hospital Medicine   [facility]

## 2017-11-13 NOTE — NURSING
Dr Anaya notified K+6.6  Stated dhruv hubbard lrepeat another potassium level will continue to monitor

## 2017-11-14 PROBLEM — Z51.5 PALLIATIVE CARE ENCOUNTER: Status: ACTIVE | Noted: 2017-01-01

## 2017-11-14 NOTE — SUBJECTIVE & OBJECTIVE
Interval History: There were no acute events overnight. The patient complains of abdominal pain.     Past Medical History:   Diagnosis Date    Diabetes mellitus     Hep C w/o coma, chronic     Hypertension     Macular degeneration     Neuropathy        Past Surgical History:   Procedure Laterality Date    EYE SURGERY      HAND SURGERY         Review of patient's allergies indicates:  No Known Allergies    Medications:  Continuous Infusions:   Scheduled Meds:   ciprofloxacin HCl  500 mg Oral Daily    furosemide  40 mg Intravenous BID    insulin aspart  5 Units Subcutaneous TIDWM    insulin detemir  15 Units Subcutaneous QHS    lactulose  30 g Oral TID    lidocaine-EPINEPHrine (PF) 1%-1:200,000  1 mL Other Once     PRN Meds:[COMPLETED] calcium gluconate IVPB **AND** calcium gluconate IVPB, dextrose 50%, [COMPLETED] dextrose 50% **AND** dextrose 50% **AND** [COMPLETED] insulin regular, glucagon (human recombinant), glucose, glucose, insulin aspart, ondansetron, sodium chloride 0.9%    Family History     None        Social History Main Topics    Smoking status: Former Smoker    Smokeless tobacco: Never Used    Alcohol use No      Comment: 2 5th per week- stopped one month ago approx 9/10/16    Drug use: No    Sexual activity: No       Review of Systems   Constitutional: Positive for activity change and fatigue.   HENT: Negative for congestion and nosebleeds.    Eyes: Negative for pain and redness.   Cardiovascular: Negative for chest pain and palpitations.   Gastrointestinal: Positive for abdominal distention and abdominal pain.   Endocrine: Negative.    Skin: Positive for color change. Negative for rash.   Neurological: Negative for seizures and speech difficulty.   Psychiatric/Behavioral: Negative for agitation. The patient is not nervous/anxious.      Objective:     Vital Signs (Most Recent):  Temp: 97.3 °F (36.3 °C) (11/14/17 0755)  Pulse: 97 (11/14/17 1440)  Resp: 16 (11/14/17 0755)  BP: (!)  123/59 (11/14/17 0755)  SpO2: 95 % (11/14/17 0755) Vital Signs (24h Range):  Temp:  [97.3 °F (36.3 °C)-97.9 °F (36.6 °C)] 97.3 °F (36.3 °C)  Pulse:  [84-99] 97  Resp:  [16-20] 16  SpO2:  [95 %-99 %] 95 %  BP: (110-138)/(59-82) 123/59     Weight: 79.4 kg (175 lb 0.7 oz)  Body mass index is 25.12 kg/m².    Review of Symptoms  Symptom Assessment (ESAS 0-10 scale)   ESAS 0 1 2 3 4 5 6 7 8 9 10   Pain      x        Dyspnea x             Anxiety x             Nausea x             Depression  x             Anorexia x             Fatigue x             Insomnia x             Restlessness  x             Agitation x             CAM / Delirium -  Constipation     -   Diarrhea           -  Bowel Management Plan (BMP): Yes    Comments: patient on lactulose    Pain Assessment: Location: generalized abdominal pain  Quality: aching  Quantity: 5/10 in intensity  Chronicity: Onset 4 days ago, gradually worsening since few days ago  Aggravating factors: eating  Alleviating factors: paracentesis  Associated symptoms: none    OME in 24 hours: 0    Performance Status: 50      Physical Exam   Constitutional: He is oriented to person, place, and time. He appears well-developed and well-nourished.   HENT:   Head: Normocephalic and atraumatic.   Cardiovascular: Regular rhythm.  Tachycardia present.    Pulmonary/Chest: Effort normal. No respiratory distress.   Abdominal: He exhibits distension. There is tenderness.   Firm. Generalized TTP   Neurological: He is alert and oriented to person, place, and time.   Jaundiced   Skin: Skin is warm and dry.   Psychiatric: He has a normal mood and affect. Cognition and memory are normal.   Nursing note and vitals reviewed.      Significant Labs: All pertinent labs within the past 24 hours have been reviewed.  CBC:     Recent Labs  Lab 11/13/17  0805   WBC 5.71   HGB 11.6*   HCT 34.5*   MCV 81*   PLT 52*     BMP:    Recent Labs  Lab 11/14/17  0524   *   *   K 5.6*      CO2 18*   BUN 63*    CREATININE 1.9*   CALCIUM 8.6*   MG 2.3     LFT:  Lab Results   Component Value Date    AST 39 11/14/2017    ALKPHOS 186 (H) 11/14/2017    BILITOT 0.8 11/14/2017     Albumin:   Albumin   Date Value Ref Range Status   11/14/2017 2.3 (L) 3.5 - 5.2 g/dL Final     Protein:   Total Protein   Date Value Ref Range Status   11/14/2017 6.0 6.0 - 8.4 g/dL Final     Lactic acid:   Lab Results   Component Value Date    LACTATE 1.8 11/05/2017       Significant Imaging: I have reviewed all pertinent imaging results/findings within the past 24 hours.    Advanced Directives::  Living Will: No  LaPOST: No  Do Not Resuscitate Status: full code  Medical Power of : next of kin for medical decision making is mother    Decision-Making Capacity: Patient answered questions    Living Arrangements: lives with 92 y.o. mother    Psychosocial/Cultural:  Mr. Reza lives with his 92 y.o. Mother in [place], [place]. He has never been  and has no children. He has a sister and brother living locally. He worked at [employer] for several years.    Spiritual:     F- Martha and Belief: Oriental orthodox    I - Importance: undetermined  .  C - Community: no mention of community involvement    A - Address in Care: will assess if spiritual care can be of assistance

## 2017-11-14 NOTE — PROGRESS NOTES
Recieved the potassium, shift meds tolerate without any complaint. Blood sugar prior to administering insulin/dextrose was in the 200's

## 2017-11-14 NOTE — PLAN OF CARE
"CM to bedside - pt provided assessment info; CM familiar w/ pt - readmit. Pt w/ DME in place, lives w/ mother. Pt is active w/ [facility] h/h and likely will resume. Pt's h/h nurse has been discussing home hospice w/ pt d/t pt's dx, noncompliant behavior r/t meds and lack of support. The h/h agency has a home hospice that the pt could transition to easily. CM has relayed info to IM4 team & SW. Pt will likely need a ride home at d/c.    Update: spoke w/ pt approx 1:30pm - CM discussed w/ pt SNF/NH transfer and benefits of care such as the facility will provide his meds timely & consistently, meals regularly and help as needed, pt stated that although something like that sounded nice - he wants to go home; relayed info that h/h nurse had called & suggested home hospice care, pt stated that he wants to go home but will think about hospice.    CM provided patient anticipated DIANA which will be update by nursing staff. Patient provided a Blue "My Health Packet" for d/c planning and health tool. Patient verbalized understanding.     11/14/17 1141   Discharge Assessment   Assessment Type Discharge Planning Assessment   Confirmed/corrected address and phone number on facesheet? Yes   Assessment information obtained from? Patient;Medical Record   Expected Length of Stay (days) 3   Communicated expected length of stay with patient/caregiver yes   Prior to hospitilization cognitive status: Alert/Oriented   Prior to hospitalization functional status: Assistive Equipment;Needs Assistance   Current cognitive status: Alert/Oriented  (sleepy)   Current Functional Status: Assistive Equipment;Needs Assistance   Facility Arrived From: N/A   Lives With parent(s)   Able to Return to Prior Arrangements unable to determine at this time (comments)   Is patient able to care for self after discharge? Unable to determine at this time (comments)   Who are your caregiver(s) and their phone number(s)?  - Sharon [phone] "   Patient's perception of discharge disposition home health   Readmission Within The Last 30 Days lack of support   If yes, most recent facility name: [facility]   Patient currently being followed by outpatient case management? No   Patient currently receives any other outside agency services? No   Equipment Currently Used at Home cane, straight;walker, rolling;walker, standard;rollator;raised toilet;glucometer   Do you have any problems affording any of your prescribed medications? No   Is the patient taking medications as prescribed? no   If no, which medications is patient not taking? pt didn't p/u Rx for po abx at last d/c; lactulose   Does the patient have transportation home? Yes   Transportation Available public transportation  [facility] [employer] [facility]   Dialysis Name and Scheduled days N/A   Does the patient receive services at the [facility]? No   Discharge Plan A Home with family;Home Health   Discharge Plan B Hospice/home   Patient/Family In Agreement With Plan yes   Readmission Questionnaire   At the time of your discharge, did someone talk to you about what your health problems were? Yes  (readmit completed in opening screen)

## 2017-11-14 NOTE — HPI
"Soy Reza is a 66 y.o. Male with HCV, EtOH cirrhosis, DM type 2, and HTN who was admitted to [facility] [place] [facility] on 11/13/17 with abdominal pain and shortness of breath. He was admitted 11/4-11/9 for SBP treatment; he has been non compliant with antibiotics since discharge. He has had recurrent admissions for ascites requiring paracentesis. Palliative medicine was consulted for "end of life/hospice."  "

## 2017-11-14 NOTE — PLAN OF CARE
Problem: Diabetes, Type 2 (Adult)  Goal: Signs and Symptoms of Listed Potential Problems Will be Absent, Minimized or Managed (Diabetes, Type 2)  Signs and symptoms of listed potential problems will be absent, minimized or managed by discharge/transition of care (reference Diabetes, Type 2 (Adult) CPG).   Outcome: Ongoing (interventions implemented as appropriate)  Reviewed POC including accu checks and sliding scale insulin for hyperglycemia. Pt verbalized understanding.

## 2017-11-14 NOTE — ASSESSMENT & PLAN NOTE
- Hx of alcohol abuse with cirrhosis  - Pt has repeat Hx of hepatic encephalopathy and SBP, largely due to his non- compliance with medication  - last DC'd on 11/9 with Bactrim for SBP PPx (however Pt did not fill Rx) & lactulose (Pt not reporting adequate BM's)  - Pt remains AAOx3  - On examination, abdomen is soft, distended, tender to palpation  - plan to have adequate BM's with lactulose PO/RI  - f/u with Hepatology as outpatient  - plan is to perform diagnostic paracentesis today  - started diuresis with lasix for abdominal overload

## 2017-11-14 NOTE — CONSULTS
"[facility]  Palliative Medicine  Consult Note    Patient Name: Soy Reza  MRN: [id number]  Admission Date: 11/13/2017  Hospital Length of Stay: 1 days  Code Status: Full Code   Attending Provider: Mckayla Swanson MD  Consulting Provider: Helen Malik PA-C  Primary Care Physician: Jean Carlos Swanson MD  Principal Problem:Hyperkalemia    Patient information was obtained from patient.      Inpatient consult to Palliative Care  Consult performed by: HELEN MALIK  Consult ordered by: NERISSA STOCK  Reason for consult: end of life/hospice        Assessment/Plan:   Soy Reza is a 66 y.o. Male with HCV, EtOH cirrhosis, DM type 2, and HTN who was admitted to [facility] on 11/13/17 with abdominal pain and shortness of breath. He was admitted 11/4-11/9 for SBP treatment; he has been non compliant with antibiotics since discharge. He has had recurrent admissions for ascites requiring paracentesis. Palliative medicine was consulted for "end of life/hospice."    Palliative care encounter    -Full code  -The patient was seen this afternoon. Introduction to palliative medicine was made.  -Mr. Reza displays very poor insight into his condition and prognosis. He lives with his 92 y.o. mother and has a very limited support system. He reports non compliance with medication due to difficulty picking up his prescriptions and forgetting to take them; he also has limited transportation.  -In discussing goals of care, Mr. Reza wishes to continue seeking active medical treatment, including recurrent paracentesis, and returning to the hospital if necessary. He would like continuance of home health, but admits he needs more assistance than they can provide.  -If the patient wishes to continue hepatology follow up and active medical treatment, then recommend discharge with home health with advanced illness management (AIM) or a palliative care NP who can assist with hospice transition when the patient is " "ready.  -Recommend social work consult with the home health or palliative care company for assistance when the patient is discharged.  -Palliative medicine will continue to see this patient addressing goals of care and code status.         Discussed patient with Dr. Anaya from primary team.     Thank you for your consult. I will follow-up with patient. Please contact us if you have any additional questions.    Subjective:     HPI:   Soy Reza is a 66 y.o. Male with HCV, EtOH cirrhosis, DM type 2, and HTN who was admitted to [facility] on 11/13/17 with abdominal pain and shortness of breath. He was admitted 11/4-11/9 for SBP treatment; he has been non compliant with antibiotics since discharge. He has had recurrent admissions for ascites requiring paracentesis. Palliative medicine was consulted for "end of life/hospice."    Interval History: There were no acute events overnight. The patient complains of abdominal pain.     Past Medical History:   Diagnosis Date    Diabetes mellitus     Hep C w/o coma, chronic     Hypertension     Macular degeneration     Neuropathy        Past Surgical History:   Procedure Laterality Date    EYE SURGERY      HAND SURGERY         Review of patient's allergies indicates:  No Known Allergies    Medications:  Continuous Infusions:   Scheduled Meds:   ciprofloxacin HCl  500 mg Oral Daily    furosemide  40 mg Intravenous BID    insulin aspart  5 Units Subcutaneous TIDWM    insulin detemir  15 Units Subcutaneous QHS    lactulose  30 g Oral TID    lidocaine-EPINEPHrine (PF) 1%-1:200,000  1 mL Other Once     PRN Meds:[COMPLETED] calcium gluconate IVPB **AND** calcium gluconate IVPB, dextrose 50%, [COMPLETED] dextrose 50% **AND** dextrose 50% **AND** [COMPLETED] insulin regular, glucagon (human recombinant), glucose, glucose, insulin aspart, ondansetron, sodium chloride 0.9%    Family History     None        Social History Main Topics    Smoking status: Former Smoker    Smokeless " tobacco: Never Used    Alcohol use No      Comment: 2 5th per week- stopped one month ago approx 9/10/16    Drug use: No    Sexual activity: No       Review of Systems   Constitutional: Positive for activity change and fatigue.   HENT: Negative for congestion and nosebleeds.    Eyes: Negative for pain and redness.   Cardiovascular: Negative for chest pain and palpitations.   Gastrointestinal: Positive for abdominal distention and abdominal pain.   Endocrine: Negative.    Skin: Positive for color change. Negative for rash.   Neurological: Negative for seizures and speech difficulty.   Psychiatric/Behavioral: Negative for agitation. The patient is not nervous/anxious.      Objective:     Vital Signs (Most Recent):  Temp: 97.3 °F (36.3 °C) (11/14/17 0755)  Pulse: 97 (11/14/17 1440)  Resp: 16 (11/14/17 0755)  BP: (!) 123/59 (11/14/17 0755)  SpO2: 95 % (11/14/17 0755) Vital Signs (24h Range):  Temp:  [97.3 °F (36.3 °C)-97.9 °F (36.6 °C)] 97.3 °F (36.3 °C)  Pulse:  [84-99] 97  Resp:  [16-20] 16  SpO2:  [95 %-99 %] 95 %  BP: (110-138)/(59-82) 123/59     Weight: 79.4 kg (175 lb 0.7 oz)  Body mass index is 25.12 kg/m².    Review of Symptoms  Symptom Assessment (ESAS 0-10 scale)   ESAS 0 1 2 3 4 5 6 7 8 9 10   Pain      x        Dyspnea x             Anxiety x             Nausea x             Depression  x             Anorexia x             Fatigue x             Insomnia x             Restlessness  x             Agitation x             CAM / Delirium -  Constipation     -   Diarrhea           -  Bowel Management Plan (BMP): Yes    Comments: patient on lactulose    Pain Assessment: Location: generalized abdominal pain  Quality: aching  Quantity: 5/10 in intensity  Chronicity: Onset 4 days ago, gradually worsening since few days ago  Aggravating factors: eating  Alleviating factors: paracentesis  Associated symptoms: none    OME in 24 hours: 0    Performance Status: 50      Physical Exam   Constitutional: He is oriented to  person, place, and time. He appears well-developed and well-nourished.   HENT:   Head: Normocephalic and atraumatic.   Cardiovascular: Regular rhythm.  Tachycardia present.    Pulmonary/Chest: Effort normal. No respiratory distress.   Abdominal: He exhibits distension. There is tenderness.   Firm. Generalized TTP   Neurological: He is alert and oriented to person, place, and time.   Jaundiced   Skin: Skin is warm and dry.   Psychiatric: He has a normal mood and affect. Cognition and memory are normal.   Nursing note and vitals reviewed.      Significant Labs: All pertinent labs within the past 24 hours have been reviewed.  CBC:     Recent Labs  Lab 11/13/17  0805   WBC 5.71   HGB 11.6*   HCT 34.5*   MCV 81*   PLT 52*     BMP:    Recent Labs  Lab 11/14/17  0524   *   *   K 5.6*      CO2 18*   BUN 63*   CREATININE 1.9*   CALCIUM 8.6*   MG 2.3     LFT:  Lab Results   Component Value Date    AST 39 11/14/2017    ALKPHOS 186 (H) 11/14/2017    BILITOT 0.8 11/14/2017     Albumin:   Albumin   Date Value Ref Range Status   11/14/2017 2.3 (L) 3.5 - 5.2 g/dL Final     Protein:   Total Protein   Date Value Ref Range Status   11/14/2017 6.0 6.0 - 8.4 g/dL Final     Lactic acid:   Lab Results   Component Value Date    LACTATE 1.8 11/05/2017       Significant Imaging: I have reviewed all pertinent imaging results/findings within the past 24 hours.    Advanced Directives::  Living Will: No  LaPOST: No  Do Not Resuscitate Status: full code  Medical Power of : next of kin for medical decision making is mother    Decision-Making Capacity: Patient answered questions    Living Arrangements: lives with 92 y.o. mother    Psychosocial/Cultural:  Mr. Reza lives with his 92 y.o. Mother in [place], [place]. He has never been  and has no children. He has a sister and brother living locally. He worked at [employer] for several years.    Spiritual:     F- Martha and Belief: Zoroastrian    I - Importance:  undetermined  .  C - Community: no mention of community involvement    A - Address in Care: will assess if spiritual care can be of assistance      > 50% of 75 min visit spent in chart review, face to face discussion of goals of care,  symptom assessment, coordination of care and emotional support.    Elizabeth Malik PAPandaC  Palliative Medicine  [facility]

## 2017-11-14 NOTE — PLAN OF CARE
CM received VM from pt's h/h nurseMulu p [phone] requesting a call concerning pt; CM attempted to call, N/A and left a msg for callback. Pt is active w/ [facility] h/h.    CM received callback from h/h nurseMulu - states that pt is noncompliant w/ meds and medical regimen. Mulu has discussed home hospice w/ pt d/t dx, lack of support and pt's lack of adherence to meds/regimen; ROBINSON relayed info to IM4 team. Pt is a candidate for Palliative Care consult.

## 2017-11-14 NOTE — SUBJECTIVE & OBJECTIVE
Interval History: Pt did well overnight. K is continues to trend down. Started diuresis with lasix for abdominal distension 2/2 ascitic fluid. Plan for diagnostic paracentesis today.    Review of Systems   Constitutional: Negative for chills and fever.   HENT: Negative for congestion and sore throat.    Eyes: Negative for photophobia.   Respiratory: Positive for shortness of breath. Negative for choking, wheezing and stridor.    Cardiovascular: Positive for chest pain. Negative for palpitations and leg swelling.   Gastrointestinal: Positive for abdominal distention and abdominal pain. Negative for blood in stool, constipation, diarrhea, nausea and vomiting.   Endocrine: Negative for polydipsia and polyuria.   Genitourinary: Negative for dysuria and hematuria.   Musculoskeletal: Negative for arthralgias and myalgias.   Skin: Negative for rash and wound.   Neurological: Negative for dizziness and headaches.   Psychiatric/Behavioral: Negative for agitation, behavioral problems and confusion.     Objective:     Vital Signs (Most Recent):  Temp: 97.3 °F (36.3 °C) (11/14/17 0755)  Pulse: 97 (11/14/17 1038)  Resp: 16 (11/14/17 0755)  BP: (!) 123/59 (11/14/17 0755)  SpO2: 95 % (11/14/17 0755) Vital Signs (24h Range):  Temp:  [97.3 °F (36.3 °C)-97.9 °F (36.6 °C)] 97.3 °F (36.3 °C)  Pulse:  [84-99] 97  Resp:  [16-20] 16  SpO2:  [95 %-99 %] 95 %  BP: (110-138)/(59-82) 123/59     Weight: 79.4 kg (175 lb 0.7 oz)  Body mass index is 25.12 kg/m².    Intake/Output Summary (Last 24 hours) at 11/14/17 1339  Last data filed at 11/14/17 0600   Gross per 24 hour   Intake              480 ml   Output              500 ml   Net              -20 ml      Physical Exam   Constitutional: He is oriented to person, place, and time. He appears well-developed and well-nourished.   HENT:   Head: Normocephalic and atraumatic.   Eyes: EOM are normal. Pupils are equal, round, and reactive to light.   Cardiovascular: Normal rate, regular rhythm and  normal heart sounds.    No murmur heard.  Pulmonary/Chest: Effort normal and breath sounds normal. No respiratory distress. He has no wheezes. He has no rales.   Abdominal: Soft. Bowel sounds are normal. He exhibits distension. There is tenderness.   Musculoskeletal: Normal range of motion.   Neurological: He is alert and oriented to person, place, and time.   Skin: Skin is warm and dry.   Psychiatric: He has a normal mood and affect. His behavior is normal. Thought content normal.       Significant Labs:   CBC:   Recent Labs  Lab 11/08/17 0347 11/09/17 0343 11/13/17  0805   WBC 3.67* 4.38 5.71   RBC 3.32* 3.35* 4.27*   HGB 9.0* 9.1* 11.6*   HCT 27.6* 27.4* 34.5*   PLT 45* 172 52*   MCV 83 82 81*   MCH 27.1 27.2 27.2   MCHC 32.6 33.2 33.6     BMP:   Recent Labs  Lab 11/13/17  1358  11/13/17  1756 11/13/17  2318 11/14/17  0524   *  --  276*  --  293*   *  --  133*  --  134*   K 6.6*  < > 6.0* 6.1* 5.6*     --  107  --  106   CO2 21*  --  19*  --  18*   BUN 60*  --  60*  --  63*   CREATININE 1.6*  --  1.6*  --  1.9*   CALCIUM 8.6*  --  8.7  --  8.6*   MG  --   --   --   --  2.3   < > = values in this interval not displayed.  Coagulation:   Recent Labs  Lab 11/08/17 0347 11/13/17  0805   INR  --  0.9   APTT 24.0  --      Microbiology Results (last 7 days)     ** No results found for the last 168 hours. **

## 2017-11-14 NOTE — HOSPITAL COURSE
Pt was admitted for hyperkalemia in the setting of abdominal ascites 2/2 HCV/EtOH cirrhosis. Performed diagnostic paracentesis on 11/14 - labs and cultures pending. Performed therapeutic paracentesis on 11/15 - 1L removed. Started diuresis with Lasix 40 PO qd. Starting on glimepiride 1mq qd and januvia 100mg qd for glucose control. Pt will be discharged home with home health.    11/14 Started diuresis with lasix. Following K. Plan for diagnostic paracentesis.  11/15 Awaiting Dx paracentesis results. Pt will go home with .

## 2017-11-14 NOTE — ASSESSMENT & PLAN NOTE
-Full code  -The patient was seen this afternoon. Introduction to palliative medicine was made.  -Mr. Reza displays very poor insight into his condition and prognosis. He lives with his 92 y.o. mother and has a very limited support system. He reports non compliance with medication due to difficulty picking up his prescriptions and forgetting to take them; he also has limited transportation.  -In discussing goals of care, Mr. Reza wishes to continue seeking active medical treatment, including recurrent paracentesis, and returning to the hospital if necessary. He would like continuance of home health, but admits he needs more assistance than they can provide.  -If the patient wishes to continue hepatology follow up and active medical treatment, then recommend discharge with home health with advanced illness management (AIM) or a palliative care NP who can assist with hospice transition when the patient is ready.  -Recommend social work consult with the home health or palliative care company for assistance when the patient is discharged.  -Palliative medicine will continue to see this patient addressing goals of care and code status.

## 2017-11-14 NOTE — PLAN OF CARE
Problem: Patient Care Overview  Goal: Plan of Care Review  Alert orient times 4. Had 4 loose stools this shift. Light brown in color. Last two every large. Abd distended but slightly softer than on set of shift. No complaint voiced of pain. Complain of nausea vomites moderate amount whit sputum. Thick. Blood sugar has remain elevated between upper 200 to lower 300's this shift. Patient was informed that he would be getting frequent blood sugar check early in shift no complaint for frequent awaken for these cheks

## 2017-11-14 NOTE — PLAN OF CARE
Problem: Patient Care Overview  Goal: Plan of Care Review  Outcome: Ongoing (interventions implemented as appropriate)  A/o pale and ecchymosis on both extremities vomited yellowish gastric output 200cc abdomen firm distended and tender to touch v/s stable K+ 5.7 -7.1  MD notified  Calcium gluconate iv, regular insulin ,d50w ivp given blood sugar [phone] md notified , kayexalate 60 gm po given , lasix 80mg ivp cardiac monitor nsr 70-80 , will continue to monitor free of falls no injury plan of care reviewed with pt

## 2017-11-15 PROBLEM — R41.0 DELIRIUM: Status: ACTIVE | Noted: 2017-01-01

## 2017-11-15 NOTE — SUBJECTIVE & OBJECTIVE
Interval History: Pt tried to leave AMA overnight. AAOx3 this morning. Re-evaluated for decision making capacity - Pt maintains capacity. Pt will be DC'd home with  and will follow up with PCP and Hepatology.    Review of Systems   Constitutional: Negative for chills and fever.   HENT: Negative for congestion and sore throat.    Eyes: Negative for photophobia.   Respiratory: Positive for shortness of breath. Negative for choking, wheezing and stridor.    Cardiovascular: Positive for chest pain. Negative for palpitations and leg swelling.   Gastrointestinal: Positive for abdominal distention and abdominal pain. Negative for blood in stool, constipation, diarrhea, nausea and vomiting.   Endocrine: Negative for polydipsia and polyuria.   Genitourinary: Negative for dysuria and hematuria.   Musculoskeletal: Negative for arthralgias and myalgias.   Skin: Negative for rash and wound.   Neurological: Negative for dizziness and headaches.   Psychiatric/Behavioral: Negative for agitation, behavioral problems and confusion.     Objective:     Vital Signs (Most Recent):  Temp: 98.4 °F (36.9 °C) (11/15/17 0806)  Pulse: 89 (11/15/17 0806)  Resp: 18 (11/15/17 0806)  BP: 123/81 (11/15/17 0806)  SpO2: (!) 94 % (11/15/17 0806) Vital Signs (24h Range):  Temp:  [97.5 °F (36.4 °C)-98.4 °F (36.9 °C)] 98.4 °F (36.9 °C)  Pulse:  [] 89  Resp:  [18-20] 18  SpO2:  [94 %-96 %] 94 %  BP: (107-137)/(65-81) 123/81     Weight: 79.4 kg (175 lb 0.7 oz)  Body mass index is 25.12 kg/m².    Intake/Output Summary (Last 24 hours) at 11/15/17 1037  Last data filed at 11/15/17 0457   Gross per 24 hour   Intake             1320 ml   Output              401 ml   Net              919 ml      Physical Exam   Constitutional: He is oriented to person, place, and time. He appears well-developed and well-nourished.   HENT:   Head: Normocephalic and atraumatic.   Eyes: EOM are normal. Pupils are equal, round, and reactive to light.   Cardiovascular: Normal  rate, regular rhythm and normal heart sounds.    No murmur heard.  Pulmonary/Chest: Effort normal and breath sounds normal. No respiratory distress. He has no wheezes. He has no rales.   Abdominal: Soft. Bowel sounds are normal. He exhibits distension. There is tenderness.   Musculoskeletal: Normal range of motion.   Neurological: He is alert and oriented to person, place, and time.   Skin: Skin is warm and dry.   Psychiatric: He has a normal mood and affect. His behavior is normal. Thought content normal.       Significant Labs:   CBC:   Recent Labs  Lab 11/09/17  0343 11/13/17  0805   WBC 4.38 5.71   RBC 3.35* 4.27*   HGB 9.1* 11.6*   HCT 27.4* 34.5*    52*   MCV 82 81*   MCH 27.2 27.2   MCHC 33.2 33.6     BMP:   Recent Labs  Lab 11/13/17  1756  11/14/17  0524 11/14/17  1501 11/15/17  0508   *  --  293*  --  213*   *  --  134*  --  132*   K 6.0*  < > 5.6* 5.4* 5.6*     --  106  --  104   CO2 19*  --  18*  --  21*   BUN 60*  --  63*  --  57*   CREATININE 1.6*  --  1.9*  --  1.9*   CALCIUM 8.7  --  8.6*  --  8.4*   MG  --   --  2.3  --  2.0   < > = values in this interval not displayed.  Coagulation:   Recent Labs  Lab 11/13/17  0805   INR 0.9     Microbiology Results (last 7 days)     Procedure Component Value Units Date/Time    Aerobic culture [[id number]] Collected:  11/14/17 1615    Order Status:  Completed Specimen:  Body Fluid from Ascites Updated:  11/15/17 0709     Aerobic Bacterial Culture No growth    Culture, Body Fluid - Bactec [[id number]] Collected:  11/14/17 1615    Order Status:  Completed Specimen:  Ascites Updated:  11/15/17 0115     Body Fluid Culture, Sterile No Growth to date    Culture, Anaerobe [[id number]] Collected:  11/14/17 1615    Order Status:  Canceled Specimen:  Ascites from Ascites Updated:  11/14/17 3397

## 2017-11-15 NOTE — ASSESSMENT & PLAN NOTE
"- consulted for "depression" however no symptoms of depression endorsed and no sign of depression on exam  - MSE is significant however for encephalopathic findings: unable to sustain attention, concentration impaired to testing, tangential thought process, memory impaired  - suspect clinical status is related to his AMS rather than depression  - recommend starting Risperdal 0.5 mg PO qhs for now  - will continue to follow and assess  "

## 2017-11-15 NOTE — PLAN OF CARE
Pt d/c to home w/ h/h - [facility] h/h.     11/15/17 1637   Final Note   Assessment Type Final Discharge Note   Discharge Disposition Home-Health   What phone number can be called within the next 1-3 days to see how you are doing after discharge? [phone]   Hospital Follow Up  Appt(s) scheduled? Yes   Right Care Referral Info   Post Acute Recommendation Home-care   Referral Type h/h   Facility Name [facility] h/h

## 2017-11-15 NOTE — PROCEDURES
"Soy Reza is a 66 y.o. male patient.    Temp: 98 °F (36.7 °C) (11/15/17 0455)  Pulse: 85 (11/15/17 0455)  Resp: 20 (11/15/17 0455)  BP: 107/65 (11/15/17 0455)  SpO2: 96 % (17 1604)  Weight: 79.4 kg (175 lb 0.7 oz) (17 08)  Height: 5' 10" (177.8 cm) (17 08)       Paracentesis  Date/Time: 11/15/2017 6:30 AM  Location procedure was performed: [facility] MEDICINE  Performed by: NERISSA STOCK  Authorized by: NERISSA STOCK   Assisting provider: RAJ WESTON  Pre-operative diagnosis: ascites  Post-operative diagnosis: ascites  Consent Done: Yes  Consent: Verbal consent obtained. Written consent obtained.  Consent given by: patient  Patient understanding: patient states understanding of the procedure being performed  Patient consent: the patient's understanding of the procedure matches consent given  Procedure consent: procedure consent matches procedure scheduled  Relevant documents: relevant documents present and verified  Test results: test results available and properly labeled  Site marked: the operative site was marked  Imaging studies: imaging studies available  Patient identity confirmed: , MRN, name and verbally with patient  Time out: Immediately prior to procedure a "time out" was called to verify the correct patient, procedure, equipment, support staff and site/side marked as required.  Procedure purpose: diagnostic  Anesthesia: local infiltration    Anesthesia:  Local Anesthetic: lidocaine 1% with epinephrine  Anesthetic total: 10 mL  Patient sedated: no  Preparation: Patient was prepped and draped in the usual sterile fashion.  Ultrasound guidance: yes  Puncture site: left lower quadrant  Fluid appearance: serosanguinous and cloudy  Complications: No  Estimated blood loss (mL): 5  Specimens: Yes  Implants: No          Nerissa Stock  11/15/2017    "

## 2017-11-15 NOTE — SUBJECTIVE & OBJECTIVE
Patient History           Medical as of 11/15/2017     Past Medical History     Diagnosis Date Comments Source    Diabetes mellitus -- -- Provider    Hep C w/o coma, chronic -- -- Provider    Hypertension -- -- Provider    Macular degeneration -- -- Provider    Neuropathy -- -- Provider                  Surgical as of 11/15/2017     Past Surgical History     Procedure Laterality Date Comments Source    EYE SURGERY -- -- -- Provider    HAND SURGERY -- -- -- Provider                  Family as of 11/15/2017    **None**           Tobacco Use as of 11/15/2017     Smoking Status Smoking Start Date Smoking Quit Date Packs/day Years Used    Former Smoker -- -- -- --    Types Comments Smokeless Tobacco Status Smokeless Tobacco Quit Date Source    -- -- Never Used -- Provider            Alcohol Use as of 11/15/2017     Alcohol Use Drinks/Week Alcohol/Week Comments Source    No -- -- 2 5th per week- stopped one month ago approx 9/10/16 Provider            Drug Use as of 11/15/2017     Drug Use Types Frequency Comments Source    No -- -- -- Provider            Sexual Activity as of 11/15/2017     Sexually Active Birth Control Partners Comments Source    No -- -- -- Provider            Activities of Daily Living as of 11/15/2017    **None**           Social Documentation as of 11/15/2017    **None**           Occupational as of 11/15/2017    **None**           Socioeconomic as of 11/15/2017     Marital Status Spouse Name Number of Children Years Education Preferred Language Ethnicity Race Source    Single -- -- -- English /White White --         Pertinent History Q A Comments    as of 11/15/2017 Lives with      Place in Birth Order      Lives in      Number of Siblings      Raised by      Legal Involvement      Childhood Trauma      Criminal History of      Financial Status      Highest Level of Education      Does patient have access to a firearm?       Service      Primary Leisure Activity       "Spirituality       Past Medical History:   Diagnosis Date    Diabetes mellitus     Hep C w/o coma, chronic     Hypertension     Macular degeneration     Neuropathy      Past Surgical History:   Procedure Laterality Date    EYE SURGERY      HAND SURGERY       Family History     None        Social History Main Topics    Smoking status: Former Smoker    Smokeless tobacco: Never Used    Alcohol use No      Comment: 2 5th per week- stopped one month ago approx 9/10/16    Drug use: No    Sexual activity: No     Review of patient's allergies indicates:  No Known Allergies    No current facility-administered medications on file prior to encounter.      Current Outpatient Prescriptions on File Prior to Encounter   Medication Sig    lactulose (CHRONULAC) 10 gram/15 mL solution Take 30 mLs (20 g total) by mouth 3 (three) times daily.    [DISCONTINUED] metformin (GLUCOPHAGE-XR) 500 MG 24 hr tablet Take 500 mg by mouth every other day.     aspirin 81 MG Chew Take 81 mg by mouth every other day.     [DISCONTINUED] sulfamethoxazole-trimethoprim 400-80mg (BACTRIM,SEPTRA) 400-80 mg per tablet Take 1 tablet by mouth 2 (two) times daily.     Psychotherapeutics     None        Review of Systems  Strengths and Liabilities: Strength: Patient is expressive/articulate., Strength: Patient is intelligent., Liability: Patient has poor health.    Objective:     Vital Signs (Most Recent):  Temp: 98.6 °F (37 °C) (11/15/17 1159)  Pulse: 97 (11/15/17 1159)  Resp: 20 (11/15/17 1159)  BP: 131/75 (11/15/17 1159)  SpO2: 100 % (11/15/17 1159) Vital Signs (24h Range):  Temp:  [97.5 °F (36.4 °C)-98.6 °F (37 °C)] 98.6 °F (37 °C)  Pulse:  [] 97  Resp:  [18-20] 20  SpO2:  [94 %-100 %] 100 %  BP: (107-137)/(65-81) 131/75     Height: 5' 10" (177.8 cm)  Weight: 79.4 kg (175 lb 0.7 oz)  Body mass index is 25.12 kg/m².      Intake/Output Summary (Last 24 hours) at 11/15/17 1301  Last data filed at 11/15/17 0457   Gross per 24 hour   Intake  " "           1080 ml   Output              200 ml   Net              880 ml       Physical Exam      CONSTITUTIONAL  General Appearance: in gown, beard, glasses, NAD, calm, cooperative    PSYCHIATRIC   Level of Consciousness: alert  Orientation: oriented to person, place, situation, date, month, year, president  Grooming: fair  Psychomotor Behavior: no agitation, retardation  Speech: normal rate, volume, tone  Language: English, no asphasia  Mood: "good"  Affect: full, reactive  Thought Process: tangential mostly, required redirection  Associations: mild looseness at times  Thought Content: denies SI  Memory: 3/3 Ir, 0/3 dr  Attention: Failed YUE (4 errors)  Fund of Knowledge: appropriate for education level  Insight: limited  Judgment: limited    "

## 2017-11-15 NOTE — PLAN OF CARE
AMANDA informed Pt likely and very anxious to discharge home today. SW informed [facility]. SW spoke to nurse and said she was setting up wheelchair van transport via [facility] ([id number]) for 5pm. AMANDA asked nurse to please call and cancel transport if for some reason it is decided Pt will not be discharging as planned.     Bridget Hollingsworth, PRUDENCIOW
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
## 2017-11-15 NOTE — ASSESSMENT & PLAN NOTE
-Full code  -The patient was seen this morning.  -Mr. Reza displays very poor insight into his condition and prognosis. He lives with his 92 y.o. mother and has a very limited support system. He reports non compliance with medication due to difficulty picking up his prescriptions and forgetting to take them; he also has limited transportation.  -In discussing goals of care, Mr. Reza wishes to continue seeking active medical treatment, including recurrent paracentesis, and returning to the hospital if necessary. He would like continuance of home health, but admits he needs more assistance than they can provide.  -Code status was discussed. Mr. Reza wishes to continue full code.   -If the patient wishes to continue hepatology follow up and active medical treatment, then recommend discharge with home health with advanced illness management (AIM) or a palliative care NP who can assist with hospice transition when the patient is ready.  -Recommend social work consult with the home health or palliative care company for assistance when the patient is discharged.

## 2017-11-15 NOTE — PROGRESS NOTES
"[facility]  Palliative Medicine  Progress Note    Patient Name: Soy Reza  MRN: [id number]  Admission Date: 11/13/2017  Hospital Length of Stay: 2 days  Code Status: Full Code   Attending Provider: Mckayla Swanson MD  Consulting Provider: Elizabeth Malik PA-C  Primary Care Physician: Jean Carlos Swanson MD  Principal Problem:Hyperkalemia    Patient information was obtained from patient.      Assessment/Plan:   Soy Reza is a 66 y.o. Male with HCV, EtOH cirrhosis, DM type 2, and HTN who was admitted to [facility] on 11/13/17 with abdominal pain and shortness of breath. He was admitted 11/4-11/9 for SBP treatment; he has been non compliant with antibiotics since discharge. He has had recurrent admissions for ascites requiring paracentesis. Palliative medicine was consulted for "end of life/hospice."    Palliative care encounter    -Full code  -The patient was seen this morning.  -Mr. Reza displays very poor insight into his condition and prognosis. He lives with his 92 y.o. mother and has a very limited support system. He reports non compliance with medication due to difficulty picking up his prescriptions and forgetting to take them; he also has limited transportation.  -In discussing goals of care, Mr. Reza wishes to continue seeking active medical treatment, including recurrent paracentesis, and returning to the hospital if necessary. He would like continuance of home health, but admits he needs more assistance than they can provide.  -Code status was discussed. Mr. Reza wishes to continue full code.   -Discussed with Mr. Reza, ways he can be more compliant with his medication, including using a daily pill box and asking his niece for assistance with medication .   -If the patient wishes to continue hepatology follow up and active medical treatment, then recommend discharge with home health with advanced illness management (AIM) or a palliative care NP who can assist with hospice " "transition when the patient is ready.  -Recommend social work consult with the home health or palliative care company for assistance when the patient is discharged.          Discussed patient with attending, Dr. Goldsmith.     I will sign off. Please contact us if you have any additional questions.    Subjective:     Chief Complaint:   Chief Complaint   Patient presents with    Abdominal Pain    Shortness of Breath       HPI:   Soy Reza is a 66 y.o. Male with HCV, EtOH cirrhosis, DM type 2, and HTN who [place] [facility] [place] with abdominal pain and shortness of breath. He was admitted 11/4-11/9 for SBP treatment; he has been non compliant with antibiotics since discharge. He has had recurrent admissions for ascites requiring paracentesis. Palliative medicine was consulted for "end of life/hospice."      Interval History: There were no acute events overnight. The patient received a paracentesis this morning with abdominal pain relief.     Past Medical History:   Diagnosis Date    Diabetes mellitus     Hep C w/o coma, chronic     Hypertension     Macular degeneration     Neuropathy        Past Surgical History:   Procedure Laterality Date    EYE SURGERY      HAND SURGERY         Review of patient's allergies indicates:  No Known Allergies    Medications:  Continuous Infusions:   Scheduled Meds:   ciprofloxacin HCl  500 mg Oral Daily    furosemide  40 mg Intravenous BID    insulin aspart  5 Units Subcutaneous TIDWM    insulin detemir  15 Units Subcutaneous QHS    lactulose  30 g Oral Q6H    lidocaine HCL 10 mg/ml (1%)  5 mL Intradermal Once     PRN Meds:acetaminophen, [COMPLETED] calcium gluconate IVPB **AND** calcium gluconate IVPB, dextrose 50%, glucagon (human recombinant), glucose, glucose, insulin aspart, ondansetron, sodium chloride 0.9%    Family History     None        Social History Main Topics    Smoking status: Former Smoker    Smokeless tobacco: Never Used    Alcohol use No      " Comment: 2 5th per week- stopped one month ago approx 9/10/16    Drug use: No    Sexual activity: No       Review of Systems   Constitutional: Positive for activity change and fatigue.   HENT: Negative for congestion and nosebleeds.    Eyes: Negative for pain and redness.   Cardiovascular: Negative for chest pain and palpitations.   Gastrointestinal: Positive for abdominal distention and abdominal pain.   Endocrine: Negative.    Skin: Positive for color change. Negative for rash.   Neurological: Negative for seizures and speech difficulty.   Psychiatric/Behavioral: Negative for agitation. The patient is not nervous/anxious.      Objective:     Vital Signs (Most Recent):  Temp: 98.6 °F (37 °C) (11/15/17 1159)  Pulse: 97 (11/15/17 1159)  Resp: 20 (11/15/17 1159)  BP: 131/75 (11/15/17 1159)  SpO2: 100 % (11/15/17 1159) Vital Signs (24h Range):  Temp:  [97.5 °F (36.4 °C)-98.6 °F (37 °C)] 98.6 °F (37 °C)  Pulse:  [] 97  Resp:  [18-20] 20  SpO2:  [94 %-100 %] 100 %  BP: (107-137)/(65-81) 131/75     Weight: 79.4 kg (175 lb 0.7 oz)  Body mass index is 25.12 kg/m².    Review of Symptoms  Symptom Assessment (ESAS 0-10 scale)   ESAS 0 1 2 3 4 5 6 7 8 9 10   Pain  x            Dyspnea x             Anxiety x             Nausea x             Depression  x             Anorexia x             Fatigue x             Insomnia x             Restlessness  x             Agitation x             CAM / Delirium -  Constipation     -   Diarrhea           -  Bowel Management Plan (BMP): Yes    Comments: patient on lactulose    Pain Assessment: Location: generalized abdominal pain  Quality: aching  Quantity: 1/10 in intensity  Chronicity: Onset 4 days ago, gradually worsening since few days ago  Aggravating factors: eating  Alleviating factors: paracentesis  Associated symptoms: none    OME in 24 hours: 0    Performance Status: 50      Physical Exam   Constitutional: He is oriented to person, place, and time. He appears well-developed  and well-nourished.   HENT:   Head: Normocephalic and atraumatic.   Cardiovascular: Regular rhythm.  Tachycardia present.    Pulmonary/Chest: Effort normal. No respiratory distress.   Abdominal: He exhibits distension.   Neurological: He is alert and oriented to person, place, and time.   Jaundiced   Skin: Skin is warm and dry.   Psychiatric: He has a normal mood and affect. Cognition and memory are normal.   Nursing note and vitals reviewed.      Significant Labs: All pertinent labs within the past 24 hours have been reviewed.  CBC:     Recent Labs  Lab 11/13/17  0805   WBC 5.71   HGB 11.6*   HCT 34.5*   MCV 81*   PLT 52*     BMP:    Recent Labs  Lab 11/15/17  0508   *   *   K 5.6*      CO2 21*   BUN 57*   CREATININE 1.9*   CALCIUM 8.4*   MG 2.0     LFT:  Lab Results   Component Value Date    AST 36 11/15/2017    ALKPHOS 178 (H) 11/15/2017    BILITOT 0.9 11/15/2017     Albumin:   Albumin   Date Value Ref Range Status   11/15/2017 2.2 (L) 3.5 - 5.2 g/dL Final     Protein:   Total Protein   Date Value Ref Range Status   11/15/2017 5.8 (L) 6.0 - 8.4 g/dL Final     Lactic acid:   Lab Results   Component Value Date    LACTATE 1.8 11/05/2017       Significant Imaging: I have reviewed all pertinent imaging results/findings within the past 24 hours.    Advanced Directives::  Living Will: No  LaPOST: No  Do Not Resuscitate Status: full code  Medical Power of : next of kin for medical decision making is mother    Decision-Making Capacity: Patient answered questions    Living Arrangements: lives with 92 y.o. mother    Psychosocial/Cultural:  Mr. Reza lives with his 92 y.o. Mother in [place], [place]. He has never been  and has no children. He has a sister living locally and a brother, Shay Reza, who lives in [place]. He worked at [employer] for several years.    Spiritual:     F- Martha and Belief: Confucianism    I - Importance: undetermined  .  C - Community: no mention of community  involvement    A - Address in Care: will assess if spiritual care can be of assistance      > 50% of 35 min visit spent in chart review, face to face discussion of goals of care,  symptom assessment, coordination of care and emotional support.    JULIÁN BlevinsC  Palliative Medicine  [facility]

## 2017-11-15 NOTE — PROCEDURES
"Soy Reza is a 66 y.o. male patient.    Temp: 98.6 °F (37 °C) (11/15/17 1159)  Pulse: 91 (11/15/17 1400)  Resp: 20 (11/15/17 1159)  BP: 131/75 (11/15/17 1159)  SpO2: 100 % (11/15/17 1159)  Weight: 79.4 kg (175 lb 0.7 oz) (17 08)  Height: 5' 10" (177.8 cm) (17 08)       Paracentesis  Date/Time: 11/15/2017 3:30 PM  Performed by: KAYLENE GASTON  Authorized by: KAYLENE GASTON   Assisting provider: RAJ WESTON  Pre-operative diagnosis: cirrhosis  Post-operative diagnosis: cirrhosis  Consent Done: Yes  Consent: Verbal consent obtained. Written consent obtained.  Consent given by: patient  Patient understanding: patient states understanding of the procedure being performed  Patient consent: the patient's understanding of the procedure matches consent given  Procedure consent: procedure consent matches procedure scheduled  Site marked: the operative site was marked  Patient identity confirmed:  and name  Time out: Immediately prior to procedure a "time out" was called to verify the correct patient, procedure, equipment, support staff and site/side marked as required.  Initial or subsequent exam: subsequent  Procedure purpose: diagnostic  Indications: abdominal discomfort secondary to ascites  Anesthesia: local infiltration    Anesthesia:  Local Anesthetic: lidocaine 1% without epinephrine  Patient sedated: no  Ultrasound guidance: yes  Puncture site: left lower quadrant  Fluid removed: 1000(ml)  Fluid appearance: cloudy and serosanguinous  Dressing: 4x4 sterile gauze and pressure dressing  Patient tolerance: Patient tolerated the procedure well with no immediate complications          Kaylene Gaston  11/15/2017    "

## 2017-11-15 NOTE — PLAN OF CARE
AMANDA learned that Pt is anticipated to discharge home either today or tomorrow. Pt is not interested in home hospice or placement. Pt is current with [facility]. SW sent all necessary referral information to [facility] via [facility] and notified them of Pt's discharge for either this afternoon or tomorrow. SW to continue to follow.     Bridget Hollingsworth LCSW

## 2017-11-15 NOTE — PLAN OF CARE
Problem: Patient Care Overview  Goal: Plan of Care Review  Outcome: Ongoing (interventions implemented as appropriate)   11/15/17 5456   Coping/Psychosocial   Plan Of Care Reviewed With patient     POC reviewed with pt. He states he understands. Pt discharge today. Psych saw pt today and paracentesis completed at bedside- 1L removed. AAOx$ but forgetful. Pt voids per urinal as well as diaper. Ambulates with assistance.

## 2017-11-15 NOTE — ASSESSMENT & PLAN NOTE
- Hx of alcohol abuse with cirrhosis  - Pt has repeat Hx of hepatic encephalopathy and SBP, largely due to his non- compliance with medication  - last DC'd on 11/9 with Bactrim for SBP PPx (however Pt did not fill Rx) & lactulose (Pt not reporting adequate BM's)  - Pt remains AAOx3  - On examination, abdomen is soft, distended, tender to palpation  - plan to have adequate BM's with lactulose PO/WV  - f/u with Hepatology as outpatient  - plan is to perform diagnostic paracentesis today  - started diuresis with lasix for abdominal overload

## 2017-11-15 NOTE — SUBJECTIVE & OBJECTIVE
Interval History: There were no acute events overnight. The patient received a paracentesis this morning with abdominal pain relief.     Past Medical History:   Diagnosis Date    Diabetes mellitus     Hep C w/o coma, chronic     Hypertension     Macular degeneration     Neuropathy        Past Surgical History:   Procedure Laterality Date    EYE SURGERY      HAND SURGERY         Review of patient's allergies indicates:  No Known Allergies    Medications:  Continuous Infusions:   Scheduled Meds:   ciprofloxacin HCl  500 mg Oral Daily    furosemide  40 mg Intravenous BID    insulin aspart  5 Units Subcutaneous TIDWM    insulin detemir  15 Units Subcutaneous QHS    lactulose  30 g Oral Q6H    lidocaine HCL 10 mg/ml (1%)  5 mL Intradermal Once     PRN Meds:acetaminophen, [COMPLETED] calcium gluconate IVPB **AND** calcium gluconate IVPB, dextrose 50%, glucagon (human recombinant), glucose, glucose, insulin aspart, ondansetron, sodium chloride 0.9%    Family History     None        Social History Main Topics    Smoking status: Former Smoker    Smokeless tobacco: Never Used    Alcohol use No      Comment: 2 5th per week- stopped one month ago approx 9/10/16    Drug use: No    Sexual activity: No       Review of Systems   Constitutional: Positive for activity change and fatigue.   HENT: Negative for congestion and nosebleeds.    Eyes: Negative for pain and redness.   Cardiovascular: Negative for chest pain and palpitations.   Gastrointestinal: Positive for abdominal distention and abdominal pain.   Endocrine: Negative.    Skin: Positive for color change. Negative for rash.   Neurological: Negative for seizures and speech difficulty.   Psychiatric/Behavioral: Negative for agitation. The patient is not nervous/anxious.      Objective:     Vital Signs (Most Recent):  Temp: 98.6 °F (37 °C) (11/15/17 1159)  Pulse: 97 (11/15/17 1159)  Resp: 20 (11/15/17 1159)  BP: 131/75 (11/15/17 1159)  SpO2: 100 % (11/15/17  1159) Vital Signs (24h Range):  Temp:  [97.5 °F (36.4 °C)-98.6 °F (37 °C)] 98.6 °F (37 °C)  Pulse:  [] 97  Resp:  [18-20] 20  SpO2:  [94 %-100 %] 100 %  BP: (107-137)/(65-81) 131/75     Weight: 79.4 kg (175 lb 0.7 oz)  Body mass index is 25.12 kg/m².    Review of Symptoms  Symptom Assessment (ESAS 0-10 scale)   ESAS 0 1 2 3 4 5 6 7 8 9 10   Pain  x            Dyspnea x             Anxiety x             Nausea x             Depression  x             Anorexia x             Fatigue x             Insomnia x             Restlessness  x             Agitation x             CAM / Delirium -  Constipation     -   Diarrhea           -  Bowel Management Plan (BMP): Yes    Comments: patient on lactulose    Pain Assessment: Location: generalized abdominal pain  Quality: aching  Quantity: 1/10 in intensity  Chronicity: Onset 4 days ago, gradually worsening since few days ago  Aggravating factors: eating  Alleviating factors: paracentesis  Associated symptoms: none    OME in 24 hours: 0    Performance Status: 50      Physical Exam   Constitutional: He is oriented to person, place, and time. He appears well-developed and well-nourished.   HENT:   Head: Normocephalic and atraumatic.   Cardiovascular: Regular rhythm.  Tachycardia present.    Pulmonary/Chest: Effort normal. No respiratory distress.   Abdominal: He exhibits distension.   Neurological: He is alert and oriented to person, place, and time.   Jaundiced   Skin: Skin is warm and dry.   Psychiatric: He has a normal mood and affect. Cognition and memory are normal.   Nursing note and vitals reviewed.      Significant Labs: All pertinent labs within the past 24 hours have been reviewed.  CBC:     Recent Labs  Lab 11/13/17  0805   WBC 5.71   HGB 11.6*   HCT 34.5*   MCV 81*   PLT 52*     BMP:    Recent Labs  Lab 11/15/17  0508   *   *   K 5.6*      CO2 21*   BUN 57*   CREATININE 1.9*   CALCIUM 8.4*   MG 2.0     LFT:  Lab Results   Component Value Date     AST 36 11/15/2017    ALKPHOS 178 (H) 11/15/2017    BILITOT 0.9 11/15/2017     Albumin:   Albumin   Date Value Ref Range Status   11/15/2017 2.2 (L) 3.5 - 5.2 g/dL Final     Protein:   Total Protein   Date Value Ref Range Status   11/15/2017 5.8 (L) 6.0 - 8.4 g/dL Final     Lactic acid:   Lab Results   Component Value Date    LACTATE 1.8 11/05/2017       Significant Imaging: I have reviewed all pertinent imaging results/findings within the past 24 hours.    Advanced Directives::  Living Will: No  LaPOST: No  Do Not Resuscitate Status: full code  Medical Power of : next of kin for medical decision making is mother    Decision-Making Capacity: Patient answered questions    Living Arrangements: lives with 92 y.o. mother    Psychosocial/Cultural:  Mr. Reza lives with his 92 y.o. Mother in [place], [place]. He has never been  and has no children. He has a sister and brother living locally. He worked at [employer] for several years.    Spiritual:     F- Martha and Belief: Pentecostal    I - Importance: undetermined  .  C - Community: no mention of community involvement    A - Address in Care: will assess if spiritual care can be of assistance

## 2017-11-15 NOTE — PROGRESS NOTES
"Patient has became Irate determine that he is going to leave with or without medical advise. Stated "My ride is downstairs yelling at staff to get him a wheel chair to meet his ride downstairs. Patient keeps trying to get up out of bed very weak and gait is unsteady. Encourage him to remain in bed until I am able to reach his doctor. OC obtained 1:1 sitter to assist with patient . I called and spoke to Dr Sarabia who stated she would come see. Patient. Patient continue to be agitated the entire time while staff protect pt safety from falling and await for MD to come. Dr sarabia came at first patient still was determine he was leaving while MD reading over chart but while I continue to talk with pt he finally stated "ok I will stay then he became willing to bet back in the bed wit minimal assistance. . Sitter remain at bedside. Call light in reach. Bed in low position bed alarm activated  "
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
## 2017-11-15 NOTE — PLAN OF CARE
CAPACITY EVALUATION     Capacity for:  · Soy Reza    Capacity for a given decision requires that the patient:  · Understands the proposed treatment and/or the proposed options for his care.  · Appreciates his own medical situation and can abstract how the treatments/proposed options for care apply to his medical situation.  · Understands the consequences of his medical decisions in a reasonable and factual manner supported by the facts and his own values in a consistent fashion.  · Demonstrates the ability to communicate a choice clearly and consistently.    Assessment of capacity:  · The patient understands the clinical condition relation to the evaluation:  Yes.  · The patient understands the nature of the proposed assessments and/or treatments:  Yes.  · The patient understands the risks and benefits of the proposed assessments and/or treatments:  Yes.  · The patient understands the risks and benefits of refusing the proposed assessments and/or treatments:  Yes.  · The patient is in agreement with the assessment and consents to treatment.  · The patient has evidence of impaired insight and/or judgement:  Yes.    Based upon my evaluation of Soy Reza regarding his decision-making capacity for refusing safe discharge/refusing proposed plan of care, I found with reasonable certainty that he does have capacity to make medical decisions on his behalf.        Chris Anaya MD PGY1  [facility]

## 2017-11-15 NOTE — PLAN OF CARE
Problem: Patient Care Overview  Goal: Plan of Care Review  Awake most this entire shift. After his agitated brief state earlier this shift. Pt. Was pleasant repetitive thanking staff for such good care. NO more trying to get out of bed. Blood sugar remain in the 200's. No iv in place per MD according to day shift RN on yesterday.

## 2017-11-15 NOTE — PROGRESS NOTES
"[facility] Medicine  Progress Note    Patient Name: Soy Reza  MRN: [id number]  Patient Class: IP- Inpatient   Admission Date: 11/13/2017  Length of Stay: 2 days  Attending Physician: Mckayla Swanson MD  Primary Care Provider: Jean Carlos Swanson MD    [facility] Medicine Team: Networked reference to record PCT  Chris Anaya MD    Subjective:     Principal Problem:Hyperkalemia    HPI:  Soy Reza is a 66 y.o. male with history of DM2, HCV, EtOH cirrhosis, HTN who presents to [facility] ED with chief complaint of generalized abdominal pain with distention and shortness of breath worsening for 4 days.  His abdominal pain is rated 9/10. The patient was most recently admitted 11/4-11/9 for treatment of SBP with Ceftriaxone; DC'd on Bactrim for PPx. However, states that he has not taken his Bactrim since discharge due to difficulty picking up his prescription.  He reports chest pain, which is consistent with pain he was experiencing while in admitted to the hospital recently.  He is taking Lactulose as prescribed.  Patient reports shortness of breath worsening last night. He had  "minimal chest pain" in the substernal area yesterday associated with the shortness of breath, but denies having any chest pain today.  He has nausea, but denies LE swelling, fever, chills,   vomiting, confusion, or additional complaints at this time.      On 11/4, he was admitted after presenting with worsening abdominal pain, swelling and nausea vomiting. History was taken largely from chart review and telephone conversation with his mother, as he was acutely encephalopathic at time of interview. Pt recently underwent paracentesis (11/2) which revealed SBP. Pt's mother endorses poor compliance with medication on the part of her son. During his admission, he was treated for SBP with Ceftriaxone, received a therapeutic paracentesis (~5L removed), and dc'd home with Bactrim for SBP PPx.    Na 131, K 5.7 in ED " 11/13         Hospital Course:  11/14 Started diuresis with lasix. Following K. Plan for diagnostic paracentesis.  11/15 Awaiting Dx paracentesis results. Pt will go home with .    Interval History: Pt tried to leave AMA overnight. AAOx3 this morning. Re-evaluated for decision making capacity - Pt maintains capacity. Pt will be DC'd home with  and will follow up with PCP and Hepatology.    Review of Systems   Constitutional: Negative for chills and fever.   HENT: Negative for congestion and sore throat.    Eyes: Negative for photophobia.   Respiratory: Positive for shortness of breath. Negative for choking, wheezing and stridor.    Cardiovascular: Positive for chest pain. Negative for palpitations and leg swelling.   Gastrointestinal: Positive for abdominal distention and abdominal pain. Negative for blood in stool, constipation, diarrhea, nausea and vomiting.   Endocrine: Negative for polydipsia and polyuria.   Genitourinary: Negative for dysuria and hematuria.   Musculoskeletal: Negative for arthralgias and myalgias.   Skin: Negative for rash and wound.   Neurological: Negative for dizziness and headaches.   Psychiatric/Behavioral: Negative for agitation, behavioral problems and confusion.     Objective:     Vital Signs (Most Recent):  Temp: 98.4 °F (36.9 °C) (11/15/17 0806)  Pulse: 89 (11/15/17 0806)  Resp: 18 (11/15/17 0806)  BP: 123/81 (11/15/17 0806)  SpO2: (!) 94 % (11/15/17 0806) Vital Signs (24h Range):  Temp:  [97.5 °F (36.4 °C)-98.4 °F (36.9 °C)] 98.4 °F (36.9 °C)  Pulse:  [] 89  Resp:  [18-20] 18  SpO2:  [94 %-96 %] 94 %  BP: (107-137)/(65-81) 123/81     Weight: 79.4 kg (175 lb 0.7 oz)  Body mass index is 25.12 kg/m².    Intake/Output Summary (Last 24 hours) at 11/15/17 1037  Last data filed at 11/15/17 0457   Gross per 24 hour   Intake             1320 ml   Output              401 ml   Net              919 ml      Physical Exam   Constitutional: He is oriented to person, place, and time. He  appears well-developed and well-nourished.   HENT:   Head: Normocephalic and atraumatic.   Eyes: EOM are normal. Pupils are equal, round, and reactive to light.   Cardiovascular: Normal rate, regular rhythm and normal heart sounds.    No murmur heard.  Pulmonary/Chest: Effort normal and breath sounds normal. No respiratory distress. He has no wheezes. He has no rales.   Abdominal: Soft. Bowel sounds are normal. He exhibits distension. There is tenderness.   Musculoskeletal: Normal range of motion.   Neurological: He is alert and oriented to person, place, and time.   Skin: Skin is warm and dry.   Psychiatric: He has a normal mood and affect. His behavior is normal. Thought content normal.       Significant Labs:   CBC:   Recent Labs  Lab 11/09/17  0343 11/13/17  0805   WBC 4.38 5.71   RBC 3.35* 4.27*   HGB 9.1* 11.6*   HCT 27.4* 34.5*    52*   MCV 82 81*   MCH 27.2 27.2   MCHC 33.2 33.6     BMP:   Recent Labs  Lab 11/13/17  1756  11/14/17  0524 11/14/17  1501 11/15/17  0508   *  --  293*  --  213*   *  --  134*  --  132*   K 6.0*  < > 5.6* 5.4* 5.6*     --  106  --  104   CO2 19*  --  18*  --  21*   BUN 60*  --  63*  --  57*   CREATININE 1.6*  --  1.9*  --  1.9*   CALCIUM 8.7  --  8.6*  --  8.4*   MG  --   --  2.3  --  2.0   < > = values in this interval not displayed.  Coagulation:   Recent Labs  Lab 11/13/17  0805   INR 0.9     Microbiology Results (last 7 days)     Procedure Component Value Units Date/Time    Aerobic culture [[id number]] Collected:  11/14/17 1615    Order Status:  Completed Specimen:  Body Fluid from Ascites Updated:  11/15/17 0709     Aerobic Bacterial Culture No growth    Culture, Body Fluid - Bactec [[id number]] Collected:  11/14/17 1615    Order Status:  Completed Specimen:  Ascites Updated:  11/15/17 0115     Body Fluid Culture, Sterile No Growth to date    Culture, Anaerobe [[id number]] Collected:  11/14/17 1615    Order Status:  Canceled Specimen:  Ascites from  Ascites Updated:  11/14/17 1718        Assessment/Plan:      * Hyperkalemia    - K 5.7 on admission 11/13  - lactulose PO/AL  - f/u K        Palliative care encounter              Type 2 diabetes mellitus with stage 2 chronic kidney disease and hypertension    - Glucose not controlled 250-300  - detemir 15u qhs  - aspart 5u TIDWM  - SSI aspart 0-5u wm          Alcoholic cirrhosis of liver with ascites    - Hx of alcohol abuse with cirrhosis  - Pt has repeat Hx of hepatic encephalopathy and SBP, largely due to his non- compliance with medication  - last DC'd on 11/9 with Bactrim for SBP PPx (however Pt did not fill Rx) & lactulose (Pt not reporting adequate BM's)  - Pt remains AAOx3  - On examination, abdomen is soft, distended, tender to palpation  - plan to have adequate BM's with lactulose PO/AL  - f/u with Hepatology as outpatient  - plan is to perform diagnostic paracentesis today  - started diuresis with lasix for abdominal overload                        VTE Risk Mitigation         Ordered     Medium Risk of VTE  Once      11/13/17 1233     Place sequential compression device  Until discontinued      11/13/17 1233              Chris Anaya MD  Department of Hospital Medicine   [facility]

## 2017-11-15 NOTE — PLAN OF CARE
[facility]    HOME HEALTH ORDERS  FACE TO FACE ENCOUNTER    Patient Name: Soy Reza  YOB: 1950    PCP: Jean Carlos Swanson MD   PCP Address: [address] / [facility] [address]  PCP Phone Number: [phone]  PCP Fax: [phone]    Encounter Date: 11/15/2017    Admit to Home Health    Diagnoses:  Active Hospital Problems    Diagnosis  POA    *Hyperkalemia [E87.5]  Yes    Palliative care encounter [Z51.5]  Not Applicable    Alcoholic cirrhosis of liver with ascites [K70.31]  Yes    Type 2 diabetes mellitus with stage 2 chronic kidney disease and hypertension [E11.22, I12.9, N18.2]  Yes      Resolved Hospital Problems    Diagnosis Date Resolved POA   No resolved problems to display.       Future Appointments  Date Time Provider Department Center   11/15/2017 1:40 PM Jean Carlos Swanson MD [facility]     Follow-up Information     Schedule an appointment as soon as possible for a visit with [facility] - Hepatology.    Specialty:  Hepatology  Contact information:  [address]  [facility] [address] [phone]  Additional information:  1st Floor - [facility], located by clinic tower elevators           Jean Carlos Swanson MD.    Specialty:  Family Medicine  Contact information:  [address] [address]  [phone]                     I have seen and examined this patient face to face today. My clinical findings that support the need for the home health skilled services and home bound status are the following:  Patient with medication mismanagement issues requiring home bound status as evidenced by  Poor adherence to medication regimen/dosage.    Allergies:Review of patient's allergies indicates:  No Known Allergies    Diet: diabetic diet: 2000 calorie    Activities: activity as tolerated    Nursing:   SN to complete comprehensive assessment including routine vital signs.  Instruct on disease process and s/s of complications to report to MD. Review/verify medication list sent home with the patient at time of discharge  and instruct patient/caregiver as needed. Frequency may be adjusted depending on start of care date.    Notify MD if SBP > 160 or < 90; DBP > 90 or < 50; HR > 120 or < 50; Temp > 101      Medications: Review discharge medications with patient and family and provide education.      Current Discharge Medication List      START taking these medications    Details   acetaminophen (TYLENOL) 325 MG tablet Take 2 tablets (650 mg total) by mouth every 8 (eight) hours as needed.  Refills: 0      ciprofloxacin HCl (CIPRO) 500 MG tablet Take 1 tablet (500 mg total) by mouth once daily.  Qty: 30 tablet, Refills: 3      furosemide (LASIX) 40 MG tablet Take 1 tablet (40 mg total) by mouth once daily.  Qty: 7 tablet, Refills: 0   Glimepiride (AMARYL) 1mg tablet        Take 1 tablet by mouth once daily before breakfast    Sitagliptin (JANUVIA) 25mg tab             Take 4 tablets by mouth once daily      CONTINUE these medications which have NOT CHANGED    Details   lactulose (CHRONULAC) 10 gram/15 mL solution Take 30 mLs (20 g total) by mouth 3 (three) times daily.  Qty: 236 mL, Refills: 0      aspirin 81 MG Chew Take 81 mg by mouth every other day.          STOP taking these medications       metformin (GLUCOPHAGE-XR) 500 MG 24 hr tablet Comments:   Reason for Stopping:         sulfamethoxazole-trimethoprim 400-80mg (BACTRIM,SEPTRA) 400-80 mg per tablet Comments:   Reason for Stopping:               I certify that this patient is confined to his home and needs intermittent skilled nursing care.

## 2017-11-15 NOTE — NURSING
Discharge instructions reviewed with pt. He states he understands. IV dc'd; cath tip intact. NAD noted. Pt waiting for wheelchair van to transport home. Pt states he ordered a taxi. Pt informed that we have a ride set up for him.

## 2017-11-17 NOTE — PLAN OF CARE
Pt d/c to home w/ h/h - [facility] h/h.     11/17/17 1502   Final Note   Assessment Type Final Discharge Note   Discharge Disposition Home-Health   What phone number can be called within the next 1-3 days to see how you are doing after discharge? [phone]   Hospital Follow Up  Appt(s) scheduled? Yes   Right Care Referral Info   Post Acute Recommendation Home-care   Referral Type h/h   Facility Name [facility] h/h

## 2017-11-17 NOTE — PROGRESS NOTES
Pt keeps going on and on about he needs Insulin. I informed him several times that there is no Insulin on his Med Rec. I instructed him that he needs to follow his diabetic diet, take his meds as ordered and keep a log of his BS and bring it to his f\u appt on the 24th,  He also would go off on tangents while talking like he stated that he fell going up the stair when he first got home. He said I got a few bruises, but I'm ok. 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  When I asked him about his depression he said oh yeah. When I asked him how many days out of 2 weeks He said, I was raised to pick yourself up. I was raised well. I went to good schools. I got it a lot better than some people. I don't remember what else. Then said are you still there. I asked to speak to his Mom, but I was concerned that he was confused. She stated that he is much better and acting more himself today.

## 2017-11-17 NOTE — PHYSICIAN QUERY
PT Name: Soy Reza  MR #: [id number]     Physician Query Form - Documentation Clarification      CDS/: HENRIQUE Guerra, RN, CCDS               Contact information: [email or web address]    This form is a permanent document in the medical record.     Query Date: November 17, 2017    By submitting this query, we are merely seeking further clarification of documentation. Please utilize your independent clinical judgment when addressing the question(s) below.    The Medical record reflects the following:    Supporting Clinical Findings Location in Medical Record   Although pt does have some neuro findings he does not appear to be encephalopathic at this time.    History was taken largely from chart review and telephone conversation w pt's mother as he was acutely encephalopathic at time of interview. Pt recently underwent paracentesis (11/2) which revealed SBP. Hepatology attempted to telephone the pt for direct admission from clinic (11/3), however, (per mother) pt refused admission as symptoms were not severe at that time. Pt's mother states that he asked her to call an ambulance this morning d/t worsening confusion and abdominal pain. Pt's mother endorses poor compliance with medication on the part of her son.     Presents with hepatic encephalopathy, abdominal pain, and nausea/vomiting. On exam, he has stigmata of chronic liver disease as well as multiple scabbed over wounds especially on his legs.     Alcoholism in remission    Neurological: He is alert. He is disoriented. GCS eye subscore is 4. GCS verbal subscore is 2. GCS motor subscore is 6.      Hepatic encephalopathy - Ammonia at 96   - Per pt's mother, he is non-adherent to regimen of lactulose and rifaximin regimen at home   - D/t encephalopathy, will administer lactulose and rifaximin rectally   - May be clouded by reception of opioid medication in the ED  11/5 ED Note      11/5 H&P by [facility]     Ammonia - 96   11/5 Labs                                                                             Doctor, Please specify diagnosis or diagnoses associated with above clinical findings.    Please specify the acuity of Hepatic Encephalopathy.    Provider Use Only      ( x  ) Acute    (   ) Chronic    (   ) Acute on Chronic    (   ) Other (specify): _________________________________________________                                                                                                                 [  ] Clinically undetermined

## 2017-11-17 NOTE — PATIENT INSTRUCTIONS
"  Discharge Instructions for Hyperkalemia  You have been diagnosed with hyperkalemia (a high level of potassium in the blood). Potassium is important to the function of the nerve and muscle cells, including the cells of the heart. But a high level of potassium in the blood can cause  serious problems such as abnormal heart rhythms and even heart attack.  Diet changes  · Eat less of these potassium-rich foods:  ¨ Bananas (avoid bananas completely)  ¨ Apricots, fresh or dried  ¨ Oranges and orange juice  ¨ Grapefruit juice  ¨ Tomatoes, tomato sauce, and tomato juice  ¨ Spinach  ¨ Green, leafy vegetables, including salad greens, kale, broccoli, chard, and collards  ¨ Melons (all kinds)  ¨ Peas  ¨ Beans  ¨ Potatoes  ¨ Sweet potatoes  ¨ Avocados and guacamole  ¨ Vegetable juice (homemade or store-bought) and vegetable juice cocktail  ¨ Fruit juices  ¨ Nuts, including pistachios, almonds, peanuts, hazelnuts, [place], cashew, mixed  ¨ "Lite" or reduced sodium salt  Other home care  · Tell your healthcare provider about all prescription and over-the-counter medicines you are taking. Certain medicines can increase potassium levels.  · Take all medicines exactly as directed.  · Have your potassium levels checked regularly.  · Keep all follow-up appointments. Your healthcare provider needs to monitor your condition closely.  · Learn to take your own pulse. If your pulse is less than 60 beats per minute or irregular, call your provider.  Follow-up  Make a follow-up appointment as directed by our staff.     When to Call Your healthcare provider  Call your provider right away if you have any of the following:  · Chest pain (call 911)  · Fainting (call 911)  · Shortness of breath (call 911 if severe)  · Slow, irregular heartbeat  · Fatigue  · Dizziness  · Lightheadedness  · Confusion   Date Last Reviewed: 6/19/2015  © 7593-5109 [employer]. [address] [place] [address], [place] [address]. All rights reserved. This " information is not intended as a substitute for professional medical care. Always follow your healthcare professional's instructions.

## 2017-11-20 NOTE — TELEPHONE ENCOUNTER
----- Message from Liliana Sera sent at 11/20/2017 10:45 AM CST -----  Contact: pt [phone]   RX request - refill or new RX.  Is this a refill or new RX:  refill  RX name and strength: Oxcycodone  Directions:     RX request - refill or new RX.  Is this a refill or new RX:  refill  RX name and strength: acetaminophen (TYLENOL) 325 MG tablet  Directions:   Is this a 30 day or 90 day RX:    Pharmacy name and phone # [phone]  Comments:

## 2017-11-20 NOTE — PT/OT/SLP DISCHARGE
Occupational Therapy Discharge Summary    Soy Reza  MRN: [id number]   Hyperkalemia   Patient Discharged from acute Occupational Therapy on 11/10/17.  Please refer to prior OT note dated on 11/8/17 for functional status.     Assessment:   Patient was discharge unexpectedly.  Information required to complete and accurate discharge summary is unknown.  Refer to therapy initial evaluation and last progress note for initial and most recent functional status and goal achievement.  Recommendations made may be found in medical record.  GOALS:    Occupational Therapy Goals     Not on file              Reasons for Discontinuation of Therapy Services  Transfer to alternate level of care.      Plan:  Patient Discharged to: Home with Home Health Service.

## 2017-11-20 NOTE — TELEPHONE ENCOUNTER
Last rx was for 15 tabs 5 mg  1 every 6 hrs/ prn on 10-19. That should last him till Dr Swanson returns.   Thanks Jeannette

## 2017-11-22 NOTE — TELEPHONE ENCOUNTER
----- Message from Luarie Hensley sent at 11/17/2017 12:11 PM CST -----  EP patient of DR Major recently discharged needs follow up.

## 2017-11-22 NOTE — TELEPHONE ENCOUNTER
MA attempted to call patient to schedule his hospital discharge follow up he is unable to reached left him  to please give us a callback. Amando

## 2017-11-27 NOTE — TELEPHONE ENCOUNTER
----- Message from Liliana Zamora sent at 11/27/2017 10:19 AM CST -----  Contact: [facility]-luis [phone]  RX request - refill or new RX.  Is this a refill or new RX: refill   RX name and strength: lactulose (CHRONULAC) 10 gram/15 mL solution  Directions:   Is this a 30 day or 90 day RX:    Pharmacy name and phone # [phone]   Comments:  She would like to know was the pt seen 11-    RX request - refill or new RX.  Is this a refill or new RX: refill   RX name and strength: furosemide (LASIX) 40 MG tablet   Directions:   Is this a 30 day or 90 day RX:    Pharmacy name and phone # ): [phone]  Comments:  Pt is out at this time    Also   Winifred Evangelista Staff   Caller: Self [phone] (Today,  9:40 AM)             Pt requesting a call back in regards to refill on all medications.Please advise.

## 2017-11-27 NOTE — TELEPHONE ENCOUNTER
Pt informed meds refilled. He is callin [facility] transportation to see about getting a ride for his appt.

## 2017-11-28 NOTE — TELEPHONE ENCOUNTER
---- Message from Jaen Carlos Swanson MD sent at 11/28/2017 12:38 PM CST -----  Contact: Mulu/ [facility] [phone]  He should go back to ER> There is nothing I can do for this by phone or in clinic

## 2017-11-28 NOTE — TELEPHONE ENCOUNTER
------ Message -----  From: Jeane Ham  Sent: 11/28/2017  12:13 PM  To: Sky Evangelista Staff    Patient's abdomen increased by 14 centimeters. Nurse also has questions and asked if she could speak with Jeannette.

## 2017-11-29 NOTE — TELEPHONE ENCOUNTER
Spoke with luis carlos  nurse. She was informed Per Dr Swanson, pt is to go to the ER immediately. She is not with the pt today, but will try to contact pt.   Spoke with pt, advised to go to ER now. Pt states his abdomen is distended and he is in pain. Pt states he doesn't have a ride. Pt advised to call 911 and get an ambulance to take him to the Er now. Pt voices understanding and states he is going now. Dr Swanson informed

## 2017-11-30 NOTE — TELEPHONE ENCOUNTER
"----- Message from Esther Lin sent at 11/30/2017 11:09 AM CST -----  Contact: self/  RX request - refill or new RX.  Is this a refill or new RX:    RX name and strengthoxyCODONE-acetaminophen (PERCOCET) 5-325 mg per tablet 15 tablet 0 11/20/2017    Sig - Route: Take 1 tablet by mouth every 6 (six) hours as needed for Pain. - Oral         Directions:   Is this a 30 day or 90 day RX:    Pharmacy name and phone # (DON'T enter "on file" or "in chart"):   Comments:        "

## 2017-12-01 NOTE — TELEPHONE ENCOUNTER
----- Message from Alva Yeager sent at 12/1/2017 10:04 AM CST -----  Contact: Pt at [phone]  Patient is returning a phone call.  Who left a message for the patient: Jeannette

## 2017-12-01 NOTE — TELEPHONE ENCOUNTER
----- Message from Jean Carlos Swanson MD sent at 12/1/2017 10:47 AM CST -----  Percocet denied. Rx sent for 30 tablets of 5mg methadone which is likely better for his pain and better than percocet given his severe liver disease. He must come in this month to see me.

## 2017-12-01 NOTE — TELEPHONE ENCOUNTER
Pt called requesting a refill on pain medications. I explained it was denied yesterday and pt ask that I resubmit the request to Dr Swanson.   Request resubmitted to Dr Swanson.  Refill request for percocet.

## 2017-12-01 NOTE — TELEPHONE ENCOUNTER
Pt informed of new rx and where to pick it up from. appt made to follow up with Dr Swanson. appt slip mailed to pt.

## 2017-12-03 PROBLEM — R53.81 PHYSICAL DECONDITIONING: Status: ACTIVE | Noted: 2017-01-01

## 2017-12-03 NOTE — PROCEDURES
"Soy Reza is a 67 y.o. male patient.    Temp: 98 °F (36.7 °C) (17 1233)  Pulse: 87 (17 1233)  Resp: 18 (17 1233)  BP: 130/79 (17 1233)  SpO2: 100 % (17 1233)  Weight: 81.2 kg (179 lb) (17 1829)  Height: 5' 11" (180.3 cm) (17 2237)       Paracentesis  Date/Time: 12/3/2017 3:27 PM  Location procedure was performed: [facility] MEDICINE  Performed by: VANESSA BRYAN  Authorized by: VANESSA BRYAN   Assisting provider: KRAIG KAUFFMAN  Pre-operative diagnosis: ascites  Post-operative diagnosis: ascites  Consent Done: Yes  Consent given by: patient  Patient understanding: patient states understanding of the procedure being performed  Patient consent: the patient's understanding of the procedure matches consent given  Procedure consent: procedure consent matches procedure scheduled  Relevant documents: relevant documents present and verified  Test results: test results available and properly labeled  Site marked: the operative site was marked  Patient identity confirmed: , MRN and name  Procedure purpose: therapeutic  Indications: abdominal discomfort secondary to ascites    Anesthesia:  Local Anesthetic: lidocaine 1% without epinephrine  Patient sedated: no  Preparation: Patient was prepped and draped in the usual sterile fashion.  Description of findings: 20L of opaque straw colored fluid   Ultrasound guidance: yes  Puncture site: right lower quadrant  Fluid appearance: chylous  Complications: No  Estimated blood loss (mL): 1  Specimens: Yes          Vanessa Bryan  12/3/2017    "

## 2017-12-03 NOTE — HPI
66 yo M w/ cirrhosis 2/2 alcoholism (quit 10 months ago) and Hep C, DM 2, HTN w/ recent discharge on 11/15 for abdominal distention, chest pain and hyperkalemia w/ therapeutic paracentesis completed while inpatient. Previous to this he was admitted 11/4-11/9 for treatment of SBP. He re-presents today with SOB, CP, abdominal pain and gross distension. Abdominal distension has worsened over the last day, with increasing tension and pain. He receives therapeutic paracentesis, he states most recently 1 week (although on review was 2 weeks ago while admitted). Upon re-questioning he states he canceled his appointment last week. He is not scheduled for another paracentesis until the end of the month. His chest pain he describes as sharp, radiating across costal margin, 10/10, relieved upon arrival at ED w/ iv morphine w/o palpitations, nausea or diaphoresis and consistent with previous admissions with similar complaints of chest pain. No history of DVTs. He denies any f/a/c, n/v, discoloration of stool or urine, unusual bleeding or bruising. He states he takes lactulose but has not had a bowel movement since Thurs.    In the ED, no EKG changes, troponin was not elevated. Ammonia collected and pending. On chest xray his lungs look hyperinflated but without any other acute process. He was given IV morphine for pain relief.

## 2017-12-03 NOTE — ASSESSMENT & PLAN NOTE
- increased abdominal distension over 1 day duration w/ pain   - W/o f/a/c or leukocytosis or confusion. Suspicion for SBP is low however, will order labs for confirmation.   - Therapeutic & diagnostic paracentesis.   - cont lasix 40mg QD  - in/out

## 2017-12-03 NOTE — HOSPITAL COURSE
Patient was admitted to hospital medicine on 12/2/17 for abdominal pain and gross distension in setting of cirrhosis.  Patient complained of chest pain in the ED, similar to complaints in the past, but had no EKG changes and troponin was normal. He was afebrile, not confused, and abdominal exam was not concerning at the time for SBP. He was started on lactulose and SBP prophylaxis with Cipro.  Labs were significant for anemia, which is chronic, Cr 1.8 (around baseline), and elevated LFTs. On 12/3, a therapeutic paracentesis was performed with 20L of fluid removed and 150g of albumin given. Patient felt unsteady and dizzy afterwards so was kept another night and given some IVFs. Likely d/c 12/4.

## 2017-12-03 NOTE — PROVIDER PROGRESS NOTES - EMERGENCY DEPT.
Encounter Date: 12/2/2017    ED Physician Progress Notes         EKG - STEMI Decision  Initial Reading: No STEMI present.    I, Anastasia Dodson, am scribing for, and in the presence of, Dr. Flores. I performed the above scribed service and the documentation accurately describes the services I performed. I attest to the accuracy of the note.

## 2017-12-03 NOTE — H&P
[facility] Hospital Medicine  History & Physical    Patient Name: Soy Reza  MRN: [id number]  Admission Date: 12/2/2017  Attending Physician: Timbo Garduno MD   Primary Care Provider: Jean Carlos Swanson MD    [facility] Medicine Team: [facility] MED 1 Krystian Felton MD     Patient information was obtained from patient and ER records.     Subjective:     Principal Problem:Ascites due to alcoholic cirrhosis    Chief Complaint:   Chief Complaint   Patient presents with    Shortness of Breath     Pt presented to the ED via  EMS. Pt c/o sob, chest pain, and ascites. Pt has a hx of liver failure.     Chest Pain    Ascites        HPI: 66 yo M w/ cirrhosis 2/2 alcoholism (quit 10 months ago) and Hep C, DM 2, HTN w/ recent discharge on 11/15 for abdominal distention, chest pain and hyperkalemia w/ therapeutic paracentesis completed while inpatient. Previous to this he was admitted 11/4-11/9 for treatment of SBP. He re-presents today with SOB, CP, abdominal pain and gross distension. Abdominal distension has worsened over the last day, with increasing tension and pain. He receives therapeutic paracentesis, most recently 1 week (although on review was 2 weeks ago while admitted). He is not scheduled for another paracentesis until the end of the month. His chest pain he describes as sharp, radiating across costal margin, 10/10, relieved upon arrival at ED w/ iv morphine w/o palpitations, nausea or diaphoresis and consistent with previous admissions with similar complaints of chest pain. No history of DVTs. He denies any f/a/c, n/v, discoloration of stool or urine, unusual bleeding or bruising. He states he takes lactulose but has not had a bowel movement since Thurs.    In the ED, no EKG changes, troponin was not elevated. Ammonia collected and pending. On chest xray his lungs look hyperinflated but without any other acute process. He was given IV morphine for pain relief.     Past Medical History:    Diagnosis Date    Diabetes mellitus     Hep C w/o coma, chronic     Hypertension     Macular degeneration     Neuropathy        Past Surgical History:   Procedure Laterality Date    EYE SURGERY      HAND SURGERY         Review of patient's allergies indicates:  No Known Allergies    No current facility-administered medications on file prior to encounter.      Current Outpatient Prescriptions on File Prior to Encounter   Medication Sig    acetaminophen (TYLENOL) 325 MG tablet Take 2 tablets (650 mg total) by mouth every 8 (eight) hours as needed.    ciprofloxacin HCl (CIPRO) 500 MG tablet Take 1 tablet (500 mg total) by mouth once daily.    furosemide (LASIX) 40 MG tablet Take 1 tablet (40 mg total) by mouth once daily.    glimepiride (AMARYL) 1 MG tablet Take 1 tablet (1 mg total) by mouth before breakfast.    lactulose (CHRONULAC) 10 gram/15 mL solution Take 30 mLs (20 g total) by mouth 3 (three) times daily.    methadone (DOLOPHINE) 5 MG tablet 1/2 tab po q 12 hrs prn severe pain.PLEASE ORDER FOR PT. I KNOW RX MAY NOT BE READY UNTIL MonThank you    potassium chloride SA (K-DUR,KLOR-CON) 20 MEQ tablet Take 1 tablet (20 mEq total) by mouth 2 (two) times daily.    SITagliptin (JANUVIA) 25 MG Tab Take 4 tablets (100 mg total) by mouth once daily.    aspirin 81 MG Chew Take 81 mg by mouth every other day.      Family History     None        Social History Main Topics    Smoking status: Former Smoker    Smokeless tobacco: Never Used    Alcohol use No      Comment: 2 5th per week- stopped one month ago approx 9/10/16    Drug use: No    Sexual activity: No     Review of Systems   Respiratory: Positive for shortness of breath.    Cardiovascular: Positive for chest pain (sharp).   Gastrointestinal: Positive for abdominal distention and abdominal pain.   All other systems reviewed and are negative.    Objective:     Vital Signs (Most Recent):  Temp: 97.9 °F (36.6 °C) (12/02/17 2234)  Pulse: 94  (12/02/17 2234)  Resp: 20 (12/02/17 2234)  BP: 127/84 (12/02/17 2234)  SpO2: 96 % (12/02/17 2234) Vital Signs (24h Range):  Temp:  [97.9 °F (36.6 °C)-98.2 °F (36.8 °C)] 97.9 °F (36.6 °C)  Pulse:  [] 94  Resp:  [17-25] 20  SpO2:  [96 %-99 %] 96 %  BP: (127-169)/(71-91) 127/84     Weight: 81.2 kg (179 lb)  Body mass index is 24.97 kg/m².    Physical Exam   Constitutional: He is oriented to person, place, and time.   Malnourished appearance   Eyes: No scleral icterus.   Cardiovascular: Normal rate, regular rhythm, normal heart sounds and intact distal pulses.    No murmur heard.  Pulmonary/Chest:   Decreased breath sounds in bases w/o crackles or wheezes.   Abdominal: He exhibits distension (Grossly w/ fluid wave). He exhibits no mass. There is no tenderness. There is no rebound and no guarding.   Slight asterixis but oriented x3, responding to all commands appropriately   Musculoskeletal: He exhibits no edema.   Neurological: He is alert and oriented to person, place, and time.   Skin:   W/o bruising but scratches throughout on legs.            Significant Labs:   CBC:   Recent Labs  Lab 12/02/17 1936   WBC 7.49   HGB 9.6*   HCT 29.5*   PLT 90*     CMP:   Recent Labs  Lab 12/02/17 1936   *   K 4.7      CO2 17*   *   BUN 56*   CREATININE 1.8*   CALCIUM 8.2*   PROT 6.8   ALBUMIN 2.4*   BILITOT 0.9   ALKPHOS 210*   AST 56*   ALT 52*   ANIONGAP 9   EGFRNONAA 38.4*     Cardiac Markers:   Recent Labs  Lab 12/02/17 1936   BNP 80     Coagulation:   Recent Labs  Lab 12/02/17 1936   INR 1.0     Lipase:   Recent Labs  Lab 12/02/17 1936   LIPASE 105*     Urine Studies:   Recent Labs  Lab 12/02/17 2104   COLORU Yellow   APPEARANCEUA Clear   PHUR 5.0   SPECGRAV 1.015   PROTEINUA Negative   GLUCUA Negative   KETONESU Negative   BILIRUBINUA Negative   OCCULTUA Negative   NITRITE Negative   UROBILINOGEN Negative   LEUKOCYTESUR Negative       Significant Imaging:     CXR: No radiographic acute  intrathoracic process seen on this single hypoventilatory view.    Assessment/Plan:     * Ascites due to alcoholic cirrhosis    - increased abdominal distension over 1 day duration w/ pain   - W/o f/a/c or leukocytosis or confusion. Suspicion for SBP is low however, will order labs for confirmation.   - Therapeutic & diagnostic paracentesis.           Hepatic encephalopathy    - oriented x3 on presentation w/ minimal asterexis  - however, w/o BM in 2 days. Had 1 large BM upon arrival to floor   - reports diffuse itching and balance issues at home w/ scratches present on exam  - ordering ammonia levels  - cont lactulose 10g/15ml PO TID and titrate to 3-4 BMs per day.             Generalized abdominal pain    - tramadol 50 mg q6 PRN        Essential hypertension    - not currently on BP medication and is in 120s on last measurement  - will continue to monitor             Uncontrolled type 2 diabetes mellitus with diabetic polyneuropathy, without long-term current use of insulin    - low dose sliding scale  - diabetic diet            VTE Risk Mitigation         Ordered     Medium Risk of VTE  Once      12/02/17 2236     Place LYNETTE hose  Until discontinued      12/02/17 2236             Krystian Felton MD  Department of Hospital Medicine   [facility]

## 2017-12-03 NOTE — ASSESSMENT & PLAN NOTE
- oriented x3 on presentation w/ minimal asterexis  - however, w/o BM in 2 days. Had 1 large BM upon arrival to floor   - reports diffuse itching and balance issues at home w/ scratches present on exam  - ordering ammonia levels  - cont lactulose 10g/15ml PO TID and titrate to 3-4 BMs per day.

## 2017-12-03 NOTE — PLAN OF CARE
Problem: Diabetes, Type 2 (Adult)  Goal: Signs and Symptoms of Listed Potential Problems Will be Absent, Minimized or Managed (Diabetes, Type 2)  Signs and symptoms of listed potential problems will be absent, minimized or managed by discharge/transition of care (reference Diabetes, Type 2 (Adult) CPG).   Blood sugar monitored

## 2017-12-03 NOTE — UM SECONDARY REVIEW
Physician Advisor External    Level of Care Issue    Denied Observation- 12/03/2017 @ 1100- per Dr. Kg Jerez, EHR, recommends upgrade to Inpatient.  Dr. Jeffers has upgraded orders from OBS to Inpatient.  CATHY, RN

## 2017-12-03 NOTE — ASSESSMENT & PLAN NOTE
- increased abdominal distension over 1 day duration w/ pain   - W/o f/a/c or leukocytosis or confusion. Suspicion for SBP is low however, will order labs for confirmation.   - Therapeutic & diagnostic paracentesis.

## 2017-12-03 NOTE — SUBJECTIVE & OBJECTIVE
Past Medical History:   Diagnosis Date    Diabetes mellitus     Hep C w/o coma, chronic     Hypertension     Macular degeneration     Neuropathy        Past Surgical History:   Procedure Laterality Date    EYE SURGERY      HAND SURGERY         Review of patient's allergies indicates:  No Known Allergies    No current facility-administered medications on file prior to encounter.      Current Outpatient Prescriptions on File Prior to Encounter   Medication Sig    acetaminophen (TYLENOL) 325 MG tablet Take 2 tablets (650 mg total) by mouth every 8 (eight) hours as needed.    ciprofloxacin HCl (CIPRO) 500 MG tablet Take 1 tablet (500 mg total) by mouth once daily.    furosemide (LASIX) 40 MG tablet Take 1 tablet (40 mg total) by mouth once daily.    glimepiride (AMARYL) 1 MG tablet Take 1 tablet (1 mg total) by mouth before breakfast.    lactulose (CHRONULAC) 10 gram/15 mL solution Take 30 mLs (20 g total) by mouth 3 (three) times daily.    methadone (DOLOPHINE) 5 MG tablet 1/2 tab po q 12 hrs prn severe pain.PLEASE ORDER FOR PT. I KNOW RX MAY NOT BE READY UNTIL MonThank you    potassium chloride SA (K-DUR,KLOR-CON) 20 MEQ tablet Take 1 tablet (20 mEq total) by mouth 2 (two) times daily.    SITagliptin (JANUVIA) 25 MG Tab Take 4 tablets (100 mg total) by mouth once daily.    aspirin 81 MG Chew Take 81 mg by mouth every other day.      Family History     None        Social History Main Topics    Smoking status: Former Smoker    Smokeless tobacco: Never Used    Alcohol use No      Comment: 2 5th per week- stopped one month ago approx 9/10/16    Drug use: No    Sexual activity: No     Review of Systems   Respiratory: Positive for shortness of breath.    Cardiovascular: Positive for chest pain (sharp).   Gastrointestinal: Positive for abdominal distention and abdominal pain.   All other systems reviewed and are negative.    Objective:     Vital Signs (Most Recent):  Temp: 97.9 °F (36.6 °C) (12/02/17  2234)  Pulse: 94 (12/02/17 2234)  Resp: 20 (12/02/17 2234)  BP: 127/84 (12/02/17 2234)  SpO2: 96 % (12/02/17 2234) Vital Signs (24h Range):  Temp:  [97.9 °F (36.6 °C)-98.2 °F (36.8 °C)] 97.9 °F (36.6 °C)  Pulse:  [] 94  Resp:  [17-25] 20  SpO2:  [96 %-99 %] 96 %  BP: (127-169)/(71-91) 127/84     Weight: 81.2 kg (179 lb)  Body mass index is 24.97 kg/m².    Physical Exam   Constitutional: He is oriented to person, place, and time.   Malnourished appearance   Eyes: No scleral icterus.   Cardiovascular: Normal rate, regular rhythm, normal heart sounds and intact distal pulses.    No murmur heard.  Pulmonary/Chest:   Decreased breath sounds in bases w/o crackles or wheezes.   Abdominal: He exhibits distension (Grossly w/ fluid wave). He exhibits no mass. There is no tenderness. There is no rebound and no guarding.   Slight asterixis but oriented x3, responding to all commands appropriately   Musculoskeletal: He exhibits no edema.   Neurological: He is alert and oriented to person, place, and time.   Skin:   W/o bruising but scratches throughout on legs.            Significant Labs:   CBC:   Recent Labs  Lab 12/02/17 1936   WBC 7.49   HGB 9.6*   HCT 29.5*   PLT 90*     CMP:   Recent Labs  Lab 12/02/17 1936   *   K 4.7      CO2 17*   *   BUN 56*   CREATININE 1.8*   CALCIUM 8.2*   PROT 6.8   ALBUMIN 2.4*   BILITOT 0.9   ALKPHOS 210*   AST 56*   ALT 52*   ANIONGAP 9   EGFRNONAA 38.4*     Cardiac Markers:   Recent Labs  Lab 12/02/17 1936   BNP 80     Coagulation:   Recent Labs  Lab 12/02/17 1936   INR 1.0     Lipase:   Recent Labs  Lab 12/02/17 1936   LIPASE 105*     Urine Studies:   Recent Labs  Lab 12/02/17 2104   COLORU Yellow   APPEARANCEUA Clear   PHUR 5.0   SPECGRAV 1.015   PROTEINUA Negative   GLUCUA Negative   KETONESU Negative   BILIRUBINUA Negative   OCCULTUA Negative   NITRITE Negative   UROBILINOGEN Negative   LEUKOCYTESUR Negative       Significant Imaging:     CXR: No radiographic  acute intrathoracic process seen on this single hypoventilatory view.

## 2017-12-03 NOTE — ED PROVIDER NOTES
Encounter Date: 12/2/2017       History     Chief Complaint   Patient presents with    Shortness of Breath     Pt presented to the ED via  EMS. Pt c/o sob, chest pain, and ascites. Pt has a hx of liver failure.     Chest Pain    Ascites     67-year-old male with medical history of alcoholic cirrhosis, diabetes mellitus type 2 and hypertension presents to the ED with shortness of breath.  Patient also endorses chest pain and abdominal pain and distention.  Patient states he receives paracentesis weekly, last one was one week ago but states his next appointment is on December 23.  According to the patient, 5 L was taken off during last paracentesis Patient seems to be a poor historian. States he has been taking meds as prescribed.  Abdominal distention and shortness of breath have worsened in the past 4 days.  Chest pain has been present ×2 days.  Patient denies fever, chills, nausea, vomiting, weakness, syncope, changes in urination, changes in bowel, altered mental status.          Review of patient's allergies indicates:  No Known Allergies  Past Medical History:   Diagnosis Date    Diabetes mellitus     Hep C w/o coma, chronic     Hypertension     Macular degeneration     Neuropathy      Past Surgical History:   Procedure Laterality Date    EYE SURGERY      HAND SURGERY       History reviewed. No pertinent family history.  Social History   Substance Use Topics    Smoking status: Former Smoker    Smokeless tobacco: Never Used    Alcohol use No      Comment: 2 5th per week- stopped one month ago approx 9/10/16     Review of Systems   Constitutional: Negative for diaphoresis, fatigue and fever.   HENT: Negative for congestion and nosebleeds.    Eyes: Negative for visual disturbance.   Respiratory: Positive for shortness of breath. Negative for cough.    Cardiovascular: Positive for chest pain. Negative for leg swelling.   Gastrointestinal: Positive for abdominal distention, abdominal pain and nausea.  Negative for blood in stool, constipation, diarrhea and vomiting.   Genitourinary: Negative for dysuria, flank pain and hematuria.   Musculoskeletal: Negative for back pain and neck pain.   Skin: Negative for rash.   Neurological: Negative for syncope, weakness, light-headedness and headaches.   Psychiatric/Behavioral: The patient is not nervous/anxious.        Physical Exam     Initial Vitals [12/02/17 1829]   BP Pulse Resp Temp SpO2   (!) 169/71 78 17 98.2 °F (36.8 °C) 97 %      MAP       103.67         Physical Exam    Vitals reviewed.  Constitutional: He appears well-developed and well-nourished. He is not diaphoretic. No distress.   HENT:   Head: Normocephalic and atraumatic.   Nose: Nose normal.   Mouth/Throat: Oropharynx is clear and moist. No oropharyngeal exudate.   Eyes: Conjunctivae and EOM are normal. Pupils are equal, round, and reactive to light.   Neck: Normal range of motion. Neck supple. No thyromegaly present.   Cardiovascular: Normal rate, regular rhythm and intact distal pulses.   Pulmonary/Chest: Breath sounds normal. No respiratory distress. He has no wheezes. He exhibits no tenderness.   Abdominal: He exhibits distension, fluid wave and ascites. There is tenderness. There is no rigidity, no rebound and no guarding.   Musculoskeletal: Normal range of motion. He exhibits no tenderness.   Lymphadenopathy:     He has no cervical adenopathy.   Neurological: He is alert and oriented to person, place, and time. He has normal strength. No cranial nerve deficit or sensory deficit.   Skin: Skin is warm and dry. Capillary refill takes less than 2 seconds. No rash noted.   Psychiatric: He has a normal mood and affect.         ED Course   Procedures  Labs Reviewed   CBC W/ AUTO DIFFERENTIAL - Abnormal; Notable for the following:        Result Value    RBC 3.71 (*)     Hemoglobin 9.6 (*)     Hematocrit 29.5 (*)     MCV 80 (*)     MCH 25.9 (*)     RDW 15.1 (*)     Platelets 90 (*)     Lymph # 0.4 (*)      Gran% 80.7 (*)     Lymph% 5.2 (*)     All other components within normal limits   COMPREHENSIVE METABOLIC PANEL - Abnormal; Notable for the following:     Sodium 133 (*)     CO2 17 (*)     Glucose 172 (*)     BUN, Bld 56 (*)     Creatinine 1.8 (*)     Calcium 8.2 (*)     Albumin 2.4 (*)     Alkaline Phosphatase 210 (*)     AST 56 (*)     ALT 52 (*)     eGFR if  44.3 (*)     eGFR if non  38.4 (*)     All other components within normal limits   LIPASE - Abnormal; Notable for the following:     Lipase 105 (*)     All other components within normal limits   B-TYPE NATRIURETIC PEPTIDE   MAGNESIUM   TROPONIN I   PROTIME-INR   URINALYSIS, REFLEX TO URINE CULTURE    Narrative:     Preferred Collection Type->Urine, Clean Catch   AMMONIA         Imaging Results          X-Ray Chest AP Portable (Final result)  Result time 12/02/17 20:03:18    Final result by Julian Mack MD (12/02/17 20:03:18)                 Impression:        No radiographic acute intrathoracic process seen on this single hypoventilatory view.      Electronically signed by: JULIAN MACK MD, MD  Date:     12/02/17  Time:    20:03              Narrative:    COMPARISON: Chest [facility] 11/13/17    FINDINGS: AP portable view of the chest. Monitoring leads overlie the chest. The lungs are symmetrically hypoinflated without large consolidation or definite new focal opacity. Left basilar linear opacities consistent with platelike scarring versus atelectasis. Pulmonary vasculature and hilar regions are grossly within normal limits. No large pleural effusion or pneumothorax. Cardiomediastinal silhouette is within normal limits, allowing for AP projection and low lung volumes. No acute osseous process seen.   PA and lateral views can be obtained.                                 Medical Decision Making:   History:   Old Medical Records: I decided to obtain old medical records.  Old Records Summarized: records from clinic  visits.  Clinical Tests:   Lab Tests: Ordered and Reviewed  Radiological Study: Ordered and Reviewed  Medical Tests: Ordered and Reviewed       APC / Resident Notes:   67-year-old male with medical history of alcoholic cirrhosis, diabetes mellitus type 2 and hypertension presents to the ED with shortness of breath.     DDx includes but is not limited to ascites, ACS, CHF, arrhythmia, electrolyte abnormality, hepatic encephalopathy, SBP. Will get labs, CXR and EKG. H/H 9.6/29.5. Patient denies blood in stool or melena. BUN 56, Cr 1.8, liver enzymes elevated. Lipase 105. Troponin negative at .020. All other labs benign. Since patient afebrile and no white count, least likely suspect SBP. Will admit patient for ascites, need for paracentesis and elevated liver enzymes. Ammonia pending. Will place     I have discussed and reviewed with my supervising physician.         Attending Attestation:     Physician Attestation Statement for NP/PA:   I discussed this assessment and plan of this patient with the NP/PA, but I did not personally examine the patient. The face to face encounter was performed by the NP/PA.                  ED Course      Clinical Impression:   The primary encounter diagnosis was Ascites due to alcoholic cirrhosis. A diagnosis of Shortness of breath was also pertinent to this visit.    Disposition:   Disposition: Placed in Observation  Condition: Stable                        Sera Solano PA-C  12/03/17 0158       Ricardo Flores MD  12/04/17 7281

## 2017-12-03 NOTE — ASSESSMENT & PLAN NOTE
- not currently on BP medication and is in 120s on last measurement  - will continue to monitor

## 2017-12-04 NOTE — CARE UPDATE
Called by nurse that patient would like to leave. Saw patient and talked to him about the reasons for him leaving, I explained that his BP has been low and he has not been having a steady gait. Given multiple attempts to explain to patient the risk of him leaving , he remained insistent to leave. He signed AMA paperwork. I explained that he has a risk of falling, injuring himself, and even death.      Updated the nurse and she will arrange for him to get wheel chair to his cab ride        Hue Kaur MD, MPH  Internal Medicine PGY1  [address] [place] [address]

## 2017-12-05 NOTE — DISCHARGE SUMMARY
DISCHARGE SUMMARY  Hospital Medicine    Team: [facility]OS[facility] MED 1    Patient Name: Soy Reza  YOB: 1950    Admit Date: 12/2/2017    Discharge Date: 12/04/2017    Discharge Attending Physician: Timbo Garduno MD    Resident on Service: Vanessa Bryan MD    Chief Complaint: Abdominal pain and distention     Princilpal Diagnoses:  Active Hospital Problems    Diagnosis  POA    *Ascites due to alcoholic cirrhosis [K70.31]  Yes    Physical deconditioning [R53.81]  Yes    Hepatic encephalopathy [K72.90]  Yes    Generalized abdominal pain [R10.84]  Yes    Uncontrolled type 2 diabetes mellitus with diabetic polyneuropathy, without long-term current use of insulin [E11.42, E11.65]  Yes     Chronic    Essential hypertension [I10]  Yes     Chronic      Resolved Hospital Problems    Diagnosis Date Resolved POA   No resolved problems to display.       Discharged Condition: Admit problems had stabilized     HOSPITAL COURSE:      Initial Presentation:    Mr. Reza is a 66 y/o M w/ alcoholic cirrhosis (quit 10 months ago) and Hep C, DM 2, HTN who presented for abdominal pain and distention.     He had a recent admission 11/4-11/9 for treatment of SBP. He re-presented with SOB, CP, abdominal pain and gross distension. Abdominal distension had worsened over the last day, with increasing tension and pain. He receives therapeutic paracentesis, most recently 1 week ago (although on review was 2 weeks ago while admitted). He is not scheduled for another paracentesis until the end of the month.     He describes his chest pain as sharp, radiating across costal margin, 10/10, relieved upon arrival at ED w/ IVmorphine. Denies palpitations, nausea or diaphoresis. In the ED, no EKG changes, troponin was not elevated    He states he takes lactulose but has not had a bowel movement since Thurs.     Course of Principle Problem for Admission:    Pt had a therapeutic and diagnostic tap with 20L opaque straw colored ascitic  fluid removed. Analysis negative for SBP. Overnight pt left AMA despite his risks being explained (unsteady gait, low BP) etc.     CONSULTS: none    Disposition:  AMA    Future Appointments  Date Time Provider Department Center   12/12/2017 1:40 PM Jean Carlos Swanson MD [facility] IM [place]   1/3/2018 2:00 PM Star Major MD [facility] HEPAT [place]     Discharge Medication List:     Soy Reza   Home Medication Instructions GARY:[id number]    Printed on:12/04/17 2055   Medication Information                      ciprofloxacin HCl (CIPRO) 500 MG tablet  Take 1 tablet (500 mg total) by mouth once daily.             furosemide (LASIX) 40 MG tablet  Take 1 tablet (40 mg total) by mouth once daily.             glimepiride (AMARYL) 1 MG tablet  Take 1 tablet (1 mg total) by mouth before breakfast.             lactulose (CHRONULAC) 10 gram/15 mL solution  Take 30 mLs (20 g total) by mouth 3 (three) times daily.             SITagliptin (JANUVIA) 100 MG Tab  Take 100 mg by mouth once daily.                 Patient Instructions:  No discharge procedures on file.    At the time of discharge patient was told to take all medications as prescribed, to keep all followup appointments, and to call their primary care physician or return to the emergency room if they have any worsening or concerning symptoms.    Patient seen and plan discussed with Staff.    Signing Physician:    Juanita Benson MD  Internal Medicine, PGY-2  Pager: [phone]

## 2017-12-07 NOTE — TELEPHONE ENCOUNTER
"Per Dr Swanson "He will need to come in to discuss BUT Pain meds are not a good idea now, I think, unless ordered by his liver doctor or his hospital doctors who see better what's going on than I do. "     Called ptSEBASTIAN to call clinic  "
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
## 2017-12-10 PROBLEM — E11.9 TYPE 2 DIABETES MELLITUS: Status: ACTIVE | Noted: 2017-01-01

## 2017-12-10 NOTE — HOSPITAL COURSE
"Soy Reza was admitted for symptomatic hyperkalemia, he was shifted in the Ed. Large volume ascites on exam, diagnostic/therapeutic paracentesis with 3L chylous fluid removed in the ED on DOA. His hyperkalemia resolved with standard treatment, last resulted lab value 4.5. His primary medicine had multiple discussions with him regarding his prognosis and options for his care moving forward, though patient would commit to none of the options. Home with palliative is the most appropriate course of treatment, though patient refused this admission after discussion and opted for home with home health instead, see discussion below which summarized primary team and palliative conversations:    Spoke to Dr. Cohn with IM. Per MD, he would like [facility] to discuss goals of care/hospice with pt who has advanced liver disease and not a candidate for liver transplant. Per MD, pt to have possible ascites drain placed.      Met with pt who verbalizes that he understands he has advanced liver disease. Per pt, he comes to hospital to receive paracentesis. Per pt, he was admitted this admit because of fall. Per pt, he lives with his 93 y/o mother. Per pt, she is active for a 93 y/o.      Spoke to pt about his goals of care. Per pt, Dr. Cohn had mentioned hospice care to pt earlier today. Education done with pt concerning home hospice level of care and inpt level of care. Explained to pt that most hospice agencies do not send pt back and forth to hospital for paracentesis. Per pt, possible ascites drain had been mentioned but he does not want to have placed because he was told on his last admit by MD that the drain was "bad to get". Asked pt to elaborate on this. Pt unable to.  Let pt know that IM team could be able to talk to pt more about. Per pt, no way will he get "unless Dr. Foster says it is okay". Per pt, she was his physician last admit.     Explained to pt that if his goals is to continue to come back and forth to " hospital for paracentesis and to see his physicians, that it is recommended pt go home with home health and a Palliative care program that is able to send NP out to visit.  [facility] and [facility] have a palliative care program with an NP.     Riana Cantu LCSW spoke to [facility] agency that may be able to do paracentesis    Patient is being discharged to home with home health, new prescriptions for his januvia and glimepiride, lactulose, lasix 40mg daily, were given in addition to cipro 500mg daily and rifaximin. Case management will attempt to set up outpatient paracentesis with [facility] as indicated above for the week following discharge. Ambulatory referrals to patients PCP and primary Hepatologist here at [facility] were made.

## 2017-12-10 NOTE — ASSESSMENT & PLAN NOTE
- increased abdominal distension over past week with generalized abdominal pain    - Mildly confused, no leukocytosis.  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

## 2017-12-10 NOTE — H&P
[facility] Hospital Medicine  History & Physical    Patient Name: Soy Reza  MRN: [id number]  Admission Date: 12/10/2017  Attending Physician: Adria Ye MD   Primary Care Provider: Jean Carlos Swanson MD    [facility] Medicine Team: [facility] 4 Manny Chon MD     Patient information was obtained from patient and ER records.     Subjective:     Principal Problem:Acute hyperkalemia    Chief Complaint:   Chief Complaint   Patient presents with    Fall     Unwitnessed fall at home. Fell, struck head. Hematoma to back of head. No LOC        HPI: Soy Reza is a 67 y.o. with HCV cirrhosis (diagnosied in July, has not received treatment), DMII (home Januvia and glimepiride; questionable compliance), HTN, and CKD who p/t ED via EMS from home after he tripped on a table leg the Am, fell backwards and hit his head. Patinet states that he did not lose consciousness before or afer the event, denies diziness/presyncopal sx or lightheadfedness, and that he simply did not see the table. He is also complaining of generalized abdominal pain that does not radiate to his back. He was found to be hyperkalemic at 7.0 (repeat 6.70 in the Ed, without EKG changes, but mildly altered.(baseline unknown.) Ct head negative for acute bleed. He is being admitted for symptomatic hyperkalemia.     Past Medical History:   Diagnosis Date    Diabetes mellitus     Hep C w/o coma, chronic     Hypertension     Macular degeneration     Neuropathy        Past Surgical History:   Procedure Laterality Date    EYE SURGERY      HAND SURGERY         Review of patient's allergies indicates:  No Known Allergies    No current facility-administered medications on file prior to encounter.      Current Outpatient Prescriptions on File Prior to Encounter   Medication Sig    ciprofloxacin HCl (CIPRO) 500 MG tablet Take 1 tablet (500 mg total) by mouth once daily.    glimepiride (AMARYL) 1 MG tablet Take 1 tablet (1  mg total) by mouth before breakfast.    lactulose (CHRONULAC) 10 gram/15 mL solution Take 30 mLs (20 g total) by mouth 3 (three) times daily.    SITagliptin (JANUVIA) 100 MG Tab Take 100 mg by mouth once daily.    [DISCONTINUED] furosemide (LASIX) 40 MG tablet Take 1 tablet (40 mg total) by mouth once daily.     Family History     None        Social History Main Topics    Smoking status: Former Smoker    Smokeless tobacco: Never Used    Alcohol use No      Comment: 2 5th per week- stopped one month ago approx 9/10/16    Drug use: No    Sexual activity: No     Review of Systems   Constitutional: Positive for fatigue. Negative for activity change and appetite change.   HENT: Negative for congestion and facial swelling.    Eyes: Negative for discharge and itching.   Respiratory: Negative for apnea, chest tightness and shortness of breath.    Cardiovascular: Negative for chest pain and palpitations.   Gastrointestinal: Positive for abdominal distention and abdominal pain. Negative for anal bleeding and blood in stool.   Endocrine: Negative for cold intolerance and heat intolerance.   Genitourinary: Negative for difficulty urinating and dysuria.   Musculoskeletal: Positive for arthralgias (L knee and thigh pain ).   Skin: Negative for color change and pallor.   Neurological: Negative for dizziness and headaches.   Psychiatric/Behavioral: Positive for confusion. Negative for agitation and behavioral problems.     Objective:     Vital Signs (Most Recent):  Temp: 97.6 °F (36.4 °C) (12/10/17 0459)  Pulse: 92 (12/10/17 1031)  Resp: 18 (12/10/17 1031)  BP: 116/63 (12/10/17 1031)  SpO2: 97 % (12/10/17 1031) Vital Signs (24h Range):  Temp:  [97.6 °F (36.4 °C)] 97.6 °F (36.4 °C)  Pulse:  [64-96] 92  Resp:  [15-30] 18  SpO2:  [97 %-100 %] 97 %  BP: (116-170)/() 116/63     Weight: 81.2 kg (179 lb)  Body mass index is 24.97 kg/m².    Physical Exam   Constitutional: He appears well-developed and well-nourished. He  appears lethargic.   HENT:   Head: Normocephalic.   Small hematoma on posterior aspect of head s/p fall   Eyes: Conjunctivae and EOM are normal.   Neck: No JVD present. No tracheal deviation present.   Cardiovascular: Normal rate, regular rhythm and normal heart sounds.    Pulmonary/Chest: Effort normal and breath sounds normal.   Abdominal: Soft. Bowel sounds are normal. He exhibits distension and ascites. There is generalized tenderness.   Musculoskeletal: He exhibits no edema or deformity.   Scrapes overlying the L knee   Neurological: He appears lethargic.   Oriented to person, location, and season of the year   Skin: Skin is warm and dry.   Psychiatric: He has a normal mood and affect. His behavior is normal.   Vitals reviewed.        CRANIAL NERVES     CN III, IV, VI   Extraocular motions are normal.        Significant Labs:   Recent Results (from the past 24 hour(s))   CBC auto differential    Collection Time: 12/10/17  6:32 AM   Result Value Ref Range    WBC 8.65 3.90 - 12.70 K/uL    RBC 4.14 (L) 4.60 - 6.20 M/uL    Hemoglobin 10.3 (L) 14.0 - 18.0 g/dL    Hematocrit 33.0 (L) 40.0 - 54.0 %    MCV 80 (L) 82 - 98 fL    MCH 24.9 (L) 27.0 - 31.0 pg    MCHC 31.2 (L) 32.0 - 36.0 g/dL    RDW 15.0 (H) 11.5 - 14.5 %    Platelets 168 150 - 350 K/uL    MPV 9.6 9.2 - 12.9 fL    Immature Granulocytes 0.5 0.0 - 0.5 %    Gran # 6.7 1.8 - 7.7 K/uL    Immature Grans (Abs) 0.04 0.00 - 0.04 K/uL    Lymph # 0.8 (L) 1.0 - 4.8 K/uL    Mono # 1.0 0.3 - 1.0 K/uL    Eos # 0.1 0.0 - 0.5 K/uL    Baso # 0.05 0.00 - 0.20 K/uL    nRBC 0 0 /100 WBC    Gran% 77.1 (H) 38.0 - 73.0 %    Lymph% 8.7 (L) 18.0 - 48.0 %    Mono% 11.8 4.0 - 15.0 %    Eosinophil% 1.3 0.0 - 8.0 %    Basophil% 0.6 0.0 - 1.9 %    Differential Method Automated    Comprehensive metabolic panel    Collection Time: 12/10/17  6:32 AM   Result Value Ref Range    Sodium 130 (L) 136 - 145 mmol/L    Potassium 7.0 (HH) 3.5 - 5.1 mmol/L    Chloride 107 95 - 110 mmol/L    CO2 16  (L) 23 - 29 mmol/L    Glucose 134 (H) 70 - 110 mg/dL    BUN, Bld 75 (H) 8 - 23 mg/dL    Creatinine 1.7 (H) 0.5 - 1.4 mg/dL    Calcium 8.6 (L) 8.7 - 10.5 mg/dL    Total Protein 6.6 6.0 - 8.4 g/dL    Albumin 2.5 (L) 3.5 - 5.2 g/dL    Total Bilirubin 0.9 0.1 - 1.0 mg/dL    Alkaline Phosphatase 150 (H) 55 - 135 U/L    AST 50 (H) 10 - 40 U/L    ALT 47 (H) 10 - 44 U/L    Anion Gap 7 (L) 8 - 16 mmol/L    eGFR if African American 47.5 (A) >60 mL/min/1.73 m^2    eGFR if non  41.1 (A) >60 mL/min/1.73 m^2   Lipase    Collection Time: 12/10/17  6:32 AM   Result Value Ref Range    Lipase 199 (H) 4 - 60 U/L   CK-MB    Collection Time: 12/10/17  6:32 AM   Result Value Ref Range    CPK 45 20 - 200 U/L    CPK MB 4.6 0.1 - 6.5 ng/mL    MB% 10.2 (H) 0.0 - 5.0 %   Potassium    Collection Time: 12/10/17  7:48 AM   Result Value Ref Range    Potassium 6.7 (HH) 3.5 - 5.1 mmol/L   ISTAT PROCEDURE    Collection Time: 12/10/17  7:54 AM   Result Value Ref Range    POC Glucose 118 (H) 70 - 110 mg/dL    POC BUN 73 (H) 6 - 30 mg/dL    POC Creatinine 1.8 (H) 0.5 - 1.4 mg/dL    POC Sodium 133 (L) 136 - 145 mmol/L    POC Potassium 6.6 (HH) 3.5 - 5.1 mmol/L    POC Chloride 106 95 - 110 mmol/L    POC TCO2 (MEASURED) 19 (L) 23 - 29 mmol/L    POC Ionized Calcium 1.15 1.06 - 1.42 mmol/L    POC Hematocrit 26 (L) 36 - 54 %PCV    Sample HENRY    POCT glucose    Collection Time: 12/10/17  8:59 AM   Result Value Ref Range    POCT Glucose 121 (H) 70 - 110 mg/dL     Significant Imaging:   Ct Head w/o 12/10/2017:  Noncontrast CT demonstrates no evidence of acute intracranial pathology.    Scalp hematoma overlying the parietal calvarium at the midline.    Findings compatible with chronic microvascular ischemic change.      Assessment/Plan:     * Acute hyperkalemia    7.0 on admission; repeat 6.7.  S/p shift in the Ed with insulin, calcium gluconate, albuterol, kaylexalate   -q4 BMP  -Urine Na, Cl, K, urea, Cr, osms IP  -UA ordered      Ascites due  to alcoholic cirrhosis    - increased abdominal distension over past week with generalized abdominal pain    - Mildly confused, no leukocytosis.  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      Fall    Fall, hit head, Ct negative for acute bleed  -Hemotoma on posterior aspect of head  -Will continue to monitor as patient is slightly altered, more likely hepatic etiology       Essential hypertension     on admission; normotensive currently  -Will continue to monitor vitals  -Not on home antihypertensives      Hep C w/o coma, chronic    Diagnosed in July 17  -Thus far has not begun treatment      Uncontrolled type 2 diabetes mellitus with diabetic polyneuropathy, without long-term current use of insulin    Glucose 134 on admit  -SSI and will monitor        VTE Risk Mitigation         Ordered     Medium Risk of VTE  Once      12/10/17 0824     Place LYNETTE tsee  Until discontinued      12/10/17 0824          Manny Cohn MD  Department of Hospital Medicine   [facility]

## 2017-12-10 NOTE — ED TRIAGE NOTES
67 year old male presents to the ED via EMS for evaluation after a fall at home. Patient was walking and ran into table, fell back and struck head. No LOC

## 2017-12-10 NOTE — ED NOTES
Pt reports moderate relief of back, abdominal and headache pain. Pt calm with spontaneous respirations with normal rate and effort. No needs at this time.

## 2017-12-10 NOTE — ED NOTES
Pt immediately vomited after ingesting sodium polystyrene. Pt dropped the second bottle on the floor.

## 2017-12-10 NOTE — ED NOTES
The patient is awake, alert and cooperative with a calm affect, patient is aware of environment. Airway is open and patent, respirations are spontaneous, normal respiratory effort and rate noted, skin warm and dry, moves all extremities well, appearance: no apparent distress noted. Patient states he would like pain medication for a headache and nausea medication. Dr. Cisse aware. Side rails x 2. Call bell within reach. Will continue to monitor.

## 2017-12-10 NOTE — PROCEDURES
"Soy Reza is a 67 y.o. male patient.    Temp: 97.6 °F (36.4 °C) (12/10/17 1215)  Pulse: 93 (12/10/17 1215)  Resp: 16 (12/10/17 1215)  BP: 119/67 (12/10/17 1215)  SpO2: (!) 94 % (12/10/17 1215)  Weight: 81.2 kg (179 lb) (12/10/17 0459)  Height: 5' 11" (180.3 cm) (12/10/17 0459)       Paracentesis  Date/Time: 12/10/2017 2:54 PM  Location procedure was performed: [facility] EMERGENCY DEPARTMENT  Performed by: SINTIA LAWLER  Authorized by: CONNIE REYNOLDS   Assisting provider: CONNIE REYNOLDS  Pre-operative diagnosis: ascites  Post-operative diagnosis: ascites  Consent Done: Yes  Consent: Verbal consent obtained. Written consent obtained.  Consent given by: patient  Patient understanding: patient states understanding of the procedure being performed  Patient consent: the patient's understanding of the procedure matches consent given  Procedure consent: procedure consent matches procedure scheduled  Relevant documents: relevant documents present and verified  Test results: test results available and properly labeled  Site marked: the operative site was marked  Imaging studies: imaging studies not available  Patient identity confirmed: EREN WILL, provided demographic data, name and verbally with patient  Time out: Immediately prior to procedure a "time out" was called to verify the correct patient, procedure, equipment, support staff and site/side marked as required.  Initial or subsequent exam: initial  Procedure purpose: diagnostic and therapeutic  Indications: abdominal discomfort secondary to ascites    Anesthesia:  Local Anesthetic: lidocaine 1% without epinephrine  Anesthetic total: 3 mL  Patient sedated: no  Preparation: Patient was prepped and draped in the usual sterile fashion.  Fluid appearance: chylous  Estimated blood loss (mL): 1  Specimens: Yes  Implants: No  Patient tolerance: Patient tolerated the procedure well with no immediate complications  Comments: Procedure performed with pigtail centesis " catheter, 3L of chylous peritoneal fluid was removed, cultures sent for SBP      Manny Cohn MD  Internal Medicine PGY-1  [facility]

## 2017-12-10 NOTE — ED PROVIDER NOTES
Encounter Date: 12/10/2017    SCRIBE #1 NOTE: I, Leanne Arroyo, am scribing for, and in the presence of,  Dr. Noble. I have scribed the following portions of the note - the Resident attestation.       History     Chief Complaint   Patient presents with    Fall     Unwitnessed fall at home. Fell, struck head. Hematoma to back of head. No LOC     Patient is a 67-year-old male the past medical history of cirrhosis of liver, diabetes presents from home after a trip and fall.  Patient reports walking through his house and tripping over a table leg.  He reports falling backwards and striking the back of his head.  He denies any loss of consciousness.  No dizziness, syncope, prodromal symptoms prior to the event.  He has not had an episode like this before.  Complaining of pain to the back of the head at this time.  Hematoma at the site of the injury.          Review of patient's allergies indicates:  No Known Allergies  Past Medical History:   Diagnosis Date    Diabetes mellitus     Hep C w/o coma, chronic     Hypertension     Macular degeneration     Neuropathy      Past Surgical History:   Procedure Laterality Date    EYE SURGERY      HAND SURGERY       No family history on file.  Social History   Substance Use Topics    Smoking status: Former Smoker    Smokeless tobacco: Never Used    Alcohol use No      Comment: 2 5th per week- stopped one month ago approx 9/10/16     Review of Systems   Constitutional: Negative for fever.   HENT: Negative for sore throat.    Respiratory: Negative for shortness of breath.    Cardiovascular: Negative for chest pain.   Gastrointestinal: Negative for nausea.   Genitourinary: Negative for dysuria.   Musculoskeletal: Negative for back pain.   Skin: Negative for rash.   Neurological: Positive for headaches. Negative for weakness.   Hematological: Does not bruise/bleed easily.       Physical Exam     Initial Vitals [12/10/17 0459]   BP Pulse Resp Temp SpO2   (!) 170/102 64 16  97.6 °F (36.4 °C) 98 %      MAP       124.67         Physical Exam    Constitutional: He appears well-developed. He is not diaphoretic. No distress.   HENT:   Head: Normocephalic and atraumatic.   Hematoma noted to posterior head. No laceration noted.    Eyes: EOM are normal. Pupils are equal, round, and reactive to light.   Neck: Normal range of motion. Neck supple. No thyromegaly present. No tracheal deviation present.   Cardiovascular: Normal rate, regular rhythm, normal heart sounds and intact distal pulses. Exam reveals no gallop and no friction rub.    No murmur heard.  Pulmonary/Chest: Breath sounds normal. No stridor. No respiratory distress. He has no wheezes. He has no rales.   Abdominal: Soft. Bowel sounds are normal. He exhibits no distension. There is no tenderness. There is no rebound.   Musculoskeletal: Normal range of motion. He exhibits no edema or tenderness.   Neurological: He is alert and oriented to person, place, and time. He has normal strength and normal reflexes. No cranial nerve deficit.   Skin: Skin is warm.   Psychiatric: He has a normal mood and affect. His behavior is normal. Judgment and thought content normal.         ED Course   Procedures  Labs Reviewed   CBC W/ AUTO DIFFERENTIAL - Abnormal; Notable for the following:        Result Value    RBC 4.14 (*)     Hemoglobin 10.3 (*)     Hematocrit 33.0 (*)     MCV 80 (*)     MCH 24.9 (*)     MCHC 31.2 (*)     RDW 15.0 (*)     Lymph # 0.8 (*)     Gran% 77.1 (*)     Lymph% 8.7 (*)     All other components within normal limits   COMPREHENSIVE METABOLIC PANEL - Abnormal; Notable for the following:     Sodium 130 (*)     Potassium 7.0 (*)     CO2 16 (*)     Glucose 134 (*)     BUN, Bld 75 (*)     Creatinine 1.7 (*)     Calcium 8.6 (*)     Albumin 2.5 (*)     Alkaline Phosphatase 150 (*)     AST 50 (*)     ALT 47 (*)     Anion Gap 7 (*)     eGFR if  47.5 (*)     eGFR if non  41.1 (*)     All other components  within normal limits   LIPASE - Abnormal; Notable for the following:     Lipase 199 (*)     All other components within normal limits   POTASSIUM - Abnormal; Notable for the following:     Potassium 6.7 (*)     All other components within normal limits   CK-MB - Abnormal; Notable for the following:     MB% 10.2 (*)     All other components within normal limits    Narrative:     add on per dr Pool order [id number] CK @0908 12/10/17   ISTAT PROCEDURE - Abnormal; Notable for the following:     POC Glucose 118 (*)     POC BUN 73 (*)     POC Creatinine 1.8 (*)     POC Sodium 133 (*)     POC Potassium 6.6 (*)     POC TCO2 (MEASURED) 19 (*)     POC Hematocrit 26 (*)     All other components within normal limits   POCT GLUCOSE - Abnormal; Notable for the following:     POCT Glucose 121 (*)     All other components within normal limits   CK   LACTATE DEHYDROGENASE             Medical Decision Making:   History:   Old Medical Records: I decided to obtain old medical records.  Initial Assessment:   68 yo M s/p trip and fall  Differential Diagnosis:   Hematoma, SDH, abrasion  Clinical Tests:   Radiological Study: Ordered and Reviewed       APC / Resident Notes:   6:24 AM: CT head negative, however patient refusing to go home stating he doesn't feel well, feels nauseated, and reports headache. Will obtain lab workup and give IV fluids and Zofran.       Scribe Attestation:   Scribe #1: I performed the above scribed service and the documentation accurately describes the services I performed. I attest to the accuracy of the note.    Attending Attestation:   Physician Attestation Statement for Resident:  As the supervising MD   Physician Attestation Statement: I have personally seen and examined this patient.   I agree with the above history. -: 67 y.o. male presents for evaluation after a mechanical fall. He tripped and hit his head. Per the Evangeline criteria, he requires a head CT. Will give tylenol for pain.    As the  supervising MD I agree with the above PE.    As the supervising MD I agree with the above treatment, course, plan, and disposition.                    ED Course as of Dec 13 0613   Sun Dec 10, 2017   0734 Potassium: (!!) 7.0 [DT]      ED Course User Index  [DT] Krystian Cisse MD     Clinical Impression:   The primary encounter diagnosis was Hyperkalemia. Diagnoses of Fall, initial encounter, Acute pancreatitis, unspecified complication status, unspecified pancreatitis type, Acute hyperkalemia, Alcoholic cirrhosis of liver with ascites, Ascites due to alcoholic hepatitis, Debility, Spontaneous bacterial peritonitis, Physical deconditioning, Palliative care encounter, Goals of care, counseling/discussion, and Pain were also pertinent to this visit.                           Kaz Noble MD  12/13/17 0614

## 2017-12-10 NOTE — HPI
Soy Reza is a 67 y.o. with HCV cirrhosis (diagnosied in July, has not received treatment), DMII (home Januvia and glimepiride; questionable compliance), HTN, and CKD who p/t ED via EMS from home after he tripped on a table leg the Am, fell backwards and hit his head. Patinet states that he did not lose consciousness before or afer the event, denies diziness/presyncopal sx or lightheadfedness, and that he simply did not see the table. He is also complaining of generalized abdominal pain that does not radiate to his back. He was found to be hyperkalemic at 7.0 (repeat 6.70 in the Ed, without EKG changes, but mildly altered.(baseline unknown.) Ct head negative for acute bleed. He is being admitted for symptomatic hyperkalemia.

## 2017-12-10 NOTE — ED NOTES
PIV removed attempted to discharged patient. Patient states that he doesn't feel good, nauseated, headache, SOB. Vitals assessed SpO2 100%. MD notified.

## 2017-12-10 NOTE — PHARMACY MED REC
"Admission Medication Reconciliation - Pharmacy Consult Note  The home medication history was taken by Miranda Nuñez, Pharmacy Technician.  Based on information gathered and subsequent review by the clinical pharmacist, the items below may need attention.     You may go to "Admission" then "Reconcile Home Medications" tabs to review and/or act upon these items. Based on information gathered and subsequent review by the clinical pharmacist, the items below may need attention.    Potentially problematic discrepancies with current MAR  o Patient IS taking the following which was not ordered upon admit  o Ciprofloxacin 500mg tab once daily => started on Rocephin 1g daily while inpatient  o Furosemide 40 mg tab daily; patient currently normotensive  o Potassium chloride 20 mEq twice daily; per reason of admission holding in setting of hyperkalemia  o Glimepiride 1 mg tablet daily before breakfast,  o Sitagliptin 100mg tab once daily;  - Per team, to maintain glucose with SSI while inpatient, currently BG<140    o Patient is taking a drug DIFFERENTLY than how ordered upon admit  o Lactulose 10gram/15mL; patient reportedly takes 30mL three times daily    Patient's prior to admission medication regimen was as follows:  Medication Sig    ciprofloxacin HCl (CIPRO) 500 MG tablet Take 1 tablet (500 mg total) by mouth once daily.    furosemide (LASIX) 40 MG tablet Take 40 mg by mouth once daily.    glimepiride (AMARYL) 1 MG tablet Take 1 tablet (1 mg total) by mouth before breakfast.    lactulose (CHRONULAC) 10 gram/15 mL solution Take 30 mLs (20 g total) by mouth 3 (three) times daily.    potassium chloride SA (K-DUR,KLOR-CON) 20 MEQ tablet Take 20 mEq by mouth 2 (two) times daily.    SITagliptin (JANUVIA) 100 MG Tab Take 100 mg by mouth once daily.     Please address this information as you see fit.  Feel free to contact us if you have any questions or require assistance.    Manny Rodriguez, PharmD  PGY-1 Pharmacy " Resident  Spectralink: [phone]   Please add appropriate    SmartPhrase below:

## 2017-12-10 NOTE — ASSESSMENT & PLAN NOTE
- oriented x2 on eval  - Will start rectal lactulose TID  - ordering ammonia levels  - cont lactulose 10g/15ml PO TID and titrate to 3-4 BMs per day.

## 2017-12-10 NOTE — ASSESSMENT & PLAN NOTE
Fall, hit head, Ct negative for acute bleed  -Hemotoma on posterior aspect of head  -Will continue to monitor as patient is slightly altered, more likely hepatic etiology

## 2017-12-10 NOTE — ASSESSMENT & PLAN NOTE
on admission; normotensive currently  -Will continue to monitor vitals  -Not on home antihypertensives

## 2017-12-10 NOTE — PROVIDER PROGRESS NOTES - EMERGENCY DEPT.
"Patient signed out to me from previous attending.  Patient complaining of pain and "not feeling right".  Labs show a potassium of 7 and a lipase of 199, which is higher than his normal.  Fluids, morphine started.  Calcium gluconate given, and prior to this EKG was obtained which shows normal sinus rhythm with no ST changes and no peaked T waves, no significant change from previous.  Given new hyperkalemia of unclear etiology and patient's elevated lipase and abdominal pain will admit.  "

## 2017-12-10 NOTE — SUBJECTIVE & OBJECTIVE
Past Medical History:   Diagnosis Date    Diabetes mellitus     Hep C w/o coma, chronic     Hypertension     Macular degeneration     Neuropathy        Past Surgical History:   Procedure Laterality Date    EYE SURGERY      HAND SURGERY         Review of patient's allergies indicates:  No Known Allergies    No current facility-administered medications on file prior to encounter.      Current Outpatient Prescriptions on File Prior to Encounter   Medication Sig    ciprofloxacin HCl (CIPRO) 500 MG tablet Take 1 tablet (500 mg total) by mouth once daily.    glimepiride (AMARYL) 1 MG tablet Take 1 tablet (1 mg total) by mouth before breakfast.    lactulose (CHRONULAC) 10 gram/15 mL solution Take 30 mLs (20 g total) by mouth 3 (three) times daily.    SITagliptin (JANUVIA) 100 MG Tab Take 100 mg by mouth once daily.    [DISCONTINUED] furosemide (LASIX) 40 MG tablet Take 1 tablet (40 mg total) by mouth once daily.     Family History     None        Social History Main Topics    Smoking status: Former Smoker    Smokeless tobacco: Never Used    Alcohol use No      Comment: 2 5th per week- stopped one month ago approx 9/10/16    Drug use: No    Sexual activity: No     Review of Systems   Constitutional: Positive for fatigue. Negative for activity change and appetite change.   HENT: Negative for congestion and facial swelling.    Eyes: Negative for discharge and itching.   Respiratory: Negative for apnea, chest tightness and shortness of breath.    Cardiovascular: Negative for chest pain and palpitations.   Gastrointestinal: Positive for abdominal distention and abdominal pain. Negative for anal bleeding and blood in stool.   Endocrine: Negative for cold intolerance and heat intolerance.   Genitourinary: Negative for difficulty urinating and dysuria.   Musculoskeletal: Positive for arthralgias (L knee and thigh pain ).   Skin: Negative for color change and pallor.   Neurological: Negative for dizziness and  headaches.   Psychiatric/Behavioral: Positive for confusion. Negative for agitation and behavioral problems.     Objective:     Vital Signs (Most Recent):  Temp: 97.6 °F (36.4 °C) (12/10/17 0459)  Pulse: 92 (12/10/17 1031)  Resp: 18 (12/10/17 1031)  BP: 116/63 (12/10/17 1031)  SpO2: 97 % (12/10/17 1031) Vital Signs (24h Range):  Temp:  [97.6 °F (36.4 °C)] 97.6 °F (36.4 °C)  Pulse:  [64-96] 92  Resp:  [15-30] 18  SpO2:  [97 %-100 %] 97 %  BP: (116-170)/() 116/63     Weight: 81.2 kg (179 lb)  Body mass index is 24.97 kg/m².    Physical Exam   Constitutional: He appears well-developed and well-nourished. He appears lethargic.   HENT:   Head: Normocephalic.   Small hematoma on posterior aspect of head s/p fall   Eyes: Conjunctivae and EOM are normal.   Neck: No JVD present. No tracheal deviation present.   Cardiovascular: Normal rate, regular rhythm and normal heart sounds.    Pulmonary/Chest: Effort normal and breath sounds normal.   Abdominal: Soft. Bowel sounds are normal. He exhibits distension and ascites. There is generalized tenderness.   Musculoskeletal: He exhibits no edema or deformity.   Scrapes overlying the L knee   Neurological: He appears lethargic.   Oriented to person, location, and season of the year   Skin: Skin is warm and dry.   Psychiatric: He has a normal mood and affect. His behavior is normal.   Vitals reviewed.        CRANIAL NERVES     CN III, IV, VI   Extraocular motions are normal.        Significant Labs:   Recent Results (from the past 24 hour(s))   CBC auto differential    Collection Time: 12/10/17  6:32 AM   Result Value Ref Range    WBC 8.65 3.90 - 12.70 K/uL    RBC 4.14 (L) 4.60 - 6.20 M/uL    Hemoglobin 10.3 (L) 14.0 - 18.0 g/dL    Hematocrit 33.0 (L) 40.0 - 54.0 %    MCV 80 (L) 82 - 98 fL    MCH 24.9 (L) 27.0 - 31.0 pg    MCHC 31.2 (L) 32.0 - 36.0 g/dL    RDW 15.0 (H) 11.5 - 14.5 %    Platelets 168 150 - 350 K/uL    MPV 9.6 9.2 - 12.9 fL    Immature Granulocytes 0.5 0.0 - 0.5 %     Gran # 6.7 1.8 - 7.7 K/uL    Immature Grans (Abs) 0.04 0.00 - 0.04 K/uL    Lymph # 0.8 (L) 1.0 - 4.8 K/uL    Mono # 1.0 0.3 - 1.0 K/uL    Eos # 0.1 0.0 - 0.5 K/uL    Baso # 0.05 0.00 - 0.20 K/uL    nRBC 0 0 /100 WBC    Gran% 77.1 (H) 38.0 - 73.0 %    Lymph% 8.7 (L) 18.0 - 48.0 %    Mono% 11.8 4.0 - 15.0 %    Eosinophil% 1.3 0.0 - 8.0 %    Basophil% 0.6 0.0 - 1.9 %    Differential Method Automated    Comprehensive metabolic panel    Collection Time: 12/10/17  6:32 AM   Result Value Ref Range    Sodium 130 (L) 136 - 145 mmol/L    Potassium 7.0 (HH) 3.5 - 5.1 mmol/L    Chloride 107 95 - 110 mmol/L    CO2 16 (L) 23 - 29 mmol/L    Glucose 134 (H) 70 - 110 mg/dL    BUN, Bld 75 (H) 8 - 23 mg/dL    Creatinine 1.7 (H) 0.5 - 1.4 mg/dL    Calcium 8.6 (L) 8.7 - 10.5 mg/dL    Total Protein 6.6 6.0 - 8.4 g/dL    Albumin 2.5 (L) 3.5 - 5.2 g/dL    Total Bilirubin 0.9 0.1 - 1.0 mg/dL    Alkaline Phosphatase 150 (H) 55 - 135 U/L    AST 50 (H) 10 - 40 U/L    ALT 47 (H) 10 - 44 U/L    Anion Gap 7 (L) 8 - 16 mmol/L    eGFR if African American 47.5 (A) >60 mL/min/1.73 m^2    eGFR if non  41.1 (A) >60 mL/min/1.73 m^2   Lipase    Collection Time: 12/10/17  6:32 AM   Result Value Ref Range    Lipase 199 (H) 4 - 60 U/L   CK-MB    Collection Time: 12/10/17  6:32 AM   Result Value Ref Range    CPK 45 20 - 200 U/L    CPK MB 4.6 0.1 - 6.5 ng/mL    MB% 10.2 (H) 0.0 - 5.0 %   Potassium    Collection Time: 12/10/17  7:48 AM   Result Value Ref Range    Potassium 6.7 (HH) 3.5 - 5.1 mmol/L   ISTAT PROCEDURE    Collection Time: 12/10/17  7:54 AM   Result Value Ref Range    POC Glucose 118 (H) 70 - 110 mg/dL    POC BUN 73 (H) 6 - 30 mg/dL    POC Creatinine 1.8 (H) 0.5 - 1.4 mg/dL    POC Sodium 133 (L) 136 - 145 mmol/L    POC Potassium 6.6 (HH) 3.5 - 5.1 mmol/L    POC Chloride 106 95 - 110 mmol/L    POC TCO2 (MEASURED) 19 (L) 23 - 29 mmol/L    POC Ionized Calcium 1.15 1.06 - 1.42 mmol/L    POC Hematocrit 26 (L) 36 - 54 %PCV    Sample  HENRY    POCT glucose    Collection Time: 12/10/17  8:59 AM   Result Value Ref Range    POCT Glucose 121 (H) 70 - 110 mg/dL     Significant Imaging:   Ct Head w/o 12/10/2017:  Noncontrast CT demonstrates no evidence of acute intracranial pathology.    Scalp hematoma overlying the parietal calvarium at the midline.    Findings compatible with chronic microvascular ischemic change.

## 2017-12-11 PROBLEM — K72.90 DECOMPENSATED HEPATIC CIRRHOSIS: Chronic | Status: ACTIVE | Noted: 2017-01-01

## 2017-12-11 PROBLEM — K74.60 DECOMPENSATED HEPATIC CIRRHOSIS: Chronic | Status: ACTIVE | Noted: 2017-01-01

## 2017-12-11 NOTE — CONSULTS
[facility]  Hepatology  Consult Note    Patient Name: Soy Reza  MRN: [id number]  Admission Date: 12/10/2017  Hospital Length of Stay: 1 days  Attending Provider: Adria Ye MD   Primary Care Physician: Jean Carlos Swanson MD  Principal Problem:Acute hyperkalemia    Inpatient consult to Hepatology  Consult performed by: BANDAR EVERETT  Consult ordered by: SINTIA LAWLER        Subjective:     Transplant status: No    HPI:  This is a 66 y/o male with hx of HCV + etoh cirrhosis, refractory ascites and hydrothorax, DMII, CKD who presented via EMS after fall at home after tripping on home furniture and hit his head. No loss of consciousness. Also with assoc generalized abd pain.   Found to have hyperK in the ER with K of 7. Ct head was negative.   Admitted to IM team.   Today he is feeling much better. Feels stronger.    Admits that he doesn't have reliable ride to make his appts. Lives with his elderly mother who helps with his meds but wants someone else to come to house to help with meds. Has tried taking public transportation but has difficulty with weakness and actually walking to the local bus stop. Taxi is  Becoming too expensive.     Of note, pt previously evaluated multiple times by hepatology inpatient and outpatient.  Was previously deemed not a liver transplant candidate due to non-compliance and lack of caregiver support.    Review of Systems   Constitutional: Positive for fatigue. Negative for chills and fever.   HENT: Negative for facial swelling, mouth sores and trouble swallowing.    Eyes: Negative for pain and redness.   Respiratory: Negative for cough and shortness of breath.    Cardiovascular: Negative for chest pain and leg swelling.   Gastrointestinal: Positive for abdominal distention.   Genitourinary: Negative for dysuria and hematuria.   Musculoskeletal: Negative for gait problem and neck stiffness.   Skin: Positive for color change. Negative for rash and  wound.   Neurological: Positive for weakness. Negative for seizures, syncope and headaches.   Psychiatric/Behavioral: Negative for confusion and hallucinations.       Past Medical History:   Diagnosis Date    Diabetes mellitus     Hep C w/o coma, chronic     Hypertension     Macular degeneration     Neuropathy        Past Surgical History:   Procedure Laterality Date    EYE SURGERY      HAND SURGERY         Family history of liver disease: No    Review of patient's allergies indicates:  No Known Allergies    Social History Main Topics    Smoking status: Former Smoker    Smokeless tobacco: Never Used    Alcohol use No      Comment: 2 5th per week- stopped one month ago approx 9/10/16    Drug use: No    Sexual activity: No       Prescriptions Prior to Admission   Medication Sig Dispense Refill Last Dose    ciprofloxacin HCl (CIPRO) 500 MG tablet Take 1 tablet (500 mg total) by mouth once daily. 30 tablet 3 Taking    furosemide (LASIX) 40 MG tablet Take 40 mg by mouth once daily.       glimepiride (AMARYL) 1 MG tablet Take 1 tablet (1 mg total) by mouth before breakfast. 90 tablet 3 Taking    lactulose (CHRONULAC) 10 gram/15 mL solution Take 30 mLs (20 g total) by mouth 3 (three) times daily. 1892 mL 1     potassium chloride SA (K-DUR,KLOR-CON) 20 MEQ tablet Take 20 mEq by mouth 2 (two) times daily.       SITagliptin (JANUVIA) 100 MG Tab Take 100 mg by mouth once daily.          Objective:     Vital Signs (Most Recent):  Temp: 97.6 °F (36.4 °C) (12/11/17 1116)  Pulse: 85 (12/11/17 1116)  Resp: 18 (12/11/17 1116)  BP: 119/74 (12/11/17 1116)  SpO2: 99 % (12/11/17 1116) Vital Signs (24h Range):  Temp:  [97 °F (36.1 °C)-98.3 °F (36.8 °C)] 97.6 °F (36.4 °C)  Pulse:  [82-91] 85  Resp:  [16-18] 18  SpO2:  [97 %-100 %] 99 %  BP: (109-122)/(55-74) 119/74     Weight: 79.9 kg (176 lb 2.4 oz) (12/10/17 1215)  Body mass index is 24.57 kg/m².    Physical Exam   Constitutional: He is oriented to person, place, and  time. No distress.   Alert ; no acute distress   HENT:   Head: Normocephalic.   Mouth/Throat: No oropharyngeal exudate.   Temporal wasting   Eyes: Conjunctivae and EOM are normal. No scleral icterus.   Neck: Neck supple. No thyromegaly present.   Cardiovascular: Normal rate and intact distal pulses.    Pulmonary/Chest: Effort normal and breath sounds normal. No respiratory distress.   Abdominal: Soft. He exhibits distension. He exhibits no mass. There is no guarding.   Musculoskeletal: Normal range of motion. He exhibits no tenderness.   Neurological: He is alert and oriented to person, place, and time. No cranial nerve deficit.   Skin: Skin is warm and dry. No rash noted.   Psychiatric: He has a normal mood and affect. His behavior is normal.   Vitals reviewed.      MELD-Na score: 12 at 12/11/2017 10:16 AM  MELD score: 12 at 12/11/2017 10:16 AM  Calculated from:  Serum Creatinine: 1.8 mg/dL at 12/11/2017 10:16 AM  Serum Sodium: 137 mmol/L at 12/11/2017 10:16 AM  Total Bilirubin: 0.8 mg/dL (Rounded to 1) at 12/11/2017  4:53 AM  INR(ratio): 1.0 at 12/11/2017  4:53 AM  Age: 67 years    Significant Labs:  CBC:   Recent Labs  Lab 12/11/17 0453   WBC 3.82*   RBC 3.44*   HGB 8.5*   HCT 27.3*   *     CMP:   Recent Labs  Lab 12/11/17 0453 12/11/17  1016   * 84   CALCIUM 8.5* 8.5*   ALBUMIN 2.2*  --    PROT 5.6*  --    * 137   K 5.3*  5.3* 5.8*  5.8*   CO2 17* 19*    111*   BUN 68* 66*   CREATININE 1.7* 1.8*   ALKPHOS 142*  --    ALT 45*  --    AST 52*  --    BILITOT 0.8  --      Coagulation:   Recent Labs  Lab 12/11/17 0453   INR 1.0       Significant Imaging:  Labs: Reviewed  CT: Reviewed    Assessment/Plan:     Decompensated hepatic cirrhosis    Longstanding decompensated cirrhosis.  Multiple inpatient visits recently. One which he left AMA.    Difficulties with lack of caregiver and support. Lack of transportation severely limits treatment options.   He is currently NOT a transplant  candidate due to non-adherence and lack of caregiver support which poses barrier to future care, including him making his appts and following up.  For this reason, he also is not a candidate for HCV treatment currently.    Needs good social work plan for potential outpatient assistance.  If he continues to present to care for decompensated liver disease, a palliative care consult should be considered and potentially needs a intraperitoneal catheter for home control of his ascites.    SBP negative on tap this admission.    Plan:  Check PETH - ordered  Continue cipro for SBP ppx  Lactulose and rifaximin  Lasix and aldactone titrated to goal   Social work consult for outpatient options regarding his lack of transportation        Alcoholic cirrhosis of liver with ascites    As above        Chylous ascites    As above            Thank you for your consult. I will follow-up with patient. Please contact us if you have any additional questions.    Bebo Bustos MD  Hepatology  [facility]

## 2017-12-11 NOTE — PROGRESS NOTES
[facility] Hospital Medicine  Progress Note    Patient Name: Soy Reza  MRN: [id number]  Patient Class: IP- Inpatient   Admission Date: 12/10/2017  Length of Stay: 1 days  Attending Physician: Adria Ye MD  Primary Care Provider: Jean Carlos Swanson MD    [facility] Medicine Team: [facility] HOSP MED 4 Manny Cohn MD    Subjective:     Principal Problem:Acute hyperkalemia    HPI:  Soy Reza is a 67 y.o. with HCV cirrhosis (diagnosied in July, has not received treatment), DMII (home Januvia and glimepiride; questionable compliance), HTN, and CKD who p/t ED via EMS from home after he tripped on a table leg the Am, fell backwards and hit his head. Patinet states that he did not lose consciousness before or afer the event, denies diziness/presyncopal sx or lightheadfedness, and that he simply did not see the table. He is also complaining of generalized abdominal pain that does not radiate to his back. He was found to be hyperkalemic at 7.0 (repeat 6.70 in the Ed, without EKG changes, but mildly altered.(baseline unknown.) Ct head negative for acute bleed. He is being admitted for symptomatic hyperkalemia.     Hospital Course:  Soy Reza was admitted for symptomatic hyperkalemia, he was shifted in the Ed. Large volume ascites on exam, diagnostic/therapeutic paracentesis with 3L chylous fluid removed in the ED on DOA.     Interval History: NAEON. He refused lactulose enema x2 yesterday per night Rn. He states that he will no longer refuse medication but cannot recall having refused overnight. Appears generally more comfortable today than on prior assessment. Will go for U/S this Am and is asking for a diet. Was more oriented this AM as well, was able to say that he is at [facility], knows the month, and the reason for his admission. No longer leaking from paracentesis site. Will continue to monitor for improvement.     Review of Systems   Constitutional: Positive for fatigue. Negative for  activity change and appetite change.   HENT: Negative for congestion and facial swelling.    Eyes: Negative for discharge and itching.   Respiratory: Negative for apnea, chest tightness and shortness of breath.    Cardiovascular: Negative for chest pain and palpitations.   Gastrointestinal: Positive for abdominal distention and abdominal pain. Negative for anal bleeding and blood in stool.   Endocrine: Negative for cold intolerance and heat intolerance.   Genitourinary: Negative for difficulty urinating and dysuria.   Musculoskeletal: Positive for arthralgias (L knee and thigh pain ).   Skin: Negative for color change and pallor.   Neurological: Negative for dizziness and headaches.   Psychiatric/Behavioral: Positive for confusion (improved from prior assessment). Negative for agitation and behavioral problems.     Objective:     Vital Signs (Most Recent):  Temp: 97.8 °F (36.6 °C) (12/11/17 0718)  Pulse: 86 (12/11/17 0718)  Resp: 18 (12/11/17 0718)  BP: 109/65 (12/11/17 0718)  SpO2: 99 % (12/11/17 0718) Vital Signs (24h Range):  Temp:  [97 °F (36.1 °C)-98.3 °F (36.8 °C)] 97.8 °F (36.6 °C)  Pulse:  [80-96] 86  Resp:  [15-30] 18  SpO2:  [94 %-100 %] 99 %  BP: (109-156)/(55-90) 109/65     Weight: 79.9 kg (176 lb 2.4 oz)  Body mass index is 24.57 kg/m².    Intake/Output Summary (Last 24 hours) at 12/11/17 0754  Last data filed at 12/10/17 2008   Gross per 24 hour   Intake             1100 ml   Output              200 ml   Net              900 ml      Physical Exam   Constitutional: He appears well-developed and well-nourished. He appears lethargic.   HENT:   Head: Normocephalic.   Small hematoma on posterior aspect of head s/p fall   Eyes: Conjunctivae and EOM are normal.   Neck: No JVD present. No tracheal deviation present.   Cardiovascular: Normal rate, regular rhythm and normal heart sounds.    Pulmonary/Chest: Effort normal and breath sounds normal.   Abdominal: Soft. Bowel sounds are normal. He exhibits distension  and ascites. There is generalized tenderness.   Musculoskeletal: He exhibits no edema or deformity.   Scrapes overlying the L knee   Neurological: He appears lethargic.   Oriented to person, location, and season of the year   Skin: Skin is warm and dry.   Psychiatric: He has a normal mood and affect. His behavior is normal.   Vitals reviewed.      Significant Labs:   Recent Results (from the past 24 hour(s))   POCT glucose    Collection Time: 12/10/17  8:59 AM   Result Value Ref Range    POCT Glucose 121 (H) 70 - 110 mg/dL   Amylase, Peritoneal, Pleural Fluid or YA Drainage, In-House Ascites    Collection Time: 12/10/17 10:16 AM   Result Value Ref Range    Body Fluid Source Amylase Ascites     Amylase, Fluid 60 Not established U/L   WBC & Diff,Body Fluid Ascites    Collection Time: 12/10/17 10:16 AM   Result Value Ref Range    Body Fluid Type Ascites     Fluid Appearance Turbid     Fluid Color Other     WBC, Body Fluid 290 /cu mm    Segs, Fluid 6 %    Lymphs, Fluid 62 %    Monocytes/Macrophages, Fluid 32 %   Albumin, Peritoneal, Pleural Fluid or YA Drainage, In-House Ascites    Collection Time: 12/10/17 10:16 AM   Result Value Ref Range    Body Fluid Source, Albumin Ascites     Body Fluid Albumin 0.8 See text g/dL   Protein, Peritoneal, Pleural Fluid or YA Drainage, In-House Ascites    Collection Time: 12/10/17 10:16 AM   Result Value Ref Range    Body Fluid Source, Total Protein Ascites     Body Fluid, Protein 2.6 Not established g/dL   LDH, Peritoneal, Pleural Fluid or YA Drainage, In-House Ascites    Collection Time: 12/10/17 10:16 AM   Result Value Ref Range    Body Fluid Source, LDH Ascites     LD, Fluid 62 Not established U/L   (rule out SBP) Aerobic culture    Collection Time: 12/10/17 10:19 AM   Result Value Ref Range    Aerobic Bacterial Culture No growth    (rule out SBP) Culture, Anaerobic    Collection Time: 12/10/17 10:19 AM   Result Value Ref Range    Anaerobic Culture Culture in progress    (rule out  SBP) Gram stain    Collection Time: 12/10/17 10:19 AM   Result Value Ref Range    Gram Stain Result Rare WBC's     Gram Stain Result No organisms seen    Lactic acid, plasma    Collection Time: 12/10/17 11:32 AM   Result Value Ref Range    Lactate (Lactic Acid) 4.1 (HH) 0.5 - 2.2 mmol/L   POCT glucose    Collection Time: 12/10/17 11:50 AM   Result Value Ref Range    POCT Glucose 121 (H) 70 - 110 mg/dL   Lactate dehydrogenase    Collection Time: 12/10/17 12:00 PM   Result Value Ref Range     110 - 260 U/L   Basic metabolic panel    Collection Time: 12/10/17  2:55 PM   Result Value Ref Range    Sodium 133 (L) 136 - 145 mmol/L    Potassium 6.6 (HH) 3.5 - 5.1 mmol/L    Chloride 108 95 - 110 mmol/L    CO2 16 (L) 23 - 29 mmol/L    Glucose 123 (H) 70 - 110 mg/dL    BUN, Bld 71 (H) 8 - 23 mg/dL    Creatinine 1.7 (H) 0.5 - 1.4 mg/dL    Calcium 8.5 (L) 8.7 - 10.5 mg/dL    Anion Gap 9 8 - 16 mmol/L    eGFR if African American 47.5 (A) >60 mL/min/1.73 m^2    eGFR if non  41.1 (A) >60 mL/min/1.73 m^2   Toxicology screen, urine    Collection Time: 12/10/17  3:05 PM   Result Value Ref Range    Alcohol, Urine <10 <10 mg/dL    Benzodiazepines Negative     Methadone metabolites Negative     Cocaine (Metab.) Negative     Opiate Scrn, Ur Presumptive Positive     Barbiturate Screen, Ur Negative     Amphetamine Screen, Ur Negative     THC Negative     Phencyclidine Negative     Creatinine, Random Ur 75.0 23.0 - 375.0 mg/dL    Toxicology Information SEE COMMENT    Sodium, urine, random    Collection Time: 12/10/17  3:05 PM   Result Value Ref Range    Sodium Urine Random <20 (A) 20 - 250 mmol/L   Potassium, urine, random    Collection Time: 12/10/17  3:05 PM   Result Value Ref Range    Potassium Urine Random 59 15 - 95 mmol/L   Urea nitrogen, urine    Collection Time: 12/10/17  3:05 PM   Result Value Ref Range    Urine Urea Nitrogen, Random 861 140 - 1050 mg/dL   Creatinine, urine, random    Collection Time: 12/10/17   3:05 PM   Result Value Ref Range    Creatinine, Random Ur 75.0 23.0 - 375.0 mg/dL   Chloride, urine, random    Collection Time: 12/10/17  3:05 PM   Result Value Ref Range    Chloride, Rand Ur <20 (L) 25 - 200 mmol/L   Urinalysis    Collection Time: 12/10/17  3:06 PM   Result Value Ref Range    Specimen UA Urine, Clean Catch     Color, UA Yellow Yellow, Straw, Marcy    Appearance, UA Clear Clear    pH, UA 5.0 5.0 - 8.0    Specific Gravity, UA 1.015 1.005 - 1.030    Protein, UA Negative Negative    Glucose, UA Negative Negative    Ketones, UA Negative Negative    Bilirubin (UA) Negative Negative    Occult Blood UA Negative Negative    Nitrite, UA Negative Negative    Urobilinogen, UA Negative <2.0 EU/dL    Leukocytes, UA Negative Negative   Osmolality, urine    Collection Time: 12/10/17  3:06 PM   Result Value Ref Range    Osmolality, Ur 490 50 - 1200 mOsm/kg   ISTAT PROCEDURE    Collection Time: 12/10/17  3:38 PM   Result Value Ref Range    POC PH 7.362 7.35 - 7.45    POC PCO2 30.4 (L) 35 - 45 mmHg    POC PO2 31 (LL) 40 - 60 mmHg    POC HCO3 17.2 (L) 24 - 28 mmol/L    POC BE -8 -2 to 2 mmol/L    POC SATURATED O2 58 (L) 95 - 100 %    POC TCO2 18 (L) 24 - 29 mmol/L    Sample VENOUS     Site Other     Allens Test N/A     DelSys Room Air     Mode SPONT     FiO2 21     Sp02 99    Blood culture    Collection Time: 12/10/17  4:04 PM   Result Value Ref Range    Blood Culture, Routine No Growth to date    Ammonia    Collection Time: 12/10/17  4:05 PM   Result Value Ref Range    Ammonia 104 (H) 10 - 50 umol/L   Blood culture    Collection Time: 12/10/17  4:06 PM   Result Value Ref Range    Blood Culture, Routine No Growth to date    Lactic acid, plasma    Collection Time: 12/10/17  4:37 PM   Result Value Ref Range    Lactate (Lactic Acid) 2.2 0.5 - 2.2 mmol/L   POCT glucose    Collection Time: 12/10/17  6:53 PM   Result Value Ref Range    POCT Glucose 92 70 - 110 mg/dL   POCT glucose    Collection Time: 12/10/17  7:29 PM    Result Value Ref Range    POCT Glucose 249 (H) 70 - 110 mg/dL   Lactic acid, plasma    Collection Time: 12/10/17  7:57 PM   Result Value Ref Range    Lactate (Lactic Acid) 2.0 0.5 - 2.2 mmol/L   Basic metabolic panel    Collection Time: 12/10/17  7:57 PM   Result Value Ref Range    Sodium 132 (L) 136 - 145 mmol/L    Potassium 6.6 (HH) 3.5 - 5.1 mmol/L    Chloride 108 95 - 110 mmol/L    CO2 19 (L) 23 - 29 mmol/L    Glucose 202 (H) 70 - 110 mg/dL    BUN, Bld 69 (H) 8 - 23 mg/dL    Creatinine 1.7 (H) 0.5 - 1.4 mg/dL    Calcium 8.4 (L) 8.7 - 10.5 mg/dL    Anion Gap 5 (L) 8 - 16 mmol/L    eGFR if African American 47.5 (A) >60 mL/min/1.73 m^2    eGFR if non  41.1 (A) >60 mL/min/1.73 m^2   POCT glucose    Collection Time: 12/10/17  8:03 PM   Result Value Ref Range    POCT Glucose 186 (H) 70 - 110 mg/dL   Basic metabolic panel    Collection Time: 12/10/17 11:16 PM   Result Value Ref Range    Sodium 136 136 - 145 mmol/L    Potassium 6.0 (H) 3.5 - 5.1 mmol/L    Chloride 109 95 - 110 mmol/L    CO2 20 (L) 23 - 29 mmol/L    Glucose 118 (H) 70 - 110 mg/dL    BUN, Bld 66 (H) 8 - 23 mg/dL    Creatinine 1.8 (H) 0.5 - 1.4 mg/dL    Calcium 8.1 (L) 8.7 - 10.5 mg/dL    Anion Gap 7 (L) 8 - 16 mmol/L    eGFR if African American 44.3 (A) >60 mL/min/1.73 m^2    eGFR if non  38.4 (A) >60 mL/min/1.73 m^2   Lactic acid, plasma    Collection Time: 12/10/17 11:17 PM   Result Value Ref Range    Lactate (Lactic Acid) 1.6 0.5 - 2.2 mmol/L   POCT glucose    Collection Time: 12/11/17  1:15 AM   Result Value Ref Range    POCT Glucose 152 (H) 70 - 110 mg/dL   POCT glucose    Collection Time: 12/11/17  1:56 AM   Result Value Ref Range    POCT Glucose 78 70 - 110 mg/dL   POCT glucose    Collection Time: 12/11/17  3:03 AM   Result Value Ref Range    POCT Glucose 74 70 - 110 mg/dL   POCT glucose    Collection Time: 12/11/17  4:10 AM   Result Value Ref Range    POCT Glucose 84 70 - 110 mg/dL   Lactic acid, plasma     Collection Time: 12/11/17  4:53 AM   Result Value Ref Range    Lactate (Lactic Acid) 1.0 0.5 - 2.2 mmol/L   Basic metabolic panel    Collection Time: 12/11/17  4:53 AM   Result Value Ref Range    Sodium 134 (L) 136 - 145 mmol/L    Potassium 5.3 (H) 3.5 - 5.1 mmol/L    Chloride 109 95 - 110 mmol/L    CO2 17 (L) 23 - 29 mmol/L    Glucose 155 (H) 70 - 110 mg/dL    BUN, Bld 68 (H) 8 - 23 mg/dL    Creatinine 1.7 (H) 0.5 - 1.4 mg/dL    Calcium 8.5 (L) 8.7 - 10.5 mg/dL    Anion Gap 8 8 - 16 mmol/L    eGFR if African American 47.5 (A) >60 mL/min/1.73 m^2    eGFR if non  41.1 (A) >60 mL/min/1.73 m^2   Protime-INR    Collection Time: 12/11/17  4:53 AM   Result Value Ref Range    Prothrombin Time 10.5 9.0 - 12.5 sec    INR 1.0 0.8 - 1.2   Hepatic function panel    Collection Time: 12/11/17  4:53 AM   Result Value Ref Range    Total Protein 5.6 (L) 6.0 - 8.4 g/dL    Albumin 2.2 (L) 3.5 - 5.2 g/dL    Total Bilirubin 0.8 0.1 - 1.0 mg/dL    Bilirubin, Direct 0.4 (H) 0.1 - 0.3 mg/dL    AST 52 (H) 10 - 40 U/L    ALT 45 (H) 10 - 44 U/L    Alkaline Phosphatase 142 (H) 55 - 135 U/L   CBC auto differential    Collection Time: 12/11/17  4:53 AM   Result Value Ref Range    WBC 3.82 (L) 3.90 - 12.70 K/uL    RBC 3.44 (L) 4.60 - 6.20 M/uL    Hemoglobin 8.5 (L) 14.0 - 18.0 g/dL    Hematocrit 27.3 (L) 40.0 - 54.0 %    MCV 79 (L) 82 - 98 fL    MCH 24.7 (L) 27.0 - 31.0 pg    MCHC 31.1 (L) 32.0 - 36.0 g/dL    RDW 15.1 (H) 11.5 - 14.5 %    Platelets 112 (L) 150 - 350 K/uL    MPV 9.7 9.2 - 12.9 fL    Immature Granulocytes 0.3 0.0 - 0.5 %    Gran # 2.8 1.8 - 7.7 K/uL    Immature Grans (Abs) 0.01 0.00 - 0.04 K/uL    Lymph # 0.3 (L) 1.0 - 4.8 K/uL    Mono # 0.6 0.3 - 1.0 K/uL    Eos # 0.1 0.0 - 0.5 K/uL    Baso # 0.02 0.00 - 0.20 K/uL    nRBC 0 0 /100 WBC    Gran% 74.3 (H) 38.0 - 73.0 %    Lymph% 6.8 (L) 18.0 - 48.0 %    Mono% 15.7 (H) 4.0 - 15.0 %    Eosinophil% 2.4 0.0 - 8.0 %    Basophil% 0.5 0.0 - 1.9 %    Differential Method  Automated    Potassium    Collection Time: 12/11/17  4:53 AM   Result Value Ref Range    Potassium 5.3 (H) 3.5 - 5.1 mmol/L   POCT glucose    Collection Time: 12/11/17  5:02 AM   Result Value Ref Range    POCT Glucose 174 (H) 70 - 110 mg/dL     Significant Imaging:     Assessment/Plan:      * Acute hyperkalemia    7.0 on admission, now 5.3  S/p shift in the Ed with insulin, calcium gluconate, albuterol, kaylexalate  -Shifted again overnight, K has responded   Will space BMPs to q6h, from q4h   -Urine Na <20, Uosms WNL; Urine Cl, K, urea, Cr IP         Ascites due to alcoholic cirrhosis    - increased abdominal distension over past week with generalized abdominal pain    - Mildly confused, no leukocytosis.  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 Will switch back to PO lactulose as he reports he believes he will be able to tolerate  - cont lactulose and titrate to 3-4 BMs per day.  -Ammonia 104, repeat ordered this AM  -Rifaximin ordered  -UA clean; Bcx NG1D      Fall    Fall, hit head, Ct negative for acute bleed  -Hemotoma on posterior aspect of head  -Will continue to monitor as patient is slightly altered, more likely hepatic etiology; AMS improved this AM      Essential hypertension     on admission; normotensive since that time  -Will continue to monitor vitals  -Not on home antihypertensives      Hep C w/o coma, chronic    Diagnosed in July 17  -Thus far has not begun treatment      Uncontrolled type 2 diabetes mellitus with  diabetic polyneuropathy, without long-term current use of insulin    Glucose 134 on admit  -SSI and will monitor; has not required thus far        VTE Risk Mitigation         Ordered     VTE Prophylaxis: Place LYNETTE hose  Until discontinued    Diet: NPO  Code Status: Full Code  Tubes/Lines/Drains: Peripheral IV - Single Lumen 12/10/17 0501 Left Antecubital 12/10/17 0887        Manny Cohn MD  Department of Hospital Medicine   [facility]

## 2017-12-11 NOTE — PLAN OF CARE
Problem: Patient Care Overview  Goal: Plan of Care Review  Outcome: Ongoing (interventions implemented as appropriate)  Pt is AAOx3.Pt needs stand by assistance only. NAD noted.Standard precautions maintained.  Telly monitoring on going. Pt turns independently, pt is aware of bony area and pressure reduction positions Pt remains injury and fall free, non skid footwear donned, call light within reach, personal items within reach, bed in low/locked position, pt able to voice needs all needs voiced have been met at this time.

## 2017-12-11 NOTE — NURSING
Pt refused lactulose. Educated Pt on the need for the medication and what the medication action is. Will offer medication at a later time. Will continue to monitor.

## 2017-12-11 NOTE — NURSING
MD notified of floor nurse not being able to do accu checks every hour of floor, and stated okay to do per floor protocol, will continue to monitor.

## 2017-12-11 NOTE — CARE UPDATE
Rapid Response Follow-up Note    Followed up with chart check after pro active rounding by night shift RRT  For hyperkalemia.  Per chart, pt's K+ 5.3, which has improved from previous level.    Please call Rapid Response RN with any questions or concerns at  [phone]

## 2017-12-11 NOTE — ASSESSMENT & PLAN NOTE
7.0 on admission; repeat 6.7.  S/p shift in the Ed with insulin, calcium gluconate, albuterol, kaylexalate   -q4 BMP  -Urine Na, Cl, K, urea, Cr, osms IP  -UA ordered

## 2017-12-11 NOTE — NURSING
Pt K+ 6.6, MD notified, pt transferred  to TSU due to every hour accuchecks, via stretcher [facility] critical care nurse, pt remains asymptomatic, no distress noted, pt delayed for U/S due to transfer.

## 2017-12-11 NOTE — ASSESSMENT & PLAN NOTE
Longstanding decompensated cirrhosis.  Multiple inpatient visits recently. One which he left AMA.    Difficulties with lack of caregiver and support. Lack of transportation severely limits treatment options.   He is currently NOT a transplant candidate due to non-adherence and lack of caregiver support which poses barrier to future care, including him making his appts and following up.  For this reason, he also is not a candidate for HCV treatment currently.    Needs good social work plan for potential outpatient assistance.  If he continues to present to care for decompensated liver disease, a palliative care consult should be considered and potentially needs a intraperitoneal catheter for home control of his ascites.    SBP negative on tap this admission.    Plan:  Check PETH - ordered  Continue cipro for SBP ppx  Lactulose and rifaximin  Lasix and aldactone titrated to goal   Social work consult for outpatient options regarding his lack of transportation

## 2017-12-11 NOTE — SUBJECTIVE & OBJECTIVE
Interval History: NAEON. He refused lactulose enema x2 yesterday per night Rn. He states that he will no longer refuse medication but cannot recall having refused overnight. Appears generally more comfortable today than on prior assessment. Will go for U/S this Am and is asking for a diet. Was more oriented this AM as well, was able to say that he is at [facility] [place], knows the month, and the reason for his admission. Will continue to monitor for improvement.     Review of Systems   Constitutional: Positive for fatigue. Negative for activity change and appetite change.   HENT: Negative for congestion and facial swelling.    Eyes: Negative for discharge and itching.   Respiratory: Negative for apnea, chest tightness and shortness of breath.    Cardiovascular: Negative for chest pain and palpitations.   Gastrointestinal: Positive for abdominal distention and abdominal pain. Negative for anal bleeding and blood in stool.   Endocrine: Negative for cold intolerance and heat intolerance.   Genitourinary: Negative for difficulty urinating and dysuria.   Musculoskeletal: Positive for arthralgias (L knee and thigh pain ).   Skin: Negative for color change and pallor.   Neurological: Negative for dizziness and headaches.   Psychiatric/Behavioral: Positive for confusion (improved from prior assessment). Negative for agitation and behavioral problems.     Objective:     Vital Signs (Most Recent):  Temp: 97.8 °F (36.6 °C) (12/11/17 0718)  Pulse: 86 (12/11/17 0718)  Resp: 18 (12/11/17 0718)  BP: 109/65 (12/11/17 0718)  SpO2: 99 % (12/11/17 0718) Vital Signs (24h Range):  Temp:  [97 °F (36.1 °C)-98.3 °F (36.8 °C)] 97.8 °F (36.6 °C)  Pulse:  [80-96] 86  Resp:  [15-30] 18  SpO2:  [94 %-100 %] 99 %  BP: (109-156)/(55-90) 109/65     Weight: 79.9 kg (176 lb 2.4 oz)  Body mass index is 24.57 kg/m².    Intake/Output Summary (Last 24 hours) at 12/11/17 0757  Last data filed at 12/10/17 2008   Gross per 24 hour   Intake             1100 ml   Output               200 ml   Net              900 ml      Physical Exam   Constitutional: He appears well-developed and well-nourished. He appears lethargic.   HENT:   Head: Normocephalic.   Small hematoma on posterior aspect of head s/p fall   Eyes: Conjunctivae and EOM are normal.   Neck: No JVD present. No tracheal deviation present.   Cardiovascular: Normal rate, regular rhythm and normal heart sounds.    Pulmonary/Chest: Effort normal and breath sounds normal.   Abdominal: Soft. Bowel sounds are normal. He exhibits distension and ascites. There is generalized tenderness.   Musculoskeletal: He exhibits no edema or deformity.   Scrapes overlying the L knee   Neurological: He appears lethargic.   Oriented to person, location, and season of the year   Skin: Skin is warm and dry.   Psychiatric: He has a normal mood and affect. His behavior is normal.   Vitals reviewed.      Significant Labs:   Recent Results (from the past 24 hour(s))   POCT glucose    Collection Time: 12/10/17  8:59 AM   Result Value Ref Range    POCT Glucose 121 (H) 70 - 110 mg/dL   Amylase, Peritoneal, Pleural Fluid or YA Drainage, In-House Ascites    Collection Time: 12/10/17 10:16 AM   Result Value Ref Range    Body Fluid Source Amylase Ascites     Amylase, Fluid 60 Not established U/L   WBC & Diff,Body Fluid Ascites    Collection Time: 12/10/17 10:16 AM   Result Value Ref Range    Body Fluid Type Ascites     Fluid Appearance Turbid     Fluid Color Other     WBC, Body Fluid 290 /cu mm    Segs, Fluid 6 %    Lymphs, Fluid 62 %    Monocytes/Macrophages, Fluid 32 %   Albumin, Peritoneal, Pleural Fluid or YA Drainage, In-House Ascites    Collection Time: 12/10/17 10:16 AM   Result Value Ref Range    Body Fluid Source, Albumin Ascites     Body Fluid Albumin 0.8 See text g/dL   Protein, Peritoneal, Pleural Fluid or YA Drainage, In-House Ascites    Collection Time: 12/10/17 10:16 AM   Result Value Ref Range    Body Fluid Source, Total Protein Ascites     Body  Fluid, Protein 2.6 Not established g/dL   LDH, Peritoneal, Pleural Fluid or YA Drainage, In-House Ascites    Collection Time: 12/10/17 10:16 AM   Result Value Ref Range    Body Fluid Source, LDH Ascites     LD, Fluid 62 Not established U/L   (rule out SBP) Aerobic culture    Collection Time: 12/10/17 10:19 AM   Result Value Ref Range    Aerobic Bacterial Culture No growth    (rule out SBP) Culture, Anaerobic    Collection Time: 12/10/17 10:19 AM   Result Value Ref Range    Anaerobic Culture Culture in progress    (rule out SBP) Gram stain    Collection Time: 12/10/17 10:19 AM   Result Value Ref Range    Gram Stain Result Rare WBC's     Gram Stain Result No organisms seen    Lactic acid, plasma    Collection Time: 12/10/17 11:32 AM   Result Value Ref Range    Lactate (Lactic Acid) 4.1 (HH) 0.5 - 2.2 mmol/L   POCT glucose    Collection Time: 12/10/17 11:50 AM   Result Value Ref Range    POCT Glucose 121 (H) 70 - 110 mg/dL   Lactate dehydrogenase    Collection Time: 12/10/17 12:00 PM   Result Value Ref Range     110 - 260 U/L   Basic metabolic panel    Collection Time: 12/10/17  2:55 PM   Result Value Ref Range    Sodium 133 (L) 136 - 145 mmol/L    Potassium 6.6 (HH) 3.5 - 5.1 mmol/L    Chloride 108 95 - 110 mmol/L    CO2 16 (L) 23 - 29 mmol/L    Glucose 123 (H) 70 - 110 mg/dL    BUN, Bld 71 (H) 8 - 23 mg/dL    Creatinine 1.7 (H) 0.5 - 1.4 mg/dL    Calcium 8.5 (L) 8.7 - 10.5 mg/dL    Anion Gap 9 8 - 16 mmol/L    eGFR if African American 47.5 (A) >60 mL/min/1.73 m^2    eGFR if non  41.1 (A) >60 mL/min/1.73 m^2   Toxicology screen, urine    Collection Time: 12/10/17  3:05 PM   Result Value Ref Range    Alcohol, Urine <10 <10 mg/dL    Benzodiazepines Negative     Methadone metabolites Negative     Cocaine (Metab.) Negative     Opiate Scrn, Ur Presumptive Positive     Barbiturate Screen, Ur Negative     Amphetamine Screen, Ur Negative     THC Negative     Phencyclidine Negative     Creatinine, Random  Ur 75.0 23.0 - 375.0 mg/dL    Toxicology Information SEE COMMENT    Sodium, urine, random    Collection Time: 12/10/17  3:05 PM   Result Value Ref Range    Sodium Urine Random <20 (A) 20 - 250 mmol/L   Potassium, urine, random    Collection Time: 12/10/17  3:05 PM   Result Value Ref Range    Potassium Urine Random 59 15 - 95 mmol/L   Urea nitrogen, urine    Collection Time: 12/10/17  3:05 PM   Result Value Ref Range    Urine Urea Nitrogen, Random 861 140 - 1050 mg/dL   Creatinine, urine, random    Collection Time: 12/10/17  3:05 PM   Result Value Ref Range    Creatinine, Random Ur 75.0 23.0 - 375.0 mg/dL   Chloride, urine, random    Collection Time: 12/10/17  3:05 PM   Result Value Ref Range    Chloride, Rand Ur <20 (L) 25 - 200 mmol/L   Urinalysis    Collection Time: 12/10/17  3:06 PM   Result Value Ref Range    Specimen UA Urine, Clean Catch     Color, UA Yellow Yellow, Straw, Marcy    Appearance, UA Clear Clear    pH, UA 5.0 5.0 - 8.0    Specific Gravity, UA 1.015 1.005 - 1.030    Protein, UA Negative Negative    Glucose, UA Negative Negative    Ketones, UA Negative Negative    Bilirubin (UA) Negative Negative    Occult Blood UA Negative Negative    Nitrite, UA Negative Negative    Urobilinogen, UA Negative <2.0 EU/dL    Leukocytes, UA Negative Negative   Osmolality, urine    Collection Time: 12/10/17  3:06 PM   Result Value Ref Range    Osmolality, Ur 490 50 - 1200 mOsm/kg   ISTAT PROCEDURE    Collection Time: 12/10/17  3:38 PM   Result Value Ref Range    POC PH 7.362 7.35 - 7.45    POC PCO2 30.4 (L) 35 - 45 mmHg    POC PO2 31 (LL) 40 - 60 mmHg    POC HCO3 17.2 (L) 24 - 28 mmol/L    POC BE -8 -2 to 2 mmol/L    POC SATURATED O2 58 (L) 95 - 100 %    POC TCO2 18 (L) 24 - 29 mmol/L    Sample VENOUS     Site Other     Allens Test N/A     DelSys Room Air     Mode SPONT     FiO2 21     Sp02 99    Blood culture    Collection Time: 12/10/17  4:04 PM   Result Value Ref Range    Blood Culture, Routine No Growth to date     Ammonia    Collection Time: 12/10/17  4:05 PM   Result Value Ref Range    Ammonia 104 (H) 10 - 50 umol/L   Blood culture    Collection Time: 12/10/17  4:06 PM   Result Value Ref Range    Blood Culture, Routine No Growth to date    Lactic acid, plasma    Collection Time: 12/10/17  4:37 PM   Result Value Ref Range    Lactate (Lactic Acid) 2.2 0.5 - 2.2 mmol/L   POCT glucose    Collection Time: 12/10/17  6:53 PM   Result Value Ref Range    POCT Glucose 92 70 - 110 mg/dL   POCT glucose    Collection Time: 12/10/17  7:29 PM   Result Value Ref Range    POCT Glucose 249 (H) 70 - 110 mg/dL   Lactic acid, plasma    Collection Time: 12/10/17  7:57 PM   Result Value Ref Range    Lactate (Lactic Acid) 2.0 0.5 - 2.2 mmol/L   Basic metabolic panel    Collection Time: 12/10/17  7:57 PM   Result Value Ref Range    Sodium 132 (L) 136 - 145 mmol/L    Potassium 6.6 (HH) 3.5 - 5.1 mmol/L    Chloride 108 95 - 110 mmol/L    CO2 19 (L) 23 - 29 mmol/L    Glucose 202 (H) 70 - 110 mg/dL    BUN, Bld 69 (H) 8 - 23 mg/dL    Creatinine 1.7 (H) 0.5 - 1.4 mg/dL    Calcium 8.4 (L) 8.7 - 10.5 mg/dL    Anion Gap 5 (L) 8 - 16 mmol/L    eGFR if African American 47.5 (A) >60 mL/min/1.73 m^2    eGFR if non  41.1 (A) >60 mL/min/1.73 m^2   POCT glucose    Collection Time: 12/10/17  8:03 PM   Result Value Ref Range    POCT Glucose 186 (H) 70 - 110 mg/dL   Basic metabolic panel    Collection Time: 12/10/17 11:16 PM   Result Value Ref Range    Sodium 136 136 - 145 mmol/L    Potassium 6.0 (H) 3.5 - 5.1 mmol/L    Chloride 109 95 - 110 mmol/L    CO2 20 (L) 23 - 29 mmol/L    Glucose 118 (H) 70 - 110 mg/dL    BUN, Bld 66 (H) 8 - 23 mg/dL    Creatinine 1.8 (H) 0.5 - 1.4 mg/dL    Calcium 8.1 (L) 8.7 - 10.5 mg/dL    Anion Gap 7 (L) 8 - 16 mmol/L    eGFR if African American 44.3 (A) >60 mL/min/1.73 m^2    eGFR if non  38.4 (A) >60 mL/min/1.73 m^2   Lactic acid, plasma    Collection Time: 12/10/17 11:17 PM   Result Value Ref Range     Lactate (Lactic Acid) 1.6 0.5 - 2.2 mmol/L   POCT glucose    Collection Time: 12/11/17  1:15 AM   Result Value Ref Range    POCT Glucose 152 (H) 70 - 110 mg/dL   POCT glucose    Collection Time: 12/11/17  1:56 AM   Result Value Ref Range    POCT Glucose 78 70 - 110 mg/dL   POCT glucose    Collection Time: 12/11/17  3:03 AM   Result Value Ref Range    POCT Glucose 74 70 - 110 mg/dL   POCT glucose    Collection Time: 12/11/17  4:10 AM   Result Value Ref Range    POCT Glucose 84 70 - 110 mg/dL   Lactic acid, plasma    Collection Time: 12/11/17  4:53 AM   Result Value Ref Range    Lactate (Lactic Acid) 1.0 0.5 - 2.2 mmol/L   Basic metabolic panel    Collection Time: 12/11/17  4:53 AM   Result Value Ref Range    Sodium 134 (L) 136 - 145 mmol/L    Potassium 5.3 (H) 3.5 - 5.1 mmol/L    Chloride 109 95 - 110 mmol/L    CO2 17 (L) 23 - 29 mmol/L    Glucose 155 (H) 70 - 110 mg/dL    BUN, Bld 68 (H) 8 - 23 mg/dL    Creatinine 1.7 (H) 0.5 - 1.4 mg/dL    Calcium 8.5 (L) 8.7 - 10.5 mg/dL    Anion Gap 8 8 - 16 mmol/L    eGFR if African American 47.5 (A) >60 mL/min/1.73 m^2    eGFR if non  41.1 (A) >60 mL/min/1.73 m^2   Protime-INR    Collection Time: 12/11/17  4:53 AM   Result Value Ref Range    Prothrombin Time 10.5 9.0 - 12.5 sec    INR 1.0 0.8 - 1.2   Hepatic function panel    Collection Time: 12/11/17  4:53 AM   Result Value Ref Range    Total Protein 5.6 (L) 6.0 - 8.4 g/dL    Albumin 2.2 (L) 3.5 - 5.2 g/dL    Total Bilirubin 0.8 0.1 - 1.0 mg/dL    Bilirubin, Direct 0.4 (H) 0.1 - 0.3 mg/dL    AST 52 (H) 10 - 40 U/L    ALT 45 (H) 10 - 44 U/L    Alkaline Phosphatase 142 (H) 55 - 135 U/L   CBC auto differential    Collection Time: 12/11/17  4:53 AM   Result Value Ref Range    WBC 3.82 (L) 3.90 - 12.70 K/uL    RBC 3.44 (L) 4.60 - 6.20 M/uL    Hemoglobin 8.5 (L) 14.0 - 18.0 g/dL    Hematocrit 27.3 (L) 40.0 - 54.0 %    MCV 79 (L) 82 - 98 fL    MCH 24.7 (L) 27.0 - 31.0 pg    MCHC 31.1 (L) 32.0 - 36.0 g/dL    RDW 15.1  (H) 11.5 - 14.5 %    Platelets 112 (L) 150 - 350 K/uL    MPV 9.7 9.2 - 12.9 fL    Immature Granulocytes 0.3 0.0 - 0.5 %    Gran # 2.8 1.8 - 7.7 K/uL    Immature Grans (Abs) 0.01 0.00 - 0.04 K/uL    Lymph # 0.3 (L) 1.0 - 4.8 K/uL    Mono # 0.6 0.3 - 1.0 K/uL    Eos # 0.1 0.0 - 0.5 K/uL    Baso # 0.02 0.00 - 0.20 K/uL    nRBC 0 0 /100 WBC    Gran% 74.3 (H) 38.0 - 73.0 %    Lymph% 6.8 (L) 18.0 - 48.0 %    Mono% 15.7 (H) 4.0 - 15.0 %    Eosinophil% 2.4 0.0 - 8.0 %    Basophil% 0.5 0.0 - 1.9 %    Differential Method Automated    Potassium    Collection Time: 12/11/17  4:53 AM   Result Value Ref Range    Potassium 5.3 (H) 3.5 - 5.1 mmol/L   POCT glucose    Collection Time: 12/11/17  5:02 AM   Result Value Ref Range    POCT Glucose 174 (H) 70 - 110 mg/dL     Significant Imaging:

## 2017-12-11 NOTE — PLAN OF CARE
Problem: Patient Care Overview  Goal: Plan of Care Review  Outcome: Ongoing (interventions implemented as appropriate)  Patient aao x2. He is disoriented to time but seems to be getting clearer as day goes on. Call bell within reach. Bed alarm on at all times. K 5.8. 30 grams kayexelate ordered and given. Plan to monitor. bmps q 4 hours. Will continue to monitor.

## 2017-12-11 NOTE — SUBJECTIVE & OBJECTIVE
Review of Systems   Constitutional: Positive for fatigue. Negative for chills and fever.   HENT: Negative for facial swelling, mouth sores and trouble swallowing.    Eyes: Negative for pain and redness.   Respiratory: Negative for cough and shortness of breath.    Cardiovascular: Negative for chest pain and leg swelling.   Gastrointestinal: Positive for abdominal distention.   Genitourinary: Negative for dysuria and hematuria.   Musculoskeletal: Negative for gait problem and neck stiffness.   Skin: Positive for color change. Negative for rash and wound.   Neurological: Positive for weakness. Negative for seizures, syncope and headaches.   Psychiatric/Behavioral: Negative for confusion and hallucinations.       Past Medical History:   Diagnosis Date    Diabetes mellitus     Hep C w/o coma, chronic     Hypertension     Macular degeneration     Neuropathy        Past Surgical History:   Procedure Laterality Date    EYE SURGERY      HAND SURGERY         Family history of liver disease: No    Review of patient's allergies indicates:  No Known Allergies    Social History Main Topics    Smoking status: Former Smoker    Smokeless tobacco: Never Used    Alcohol use No      Comment: 2 5th per week- stopped one month ago approx 9/10/16    Drug use: No    Sexual activity: No       Prescriptions Prior to Admission   Medication Sig Dispense Refill Last Dose    ciprofloxacin HCl (CIPRO) 500 MG tablet Take 1 tablet (500 mg total) by mouth once daily. 30 tablet 3 Taking    furosemide (LASIX) 40 MG tablet Take 40 mg by mouth once daily.       glimepiride (AMARYL) 1 MG tablet Take 1 tablet (1 mg total) by mouth before breakfast. 90 tablet 3 Taking    lactulose (CHRONULAC) 10 gram/15 mL solution Take 30 mLs (20 g total) by mouth 3 (three) times daily. 1892 mL 1     potassium chloride SA (K-DUR,KLOR-CON) 20 MEQ tablet Take 20 mEq by mouth 2 (two) times daily.       SITagliptin (JANUVIA) 100 MG Tab Take 100 mg by mouth  once daily.          Objective:     Vital Signs (Most Recent):  Temp: 97.6 °F (36.4 °C) (12/11/17 1116)  Pulse: 85 (12/11/17 1116)  Resp: 18 (12/11/17 1116)  BP: 119/74 (12/11/17 1116)  SpO2: 99 % (12/11/17 1116) Vital Signs (24h Range):  Temp:  [97 °F (36.1 °C)-98.3 °F (36.8 °C)] 97.6 °F (36.4 °C)  Pulse:  [82-91] 85  Resp:  [16-18] 18  SpO2:  [97 %-100 %] 99 %  BP: (109-122)/(55-74) 119/74     Weight: 79.9 kg (176 lb 2.4 oz) (12/10/17 1215)  Body mass index is 24.57 kg/m².    Physical Exam   Constitutional: He is oriented to person, place, and time. No distress.   Alert ; no acute distress   HENT:   Head: Normocephalic.   Mouth/Throat: No oropharyngeal exudate.   Temporal wasting   Eyes: Conjunctivae and EOM are normal. No scleral icterus.   Neck: Neck supple. No thyromegaly present.   Cardiovascular: Normal rate and intact distal pulses.    Pulmonary/Chest: Effort normal and breath sounds normal. No respiratory distress.   Abdominal: Soft. He exhibits distension. He exhibits no mass. There is no guarding.   Musculoskeletal: Normal range of motion. He exhibits no tenderness.   Neurological: He is alert and oriented to person, place, and time. No cranial nerve deficit.   Skin: Skin is warm and dry. No rash noted.   Psychiatric: He has a normal mood and affect. His behavior is normal.   Vitals reviewed.      MELD-Na score: 12 at 12/11/2017 10:16 AM  MELD score: 12 at 12/11/2017 10:16 AM  Calculated from:  Serum Creatinine: 1.8 mg/dL at 12/11/2017 10:16 AM  Serum Sodium: 137 mmol/L at 12/11/2017 10:16 AM  Total Bilirubin: 0.8 mg/dL (Rounded to 1) at 12/11/2017  4:53 AM  INR(ratio): 1.0 at 12/11/2017  4:53 AM  Age: 67 years    Significant Labs:  CBC:   Recent Labs  Lab 12/11/17 0453   WBC 3.82*   RBC 3.44*   HGB 8.5*   HCT 27.3*   *     CMP:   Recent Labs  Lab 12/11/17  0453 12/11/17  1016   * 84   CALCIUM 8.5* 8.5*   ALBUMIN 2.2*  --    PROT 5.6*  --    * 137   K 5.3*  5.3* 5.8*  5.8*   CO2 17*  19*    111*   BUN 68* 66*   CREATININE 1.7* 1.8*   ALKPHOS 142*  --    ALT 45*  --    AST 52*  --    BILITOT 0.8  --      Coagulation:   Recent Labs  Lab 12/11/17  0453   INR 1.0       Significant Imaging:  Labs: Reviewed  CT: Reviewed

## 2017-12-11 NOTE — HPI
This is a 66 y/o male with hx of HCV + etoh cirrhosis, refractory ascites and hydrothorax, DMII, CKD who presented via EMS after fall at home after tripping on home furniture and hit his head. No loss of consciousness. Also with assoc generalized abd pain.   Found to have hyperK in the ER with K of 7. Ct head was negative.   Admitted to IM team.   Today he is feeling much better. Feels stronger.    Admits that he doesn't have reliable ride to make his appts. Lives with his elderly mother who helps with his meds but wants someone else to come to house to help with meds. Has tried taking public transportation but has difficulty with weakness and actually walking to the local bus stop. Taxi is  Becoming too expensive.     Of note, pt previously evaluated multiple times by hepatology inpatient and outpatient.  Was previously deemed not a liver transplant candidate due to non-compliance and lack of caregiver support.

## 2017-12-11 NOTE — PLAN OF CARE
"CM to bedside - CM familiar w/ pt; readmit. Pt w/ DME in place, lives w/ mother. Pt has been followed by [facility] h/h in the past; since pt left AMA after last admit - h/h was not aware of pt's d/c but they are willing to take pt back. CM was at bedside w/ Hepatology - that team was discussing w/ pt the concern r/t missed appts and pt needing pt have regular paracentesis. Pt states that he doesn't have reliable transportation, doesn't have help w/ meds, etc. CM/SW have determined that pt has Medicaid/[employer] as secondary insurance which provided ride benefits p [phone]; appt for rides need to be made 72hr ahead of time and provided door to door service for all medical appts. CM will also place a consult for outpt case mgmt which can help manage pt's issue after d/c.    CM provided patient anticipated DIANA which will be update by nursing staff. Patient provided a Blue "My Health Packet" for d/c planning and health tool. Patient verbalized understanding.    Future Appointments  Date Time Provider Department Center   12/12/2017 1:40 PM Jean Carlos Swanson MD [facility] IM [place]   1/3/2018 2:00 PM Star Major MD [facility] HEPAT [place]        12/11/17 4044   Discharge Assessment   Assessment Type Discharge Planning Assessment   Confirmed/corrected address and phone number on facesheet? Yes   Assessment information obtained from? Patient;Medical Record   Expected Length of Stay (days) 3   Communicated expected length of stay with patient/caregiver yes   Prior to hospitilization cognitive status: Unable to Assess   Prior to hospitalization functional status: Assistive Equipment;Needs Assistance   Current cognitive status: Not Oriented to Time   Current Functional Status: Assistive Equipment;Needs Assistance   Facility Arrived From: N/A   Lives With parent(s)   Able to Return to Prior Arrangements unable to determine at this time (comments)   Is patient able to care for self after discharge? Unable to " determine at this time (comments)   Who are your caregiver(s) and their phone number(s)? mother Panda Herrera [phone]   Patient's perception of discharge disposition home health   Readmission Within The Last 30 Days unable to assess   Patient currently being followed by outpatient case management? No   Patient currently receives any other outside agency services? Yes   Name and contact number of agency or person providing outside services [facility]   Is it the patient/care giver preference to resume care with the current outside agency? Yes   Equipment Currently Used at Home cane, straight;walker, rolling;walker, standard;rollator;raised toilet;glucometer   Do you have any problems affording any of your prescribed medications? No   Is the patient taking medications as prescribed? no   If no, which medications is patient not taking? pt becomes encephalpathic and forgets to take meds   Does the patient have transportation home? Yes  (pt does have occasional issues w/ rides)   Transportation Available public transportation;taxi  ([employer] transport)   Dialysis Name and Scheduled days N/A   Does the patient receive services at the [facility]? No   Discharge Plan A Home with family;Home Health   Discharge Plan B Skilled Nursing Facility   Patient/Family In Agreement With Plan yes   Readmission Questionnaire   At the time of your discharge, did someone talk to you about what your health problems were? Yes  (readmit completed on opening screen)

## 2017-12-11 NOTE — NURSING
CN took critical lab result. K is 6.6. MD notified. Redraw lab order given. Will continue to monitor.

## 2017-12-11 NOTE — PROGRESS NOTES
RRT rounding note:  Alerted to patient by TSU charge nurse. Concern for hyperkalemia despite being shifted. Otherwise, bedside nurse states patient is stable, and without complaints.   Checked on patient again this AM, nurse states they are awaiting 0400 lab results to check if K decreased with last shift.   RN informed to call RRT @ [id number] if any further changes are noted, or if patient shows no improvements.

## 2017-12-11 NOTE — NURSING
Pt admitted to unit from ED, oriented to room, MD notified of pt arrival, MD notified of pt leaking from paracentesis site and stated it may leak form site. BS WNL, telemetry on and intact pt K+ 6.8 currently, MD aware.

## 2017-12-12 PROBLEM — E87.5 ACUTE HYPERKALEMIA: Status: RESOLVED | Noted: 2017-01-01 | Resolved: 2017-01-01

## 2017-12-12 NOTE — CARE UPDATE
Rapid Response Follow-up Note    Followed up with patient for proactive rounding.   No acute issues at this time. Potassium trending down, last value 5.3 s/p kayexalate administration.  Per bedside RN, patient continuing to have multiple BMs on night shift, which will help to decrease potassium levels.  No ectopy or abnormal ECG noted.    Reviewed plan of care with primary RN.   Please call Rapid Response RN with any questions or concerns at  [phone]

## 2017-12-12 NOTE — PLAN OF CARE
Problem: Patient Care Overview  Goal: Plan of Care Review  Outcome: Ongoing (interventions implemented as appropriate)  Pt is AAOx3.Pt needs stand by assistance only. Pt able to perform all ADL's without assistance. CBG monitored AC HS.  SS coverage given when appropriate.Pt is in good spirits.  NAD noted.Telly monitoring on going. Pt turns independently, pt is aware of bony area and pressure reduction positions Pt remains injury and fall free, non skid footwear donned, call light within reach, personal items within reach, bed in low/locked position, pt able to voice needs all needs voiced have been met at this time.

## 2017-12-12 NOTE — PT/OT/SLP EVAL
"Physical Therapy Evaluation    Patient Name:  Soy Reza   MRN:  [id number]    Recommendations:     Discharge Recommendations:  home health PT   Discharge Equipment Recommendations: none   Barriers to discharge: None    Assessment:     Soy Reza is a 67 y.o. male admitted with a medical diagnosis of Acute hyperkalemia.  He presents with the following impairments/functional limitations:  weakness, impaired endurance, impaired sensation, impaired self care skills, impaired functional mobilty, gait instability, impaired balance, impaired cognition, decreased lower extremity function, decreased safety awareness, abnormal tone, edema, impaired coordination, impaired skin. Pt demo good participation with PT this date; instability of gait noted. Pt reports near baseline function. Agreeable to remain OOB for meals and participate with PT to ensure improved balance. Appropriate for D/C home with HH PT 2/2 recent fall in the home.    Rehab Prognosis:  Good; patient would benefit from acute skilled PT services to address these deficits and reach maximum level of function.      Recent Surgery: * No surgery found *      Plan:     During this hospitalization, patient to be seen 3 x/week to address the above listed problems via gait training, therapeutic activities, therapeutic exercises, neuromuscular re-education  · Plan of Care Expires:  01/11/18   Plan of Care Reviewed with: patient    Subjective     Communicated with RN (Verna) prior to session.  Patient found sitting UIC upon PT entry to room, agreeable to evaluation.      Chief Complaint: "I feel smack on my head like a ton of bricks."  Patient comments/goals: "  Pain/Comfort:  · Pain Rating 1: 0/10  · Pain Rating Post-Intervention 1: 0/10    Patients cultural, spiritual, Sikh conflicts given the current situation: None noted    Living Environment:  PT lives with his 94 y/o mother in a [facility] with x2STE in [place]. PTA pt was mod (I) using RW for majority of " mobility; per pt his mother performs majority of household tasks. Pt reports difficulty managing medications; endorses multiple falls in the home. Pt enjoys using his remote control car outside on their property.  Prior to admission, patients level of function was mod (I).  Patient has the following equipment: cane, straight, walker, rolling, rollator, raised toilet.  DME owned (not currently used): single point cane.  Upon discharge, patient will have assistance from his mother.    Objective:     Patient found with: telemetry     General Precautions: Standard, fall    Exams:  · Cognitive Exam:  Patient is oriented to Person, Place and Situation and follows 100% of simple commands   · Sensation:    · -       Intact  · Skin Integrity/Edema:      · -       Skin integrity: Visible skin intact, Dry scar scalp, Thin and Dry  · -       Edema: Moderate abdomen   · MMT: All WFL; no focal weakness noted    Functional Mobility:  Bed Mobility: pt found and remained UIC    Sitting Balance at Edge of Bed:   Assistance Level Required: Independent   Time: x5 min for testing   Postural deviations noted:    -       Rounded shoulders  -       Forward head  -       Posterior pelvic tilt  -       Lateral weight shift of hips   Encouraged: upright posture, thoracic extension    Standing Balance at Edge of Bed:   Assistance Level Required: Stand-by Assistance and Contact Guard Assistance   Time: x5 min total   Postural deviations noted:    -       Rounded shoulders  -       Forward head  -       Posterior pelvic tilt  -       WBOS    Pt tolerated EOB standing as above with VCs/TCs for hip, knee and thoracic extension, midline orientation, forward gaze and attention to task.    Transfers:   Sit to Stand: Stand-by Assistance and Contact Guard Assistance with No Assistive Device x3 trials from bedside chair   Stand to Sit: Contact Guard Assistance with No Assistive Device for controlled descent to surface    Gait:   Distance Ambulated: Pt  ambulated x120 feet in hallway setting. Pt demo instability of gait, decreased safety awareness and req VC/TCs for directional guidance. Pt with min A throughout gait trial with intermittent bouts of SBA; x3-4 minor LOB noted.    Assistance level: Stand-by Assistance and Minimal Assistance   Assistive Device used:  No Assistive Device   Gait Pattern: reciprocal gait   Gait Deviation(s): decreased sandeep, decreased stride length, increased stride width and decreased weight-shifting ability   Impairments due to: impaired balance    AM-PAC 6 CLICK MOBILITY  Total Score:19     Therapeutic Activities and Exercises:   PT arrived to pt's room to find pt resting quietly; agreeable to PT session. Pt performed mobility as above. Upon return to room, PT reviewed mobility needs, progression with therapy services and need to call for assist to avoid falling in hosital. Questions/concerns addressed within PT scope of practice; pt and RN with no further questions.    Patient left up in chair with all lines intact, call button in reach and RN notified.    GOALS:    Physical Therapy Goals        Problem: Physical Therapy Goal    Goal Priority Disciplines Outcome Goal Variances Interventions   Physical Therapy Goal     PT/OT, PT Ongoing (interventions implemented as appropriate)     Description:  Goals to be met by: 2017     Patient will increase functional independence with mobility by performin. Supine to sit with Modified [place]  2. Sit to supine with Modified [place]  3. Sit to stand transfer with Modified [place] using AD or no AD  4. Bed to chair transfer with Stand-by Assistance using AD or No AD  5. Gait x150 feet with Supervision using AD or No AD   6. Ascend/descend x2 stair with no rails Handrails with Stand-by Assistance   7. Stand for x10 minutes with Stand-by Assistance while performing dynamic balance tasks  8. Lower extremity exercise program x15 reps with supervision to maintain B LE  strength and coordination                      History:     Past Medical History:   Diagnosis Date    Diabetes mellitus     Hep C w/o coma, chronic     Hypertension     Macular degeneration     Neuropathy        Past Surgical History:   Procedure Laterality Date    EYE SURGERY      HAND SURGERY         Clinical Decision Making:     History  Co-morbidities and personal factors that may impact the plan of care Examination  Body Structures and Functions, activity limitations and participation restrictions that may impact the plan of care Clinical Presentation   Decision Making/ Complexity Score   Co-morbidities:   [] Time since onset of injury / illness / exacerbation  [x] Status of current condition  [x]Patient's cognitive status and safety concerns    [x] Multiple Medical Problems (see med hx)  Personal Factors:   [] Patient's age  [x] Prior Level of function   [x] Patient's home situation (environment and family support)  [] Patient's level of motivation  [] Expected progression of patient      HISTORY:(criteria)    [] 79316 - no personal factors/history    [x] 56058 - has 1-2 personal factor/comorbidity     [] 67403 - has >3 personal factor/comorbidity     Body Regions:  [x] Objective examination findings  [x] Head     [x]  Neck  [x] Trunk   [x] Upper Extremity  [x] Lower Extremity    Body Systems:  [x] For communication ability, affect, cognition, language, and learning style: the assessment of the ability to make needs known, consciousness, orientation (person, place, and time), expected emotional /behavioral responses, and learning preferences (eg, learning barriers, education  needs)  [x] For the neuromuscular system: a general assessment of gross coordinated movement (eg, balance, gait, locomotion, transfers, and transitions) and motor function  (motor control and motor learning)  [x] For the musculoskeletal system: the assessment of gross symmetry, gross range of motion, gross strength, height, and  weight  [] For the integumentary system: the assessment of pliability(texture), presence of scar formation, skin color, and skin integrity  [] For cardiovascular/pulmonary system: the assessment of heart rate, respiratory rate, blood pressure, and edema     Activity limitations:    [x] Patient's cognitive status and saf ety concerns          [x] Status of current condition      [] Weight bearing restriction  [] Cardiopulmunary Restriction    Participation Restrictions:   [x] Goals and goal agreement with the patient     [x] Rehab potential (prognosis) and probable outcome      Examination of Body System: (criteria)    [] 27466 - addressing 1-2 elements    [x] 61752 - addressing a total of 3 or more elements     [] 90945 -  Addressing a total of 4 or more elements         Clinical Presentation: (criteria)  Stable - 22751     On examination of body system using standardized tests and measures patient presents with (CHOOSE ONE) elements from any of the following: body structures and functions, activity limitations, and/or participation restrictions.  Leading to a clinical presentation that is considered stable and/or uncomplicated                              Clinical Decision Making  (Eval Complexity):  Low- 27182     Time Tracking:     PT Received On: 12/12/17  PT Start Time: 0842     PT Stop Time: 0858  PT Total Time (min): 16 min     Billable Minutes: Evaluation 15    Elsie Martines, PT, DPT  [phone]  12/12/2017

## 2017-12-12 NOTE — PLAN OF CARE
Problem: Physical Therapy Goal  Goal: Physical Therapy Goal  Goals to be met by: 2017     Patient will increase functional independence with mobility by performin. Supine to sit with Modified [place]  2. Sit to supine with Modified [place]  3. Sit to stand transfer with Modified [place] using AD or no AD  4. Bed to chair transfer with Stand-by Assistance using AD or No AD  5. Gait x150 feet with Supervision using AD or No AD   6. Ascend/descend x2 stair with no rails Handrails with Stand-by Assistance   7. Stand for x10 minutes with Stand-by Assistance while performing dynamic balance tasks  8. Lower extremity exercise program x15 reps with supervision to maintain B LE strength and coordination    Outcome: Ongoing (interventions implemented as appropriate)    PT Evaluation complete. Recommending  PT upon D/C. Please see full note for details.    Elsie Martines, PT, DPT  [phone]  2017

## 2017-12-12 NOTE — SUBJECTIVE & OBJECTIVE
Past Medical History:   Diagnosis Date    Diabetes mellitus     Hep C w/o coma, chronic     Hypertension     Macular degeneration     Neuropathy        Past Surgical History:   Procedure Laterality Date    EYE SURGERY      HAND SURGERY         Review of patient's allergies indicates:  No Known Allergies    Medications:  Continuous Infusions:   Scheduled Meds:   ciprofloxacin HCl  500 mg Oral Daily    furosemide  80 mg Oral Daily    lactulose  20 g Oral TID    rifAXImin  550 mg Oral BID     PRN Meds:acetaminophen, dextrose 50%, glucagon (human recombinant), insulin aspart, ondansetron    Family History     None        Social History Main Topics    Smoking status: Former Smoker    Smokeless tobacco: Never Used    Alcohol use No      Comment: 2 5th per week- stopped one month ago approx 9/10/16    Drug use: No    Sexual activity: No       Review of Systems   Constitutional: Positive for activity change, appetite change and fatigue.   Respiratory: Negative.    Cardiovascular: Negative.    Gastrointestinal: Positive for abdominal distention and abdominal pain.   Genitourinary: Negative.    Musculoskeletal: Negative.    Neurological: Positive for weakness.   Psychiatric/Behavioral: Negative.      Objective:     Vital Signs (Most Recent):  Temp: 97.8 °F (36.6 °C) (12/12/17 0932)  Pulse: 102 (12/12/17 0932)  Resp: 18 (12/12/17 0932)  BP: 122/80 (12/12/17 0932)  SpO2: 100 % (12/12/17 0932) Vital Signs (24h Range):  Temp:  [97.4 °F (36.3 °C)-98.3 °F (36.8 °C)] 97.8 °F (36.6 °C)  Pulse:  [] 102  Resp:  [17-20] 18  SpO2:  [99 %-100 %] 100 %  BP: (100-125)/(55-80) 122/80     Weight: 79.9 kg (176 lb 2.4 oz)  Body mass index is 24.57 kg/m².    Review of Symptoms  Symptom Assessment (ESAS 0-10 scale)   ESAS 0 1 2 3 4 5 6 7 8 9 10   Pain         X     Dyspnea X             Anxiety X             Nausea X             Depression  X             Anorexia X             Fatigue      X        Insomnia X            "  Restlessness  X             Agitation X             CAM / Delirium _X_ --  ___+   Constipation     _X_ --  ___+   Diarrhea           _X_ --  ___+  Bowel Management Plan (BMP): Ye    Pain Assessment:   Onset: "Comes and goes"  Length: Can last for hours.   Duration: throughout day  Characteristics: throbbing pain in abd  Associated features: ascites  Relieving factors reposition, pain med.  TIming: With ascites  Severity: 8/10 today    OME in 24 hours: 0        ECOG Performance Status Grade: 2 - Ambulates, capable of self care only    Physical Exam    Significant Labs: All pertinent labs within the past 24 hours have been reviewed.  CBC:     Recent Labs  Lab 12/11/17  0453 12/12/17  0439   WBC 3.82* 3.13*   HGB 8.5* 8.6*   HCT 27.3* 26.6*   MCV 79* 77*   *  --      BMP:    Recent Labs  Lab 12/12/17  0439   GLU 92      K 4.5      CO2 19*   BUN 61*   CREATININE 1.9*   CALCIUM 8.2*     LFT:  Lab Results   Component Value Date    AST 56 (H) 12/12/2017    ALKPHOS 161 (H) 12/12/2017    BILITOT 0.9 12/12/2017     Albumin:   Albumin   Date Value Ref Range Status   12/12/2017 2.1 (L) 3.5 - 5.2 g/dL Final     Protein:   Total Protein   Date Value Ref Range Status   12/12/2017 5.4 (L) 6.0 - 8.4 g/dL Final     Lactic acid:   Lab Results   Component Value Date    LACTATE 1.0 12/11/2017    LACTATE 1.6 12/10/2017       Significant Imaging: I have reviewed all pertinent imaging results/findings within the past 24 hours.    Advanced Directives::  Living Will: No  LaPOST: No  Do Not Resuscitate Status: No  Medical Power of : No Mother next of kin    Decision-Making Capacity: Patient answered questions    Living Arrangements: Lives with family    Psychosocial/Cultural:  Pt lives with 91 y/o mother. Pt has a sister and a brother. Per pt, he has a hx of alcohol use and drug use.         "

## 2017-12-12 NOTE — PT/OT/SLP EVAL
Occupational Therapy   Evaluation/Treatment/ Discharge     Name: Soy Reza  MRN: [id number]  Admitting Diagnosis:  Acute hyperkalemia      Recommendations:     Discharge Recommendations: home with home health  Discharge Equipment Recommendations:  none  Barriers to discharge:  None    History:     Occupational Profile:  Living Environment: Pt lives in a h w/ mother w/ 2 steps to enter.  Previous level of function: required assist w/ LBD and MOD I for mobility (RW or rollator)  Roles and Routines: son  Equipment Owned:  cane, straight, walker, rolling, rollator, raised toilet  Assistance upon Discharge: Pt's mother will continue to assist upon D/C    Past Medical History:   Diagnosis Date    Diabetes mellitus     Hep C w/o coma, chronic     Hypertension     Macular degeneration     Neuropathy        Past Surgical History:   Procedure Laterality Date    EYE SURGERY      HAND SURGERY         Subjective     Chief Complaint: no complaints   Patient/Family stated goals: return home  Communicated with: RN prior to session.  Pain/Comfort:  · Pain Rating 1: 0/10  · Pain Rating Post-Intervention 1: 0/10    Objective:     Patient found with: telemetry    General Precautions: Standard, fall   Orthopedic Precautions:N/A   Braces: N/A     Occupational Performance:    Bed Mobility:    · Pt received up in chair.     Functional Mobility/Transfers:  · Patient completed Sit <> Stand Transfer with stand by assistance  with  no assistive device   · Patient completed Bed <> Chair Transfer using Stand Pivot technique with stand by assistance with rolling walker  · Patient completed Toilet Transfer Stand Pivot technique with supervision with  no AD    Activities of Daily Living:  · LB Dressing: moderate assistance donned/doffed socks  · Toileting: supervision indep for janay-anal hygiene    Cognitive/Visual Perceptual:  Cognitive/Psychosocial Skills:     -       Oriented to: Person, Place, Time and Situation   -       Follows  Commands/attention:Follows multistep  commands  -       Communication: clear/fluent  -       Memory: No Deficits noted  -       Safety awareness/insight to disability: impaired   -       Mood/Affect/Coping skills/emotional control: Appropriate to situation  Visual/Perceptual:      -Intact    Physical Exam:  Balance:    -       Pt displayed deficits for sitting dynamic balance when attmeptng to marco socks. Pt didplayed unsteady gait and decreased balance w/ ambualtion w/o an AD. Pt displayed good balance and gait when using a RW  Postural examination/scapula alignment:    -       Rounded shoulders  -       Forward head  Skin integrity: small cuts all over pt's body  Edema:  None noted  Sensation:    -       Intact  Upper Extremity Range of Motion:     -       Right Upper Extremity: WFL  -       Left Upper Extremity: WFL  Upper Extremity Strength:    -       Right Upper Extremity: WFL  -       Left Upper Extremity: WFL   Strength:    -       Right Upper Extremity: WFL  -       Left Upper Extremity: WFL  Fine Motor Coordination:    -       Intact  Gross motor coordination:   WFL    Patient left up in chair with all lines intact and call button in reach    AMPA 6 Click:  AMPA Total Score: 22    Treatment & Education:  Pt educated on POC.  Pt ambulated ~ 312 feet at sba w/ RW. No LOB noted.  Education:    Assessment:     Soy Reza is a 67 y.o. male with a medical diagnosis of Acute hyperkalemia.  He presents with unsteady gait and balance deficits. Pt is not displaying a need for acute OT services 2* to being at baseline. Pt would benefit from  OT to improve overall safety and independence in his environment.  Performance deficits affecting function are impaired functional mobilty, gait instability, impaired self care skills (these are deficits that were present at baseline ).      Rehab Prognosis:  good; patient would benefit from acute skilled OT services to address these deficits and reach maximum level  "of function.         Clinical Decision Makin.  OT Low:  "Pt evaluation falls under low complexity for evaluation coding due to performance deficits noted in 1-3 areas as stated above and 0 co-morbities affecting current functional status. Data obtained from problem focused assessments. No modifications or assistance was required for completion of evaluation. Only brief occupational profile and history review completed."     Plan:     Patient to be seen  (D/C acute OT services ) to address the above listed problems via    · Plan of Care Expires:    · Plan of Care Reviewed with: patient    This Plan of care has been discussed with the patient who was involved in its development and understands and is in agreement with the identified goals and treatment plan    GOALS:    Occupational Therapy Goals        Problem: Occupational Therapy Goal    Goal Priority Disciplines Outcome Interventions   Occupational Therapy Goal     OT, PT/OT     Description:  is currently performing ADLs, functional mobility & t/fs at baseline and displays age-appropriate strength, endurance & balance. OT services are not recommended at this time and patient is safe to D/C home.                      Time Tracking:     OT Date of Treatment: 17  OT Start Time: 1045  OT Stop Time: 1107  OT Total Time (min): 22 min    Billable Minutes:Evaluation 14 minutes  Therapeutic Activity 8 minutes    Amol Monroe OT  2017    "

## 2017-12-12 NOTE — ASSESSMENT & PLAN NOTE
"Spoke to Dr. Cohn with IM. Per MD, he would like pal care to discuss goals of care/hospice with pt who has advanced liver disease and not a candidate for liver transplant. Per MD, pt to have possible ascites drain placed.     Met with pt who verbalizes that he understands he has advanced liver disease. Per pt, he comes to hospital to receive paracentesis. Per pt, he was admitted this admit because of fall. Per pt, he lives with his 91 y/o mother. Per pt, she is active for a 91 y/o.     Spoke to pt about his goals of care. Per pt, Dr. Cohn had mentioned hospice care to pt earlier today. Education done with pt concerning home hospice level of care and inpt level of care. Explained to pt that most hospice agencies do not send pt back and forth to hospital for paracentesis. Per pt, possible ascites drain had been mentioned but he does not want to have placed because he was told on his last admit by MD that the drain was "bad to get". Asked pt to elaborate on this. Pt unable to.  Let pt know that IM team could be able to talk to pt more about. Per pt, no way will he get "unless Dr. Foster says it is okay". Per pt, she was his physician last admit.    Explained to pt that if his goals is to continue to come back and forth to hospital for paracentesis and to see his physicians, that it is recommended pt go home with home health and a Palliative care program that is able to send NP out to visit.  [facility] and [facility] have a palliative care program with an NP.    Riana Cantu LCSW spoke to [facility] agency that may be able to do paracentesis for pt on hospice care.     Per pt, he wants to discuss more with Dr. Cohn when he comes back to round on pt. Let pt know pal care would continue to follow.       Pain:  Pain Assessment:   Onset: "Comes and goes"  Length: Can last for hours.   Duration: throughout day  Characteristics: throbbing pain in abd  Associated features: ascites  Relieving factors reposition, pain " med.  TIming: With ascites  Severity: 8/10 today    Pt has acetaminophen q 6hrs prn mild pain.     Recs:   Consider adding low dose opioid for abd pain.   Pt may benefit from oxycodone 5 mg q 8 hrs prn  severe pain.     Spoke to Dr. Hanson, [facility] physician concerning pain recs.     Recs:     1) Pt considering hospice vs home health.   Per pt, he does not want to have ascites drain place.  [facility] may be able to admit pt and have paracentesis drain but would have to do contract and have approved by their administration per Isabella Cantu [facility] elly PATEL.  2) If pt decides to go home with home health would have a Palliative care program that is able to send NP out to visit.  [facility] and [facility] have a palliative care program with an NP.  3) See above for pain recs.

## 2017-12-12 NOTE — HPI
This is a 68 y/o male with hx of HCV + etoh cirrhosis, refractory ascites and hydrothorax, DMII, CKD who presented via EMS after fall at home after tripping on home furniture and hit his head. No loss of consciousness. Also with assoc generalized abd pain.   Per chart review,  Pt was Found to have hyperK in the ER with K of 7. Ct head was negative.   Admitted to IM team.      Pt does admits that he doesn't have reliable ride to make his appts. Lives with his elderly mother who helps with his meds but wants someone else to come to house to help with meds. Has tried taking public transportation but has difficulty with weakness and actually walking to the local bus stop. Taxi is  Becoming too expensive.     Per chart review, pt previously evaluated multiple times by hepatology inpatient and outpatient.  Was previously deemed not a liver transplant candidate due to non-compliance and lack of caregiver support.

## 2017-12-12 NOTE — PLAN OF CARE
Home health referral sent to [facility].     [facility]   [phone]

## 2017-12-12 NOTE — DISCHARGE SUMMARY
[facility] Hospital Medicine  Discharge Summary      Patient Name: Soy Reza  MRN: [id number]  Admission Date: 12/10/2017  Hospital Length of Stay: 2 days  Discharge Date and Time:  12/12/2017 3:41 PM  Attending Physician: Adria Ye MD   Discharging Provider: Manny Cohn MD  Primary Care Provider: Jean Carlos Swanson MD  Hospital Medicine Team: [facility] MED 4 Manny Cohn MD    HPI:   Soy Reza is a 67 y.o. with HCV cirrhosis (diagnosied in July, has not received treatment), DMII (home Januvia and glimepiride; questionable compliance), HTN, and CKD who p/t ED via EMS from home after he tripped on a table leg the Am, fell backwards and hit his head. Patinet states that he did not lose consciousness before or afer the event, denies diziness/presyncopal sx or lightheadfedness, and that he simply did not see the table. He is also complaining of generalized abdominal pain that does not radiate to his back. He was found to be hyperkalemic at 7.0 (repeat 6.70 in the Ed, without EKG changes, but mildly altered.(baseline unknown.) Ct head negative for acute bleed. He is being admitted for symptomatic hyperkalemia.     * No surgery found *      Hospital Course:   Soy Reza was admitted for symptomatic hyperkalemia, he was shifted in the Ed. Large volume ascites on exam, diagnostic/therapeutic paracentesis with 3L chylous fluid removed in the ED on DOA. His hyperkalemia resolved with standard treatment, last resulted lab value 4.5. His primary medicine had multiple discussions with him regarding his prognosis and options for his care moving forward, though patient would commit to none of the options. Home with palliative is the most appropriate course of treatment, though patient refused this admission after discussion and opted for home with home health instead, see discussion below which summarized primary team and palliative conversations:    Spoke to Dr. Cohn with IM.  "Per MD, he would like [facility] to discuss goals of care/hospice with pt who has advanced liver disease and not a candidate for liver transplant. Per MD, pt to have possible ascites drain placed.      Met with pt who verbalizes that he understands he has advanced liver disease. Per pt, he comes to hospital to receive paracentesis. Per pt, he was admitted this admit because of fall. Per pt, he lives with his 91 y/o mother. Per pt, she is active for a 89 y/o.      Spoke to pt about his goals of care. Per pt, Dr. Cohn had mentioned hospice care to pt earlier today. Education done with pt concerning home hospice level of care and inpt level of care. Explained to pt that most hospice agencies do not send pt back and forth to hospital for paracentesis. Per pt, possible ascites drain had been mentioned but he does not want to have placed because he was told on his last admit by MD that the drain was "bad to get". Asked pt to elaborate on this. Pt unable to.  Let pt know that IM team could be able to talk to pt more about. Per pt, no way will he get "unless Dr. Foster says it is okay". Per pt, she was his physician last admit.     Explained to pt that if his goals is to continue to come back and forth to hospital for paracentesis and to see his physicians, that it is recommended pt go home with home health and a Palliative care program that is able to send NP out to visit.  [facility] and [facility] have a palliative care program with an NP.     Riana Cantu LCSW spoke to [facility] agency that may be able to do paracentesis    Patient is being discharged to home with home health, new prescriptions for his januvia and glimepiride, lactulose, lasix 40mg daily, were given in addition to cipro 500mg daily and rifaximin. Case management will attempt to set up outpatient paracentesis with [facility] as indicated above for the week following discharge. Ambulatory referrals to patients PCP and primary Hepatologist here at " [facility] were made.      Consults:   Consults         Status Ordering Provider     Inpatient consult to Hepatology  Once     Provider:  (Not yet assigned)    Completed SINTIA LAWLER     Inpatient consult to Palliative Care  Once     Provider:  (Not yet assigned)    Completed SINTIA LAWLER          No new Assessment & Plan notes have been filed under this hospital service since the last note was generated.  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] 10/16/2017 Yes    Alcoholic cirrhosis of liver with ascites [K70.31] 10/15/2017 Yes    Hep C w/o coma, chronic [B18.2] 01/19/2017 Yes     Chronic    Essential hypertension [I10] 01/19/2017 Yes     Chronic    Uncontrolled type 2 diabetes mellitus with diabetic polyneuropathy, without long-term current use of insulin [E11.42, E11.65] 01/19/2017 Yes     Chronic      Problems Resolved During this Admission:    Diagnosis Date Noted Date Resolved POA    PRINCIPAL PROBLEM:  Acute hyperkalemia [E87.5] 11/05/2017 12/12/2017 Yes       Discharged Condition: stable    Disposition: Home or Self Care    Follow Up:  Follow-up Information     [facility].    Specialty:  Emergency Medicine  Why:  If symptoms worsen  Contact information:  [address] [place] [address] [phone]           Jean Carlos Swanson MD On 12/18/2017.    Specialty:  Family Medicine  Why:  Hospital Follow-up Monday 12/18 @ 2:40pm  Contact information:  [address]  [address] FLO[address]  [place] [address]  [phone]             [facility] [place].    Specialty:  Home Health  Services  Why:  Home Health  Contact information:  [address] [address]  [phone]             [facility].    Specialty:  Hepatology  Why:  Left message with clinic requesting an appt date & time for next outpt paracentesis.  Contact information:  [address] [place]  [facility] [address] [phone]  Additional information:  1st Floor - [facility], located by clinic tower elevators               Patient Instructions:     Ambulatory referral to Outpatient Case Management   Referral Priority: Routine Referral Type: Consultation   Referral Reason: Specialty Services Required    Number of Visits Requested: 1      Ambulatory Referral to Hepatology   Referral Priority: Routine Referral Type: Consultation   Referral Reason: Specialty Services Required    Referred to Provider: RAMIRO VELAZQUEZ    Number of Visits Requested: 1      Diet general     Activity as tolerated     Call MD for:  temperature >100.4     Call MD for:  increased confusion or weakness     Call MD for:  persistent dizziness, light-headedness, or visual disturbances         Significant Diagnostic Studies: Labs:   CMP   Recent Labs  Lab 12/11/17  0453 12/11/17  1016 12/11/17  1557 12/12/17  0439   * 137 133*  133* 136   K 5.3*  5.3* 5.8*  5.8* 5.3*  5.3* 4.5    111* 107  107 108   CO2 17* 19* 20*  20* 19*   * 84 97  97 92   BUN 68* 66* 64*  64* 61*   CREATININE 1.7* 1.8* 1.8*  1.8* 1.9*   CALCIUM 8.5* 8.5* 8.5*  8.5* 8.2*   PROT 5.6*  --   --  5.4*   ALBUMIN 2.2*  --   --  2.1*   BILITOT 0.8  --   --  0.9   ALKPHOS 142*  --   --  161*   AST 52*  --   --  56*   ALT 45*  --   --  49*   ANIONGAP 8 7* 6*  6* 9   ESTGFRAFRICA 47.5* 44.3* 44.3*  44.3* 41.5*   EGFRNONAA 41.1* 38.4* 38.4*  38.4* 35.9*    and CBC   Recent Labs  Lab 12/11/17  0453 12/12/17  0439   WBC 3.82* 3.13*   HGB 8.5* 8.6*   HCT 27.3* 26.6*   * SEE COMMENT       Pending Diagnostic  Studies:     Procedure Component Value Units Date/Time    Basic metabolic panel [[id number]] Collected:  12/10/17 1639    Order Status:  Sent Lab Status:  In process Updated:  12/10/17 1639    Specimen:  Blood from Blood     Basic metabolic panel [[id number]] Collected:  12/10/17 1455    Order Status:  Sent Lab Status:  In process Updated:  12/10/17 1455    Specimen:  Blood from Blood     Narrative:       Collection has been rescheduled by KM at 12/10/2017 13:22 Reason:   patient in xray  Collection has been rescheduled by KM at 12/10/2017 13:23 Reason:   per rn deepali draw labs at 1500    Phosphatidylethanol (PETH) [[id number]] Collected:  12/12/17 0439    Order Status:  Sent Lab Status:  In process Updated:  12/12/17 0523    Specimen:  Blood from Blood          Medications:  Reconciled Home Medications:   Current Discharge Medication List      START taking these medications    Details   rifAXIMin (XIFAXAN) 550 mg Tab Take 1 tablet (550 mg total) by mouth 2 (two) times daily.  Qty: 120 tablet, Refills: 0         CONTINUE these medications which have CHANGED    Details   ciprofloxacin HCl (CIPRO) 500 MG tablet Take 1 tablet (500 mg total) by mouth once daily.  Qty: 60 tablet, Refills: 0      furosemide (LASIX) 40 MG tablet Take 1 tablet (40 mg total) by mouth once daily.  Qty: 30 tablet, Refills: 1      glimepiride (AMARYL) 1 MG tablet Take 1 tablet (1 mg total) by mouth before breakfast.  Qty: 60 tablet, Refills: 0      lactulose (CHRONULAC) 10 gram/15 mL solution Take 30 mLs (20 g total) by mouth 3 (three) times daily.  Qty: 5400 mL, Refills: 0      SITagliptin (JANUVIA) 100 MG Tab Take 1 tablet (100 mg total) by mouth once daily.  Qty: 60 tablet, Refills: 0         STOP taking these medications       potassium chloride SA (K-DUR,KLOR-CON) 20 MEQ tablet Comments:   Reason for Stopping:               Indwelling Lines/Drains at time of discharge:   Lines/Drains/Airways          No matching active lines, drains, or  airways        Manny Cohn MD  Department of Hospital Medicine  [facility]

## 2017-12-12 NOTE — PLAN OF CARE
[facility]    HOME HEALTH ORDERS  FACE TO FACE ENCOUNTER    Patient Name: Soy Reza  YOB: 1950    PCP: Jean Carlos Swanson MD   PCP Address: [address] [facility] [address] / [place] [address]  PCP Phone Number: [phone]  PCP Fax: [phone]    Encounter Date: 12/12/2017    Admit to Home Health    Diagnoses:  Active Hospital Problems    Diagnosis  POA    Ascites due to alcoholic cirrhosis [K70.31]  Yes     Priority: 2     Hepatic encephalopathy [K72.90]  Yes     Priority: 3     Pain [R52]  Unknown    Decompensated hepatic cirrhosis [K72.90]  Yes     Chronic    Fall [W19.XXXA]  Yes    Palliative care encounter [Z51.5]  Not Applicable    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 IM [place]   1/3/2018 2:00 PM Star Major MD [facility]     Follow-up Information     [facility].    Specialty:  Emergency Medicine  Why:  If symptoms worsen  Contact information:  [address] [facility] [id number] [phone]           Jean Carlos Swanson MD On 12/18/2017.    Specialty:  Family Medicine  Why:  Hospital Follow-up Monday 12/18 @ 2:40pm  Contact information:  [address] [facility]  [address]TH FLO[address]  [place] [address]  [phone]             [facility] [place].    Specialty:  Home Health Services  Why:  Home Health  Contact information:  [address]  [place]  [address]  [phone]             [facility].    Specialty:  Hepatology  Contact information:  [address] [place]  [facility] [address] [phone]  Additional information:  1st Floor - [facility], located by clinic tower elevators                   I have seen and examined this patient face to face today. My clinical findings that support the need for the home health skilled services and home bound status are the following:  Weakness/numbness causing balance and gait disturbance due to HCV/EtOH cirrosis w/ hepatic encephalopathy making it taxing to leave home.  Patient with medication mismanagement issues requiring home bound status as evidenced by  Poor adherence to medication regimen/dosage.  Mental confusion making it unsafe for patient to leave home alone due to  HCV/EtOH cirrhosis w/ hepatic encepahlopathy.    Allergies:Review of patient's allergies indicates:  No Known Allergies    Diet: low fat, low cholesterol diet    Activities: activity as tolerated    Nursing:   SN to complete comprehensive assessment including routine vital signs. Instruct on disease process and s/s of complications to report to MD. Review/verify medication list sent home with the patient at time of discharge  and instruct patient/caregiver as needed. Frequency may be adjusted depending on start of care date.    Notify MD if SBP > 160 or < 90; DBP > 90 or < 50; HR > 120 or < 50; Temp > 101; Other:         CONSULTS:    Physical Therapy to evaluate and treat. Evaluate for home safety and equipment needs; Establish/upgrade home exercise program. Perform / instruct on therapeutic exercises, gait training, transfer training, and Range of Motion.  Occupational Therapy to evaluate and treat. Evaluate home environment for safety and equipment needs. Perform/Instruct on transfers, ADL training, ROM, and therapeutic exercises.   to evaluate for community resources/long-range  planning.    Medications: Review discharge medications with patient and family and provide education.      Current Discharge Medication List      START taking these medications    Details   rifAXIMin (XIFAXAN) 550 mg Tab Take 1 tablet (550 mg total) by mouth 2 (two) times daily.  Qty: 120 tablet, Refills: 0         CONTINUE these medications which have CHANGED    Details   ciprofloxacin HCl (CIPRO) 500 MG tablet Take 1 tablet (500 mg total) by mouth once daily.  Qty: 60 tablet, Refills: 0      furosemide (LASIX) 40 MG tablet Take 1 tablet (40 mg total) by mouth once daily.  Qty: 30 tablet, Refills: 1      glimepiride (AMARYL) 1 MG tablet Take 1 tablet (1 mg total) by mouth before breakfast.  Qty: 60 tablet, Refills: 0      lactulose (CHRONULAC) 10 gram/15 mL solution Take 30 mLs (20 g total) by mouth 3 (three) times daily.  Qty: 5400 mL, Refills: 0      SITagliptin (JANUVIA) 100 MG Tab Take 1 tablet (100 mg total) by mouth once daily.  Qty: 60 tablet, Refills: 0         STOP taking these medications       potassium chloride SA (K-DUR,KLOR-CON) 20 MEQ tablet Comments:   Reason for Stopping:               I certify that this patient is confined to his home and needs intermittent skilled nursing care, speech therapy and occupational therapy.    Manny Cohn MD  Internal Medicine PGY-1  [facility]

## 2017-12-12 NOTE — PLAN OF CARE
Discharge education given to pt regarding medications, future appointments, and when to call a healthcare provider. He verbalized understanding and had no further questions. Home health/palliative care to follow up. PIV and telemetry removed. Pt brought off the unit via wheelchair and is taking a taxi home.

## 2017-12-12 NOTE — PLAN OF CARE
"Palliative Care Social Work   Assessment  Name: Soy Reza  MRN: [id number]  Date of Birth/Age:  1950  Sex: male  Ethnicity:     Primary Language:English   Needed: no    Attending Physician: Dr. Ye  Reason for Referral: assistance with clarification of goals of care and "68yo with cirrhosis, not a transplant candidate. Spoke with him this Am about his disease being a terminal process, He is amenable to having a discussion about home hospice. Primary team to speak with his mother today.Primary team can be reached at [phone]"  Consult Order Date: 12/12/17 0813  Primary CM/SW: Pili Pineda, RN    Palliative Care Provider: [facility]    Present during Interview: pt, [facility] Care APRN    Past & Current Medical Situation:   Diagnosis: Acute hyperkalemia  PMH:   Past Medical History:   Diagnosis Date    Diabetes mellitus     Hep C w/o coma, chronic     Hypertension     Macular degeneration     Neuropathy      Mental Health/Substance Use History: hx of ETOH abuse. Hx of IV heroin (25 years ago)  Non-traditional Health practices:     Understanding of diagnosis and prognosis: Pt does not have good insight into his prognosis.      Socio-Economic Factors/Resources:  Address: [address] [place] [address]  Phone Number: [phone] (home)     Marital Status: Single  Household Composition: Lives with Mother Sharon  Children: None  Relationships with Family: Pt has never been  and has no children. His mother is 92 years old and per pt, she is independent. She does her own cooking, cleaning and working in the garden. Pt has a brother and a sister as well.     Emergency Contacts:   Mother: Sharon Reza: [phone]    Activities of Daily Living: Needs some assistance with ADLs  Support Systems-Family & Community (Home Health, HME etc): straight cane, rolling walker, standard walker, rollator, raised toilet and glucometer. [facility] in the " "past.    Transportation:  no Relies on family/friends and taxis for transportation    Work/Education History: Pt is disabled. He worked as a  for a little while. He also has worked as a  and several years at [employer].    History: no    Financial Resources:Medicare. Medicaid      Advanced Care Planning & Legal Concerns:   Advanced Directives/Living Will: no   Planning:  no    Power of : no  Surrogate Decision Maker: Mother Sharon      Spirituality, Culture & Coping Mechanisms:  F- Martha and Belief: Nondenominational . Quaker  I - Importance: tbd  C - Community/Culture Values:   A - Address in Care: Spiritual Care to follow as appropriate.      Strengths/Coping Strategies: Mother supportive  Self-Care Activities/Hobbies: Enjoys spending time with family.    Goals/Hopes/Expectations: Pt wants to go home with hospice but does not want to receive a drain.  Fears/Anxiety/Concerns: Pt against getting a drain placed because a previous physician told him it would be "the death of him".         Preferences about EOL Environment: (own bed, family nearby, pets, music, etc)  home    Complicated Bereavement Risk Assessment Tool (CBRAT)  Reference:  [facility] Palliative Care Consortium Clinical Practice Group (May 2016). Bereavement Risk Screening and Management Guidelines.  Retrieved from: http://www.grpcc.com.au/wp-content/uploads//SFVGV-Iklmhzkgvth-Pceheimpr-and-Management-Guideline-2016.pdf      Client Characteristics (Bereaved Client)  ? Under 18      no  ? Was a Twin   no  ? Young Spouse   no  ? Elderly Spouse    no  ? Isolated    yes  ? Lacks Meaningful Social Support   yes  ? Dissatisfied with help available during illness   no  ? New to Financial Lares no  ? New to Decision-Making   no   History of Loss (Bereaved Client)  ? Cumulative Multiple Losses   no  ? Previous Mental Health Illnesses   no  ? Current Mental Health Illness   no  ? Other Significant " "Health Issues   yes   ? Migrant/Refugee   no    Illness  ? Inherited Disorder   no  ? Stigmatized Disease in the   no  ?  Family/Community   yes  ? Lengthy/Burdensome   yes Relationship with   ? Profound Lifelong Partner   no  ? Highly Dependent    yes  ? Antagonistic   no  ? Ambivalent   yes  ? Deeply Connected   no  ? Culturally Defined   no   Death  ? Sudden or Unexpected   no  ? Traumatic Circumstances Associated with Death   no  ? Significant Cultural/Social Burdens as a result of Death   no   Risk Factors Scores  0-2  Low  3-5  Moderate  5+  High  All persons scoring moderate to high presume to be at risk**    (** It is acknowledged that protective factors and resilience may outweigh apparent risk factors.      Total Risk Factors Score:   Moderate    Next of Kin is pt's 92 year old mother. Pt has had a long history of substance abuse and has lived with his mother for a long time. Mother is currently independent with ADLs and IADLs.     Mother would benefit from continued emotional and social support from a  and bereavement care. Bereavement Program to benefit afterwards.       Discharge Planning Needs/Plan of Care:     SW met with pt with [facility] Kay at bedside. Pt verbalized that his liver disease was advanced. Pt stated that he comes to the hospital to receive paracentesis. He stated that he tries to come weekly but he can't always get a ride. Pt stated that he was admitted because of a fall and then he "realized how bad it was".      Spoke with pt about his goals. Pt stated that the dtr had mentioned Hospice care earlier today. Education completed on hospice and the different levels of care. Explained that most agencies do not send pt back and forth to hospital for the paracentesis and would rather the pt obtain an ascites drain.      Pt stated that he did not want the ascites drain. He explained that upon a previous admit the physician stated that the drain " "was "bad to get" and that it would "shorten his life."  Pt stated that he would not get the drain "unless Dr. Foster says it is okay."  Pt was seen by Dr. Foster on previous admit.       Explained that if pt's goal was to continue coming back and forth to the hospital for paracentesis and to see his physicians then he would be more appropriate for home Palliative Care or Home health with an AIM. A Home Palliative Care program with a NP would be most beneficial. (Agency such as [facility] and [facility] or [facility]).    Pt to discuss needs more with Dr. Cohn when he rounds this afternoon.    Palliative Care to continue to follow.        AMANDA sent generic message to Karoline Nice with [facility] ([phone]) asking if their agency would take a patient with hospice that needed frequent paracentesis. Explaining that the pt had refused a drain in the past. Karoline stated that a contract from their administration would be needed but it could be do-able.    AMANDA informed [facility] Care APRN of above information. AMANDA also left message for pt's ROBINSON Pineda informing him of above.    Will continue to follow.    [place] Cantu, LCSW, [facility]    "

## 2017-12-12 NOTE — SUBJECTIVE & OBJECTIVE
Interval History: NAEON. Much more oriented this morning, took his medication yesterday had multiple BMS, was sitting in the chair this Am asking for breakfast. Conversation ws had regarding patients prognosis. He is aware that he has a terminal condidtion and states that he would like to be evaluated for home hospice. Palliative service to evaluate patient this Am. Will f/u. Consideration should be made for IR drain placement for his persistent ascites. That determination will be made today if patient decides that he dose in fact want to move forward with hospice.     Review of Systems   Constitutional: Positive for fatigue. Negative for chills and fever.   HENT: Negative for facial swelling, mouth sores and trouble swallowing.    Eyes: Negative for pain and redness.   Respiratory: Negative for cough and shortness of breath.    Cardiovascular: Negative for chest pain and leg swelling.   Gastrointestinal: Positive for abdominal distention.   Genitourinary: Negative for dysuria and hematuria.   Musculoskeletal: Negative for gait problem and neck stiffness.   Skin: Positive for color change. Negative for rash and wound.   Neurological: Positive for weakness. Negative for seizures, syncope and headaches.   Psychiatric/Behavioral: Negative for confusion and hallucinations.     Objective:     Vital Signs (Most Recent):  Temp: 97.8 °F (36.6 °C) (12/12/17 0932)  Pulse: 98 (12/12/17 1100)  Resp: 18 (12/12/17 0932)  BP: 122/80 (12/12/17 0932)  SpO2: 100 % (12/12/17 0932) Vital Signs (24h Range):  Temp:  [97.4 °F (36.3 °C)-98.3 °F (36.8 °C)] 97.8 °F (36.6 °C)  Pulse:  [] 98  Resp:  [17-20] 18  SpO2:  [99 %-100 %] 100 %  BP: (100-125)/(55-80) 122/80     Weight: 79.9 kg (176 lb 2.4 oz)  Body mass index is 24.57 kg/m².    Intake/Output Summary (Last 24 hours) at 12/12/17 1107  Last data filed at 12/11/17 1800   Gross per 24 hour   Intake              900 ml   Output              375 ml   Net              525 ml       Physical Exam   Constitutional: He is oriented to person, place, and time. No distress.   Alert ; no acute distress   HENT:   Head: Normocephalic.   Mouth/Throat: No oropharyngeal exudate.   Temporal wasting   Eyes: Conjunctivae and EOM are normal. No scleral icterus.   Neck: Neck supple. No thyromegaly present.   Cardiovascular: Normal rate and intact distal pulses.    Pulmonary/Chest: Effort normal and breath sounds normal. No respiratory distress.   Abdominal: Soft. He exhibits distension. He exhibits no mass. There is no guarding.   Musculoskeletal: Normal range of motion. He exhibits no tenderness.   Neurological: He is alert and oriented to person, place, and time. No cranial nerve deficit.   Skin: Skin is warm and dry. No rash noted.   Psychiatric: He has a normal mood and affect. His behavior is normal.   Vitals reviewed.      Significant Labs:   Recent Results (from the past 24 hour(s))   Basic metabolic panel    Collection Time: 12/11/17  3:57 PM   Result Value Ref Range    Sodium 133 (L) 136 - 145 mmol/L    Potassium 5.3 (H) 3.5 - 5.1 mmol/L    Chloride 107 95 - 110 mmol/L    CO2 20 (L) 23 - 29 mmol/L    Glucose 97 70 - 110 mg/dL    BUN, Bld 64 (H) 8 - 23 mg/dL    Creatinine 1.8 (H) 0.5 - 1.4 mg/dL    Calcium 8.5 (L) 8.7 - 10.5 mg/dL    Anion Gap 6 (L) 8 - 16 mmol/L    eGFR if African American 44.3 (A) >60 mL/min/1.73 m^2    eGFR if non  38.4 (A) >60 mL/min/1.73 m^2   Basic metabolic panel    Collection Time: 12/11/17  3:57 PM   Result Value Ref Range    Sodium 133 (L) 136 - 145 mmol/L    Potassium 5.3 (H) 3.5 - 5.1 mmol/L    Chloride 107 95 - 110 mmol/L    CO2 20 (L) 23 - 29 mmol/L    Glucose 97 70 - 110 mg/dL    BUN, Bld 64 (H) 8 - 23 mg/dL    Creatinine 1.8 (H) 0.5 - 1.4 mg/dL    Calcium 8.5 (L) 8.7 - 10.5 mg/dL    Anion Gap 6 (L) 8 - 16 mmol/L    eGFR if African American 44.3 (A) >60 mL/min/1.73 m^2    eGFR if non  38.4 (A) >60 mL/min/1.73 m^2   POCT glucose     Collection Time: 12/11/17  5:01 PM   Result Value Ref Range    POCT Glucose 123 (H) 70 - 110 mg/dL   POCT glucose    Collection Time: 12/11/17 11:32 PM   Result Value Ref Range    POCT Glucose 160 (H) 70 - 110 mg/dL   Protime-INR    Collection Time: 12/12/17  4:39 AM   Result Value Ref Range    Prothrombin Time 10.6 9.0 - 12.5 sec    INR 1.0 0.8 - 1.2   Hepatic function panel    Collection Time: 12/12/17  4:39 AM   Result Value Ref Range    Total Protein 5.4 (L) 6.0 - 8.4 g/dL    Albumin 2.1 (L) 3.5 - 5.2 g/dL    Total Bilirubin 0.9 0.1 - 1.0 mg/dL    Bilirubin, Direct 0.4 (H) 0.1 - 0.3 mg/dL    AST 56 (H) 10 - 40 U/L    ALT 49 (H) 10 - 44 U/L    Alkaline Phosphatase 161 (H) 55 - 135 U/L   CBC auto differential    Collection Time: 12/12/17  4:39 AM   Result Value Ref Range    WBC 3.13 (L) 3.90 - 12.70 K/uL    RBC 3.44 (L) 4.60 - 6.20 M/uL    Hemoglobin 8.6 (L) 14.0 - 18.0 g/dL    Hematocrit 26.6 (L) 40.0 - 54.0 %    MCV 77 (L) 82 - 98 fL    MCH 25.0 (L) 27.0 - 31.0 pg    MCHC 32.3 32.0 - 36.0 g/dL    RDW 14.9 (H) 11.5 - 14.5 %    Platelets SEE COMMENT 150 - 350 K/uL    MPV 11.1 9.2 - 12.9 fL    Immature Granulocytes 0.6 (H) 0.0 - 0.5 %    Gran # 2.1 1.8 - 7.7 K/uL    Immature Grans (Abs) 0.02 0.00 - 0.04 K/uL    Lymph # 0.3 (L) 1.0 - 4.8 K/uL    Mono # 0.6 0.3 - 1.0 K/uL    Eos # 0.1 0.0 - 0.5 K/uL    Baso # 0.01 0.00 - 0.20 K/uL    nRBC 0 0 /100 WBC    Gran% 66.8 38.0 - 73.0 %    Lymph% 10.9 (L) 18.0 - 48.0 %    Mono% 17.9 (H) 4.0 - 15.0 %    Eosinophil% 3.5 0.0 - 8.0 %    Basophil% 0.3 0.0 - 1.9 %    Differential Method Automated    Basic metabolic panel    Collection Time: 12/12/17  4:39 AM   Result Value Ref Range    Sodium 136 136 - 145 mmol/L    Potassium 4.5 3.5 - 5.1 mmol/L    Chloride 108 95 - 110 mmol/L    CO2 19 (L) 23 - 29 mmol/L    Glucose 92 70 - 110 mg/dL    BUN, Bld 61 (H) 8 - 23 mg/dL    Creatinine 1.9 (H) 0.5 - 1.4 mg/dL    Calcium 8.2 (L) 8.7 - 10.5 mg/dL    Anion Gap 9 8 - 16 mmol/L    eGFR if   41.5 (A) >60 mL/min/1.73 m^2    eGFR if non African American 35.9 (A) >60 mL/min/1.73 m^2     Significant Imaging:   No new imaging or procedures to review since prior assessment

## 2017-12-12 NOTE — PLAN OF CARE
CM received call from Isabella w/ [facility]. Pt is considering home hospice but does not want pleurx cath; pt wants to return to clinic to have paracentesis. Isabella stated that most hospice agencies do not do this but she is aware that [facility] does and to communicate w/ Karoline p [phone]. CM spoke w/ Karoline who stated that this could be done and CM relayed info about Medicaid rides which she will check into to see if pt will be able to continue to have under hospice care.    Update: pt will opt for home w/ h/h and d/c today. CM will wait for callback from hepatology clinic, [place] - paracentesis date & time to setup pt's ride for outpt appt.     12/12/17 3429   Discharge Reassessment   Assessment Type Discharge Planning Reassessment   Discharge plan remains the same: No   Provided patient/caregiver education on the expected discharge date and the discharge plan Yes   Discharge Plan A Hospice/home   Discharge Plan B Home Health   Change in patient condition or support system No   Patient choice form signed by patient/caregiver N/A   Explained to the the patient/caregiver why the discharge planned changed: Yes   Involved the patient/caregiver in establishing a new discharge plan: Yes

## 2017-12-12 NOTE — PLAN OF CARE
Problem: Occupational Therapy Goal  Goal: Occupational Therapy Goal  is currently performing ADLs, functional mobility & t/fs at baseline and displays age-appropriate strength, endurance & balance. OT services are not recommended at this time and patient is safe to D/C home.    D/C acute OT services     Comments: Amol Monroe OTR/L  12/12/2017

## 2017-12-12 NOTE — PROGRESS NOTES
[facility] Hospital Medicine  Progress Note    Patient Name: Soy Reza  MRN: [id number]  Patient Class: IP- Inpatient   Admission Date: 12/10/2017  Length of Stay: 2 days  Attending Physician: Adria Ye MD  Primary Care Provider: Jean Carlos Swanson MD    [facility] Medicine Team: [facility] HOSP MED 4 Manny Cohn MD    Subjective:     Principal Problem:Acute hyperkalemia    HPI:  Soy Reza is a 67 y.o. with HCV cirrhosis (diagnosied in July, has not received treatment), DMII (home Januvia and glimepiride; questionable compliance), HTN, and CKD who p/t ED via EMS from home after he tripped on a table leg the Am, fell backwards and hit his head. Patinet states that he did not lose consciousness before or afer the event, denies diziness/presyncopal sx or lightheadfedness, and that he simply did not see the table. He is also complaining of generalized abdominal pain that does not radiate to his back. He was found to be hyperkalemic at 7.0 (repeat 6.70 in the Ed, without EKG changes, but mildly altered.(baseline unknown.) Ct head negative for acute bleed. He is being admitted for symptomatic hyperkalemia.     Hospital Course:  Soy Reza was admitted for symptomatic hyperkalemia, he was shifted in the Ed. Large volume ascites on exam, diagnostic/therapeutic paracentesis with 3L chylous fluid removed in the ED on DOA.     Interval History: NAEON. Much more oriented this morning, took his medication yesterday had multiple BMS, was sitting in the chair this Am asking for breakfast. Conversation ws had regarding patients prognosis. He is aware that he has a terminal condidtion and states that he would like to be evaluated for home hospice. Palliative service to evaluate patient this Am. Will f/u. Consideration should be made for IR drain placement for his persistent ascites. That determination will be made today if patient decides that he dose in fact want to move forward with  hospice. Please refer to palliate note for further details regarding that discussion.     Review of Systems   Constitutional: Positive for fatigue. Negative for chills and fever.   HENT: Negative for facial swelling, mouth sores and trouble swallowing.    Eyes: Negative for pain and redness.   Respiratory: Negative for cough and shortness of breath.    Cardiovascular: Negative for chest pain and leg swelling.   Gastrointestinal: Positive for abdominal distention.   Genitourinary: Negative for dysuria and hematuria.   Musculoskeletal: Negative for gait problem and neck stiffness.   Skin: Positive for color change. Negative for rash and wound.   Neurological: Positive for weakness. Negative for seizures, syncope and headaches.   Psychiatric/Behavioral: Negative for confusion and hallucinations.     Objective:     Vital Signs (Most Recent):  Temp: 97.8 °F (36.6 °C) (12/12/17 0932)  Pulse: 98 (12/12/17 1100)  Resp: 18 (12/12/17 0932)  BP: 122/80 (12/12/17 0932)  SpO2: 100 % (12/12/17 0932) Vital Signs (24h Range):  Temp:  [97.4 °F (36.3 °C)-98.3 °F (36.8 °C)] 97.8 °F (36.6 °C)  Pulse:  [] 98  Resp:  [17-20] 18  SpO2:  [99 %-100 %] 100 %  BP: (100-125)/(55-80) 122/80     Weight: 79.9 kg (176 lb 2.4 oz)  Body mass index is 24.57 kg/m².    Intake/Output Summary (Last 24 hours) at 12/12/17 1107  Last data filed at 12/11/17 1800   Gross per 24 hour   Intake              900 ml   Output              375 ml   Net              525 ml      Physical Exam   Constitutional: He is oriented to person, place, and time. No distress.   Alert ; no acute distress   HENT:   Head: Normocephalic.   Mouth/Throat: No oropharyngeal exudate.   Temporal wasting   Eyes: Conjunctivae and EOM are normal. No scleral icterus.   Neck: Neck supple. No thyromegaly present.   Cardiovascular: Normal rate and intact distal pulses.    Pulmonary/Chest: Effort normal and breath sounds normal. No respiratory distress.   Abdominal: Soft. He exhibits  distension. He exhibits no mass. There is no guarding.   Musculoskeletal: Normal range of motion. He exhibits no tenderness.   Neurological: He is alert and oriented to person, place, and time. No cranial nerve deficit.   Skin: Skin is warm and dry. No rash noted.   Psychiatric: He has a normal mood and affect. His behavior is normal.   Vitals reviewed.      Significant Labs:   Recent Results (from the past 24 hour(s))   Basic metabolic panel    Collection Time: 12/11/17  3:57 PM   Result Value Ref Range    Sodium 133 (L) 136 - 145 mmol/L    Potassium 5.3 (H) 3.5 - 5.1 mmol/L    Chloride 107 95 - 110 mmol/L    CO2 20 (L) 23 - 29 mmol/L    Glucose 97 70 - 110 mg/dL    BUN, Bld 64 (H) 8 - 23 mg/dL    Creatinine 1.8 (H) 0.5 - 1.4 mg/dL    Calcium 8.5 (L) 8.7 - 10.5 mg/dL    Anion Gap 6 (L) 8 - 16 mmol/L    eGFR if African American 44.3 (A) >60 mL/min/1.73 m^2    eGFR if non  38.4 (A) >60 mL/min/1.73 m^2   Basic metabolic panel    Collection Time: 12/11/17  3:57 PM   Result Value Ref Range    Sodium 133 (L) 136 - 145 mmol/L    Potassium 5.3 (H) 3.5 - 5.1 mmol/L    Chloride 107 95 - 110 mmol/L    CO2 20 (L) 23 - 29 mmol/L    Glucose 97 70 - 110 mg/dL    BUN, Bld 64 (H) 8 - 23 mg/dL    Creatinine 1.8 (H) 0.5 - 1.4 mg/dL    Calcium 8.5 (L) 8.7 - 10.5 mg/dL    Anion Gap 6 (L) 8 - 16 mmol/L    eGFR if African American 44.3 (A) >60 mL/min/1.73 m^2    eGFR if non  38.4 (A) >60 mL/min/1.73 m^2   POCT glucose    Collection Time: 12/11/17  5:01 PM   Result Value Ref Range    POCT Glucose 123 (H) 70 - 110 mg/dL   POCT glucose    Collection Time: 12/11/17 11:32 PM   Result Value Ref Range    POCT Glucose 160 (H) 70 - 110 mg/dL   Protime-INR    Collection Time: 12/12/17  4:39 AM   Result Value Ref Range    Prothrombin Time 10.6 9.0 - 12.5 sec    INR 1.0 0.8 - 1.2   Hepatic function panel    Collection Time: 12/12/17  4:39 AM   Result Value Ref Range    Total Protein 5.4 (L) 6.0 - 8.4 g/dL    Albumin  2.1 (L) 3.5 - 5.2 g/dL    Total Bilirubin 0.9 0.1 - 1.0 mg/dL    Bilirubin, Direct 0.4 (H) 0.1 - 0.3 mg/dL    AST 56 (H) 10 - 40 U/L    ALT 49 (H) 10 - 44 U/L    Alkaline Phosphatase 161 (H) 55 - 135 U/L   CBC auto differential    Collection Time: 12/12/17  4:39 AM   Result Value Ref Range    WBC 3.13 (L) 3.90 - 12.70 K/uL    RBC 3.44 (L) 4.60 - 6.20 M/uL    Hemoglobin 8.6 (L) 14.0 - 18.0 g/dL    Hematocrit 26.6 (L) 40.0 - 54.0 %    MCV 77 (L) 82 - 98 fL    MCH 25.0 (L) 27.0 - 31.0 pg    MCHC 32.3 32.0 - 36.0 g/dL    RDW 14.9 (H) 11.5 - 14.5 %    Platelets SEE COMMENT 150 - 350 K/uL    MPV 11.1 9.2 - 12.9 fL    Immature Granulocytes 0.6 (H) 0.0 - 0.5 %    Gran # 2.1 1.8 - 7.7 K/uL    Immature Grans (Abs) 0.02 0.00 - 0.04 K/uL    Lymph # 0.3 (L) 1.0 - 4.8 K/uL    Mono # 0.6 0.3 - 1.0 K/uL    Eos # 0.1 0.0 - 0.5 K/uL    Baso # 0.01 0.00 - 0.20 K/uL    nRBC 0 0 /100 WBC    Gran% 66.8 38.0 - 73.0 %    Lymph% 10.9 (L) 18.0 - 48.0 %    Mono% 17.9 (H) 4.0 - 15.0 %    Eosinophil% 3.5 0.0 - 8.0 %    Basophil% 0.3 0.0 - 1.9 %    Differential Method Automated    Basic metabolic panel    Collection Time: 12/12/17  4:39 AM   Result Value Ref Range    Sodium 136 136 - 145 mmol/L    Potassium 4.5 3.5 - 5.1 mmol/L    Chloride 108 95 - 110 mmol/L    CO2 19 (L) 23 - 29 mmol/L    Glucose 92 70 - 110 mg/dL    BUN, Bld 61 (H) 8 - 23 mg/dL    Creatinine 1.9 (H) 0.5 - 1.4 mg/dL    Calcium 8.2 (L) 8.7 - 10.5 mg/dL    Anion Gap 9 8 - 16 mmol/L    eGFR if African American 41.5 (A) >60 mL/min/1.73 m^2    eGFR if non African American 35.9 (A) >60 mL/min/1.73 m^2     Significant Imaging:   No new imaging or procedures to review since prior assessment      Assessment/Plan:      * Acute hyperkalemia     Resolved  7.0 on admission, now 4.5  S/p shift in the Ed with insulin, calcium gluconate, albuterol, kaylexalate  -Shifted again overnight, K has responded   Daily labs  -Urine Na <20, Urine K, Uosms WNL; Urine Cl, urea, Cr IP         Ascites due  to alcoholic cirrhosis    - increased abdominal distension over past week with generalized abdominal pain    - Mildly confused, no leukocytosis.  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  -Ammonia 104, repeat 66  -Continue Rifaximin   -UA clean; Bcx NG2D         Pain    Controlled      Fall    Fall, hit head, Ct negative for acute bleed  -Hemotoma on posterior aspect of head  -Will continue to monitor as patient is slightly altered, more likely hepatic etiology; AMS improved this AM      Palliative care encounter    Patient communicated that he was amenable to a palliative care discussion, though he is unable to clearly make a decision regarding his care moving forward. Dispo: home with palliative vs. HH per PT/OT eval      Essential hypertension     on admission; normotensive currently  -Will continue to monitor vitals  -Not on home antihypertensives      Hep C w/o coma, chronic    Diagnosed in July 17  -Thus far has not begun treatment      Uncontrolled type 2 diabetes mellitus with diabetic polyneuropathy, without long-term current use of insulin    Glucose 134 on admit  -SSI and will monitor        VTE Risk Mitigation         Ordered     Medium Risk of VTE  Once      12/10/17 0824     Place LYNETTE hose  Until discontinued      12/10/17 0824        Manny Cohn MD  Department of Hospital Medicine   [facility]

## 2017-12-12 NOTE — CONSULTS
"[facility]  Palliative Medicine  Consult Note    Patient Name: Soy Reza  MRN: [id number]  Admission Date: 12/10/2017  Hospital Length of Stay: 2 days  Code Status: Full Code   Attending Provider: Adria Ye MD  Consulting Provider: CLAYTON Leonard  Primary Care Physician: Jean Carlos Swanson MD  Principal Problem:Acute hyperkalemia    Patient information was obtained from patient and ER records.      Inpatient consult to Palliative Care  Consult performed by: RAHEEL FUNG.  Consult ordered by: SINTIA LAWLER        Assessment/Plan:     Palliative care encounter    Spoke to Dr. Lawler with IM. Per MD, he would like \[facility]\"" care to discuss goals of care/hospice with pt who has advanced liver disease and not a candidate for liver transplant. Per MD, pt to have possible ascites drain placed.     Met with pt who verbalizes that he understands he has advanced liver disease. Per pt, he comes to hospital to receive paracentesis. Per pt, he was admitted this admit because of fall. Per pt, he lives with his 91 y/o mother. Per pt, she is active for a 89 y/o.     Spoke to pt about his goals of care. Per pt, Dr. Lawler had mentioned hospice care to pt earlier today. Education done with pt concerning home hospice level of care and inpt level of care. Explained to pt that most hospice agencies do not send pt back and forth to hospital for paracentesis. Per pt, possible ascites drain had been mentioned but he does not want to have placed because he was told on his last admit by MD that the drain was "bad to get". Asked pt to elaborate on this. Pt unable to.  Let pt know that IM team could be able to talk to pt more about. Per pt, no way will he get "unless Dr. Foster says it is okay". Per pt, she was his physician last admit.    Explained to pt that if his goals is to continue to come back and forth to hospital for paracentesis and to see his physicians, that it is recommended pt go home " "with home health and a Palliative care program that is able to send NP out to visit.  [facility] and [facility] have a palliative care program with an NP.    Riana Cantu LCSW spoke to [facility] agency that may be able to do paracentesis for pt on hospice care.     Per pt, he wants to discuss more with Dr. Cohn when he comes back to round on pt. Let pt know [facility] care would continue to follow.       Pain:  Pain Assessment:   Onset: "Comes and goes"  Length: Can last for hours.   Duration: throughout day  Characteristics: throbbing pain in abd  Associated features: ascites  Relieving factors reposition, pain med.  TIming: With ascites  Severity: 8/10 today    Pt has acetaminophen q 6hrs prn mild pain.     Recs:   Consider adding low dose opioid for abd pain.   Pt may benefit from oxycodone 5 mg q 8 hrs prn  severe pain.     Spoke to Dr. Hanson, [facility] physician concerning pain recs.     Recs:     1) Pt considering hospice vs home health.   Per pt, he does not want to have ascites drain place.  [facility] may be able to admit pt and have paracentesis  but would have to do contract and have approved by their administration per Isabella Cantu [facility] elly SW.  2) If pt decides to go home with home health would have a Palliative care program that is able to send NP out to visit.  [facility] and [facility] have a palliative care program with an NP.  3) See above for pain recs.                         Thank you for your consult. I will follow-up with patient. Please contact us if you have any additional questions.    Subjective:     HPI:   This is a 68 y/o male with hx of HCV + etoh cirrhosis, refractory ascites and hydrothorax, DMII, CKD who presented via EMS after fall at home after tripping on home furniture and hit his head. No loss of consciousness. Also with assoc generalized abd pain.   Per chart review,  Pt was Found to have hyperK in the ER with K of 7. Ct head was negative.   Admitted to IM team.      Pt does admits " that he doesn't have reliable ride to make his appts. Lives with his elderly mother who helps with his meds but wants someone else to come to house to help with meds. Has tried taking public transportation but has difficulty with weakness and actually walking to the local bus stop. Taxi is  Becoming too expensive.     Per chart review, pt previously evaluated multiple times by hepatology inpatient and outpatient.  Was previously deemed not a liver transplant candidate due to non-compliance and lack of caregiver support.       Hospital Course:  No notes on file    No new subjective & objective note has been filed under this hospital service since the last note was generated.    Physical Exam  Constitutional: He is oriented to person, place, and time. No distress.   HENT:   Head: Normocephalic.   Eyes: Conjunctivae and EOM are normal. No scleral icterus.   Cardiovascular: Normal rate and intact distal pulses.    Pulmonary/Chest: Effort normal and breath sounds normal. No respiratory distress.   Abdominal: Soft, distension noted pain upon palpatation  Musculoskeletal: Normal range of motion. He exhibits no tenderness.   Neurological: He is alert and oriented to person, place, and time, situation   Skin: Skin is warm and dry   Psychiatric: He has a normal mood and affect. His behavior is normal.       > 50% of 70 min visit spent in chart review, face to face discussion of goals of care,  symptom assessment, coordination of care and emotional support.    Faiza Kay, CNS  Palliative Medicine  [facility]

## 2017-12-12 NOTE — ASSESSMENT & PLAN NOTE
"Spoke to Dr. Cohn with IM. Per MD, he would like pal care to discuss goals of care/hospice with pt who has advanced liver disease and not a candidate for liver transplant. Per MD, pt to have possible ascites drain placed.     Met with pt who verbalizes that he understands he has advanced liver disease. Per pt, he comes to hospital to receive paracentesis. Per pt, he was admitted this admit because of fall. Per pt, he lives with his 93 y/o mother. Per pt, she is active for a 93 y/o.     Spoke to pt about his goals of care. Per pt, Dr. Cohn had mentioned hospice care to pt earlier today. Education done with pt concerning home hospice level of care and inpt level of care. Explained to pt that most hospice agencies do not send pt back and forth to hospital for paracentesis. Per pt, possible ascites drain had been mentioned but he does not want to have placed because he was told on his last admit by MD that the drain was "bad to get". Asked pt to elaborate on this. Pt unable to.  Let pt know that IM team could be able to talk to pt more about. Per pt, no way will he get "unless Dr. Foster says it is okay". Per pt, she was his physician last admit.    Explained to pt that if his goals is to continue to come back and forth to hospital for paracentesis and to see his physicians, that it is recommended pt go home with home health and a Palliative care program that is able to send NP out to visit.  [facility] and [facility] have a palliative care program with an NP.    Riana Cantu LCSW spoke to [facility] agency that may be able to do paracentesis for pt on hospice care.     Per pt, he wants to discuss more with Dr. Cohn when he comes back to round on pt. Let pt know pal care would continue to follow.       Pain:  Pain Assessment:   Onset: "Comes and goes"  Length: Can last for hours.   Duration: throughout day  Characteristics: throbbing pain in abd  Associated features: ascites  Relieving factors reposition, pain " med.  TIming: With ascites  Severity: 8/10 today    Pt has acetaminophen q 6hrs prn mild pain.     Recs:   Consider adding low dose opioid for abd pain.   Pt may benefit from oxycodone 5 mg q 8 hrs prn  severe pain.     Spoke to Dr. Hanson, [facility] physician concerning pain recs.     Recs:     1) Pt considering hospice vs home health.   Per pt, he does not want to have ascites drain place.  [facility] may be able to admit pt and have paracentesis  but would have to do contract and have approved by their administration per Isabella Cantu [facility] elly PATEL.  2) If pt decides to go home with home health would have a Palliative care program that is able to send NP out to visit.  [facility] and [facility] have a palliative care program with an NP.  3) See above for pain recs.

## 2017-12-13 PROBLEM — R52 PAIN: Status: ACTIVE | Noted: 2017-01-01

## 2017-12-13 NOTE — PLAN OF CARE
Pt d/c home w/ h/h - set up w/ [facility] h/[facility] but agency denied. Then SW referred pt to [facility]-h/h who accepted the pt w/ the condition an ambulatory referral for palliative care was placed - MD placed the order. Pt's paracentesis appt still pending - CM spoke w/ hepatology clinic 12/13 @ 4:45; likely will be next Monday 12/18 time pending. CM emailed outpt , Deepti Trevino p [phone] (see note below) requesting assistance w/ f/u on pt's ride to appt (ride issues in the past have led to readmits)      Deepti [email or web address]    I'm emailing you b/c you were 1st in the list for outpt case mgmt when I search the name thru sharepoint!!!  Soy Reza #[id number] (CSN: [id number])   This pt is a multiple readmit w/ a sole support system of a mom in the early 90's. He needs to have scheduled paracentesis but states rides are a barrier - he was informed that was not so d/t his ride benefits but his encephalopathy will make it difficult for him to be reliable in making the ride appts. The hepatology clinic is in the process of setting up his next paracentesis for next week - likely 12/18 but he will need to ride to go with it. Not sure if the referral will have been process by then. Thanks.   Pili Pineda inpat  [phone]     12/13/17 1658   Final Note   Assessment Type Final Discharge Note   Discharge Disposition Home-Health   What phone number can be called within the next 1-3 days to see how you are doing after discharge? [phone]   Hospital Follow Up  Appt(s) scheduled? Yes

## 2017-12-13 NOTE — TELEPHONE ENCOUNTER
Patient recently discharged.  Pili (in pt case manger [phone]) phoned stating that patient needs to be setup for para 12/18.  Please advise.

## 2017-12-13 NOTE — PT/OT/SLP DISCHARGE
Physical Therapy Discharge Summary    Name: Soy Reza  MRN: [id number]   Principal Problem: Acute hyperkalemia     Patient Discharged from acute Physical Therapy on 2017.  Please refer to prior PT noted date on 2017 for functional status.     Assessment:     Patient appropriate for care in another setting.    Objective:     GOALS:    Physical Therapy Goals        Problem: Physical Therapy Goal    Goal Priority Disciplines Outcome Goal Variances Interventions   Physical Therapy Goal     PT/OT, PT Ongoing (interventions implemented as appropriate)     Description:  Goals to be met by: 2017     Patient will increase functional independence with mobility by performin. Supine to sit with Modified [place]  2. Sit to supine with Modified [place]  3. Sit to stand transfer with Modified [place] using AD or no AD  4. Bed to chair transfer with Stand-by Assistance using AD or No AD  5. Gait x150 feet with Supervision using AD or No AD   6. Ascend/descend x2 stair with no rails Handrails with Stand-by Assistance   7. Stand for x10 minutes with Stand-by Assistance while performing dynamic balance tasks  8. Lower extremity exercise program x15 reps with supervision to maintain B LE strength and coordination                      Reasons for Discontinuation of Therapy Services  Transfer to alternate level of care.      Plan:     Patient Discharged to: Home with Home Health Service.    Elsie Martines, PT, DPT  [phone]  2017

## 2017-12-13 NOTE — PLAN OF CARE
Sw informed [facility] denied pt readmission to home health. Sw sent referral to [facility]. [facility] denied because pt shows as current with [facility]. Shirin spoke with Avani at [facility] who stated pt d/c date from [facility] will be 11/22/17. Sw referred pt to [facility] and included above information.     Kaz Branham, [facility]   [phone]

## 2017-12-14 NOTE — TELEPHONE ENCOUNTER
MD Jeannette Chavez MA   Caller: Shannon Bang  NP with  P[facility] [phone]             Ok for palliative care consult.    Previous Messages      ----- Message -----   From: Lisandra Valerio   Sent: 12/14/2017   1:05 PM   To: Sky Evangelista Staff     She need to speak with doctor or nurse about a Palliative care consult for this patient.         Spoke with shannon, informed her it is ok for palliative care consult per dr sampson.

## 2017-12-14 NOTE — PROGRESS NOTES
Thank you for the referral.  Patient has been assigned to [facility] for low risk screening for Outpatient Case Management.     Reason for referral:  Fall, initial encounter  Hyperkalemia  Alcoholic cirrhosis of liver with ascites  Ascites due to alcoholic hepatitis  Debility  Spontaneous bacterial peritonitis  Physical deconditioning    Please contact [facility] at ext. [phone] with any questions.    Thank you,  Claudia Rosario

## 2017-12-14 NOTE — PATIENT INSTRUCTIONS
Ascites    Ascites is fluid collecting in the abdomen (stomach area). Symptoms include swelling of the abdomen and a feeling of pressure. Shortness of breath may also occur. In severe cases, the feet, ankles and legs may also swell.   There are many causes of ascites. The most common are related to the liver. They include:  · Long-term alcohol abuse  · Hepatitis  · Diseases such as congestive heart failure, kidney failure, pancreatitis, or cancer  To treat the condition, a low-salt diet may be recommended. Medicines that help fluid leave the body (diuretics) may be prescribed. In some cases, a procedure is done to drain the abdomen of fluid. This is called paracentesis. Unless the underlying cause is treated, the fluid is likely to return.  If liver damage is due to alcohol, stopping all alcohol will help slow the progress of the disease. If liver damage is from hepatitis B or C, treatments may be given to fight the virus. If liver damage becomes life threatening, a liver transplant may be needed.  Home care  · Certain medicines can worsen liver damage. Talk to your healthcare provider or pharmacist about any medicines you currently take. Ask your healthcare provider or pharmacist before taking any new medicines. Also ask before taking herbs, vitamins, or minerals. Certain ones affect the liver.  · Do not taking acetaminophen or ibuprofen without taking to your healthcare provider first. Both can affect your liver.   · Stop all alcohol use. If you abuse alcohol, talk to your healthcare provider about getting help and support to stop.   · If you use IV drugs, seek help to stop. Never share needles or other equipment.    Follow-up care  Follow up with your healthcare provider as advised. If a culture was done, call as directed for the results. Depending on the results, your treatment may change.  The following sources can tell you more about ascites and help you find support.  · [employer] Foundation  793.230.6913 www.liverfoundation.org  · Hepatitis Foundation International www.hepfi.org  · Alcoholics Anonymous www.aa.org  · [employer] [place] on Alcoholism and Drug Dependence 390-854-4647 www.ncadd.org  When to seek medical advice  Call your healthcare provider right away if you have any of the following:  · Sudden weight gain with increased size of your abdomen or leg swelling  · Increasing jaundice (yellowing of skin or eyes)  · Excess bleeding from cuts or injuries  · Blood in vomit or stool (black or red color)  · Trouble breathing  · Increasing abdominal pain  · Fever of 100.4ºF (38ºC) or higher, or as directed by your healthcare provider  Date Last Reviewed: 6/16/2015  © 8572-3165 [employer]. [address] [place] [address], [place] [address]. All rights reserved. This information is not intended as a substitute for professional medical care. Always follow your healthcare professional's instructions.

## 2017-12-14 NOTE — TELEPHONE ENCOUNTER
----- Message from Jeane Ham sent at 12/14/2017  9:00 AM CST -----  Contact: patient [phone]  Pt c/o foot pain/ neuropathy and wants to know if you can order medication . He took Excedrin with no relief. Pt is diabetic. Please call him asap.    [facility] [phone]

## 2017-12-15 NOTE — TELEPHONE ENCOUNTER
Called pt, no answer.   Spoke with JADON waldron RN. Pt is having severe leg and foot pain from diabetic neuropathy. Pt would like to know if there is something we can call in for his leg/foot pain.   Please advise

## 2017-12-15 NOTE — TELEPHONE ENCOUNTER
----- Message from Lisandra Valerio sent at 12/14/2017  3:25 PM CST -----  Contact: Latonya with [facility]  [phone]  Patient want to be admitted to home health today. He's not home health appropriate. She recommends  Hospice which patient agreed to. Her Recommendation is [facility]. Please call her.

## 2017-12-15 NOTE — TELEPHONE ENCOUNTER
Spoke with latonya, she would like to recommend Hospice for the pt. Pt agreed yesterday to allow hospice to come in per latonya. Also, [facility]/ Latonya recommends we speak with his mother because pt is very confused. Dr Swanson has been informed of all.

## 2017-12-15 NOTE — TELEPHONE ENCOUNTER
I spoke with [facility] nurse vanita this morning.  She performed an eval on pt, see notes. Dr Swanson informed of all information and messages. Seeking to get hospice for pt.   Closing encounter

## 2017-12-15 NOTE — TELEPHONE ENCOUNTER
"Per Dr Swanson "No. I'm not giving him pain meds now with OR without a clinic visit. In my opinion, pt needs hospice. Hospice can then address his paracentesis needs AND pain needs."  Pt informed.   "

## 2017-12-15 NOTE — TELEPHONE ENCOUNTER
----- Message from Jeane Ham sent at 12/14/2017  9:00 AM CST -----  Contact: patient [phone]  Pt c/o foot pain/ neuropathy and wants to know if you can order medication . He took Excedrin with no relief. Pt is diabetic. Please call him asap.    [facility] [phone]

## 2017-12-18 NOTE — TELEPHONE ENCOUNTER
----- Message from Liliana Zamora sent at 12/18/2017  9:16 AM CST -----  Contact: pt [phone]  Pt would like a call from the nurse pt said he can not walk and no one is willing to help him pt said he do not have a wheel chair and it is hard to keep his appointment today,please advise pt

## 2017-12-18 NOTE — TELEPHONE ENCOUNTER
Spoke with pt. He states he is having a good day. He doesn't seem to be confused at all. Able to speak clearly and request/explain what is needs. He states he has signed up and agreed to Hospice and palliative care..   Pt is requesting an order for a wheelchair to be able to get around at home and to make doctor visits. Also requesting a raised seat for the toilet to make it easier to get up. Please advise  Thank you

## 2017-12-19 PROBLEM — E87.5 HYPERKALEMIA: Status: ACTIVE | Noted: 2017-01-01

## 2017-12-19 PROBLEM — K72.91 HEPATIC COMA/ENCEPHALOPATHY: Status: ACTIVE | Noted: 2017-01-01

## 2017-12-19 PROBLEM — E87.1 HYPONATREMIA: Status: ACTIVE | Noted: 2017-01-01

## 2017-12-19 PROBLEM — K92.0 GASTROINTESTINAL HEMORRHAGE WITH HEMATEMESIS: Status: ACTIVE | Noted: 2017-01-01

## 2017-12-19 NOTE — ED NOTES
Nurse x 2 spoke with mother regarding parcentesis. Paracentesis consent form signed by physician and witnessed by two nurses

## 2017-12-19 NOTE — ASSESSMENT & PLAN NOTE
· Purulent appearing ascitic fluid.   · F/u ascitic fluid stuides.   · F/u cultures  · Vanco loaded in ED and Zosyn given.  ·

## 2017-12-19 NOTE — HPI
"66 y/o M PMhx HCV cirrhosis (diagnosied July, not treated), DMII, HTN, CKD III who presented w/ abd pain and swelling. Patient was recently admitted 12/10-12/12 for symptomatic hyperkalemia. During that admission he had diagnostic/therapeutic paracentesis with 3L fluid removed. He had multiple discussions w/ his primary team regarding goals of care as patient was deemed not to be a transplant candidate. Multiple options were discussed with him including hospice, home w/ home health w/ palliative care program, and placement of a drain. When asked what brought patient to ED, he stated "to get the fluid off". When asked about home hospice he stated "they didn't do anything at all" as he was expecting them to perform paracentesis at his home. Per discharge note case management attempted to set up outpatient paracentesis w/ [facility] and ambulatory referral to hepatology. Attempted to ask patient about drain as an option and he adamantly refused drain placement because "Dr. Foster told me not to". Patient denied fever, chills, SOB, constipation, diarrhea. He endorsed abd pain.  "

## 2017-12-19 NOTE — ED NOTES
Pt has become altered, lethargic, difficulty to arouse, pallor in appearance. Critical care at bedside with IM team 5 at bedside

## 2017-12-19 NOTE — ED NOTES
Pt's gown changed due to soiled with bright red emesis. IM 5 aware pt is actively vomiting bright red blood

## 2017-12-19 NOTE — ED NOTES
"Pt states "I would like to wait for the nausea medicine to kick in before I take my pill and lactulose."  "

## 2017-12-19 NOTE — ASSESSMENT & PLAN NOTE
66 yo M with EtOH cirrhosis who presented for abdominal distention and tenderness now with hematemesis and hypotension concerned for variceal bleed.    Recommendations:  - Continue resuscitation  - Transfuse to keep Hg>7  - Continue Octreotide gtt  - Continue Protonix gtt  - Start Ceftriaxone 1gm IV daily for SBP ppx  - Plan for urgent bedside EGD

## 2017-12-19 NOTE — SUBJECTIVE & OBJECTIVE
Past Medical History:   Diagnosis Date    Diabetes mellitus     Hep C w/o coma, chronic     Hypertension     Macular degeneration     Neuropathy        Past Surgical History:   Procedure Laterality Date    EYE SURGERY      HAND SURGERY         Review of patient's allergies indicates:  No Known Allergies  Family History     None        Social History Main Topics    Smoking status: Former Smoker    Smokeless tobacco: Never Used    Alcohol use No      Comment: 2 5th per week- stopped one month ago approx 9/10/16    Drug use: No    Sexual activity: No     Review of Systems   Unable to perform ROS: Intubated     Objective:     Vital Signs (Most Recent):  Temp: 98.8 °F (37.1 °C) (12/19/17 1347)  Pulse: (!) 111 (12/19/17 1412)  Resp: 20 (12/19/17 1347)  BP: 113/74 (12/19/17 1412)  SpO2: 100 % (12/19/17 1412) Vital Signs (24h Range):  Temp:  [98.2 °F (36.8 °C)-99.4 °F (37.4 °C)] 98.8 °F (37.1 °C)  Pulse:  [] 111  Resp:  [17-24] 20  SpO2:  [97 %-100 %] 100 %  BP: ()/(53-90) 113/74     Weight: 90.7 kg (200 lb) (12/19/17 0020)  Body mass index is 27.89 kg/m².      Intake/Output Summary (Last 24 hours) at 12/19/17 1416  Last data filed at 12/19/17 1332   Gross per 24 hour   Intake              212 ml   Output                0 ml   Net              212 ml       Lines/Drains/Airways     Drain                 Urethral Catheter 12/19/17 1344 Latex 16 Fr. less than 1 day          Airway                 Airway - Non-Surgical 12/19/17 1342 Endotracheal Tube less than 1 day          Peripheral Intravenous Line                 Peripheral IV - Single Lumen 12/10/17 0501 Left Antecubital 9 days         Peripheral IV - Single Lumen 12/19/17 1006 Right Antecubital less than 1 day         Peripheral IV - Single Lumen 12/19/17 1354 Right Hand less than 1 day                Physical Exam   Constitutional:   Intubated, sedated   HENT:   Dried blood in beard   Eyes: No scleral icterus.   Cardiovascular:   tachycardiac    Pulmonary/Chest:   Ventilator breath sounds   Abdominal: He exhibits no mass. There is no guarding.   +abdominal distention   Musculoskeletal: He exhibits no edema or deformity.   Lymphadenopathy:     He has no cervical adenopathy.   Neurological:   sedated   Skin: Skin is warm and dry.   Psychiatric:   Unable to assess   Vitals reviewed.      Significant Labs:  CBC:   Recent Labs  Lab 12/19/17 0223 12/19/17  0840 12/19/17  1333   WBC 6.92 6.87  --    HGB 10.0* 9.5*  --    HCT 30.8* 29.7* 24*   * 147*  --      CMP:   Recent Labs  Lab 12/19/17 0223 12/19/17  0840   * 258*   CALCIUM 8.5* 8.2*   ALBUMIN 2.2*  --    PROT 6.4  --    * 128*   K 6.1* 6.5*   CO2 18* 20*    103   BUN 86* 90*   CREATININE 2.2* 2.2*   ALKPHOS 174*  --    ALT 43  --    AST 33  --    BILITOT 0.7  --      Coagulation:   Recent Labs  Lab 12/19/17 0223   INR 0.9       Significant Imaging:  Imaging results within the past 24 hours have been reviewed.

## 2017-12-19 NOTE — PROGRESS NOTES
Called to bedside for acute decompensation, hypotension and active vomiting of bright red blood. Asked by ICU fellow to intubate the patient. Discussed with patient, he states he would like to be intubated at this time. Labs reviewed, elevated K noted. Will start fentanyl ggt for sedation. Will start levophed as patient is hypotensive.     .Intubation  Date/Time: 12/19/2017 1:49 PM  Performed by: JL REILLY  Authorized by: ANA ZAPIEN   Consent Done: Emergent Situation  Indications: airway protection  Intubation method: direct  Patient status: paralyzed (RSI)  Preoxygenation: nasal cannula  Sedatives: etomidate  Paralytic: rocuronium  Laryngoscope size: Mac 3  Tube size: 7.5 mm  Tube type: cuffed  Number of attempts: 1  Cricoid pressure: no  Cords visualized: yes  Post-procedure assessment: chest rise,  CO2 detector and esophageal detector  Breath sounds: clear and equal and absent over the epigastrium  Cuff inflated: yes  ETT to teeth: 25 cm  Tube secured with: ETT ervin  Chest x-ray interpreted by me.  Chest x-ray findings: endotracheal tube in appropriate position  Patient tolerance: Patient tolerated the procedure well with no immediate complications        DEBBIE Reilly MD  Emergency Department Staff Physician    1:51 PM 12/19/2017

## 2017-12-19 NOTE — ED PROVIDER NOTES
Encounter Date: 12/19/2017       History     Chief Complaint   Patient presents with    Abdominal Swelling     Abd swelling hx of Cirrhosis. Usualy gets tapped weekly.      67-year-old male to the ER for evaluation of abdominal distention.  Patient was recently seen here for similar complaints and admitted to the hospital.  He received a palliative care consult while in the hospital.  Ultimately patient was discharged no and that his condition is terminal.  Patient is aware that he would need paracentesis often.  He was supposed to be set up with outpatient to receive paracentesis or possible drain placement has not been completed.  He presents tonight with abdominal distention swelling and pain.  He denies any fever, chills, nausea, vomiting, confusion.  Patient alert and oriented          Review of patient's allergies indicates:  No Known Allergies  Past Medical History:   Diagnosis Date    Diabetes mellitus     Hep C w/o coma, chronic     Hypertension     Macular degeneration     Neuropathy      Past Surgical History:   Procedure Laterality Date    EYE SURGERY      HAND SURGERY       No family history on file.  Social History   Substance Use Topics    Smoking status: Former Smoker    Smokeless tobacco: Never Used    Alcohol use No      Comment: 2 5th per week- stopped one month ago approx 9/10/16     Review of Systems   Constitutional: Negative for fever.   HENT: Negative for sore throat.    Respiratory: Negative for shortness of breath.    Cardiovascular: Negative for chest pain.   Gastrointestinal: Positive for abdominal distention and abdominal pain. Negative for nausea and vomiting.   Genitourinary: Negative for dysuria.   Musculoskeletal: Negative for back pain.   Skin: Negative for rash.   Neurological: Negative for weakness.   Hematological: Does not bruise/bleed easily.       Physical Exam     Initial Vitals [12/19/17 0020]   BP Pulse Resp Temp SpO2   (!) 143/90 108 18 99.4 °F (37.4 °C) 98 %       MAP       107.67         Physical Exam    Constitutional: Vital signs are normal. He appears well-developed and well-nourished. He is not diaphoretic. No distress.   HENT:   Head: Normocephalic and atraumatic.   Right Ear: External ear normal.   Left Ear: External ear normal.   Eyes: Conjunctivae are normal.   No icterus    Cardiovascular: Normal rate, regular rhythm and normal heart sounds. Exam reveals no gallop and no friction rub.    No murmur heard.  Pulmonary/Chest: No respiratory distress. He has no wheezes. He has no rhonchi. He has no rales. He exhibits no tenderness.   Abdominal: Soft. Normal appearance and bowel sounds are normal. He exhibits distension. He exhibits no mass. There is tenderness. There is no rebound and no guarding.   Moderate to severe distention  Tender on exam   Musculoskeletal: Normal range of motion.   Lymphadenopathy:   No LE edema   Neurological: He is alert and oriented to person, place, and time.   Skin: Skin is warm and intact.   Psychiatric: He has a normal mood and affect. His speech is normal and behavior is normal. Cognition and memory are normal.         ED Course   Procedures  Labs Reviewed   CBC W/ AUTO DIFFERENTIAL - Abnormal; Notable for the following:        Result Value    RBC 4.05 (*)     Hemoglobin 10.0 (*)     Hematocrit 30.8 (*)     MCV 76 (*)     MCH 24.7 (*)     RDW 15.9 (*)     Platelets 142 (*)     Immature Granulocytes 0.7 (*)     Immature Grans (Abs) 0.05 (*)     Lymph # 0.5 (*)     Gran% 75.0 (*)     Lymph% 7.5 (*)     All other components within normal limits   COMPREHENSIVE METABOLIC PANEL - Abnormal; Notable for the following:     Sodium 130 (*)     Potassium 6.1 (*)     CO2 18 (*)     Glucose 235 (*)     BUN, Bld 86 (*)     Creatinine 2.2 (*)     Calcium 8.5 (*)     Albumin 2.2 (*)     Alkaline Phosphatase 174 (*)     eGFR if  34.8 (*)     eGFR if non  30.1 (*)     All other components within normal limits   AMMONIA -  Abnormal; Notable for the following:     Ammonia 103 (*)     All other components within normal limits   PROTIME-INR   MAGNESIUM   PHOSPHORUS             Medical Decision Making:   ED Management:  67-year-old male with ascites secondary to cirrhosis.  Potassium elevated at 6.1.  We'll treat with Kayexalate.  Patient will be admitted to the floor, he will need a paracentesis.  Low suspicion for SBP                   ED Course      Clinical Impression:   The primary encounter diagnosis was Hyperkalemia. A diagnosis of Alcoholic cirrhosis of liver with ascites was also pertinent to this visit.                           Arthur Riggins PA-C  12/19/17 0538

## 2017-12-19 NOTE — H&P
"[facility] Hospital Medicine  History & Physical    Patient Name: Soy Reza  MRN: [id number]  Admission Date: 12/19/2017  Attending Physician: Dr. Tan  Primary Care Provider: Jean Carlos Swanson MD    [facility] Medicine Team: Networked reference to record PCT  Hung Pal MD     Patient information was obtained from patient, past medical records and ER records.     Subjective:     Principal Problem:Decompensated hepatic cirrhosis    Chief Complaint:   Chief Complaint   Patient presents with    Abdominal Swelling     Abd swelling hx of Cirrhosis. Usualy gets tapped weekly.         HPI: 66 y/o M PMhx HCV cirrhosis (diagnosied July, not treated), DMII, HTN, CKD III who presented w/ abd pain and swelling. Patient was recently admitted 12/10-12/12 for symptomatic hyperkalemia. During that admission he had diagnostic/therapeutic paracentesis with 3L fluid removed. He had multiple discussions w/ his primary team regarding goals of care as patient was deemed not to be a transplant candidate. Multiple options were discussed with him including hospice, home w/ home health w/ palliative care program, and placement of a drain. When asked what brought patient to ED, he stated "to get the fluid off". When asked about home hospice he stated "they didn't do anything at all" as he was expecting them to perform paracentesis at his home. Per discharge note case management attempted to set up outpatient paracentesis w/ [facility] and ambulatory referral to hepatology. Attempted to ask patient about drain as an option and he adamantly refused drain placement because "Dr. Foster told me not to". Patient denied fever, chills, SOB, constipation, diarrhea. He endorsed abd pain.    Past Medical History:   Diagnosis Date    Diabetes mellitus     Hep C w/o coma, chronic     Hypertension     Macular degeneration     Neuropathy        Past Surgical History:   Procedure Laterality Date    EYE " SURGERY      HAND SURGERY         Review of patient's allergies indicates:  No Known Allergies    No current facility-administered medications on file prior to encounter.      Current Outpatient Prescriptions on File Prior to Encounter   Medication Sig    ciprofloxacin HCl (CIPRO) 500 MG tablet Take 1 tablet (500 mg total) by mouth once daily.    furosemide (LASIX) 40 MG tablet Take 1 tablet (40 mg total) by mouth once daily.    glimepiride (AMARYL) 1 MG tablet Take 1 tablet (1 mg total) by mouth before breakfast.    lactulose (CHRONULAC) 10 gram/15 mL solution Take 30 mLs (20 g total) by mouth 3 (three) times daily.    rifAXIMin (XIFAXAN) 550 mg Tab Take 1 tablet (550 mg total) by mouth 2 (two) times daily.    SITagliptin (JANUVIA) 100 MG Tab Take 1 tablet (100 mg total) by mouth once daily.     Family History     None        Social History Main Topics    Smoking status: Former Smoker    Smokeless tobacco: Never Used    Alcohol use No      Comment: 2 5th per week- stopped one month ago approx 9/10/16    Drug use: No    Sexual activity: No     Review of Systems   Constitutional: Negative for chills and fever.   HENT: Negative for congestion and sore throat.    Eyes: Negative for photophobia.   Respiratory: Negative for chest tightness, shortness of breath and wheezing.    Cardiovascular: Negative for chest pain and leg swelling.   Gastrointestinal: Positive for abdominal pain. Negative for constipation, diarrhea, nausea and vomiting.   Genitourinary: Negative for dysuria.   Musculoskeletal: Negative for neck pain.   Skin: Negative for rash.   Neurological: Negative for dizziness, light-headedness and headaches.   Psychiatric/Behavioral: Negative for agitation and confusion.     Objective:     Vital Signs (Most Recent):  Temp: 99.4 °F (37.4 °C) (12/19/17 0020)  Pulse: 91 (12/19/17 0323)  Resp: 18 (12/19/17 0020)  BP: (!) 145/86 (12/19/17 0231)  SpO2: 99 % (12/19/17 0323) Vital Signs (24h Range):  Temp:   [99.4 °F (37.4 °C)] 99.4 °F (37.4 °C)  Pulse:  [] 91  Resp:  [18] 18  SpO2:  [98 %-100 %] 99 %  BP: (141-145)/(85-90) 145/86     Weight: 90.7 kg (200 lb)  Body mass index is 27.89 kg/m².    Physical Exam   Constitutional: He is oriented to person, place, and time. No distress.   HENT:   Head: Normocephalic and atraumatic.   Eyes: Conjunctivae and EOM are normal. No scleral icterus.   Neck: Normal range of motion. Neck supple.   Cardiovascular: Normal rate, regular rhythm and intact distal pulses.    No murmur heard.  Pulmonary/Chest: No respiratory distress. He has no wheezes. He has no rales. He exhibits no tenderness.   Abdominal: He exhibits distension. There is no tenderness. There is no rebound and no guarding.   Musculoskeletal: He exhibits no edema or tenderness.   Lymphadenopathy:     He has no cervical adenopathy.   Neurological: He is alert and oriented to person, place, and time.   Skin: Skin is warm. No rash noted.   Ulcers on L 2nd and 3rd toes   Psychiatric:   Not able to appreciate situation         CRANIAL NERVES     CN III, IV, VI   Extraocular motions are normal.        Significant Labs:   CBC:   Recent Labs  Lab 12/19/17 0223   WBC 6.92   HGB 10.0*   HCT 30.8*   *     CMP:   Recent Labs  Lab 12/19/17 0223   *   K 6.1*      CO2 18*   *   BUN 86*   CREATININE 2.2*   CALCIUM 8.5*   PROT 6.4   ALBUMIN 2.2*   BILITOT 0.7   ALKPHOS 174*   AST 33   ALT 43   ANIONGAP 9   EGFRNONAA 30.1*     Coagulation:   Recent Labs  Lab 12/19/17 0223   INR 0.9       Significant Imaging: I have reviewed and interpreted all pertinent imaging results/findings within the past 24 hours. No new imaging over last 24 hrs    Assessment/Plan:     * Decompensated hepatic cirrhosis    Hx alcohol abuse and HCV  On lasix 40mg qd at home  C/w lasix  Cannot receive aldactone due to chronically having issues w/ hyperkalemia  F/u bmp  Patient refusing drain placement for recurring ascites and will  likely continue to present to hospital weekly for paracentesis  --Recommend performing therapeutic paracentesis, arranging outpatient IR paracentesis to prevent recurrent admissions  Had discussion w/ patient, he stated he would like hospice however he does not seem to appreciate what this means   Currently full code  Recommend Palliative care consult          Hyponatremia    Chronic likely 2/2 cirrhosis  F/u urine urea as pt on diuretic, urine osm        Hyperkalemia    S/p kayexelate  Also on home dose of lasix  F/u BMP        Type 2 diabetes mellitus    Takes glimeperide and januvia at home  C/w aspart sliding scale for now        Generalized abdominal pain    Likely 2/2 ascites  No WBC  No fever or chills          VTE Risk Mitigation         Ordered     heparin (porcine) injection 5,000 Units  Every 8 hours     Route:  Subcutaneous        12/19/17 0442     Medium Risk of VTE  Once      12/19/17 0320     Place LYNETTE hose  Until discontinued      12/19/17 0320             Hung Pal MD  Department of Hospital Medicine   [facility]

## 2017-12-19 NOTE — SUBJECTIVE & OBJECTIVE
Past Medical History:   Diagnosis Date    Diabetes mellitus     Hep C w/o coma, chronic     Hypertension     Macular degeneration     Neuropathy        Past Surgical History:   Procedure Laterality Date    EYE SURGERY      HAND SURGERY         Review of patient's allergies indicates:  No Known Allergies    Family History     None        Social History Main Topics    Smoking status: Former Smoker    Smokeless tobacco: Never Used    Alcohol use No      Comment: 2 5th per week- stopped one month ago approx 9/10/16    Drug use: No    Sexual activity: No      Review of Systems   Constitutional: Positive for appetite change and fatigue. Negative for chills and fever.   Respiratory: Negative for cough and chest tightness.    Cardiovascular: Negative for chest pain and leg swelling.   Gastrointestinal: Positive for abdominal distention, blood in stool, nausea and vomiting.   Genitourinary: Negative for flank pain and frequency.     Objective:     Vital Signs (Most Recent):  Temp: 98.2 °F (36.8 °C) (12/19/17 0601)  Pulse: (!) 112 (12/19/17 1233)  Resp: 18 (12/19/17 0701)  BP: 106/73 (12/19/17 1233)  SpO2: 99 % (12/19/17 1233) Vital Signs (24h Range):  Temp:  [98.2 °F (36.8 °C)-99.4 °F (37.4 °C)] 98.2 °F (36.8 °C)  Pulse:  [] 112  Resp:  [17-19] 18  SpO2:  [97 %-100 %] 99 %  BP: (101-158)/(67-90) 106/73   Weight: 90.7 kg (200 lb)  Body mass index is 27.89 kg/m².    No intake or output data in the 24 hours ending 12/19/17 1253    Physical Exam   Constitutional: He appears well-developed. He appears lethargic. He appears cachectic. He has a sickly appearance. He appears ill. No distress.   Temporal wasting. Sunken cheeks. Jaundiced skin. Beard heavily stained with blood and clots.    HENT:   Head: Normocephalic and atraumatic.   Cardiovascular: Tachycardia present.  Exam reveals no friction rub.    No murmur heard.  Pulmonary/Chest: Effort normal.   Abdominal: He exhibits distension and ascites. Bowel sounds  are decreased. There is generalized tenderness. There is rigidity and rebound.   Guaic+.    Neurological: He appears lethargic. He is disoriented.   Oriented to person/place only.    Nursing note and vitals reviewed.      Vents:     Lines/Drains/Airways     Peripheral Intravenous Line                 Peripheral IV - Single Lumen 12/10/17 0501 Left Antecubital 9 days         Peripheral IV - Single Lumen 12/19/17 1006 Right Antecubital less than 1 day              Significant Labs:    CBC/Anemia Profile:    Recent Labs  Lab 12/19/17 0223 12/19/17  0840   WBC 6.92 6.87   HGB 10.0* 9.5*   HCT 30.8* 29.7*   * 147*   MCV 76* 78*   RDW 15.9* 15.9*        Chemistries:    Recent Labs  Lab 12/19/17 0223 12/19/17  0840   * 128*   K 6.1* 6.5*    103   CO2 18* 20*   BUN 86* 90*   CREATININE 2.2* 2.2*   CALCIUM 8.5* 8.2*   ALBUMIN 2.2*  --    PROT 6.4  --    BILITOT 0.7  --    ALKPHOS 174*  --    ALT 43  --    AST 33  --    MG 2.5  --    PHOS 3.9  --      Lab Results   Component Value Date    INR 0.9 12/19/2017    INR 1.0 12/12/2017    INR 1.0 12/11/2017     MELD-Na score: 22 at 12/19/2017  8:40 AM  MELD score: 14 at 12/19/2017  8:40 AM  Calculated from:  Serum Creatinine: 2.2 mg/dL at 12/19/2017  8:40 AM  Serum Sodium: 128 mmol/L at 12/19/2017  8:40 AM  Total Bilirubin: 0.7 mg/dL (Rounded to 1) at 12/19/2017  2:23 AM  INR(ratio): 0.9 (Rounded to 1) at 12/19/2017  2:23 AM  Age: 67 years    Significant Imaging:   No pertinent imaging taken during this admission.

## 2017-12-19 NOTE — HPI
This is a 66 yo M with HCV cirrhosis c/b ascites, Dm, HTN, CKD who presented to ED with abdominal pain and distention. He was recently hospitalized from 12/10 to 12/12 for symptomatic hyperkalemia. On last visit, patient did undergo therapeutic paracentesis and there were discussions about possible home hospice vs home with home health. While patient was in ED this morning, he was witnessed to have bright red blood emesis by nursing staff at around 7:30am. He reportedly came in encephalopathic. He had another episode of hematemesis around 8:30am. He was tachycardiac but blood pressure was ok. He arrived with Hg of 10 which downtrended to 9.5. He was started on octreotide and protonix.    GI was consulted this morning. At that time, patient had hyperkalemia (K of 6.5) and abdominal distention for which he was going to undergo therapeutic paracentesis. Per anesthesia, these issues needed to be sorted out prior to scope. Patient's potassium was shifted with Kayexalate and 2L was removed. He continued to throw up blood and is now currently intubated, sedated, and on pressors.

## 2017-12-19 NOTE — PROCEDURES
"Soy Reza is a 67 y.o. male patient.    Temp: 98.8 °F (37.1 °C) (12/19/17 1347)  Pulse: (!) 113 (12/19/17 1517)  Resp: 20 (12/19/17 1347)  BP: (!) 99/58 (12/19/17 1517)  SpO2: 100 % (12/19/17 1517)  Weight: 90.7 kg (200 lb) (12/19/17 0020)  Height: 5' 11" (180.3 cm) (12/19/17 1432)       Paracentesis  Date/Time: 12/19/2017 3:39 PM  Location procedure was performed: [facility] EMERGENCY DEPARTMENT  Performed by: MART SNYDER  Authorized by: MART SNYDER   Pre-operative diagnosis: decompensated cirrhosis  Post-operative diagnosis: decompensated cirrhosis  Consent Done: Yes  Consent: Verbal consent obtained. Written consent obtained.  Consent given by: mother  Patient understanding: patient states understanding of the procedure being performed  Patient consent: the patient's understanding of the procedure does not match consent given  Procedure consent: procedure consent matches procedure scheduled  Relevant documents: relevant documents present and verified  Test results: test results not available  Site marked: the operative site was marked  Patient identity confirmed: MRN and name  Time out: Immediately prior to procedure a "time out" was called to verify the correct patient, procedure, equipment, support staff and site/side marked as required.  Initial or subsequent exam: initial  Procedure purpose: diagnostic and therapeutic  Indications: abdominal discomfort secondary to ascites and secondary bacterial peritonitis  Anesthesia: local infiltration    Anesthesia:  Local Anesthetic: lidocaine 1% without epinephrine  Anesthetic total: 5 mL  Patient sedated: no  Needle gauge: 18  Ultrasound guidance: yes  Puncture site: right lower quadrant  Fluid removed: 2500(ml)  Fluid appearance: purulent  Dressing: 4x4 sterile gauze  Complications: No  Estimated blood loss (mL): 5  Specimens: No  Implants: No          Mart Snyder  12/19/2017  "

## 2017-12-19 NOTE — SUBJECTIVE & OBJECTIVE
Past Medical History:   Diagnosis Date    Diabetes mellitus     Hep C w/o coma, chronic     Hypertension     Macular degeneration     Neuropathy        Past Surgical History:   Procedure Laterality Date    EYE SURGERY      HAND SURGERY         Review of patient's allergies indicates:  No Known Allergies    No current facility-administered medications on file prior to encounter.      Current Outpatient Prescriptions on File Prior to Encounter   Medication Sig    ciprofloxacin HCl (CIPRO) 500 MG tablet Take 1 tablet (500 mg total) by mouth once daily.    furosemide (LASIX) 40 MG tablet Take 1 tablet (40 mg total) by mouth once daily.    glimepiride (AMARYL) 1 MG tablet Take 1 tablet (1 mg total) by mouth before breakfast.    lactulose (CHRONULAC) 10 gram/15 mL solution Take 30 mLs (20 g total) by mouth 3 (three) times daily.    rifAXIMin (XIFAXAN) 550 mg Tab Take 1 tablet (550 mg total) by mouth 2 (two) times daily.    SITagliptin (JANUVIA) 100 MG Tab Take 1 tablet (100 mg total) by mouth once daily.     Family History     None        Social History Main Topics    Smoking status: Former Smoker    Smokeless tobacco: Never Used    Alcohol use No      Comment: 2 5th per week- stopped one month ago approx 9/10/16    Drug use: No    Sexual activity: No     Review of Systems   Constitutional: Negative for chills and fever.   HENT: Negative for congestion and sore throat.    Eyes: Negative for photophobia.   Respiratory: Negative for chest tightness, shortness of breath and wheezing.    Cardiovascular: Negative for chest pain and leg swelling.   Gastrointestinal: Positive for abdominal pain. Negative for constipation, diarrhea, nausea and vomiting.   Genitourinary: Negative for dysuria.   Musculoskeletal: Negative for neck pain.   Skin: Negative for rash.   Neurological: Negative for dizziness, light-headedness and headaches.   Psychiatric/Behavioral: Negative for agitation and confusion.     Objective:      Vital Signs (Most Recent):  Temp: 99.4 °F (37.4 °C) (12/19/17 0020)  Pulse: 91 (12/19/17 0323)  Resp: 18 (12/19/17 0020)  BP: (!) 145/86 (12/19/17 0231)  SpO2: 99 % (12/19/17 0323) Vital Signs (24h Range):  Temp:  [99.4 °F (37.4 °C)] 99.4 °F (37.4 °C)  Pulse:  [] 91  Resp:  [18] 18  SpO2:  [98 %-100 %] 99 %  BP: (141-145)/(85-90) 145/86     Weight: 90.7 kg (200 lb)  Body mass index is 27.89 kg/m².    Physical Exam   Constitutional: He is oriented to person, place, and time. No distress.   HENT:   Head: Normocephalic and atraumatic.   Eyes: Conjunctivae and EOM are normal. No scleral icterus.   Neck: Normal range of motion. Neck supple.   Cardiovascular: Normal rate, regular rhythm and intact distal pulses.    No murmur heard.  Pulmonary/Chest: No respiratory distress. He has no wheezes. He has no rales. He exhibits no tenderness.   Abdominal: He exhibits distension. There is no tenderness. There is no rebound and no guarding.   Musculoskeletal: He exhibits no edema or tenderness.   Lymphadenopathy:     He has no cervical adenopathy.   Neurological: He is alert and oriented to person, place, and time.   Skin: Skin is warm. No rash noted.   Ulcers on L 2nd and 3rd toes   Psychiatric:   Not able to appreciate situation         CRANIAL NERVES     CN III, IV, VI   Extraocular motions are normal.        Significant Labs:   CBC:   Recent Labs  Lab 12/19/17 0223   WBC 6.92   HGB 10.0*   HCT 30.8*   *     CMP:   Recent Labs  Lab 12/19/17 0223   *   K 6.1*      CO2 18*   *   BUN 86*   CREATININE 2.2*   CALCIUM 8.5*   PROT 6.4   ALBUMIN 2.2*   BILITOT 0.7   ALKPHOS 174*   AST 33   ALT 43   ANIONGAP 9   EGFRNONAA 30.1*     Coagulation:   Recent Labs  Lab 12/19/17 0223   INR 0.9       Significant Imaging: I have reviewed and interpreted all pertinent imaging results/findings within the past 24 hours. No new imaging over last 24 hrs

## 2017-12-19 NOTE — HPI
Soy Reza is a 67 y.o. male with PMHx cirrhosis and ascites. He presented to [facility] [place] with C/C of abdominal pain. This is a standard presentation similar to multiple prior where he receives a paracentesis.  During this stay he was admitted to hospital medicine for removal of ascitic fluid when he became acutely nauseous and vomited BRB and clots. GI was consulted however management of hyperkalemia was needed before anesthesia could be safely achieved. Work up in ED was notable for an Hgb of 9.5 (down from 10.5 earlier this month, PLT of 217 and a normal INR. The patient has no known history of varices per documentation available at [facility].     In the interim a 2L paracentesis was performed yielding cloud, purulent Recommendation was made for large volume paracentesis which was performed yielding cloudy, purulent appearing fluid. Immediately following procedure the patient was noted to become acutely lethargic and hypotensive prompting critical care consult.

## 2017-12-19 NOTE — H&P (VIEW-ONLY)
[facility]  Gastroenterology  Consult Note    Patient Name: Soy Reza  MRN: [id number]  Admission Date: 12/19/2017  Hospital Length of Stay: 0 days  Code Status: Full Code   Attending Provider: Jordana Morris MD   Consulting Provider: Vimal Thurman MD  Primary Care Physician: Jean Carlos Swanson MD  Principal Problem:Decompensated hepatic cirrhosis    Inpatient consult to Gastroenterology  Consult performed by: VIMAL THURMAN  Consult ordered by: TEODORO SNYDER        Subjective:     HPI:  This is a 68 yo M with HCV cirrhosis c/b ascites, Dm, HTN, CKD who presented to ED with abdominal pain and distention. He was recently hospitalized from 12/10 to 12/12 for symptomatic hyperkalemia. On last visit, patient did undergo therapeutic paracentesis and there were discussions about possible home hospice vs home with home health. While patient was in ED this morning, he was witnessed to have bright red blood emesis by nursing staff at around 7:30am. He reportedly came in encephalopathic. He had another episode of hematemesis around 8:30am. He was tachycardiac but blood pressure was ok. He arrived with Hg of 10 which downtrended to 9.5. He was started on octreotide and protonix.    GI was consulted this morning. At that time, patient had hyperkalemia (K of 6.5) and abdominal distention for which he was going to undergo therapeutic paracentesis. Per anesthesia, these issues needed to be sorted out prior to scope. Patient's potassium was shifted with Kayexalate and 2L was removed. He continued to throw up blood and is now currently intubated, sedated, and on pressors.        Past Medical History:   Diagnosis Date    Diabetes mellitus     Hep C w/o coma, chronic     Hypertension     Macular degeneration     Neuropathy        Past Surgical History:   Procedure Laterality Date    EYE SURGERY      HAND SURGERY         Review of patient's allergies indicates:  No Known Allergies  Family  History     None        Social History Main Topics    Smoking status: Former Smoker    Smokeless tobacco: Never Used    Alcohol use No      Comment: 2 5th per week- stopped one month ago approx 9/10/16    Drug use: No    Sexual activity: No     Review of Systems   Unable to perform ROS: Intubated     Objective:     Vital Signs (Most Recent):  Temp: 98.8 °F (37.1 °C) (12/19/17 1347)  Pulse: (!) 111 (12/19/17 1412)  Resp: 20 (12/19/17 1347)  BP: 113/74 (12/19/17 1412)  SpO2: 100 % (12/19/17 1412) Vital Signs (24h Range):  Temp:  [98.2 °F (36.8 °C)-99.4 °F (37.4 °C)] 98.8 °F (37.1 °C)  Pulse:  [] 111  Resp:  [17-24] 20  SpO2:  [97 %-100 %] 100 %  BP: ()/(53-90) 113/74     Weight: 90.7 kg (200 lb) (12/19/17 0020)  Body mass index is 27.89 kg/m².      Intake/Output Summary (Last 24 hours) at 12/19/17 1416  Last data filed at 12/19/17 1332   Gross per 24 hour   Intake              212 ml   Output                0 ml   Net              212 ml       Lines/Drains/Airways     Drain                 Urethral Catheter 12/19/17 1344 Latex 16 Fr. less than 1 day          Airway                 Airway - Non-Surgical 12/19/17 1342 Endotracheal Tube less than 1 day          Peripheral Intravenous Line                 Peripheral IV - Single Lumen 12/10/17 0501 Left Antecubital 9 days         Peripheral IV - Single Lumen 12/19/17 1006 Right Antecubital less than 1 day         Peripheral IV - Single Lumen 12/19/17 1354 Right Hand less than 1 day                Physical Exam   Constitutional:   Intubated, sedated   HENT:   Dried blood in beard   Eyes: No scleral icterus.   Cardiovascular:   tachycardiac   Pulmonary/Chest:   Ventilator breath sounds   Abdominal: He exhibits no mass. There is no guarding.   +abdominal distention   Musculoskeletal: He exhibits no edema or deformity.   Lymphadenopathy:     He has no cervical adenopathy.   Neurological:   sedated   Skin: Skin is warm and dry.   Psychiatric:   Unable to assess    Vitals reviewed.      Significant Labs:  CBC:   Recent Labs  Lab 12/19/17 0223 12/19/17  0840 12/19/17  1333   WBC 6.92 6.87  --    HGB 10.0* 9.5*  --    HCT 30.8* 29.7* 24*   * 147*  --      CMP:   Recent Labs  Lab 12/19/17 0223 12/19/17  0840   * 258*   CALCIUM 8.5* 8.2*   ALBUMIN 2.2*  --    PROT 6.4  --    * 128*   K 6.1* 6.5*   CO2 18* 20*    103   BUN 86* 90*   CREATININE 2.2* 2.2*   ALKPHOS 174*  --    ALT 43  --    AST 33  --    BILITOT 0.7  --      Coagulation:   Recent Labs  Lab 12/19/17 0223   INR 0.9       Significant Imaging:  Imaging results within the past 24 hours have been reviewed.    Assessment/Plan:     Gastrointestinal hemorrhage with hematemesis    68 yo M with EtOH cirrhosis who presented for abdominal distention and tenderness now with hematemesis and hypotension concerned for variceal bleed.    Recommendations:  - Continue resuscitation  - Transfuse to keep Hg>7  - Continue Octreotide gtt  - Continue Protonix gtt  - Start Ceftriaxone 1gm IV daily for SBP ppx  - Plan for urgent bedside EGD            Thank you for your consult. I will follow-up with patient. Please contact us if you have any additional questions.    Vimal Fabian MD PGY-IV  Gastroenterology Fellow  [facility]  P [phone]

## 2017-12-19 NOTE — CONSULTS
[facility]  Gastroenterology  Consult Note    Patient Name: Soy Reza  MRN: [id number]  Admission Date: 12/19/2017  Hospital Length of Stay: 0 days  Code Status: Full Code   Attending Provider: Jordana Morris MD   Consulting Provider: Vimal Thurman MD  Primary Care Physician: Jean Carlos Swanson MD  Principal Problem:Decompensated hepatic cirrhosis    Inpatient consult to Gastroenterology  Consult performed by: VIMAL THURMAN  Consult ordered by: TEODORO SNYDER        Subjective:     HPI:  This is a 68 yo M with HCV cirrhosis c/b ascites, Dm, HTN, CKD who presented to ED with abdominal pain and distention. He was recently hospitalized from 12/10 to 12/12 for symptomatic hyperkalemia. On last visit, patient did undergo therapeutic paracentesis and there were discussions about possible home hospice vs home with home health. While patient was in ED this morning, he was witnessed to have bright red blood emesis by nursing staff at around 7:30am. He reportedly came in encephalopathic. He had another episode of hematemesis around 8:30am. He was tachycardiac but blood pressure was ok. He arrived with Hg of 10 which downtrended to 9.5. He was started on octreotide and protonix.    GI was consulted this morning. At that time, patient had hyperkalemia (K of 6.5) and abdominal distention for which he was going to undergo therapeutic paracentesis. Per anesthesia, these issues needed to be sorted out prior to scope. Patient's potassium was shifted with Kayexalate and 2L was removed. He continued to throw up blood and is now currently intubated, sedated, and on pressors.        Past Medical History:   Diagnosis Date    Diabetes mellitus     Hep C w/o coma, chronic     Hypertension     Macular degeneration     Neuropathy        Past Surgical History:   Procedure Laterality Date    EYE SURGERY      HAND SURGERY         Review of patient's allergies indicates:  No Known Allergies  Family  History     None        Social History Main Topics    Smoking status: Former Smoker    Smokeless tobacco: Never Used    Alcohol use No      Comment: 2 5th per week- stopped one month ago approx 9/10/16    Drug use: No    Sexual activity: No     Review of Systems   Unable to perform ROS: Intubated     Objective:     Vital Signs (Most Recent):  Temp: 98.8 °F (37.1 °C) (12/19/17 1347)  Pulse: (!) 111 (12/19/17 1412)  Resp: 20 (12/19/17 1347)  BP: 113/74 (12/19/17 1412)  SpO2: 100 % (12/19/17 1412) Vital Signs (24h Range):  Temp:  [98.2 °F (36.8 °C)-99.4 °F (37.4 °C)] 98.8 °F (37.1 °C)  Pulse:  [] 111  Resp:  [17-24] 20  SpO2:  [97 %-100 %] 100 %  BP: ()/(53-90) 113/74     Weight: 90.7 kg (200 lb) (12/19/17 0020)  Body mass index is 27.89 kg/m².      Intake/Output Summary (Last 24 hours) at 12/19/17 1416  Last data filed at 12/19/17 1332   Gross per 24 hour   Intake              212 ml   Output                0 ml   Net              212 ml       Lines/Drains/Airways     Drain                 Urethral Catheter 12/19/17 1344 Latex 16 Fr. less than 1 day          Airway                 Airway - Non-Surgical 12/19/17 1342 Endotracheal Tube less than 1 day          Peripheral Intravenous Line                 Peripheral IV - Single Lumen 12/10/17 0501 Left Antecubital 9 days         Peripheral IV - Single Lumen 12/19/17 1006 Right Antecubital less than 1 day         Peripheral IV - Single Lumen 12/19/17 1354 Right Hand less than 1 day                Physical Exam   Constitutional:   Intubated, sedated   HENT:   Dried blood in beard   Eyes: No scleral icterus.   Cardiovascular:   tachycardiac   Pulmonary/Chest:   Ventilator breath sounds   Abdominal: He exhibits no mass. There is no guarding.   +abdominal distention   Musculoskeletal: He exhibits no edema or deformity.   Lymphadenopathy:     He has no cervical adenopathy.   Neurological:   sedated   Skin: Skin is warm and dry.   Psychiatric:   Unable to assess    Vitals reviewed.      Significant Labs:  CBC:   Recent Labs  Lab 12/19/17 0223 12/19/17  0840 12/19/17  1333   WBC 6.92 6.87  --    HGB 10.0* 9.5*  --    HCT 30.8* 29.7* 24*   * 147*  --      CMP:   Recent Labs  Lab 12/19/17 0223 12/19/17  0840   * 258*   CALCIUM 8.5* 8.2*   ALBUMIN 2.2*  --    PROT 6.4  --    * 128*   K 6.1* 6.5*   CO2 18* 20*    103   BUN 86* 90*   CREATININE 2.2* 2.2*   ALKPHOS 174*  --    ALT 43  --    AST 33  --    BILITOT 0.7  --      Coagulation:   Recent Labs  Lab 12/19/17 0223   INR 0.9       Significant Imaging:  Imaging results within the past 24 hours have been reviewed.    Assessment/Plan:     Gastrointestinal hemorrhage with hematemesis    68 yo M with EtOH cirrhosis who presented for abdominal distention and tenderness now with hematemesis and hypotension concerned for variceal bleed.    Recommendations:  - Continue resuscitation  - Transfuse to keep Hg>7  - Continue Octreotide gtt  - Continue Protonix gtt  - Start Ceftriaxone 1gm IV daily for SBP ppx  - Plan for urgent bedside EGD            Thank you for your consult. I will follow-up with patient. Please contact us if you have any additional questions.    Vimal Fabian MD PGY-IV  Gastroenterology Fellow  [facility]  P [phone]

## 2017-12-19 NOTE — ANESTHESIA PROCEDURE NOTES
Intubation    Diagnosis: Endotracheal cuff leak   Patient location during procedure: ED  Procedure start time: 12/19/2017 2:31 PM  Timeout: 12/19/2017 2:30 PM  Procedure end time: 12/19/2017 2:35 PM  Staffing  Anesthesiologist: JULIAN POOL  Resident/CRNA: CRISTEL SANTIAGO  Performed: anesthesiologist   Anesthesiologist was present at the time of the procedure.  Preanesthetic Checklist  Completed: patient identified, site marked, pre-op evaluation, timeout performed, IV checked, risks and benefits discussed and monitors and equipment checked  Intubation  Indication: respiratory distress, respiratory failure, hypoxemia, hypercapnia mask ventilation: n/a.  Intubation: n/a, laryngoscopy n/a, n/a.  Endotracheal Tube: oral (Cook Catheter Used to Exchange ET Tube), 7.5 mm ID, cuffed (inflated to minimal occlusive pressure)  Attempts: 1  Complicating Factors: none  Tube secured at 23 cm at the lips.  Findings post-intubation: bilateral breath sounds, positive ETCO2, atraumatic / condition of teeth unchanged  Position Confirmation: auscultation

## 2017-12-19 NOTE — ASSESSMENT & PLAN NOTE
· Intubated for airway protection and scope for GI bleed.  · Ammonia >100 and lethargic on presentation  · Lactulose LA TID. Will add rifaximin. Add oral rifixamin when bleeding stable.

## 2017-12-19 NOTE — ED NOTES
I acknowledge care of this pt. Pt AAO x 4 lying in stretcher. Pt offered bedside commode, orders placed. Pt on continuous cardiac monitor and pulse ox with blood pressure cycling every thirty minutes. Respirations are unlabored and equal. Side rails up x 2 bed locked and in low position with call light in reach. VSS. NAD noted. Will continue to monitor

## 2017-12-19 NOTE — ASSESSMENT & PLAN NOTE
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 PETH 12/2017 negative.  · Will defer diuretics for ascites pending resolution of acute bleed.  · Unable to tolerate spironolctone 2/2 persistent hyperkalemia.  · Has had multiple goals of care discussions in the past and has refused hospice as he would not be able to get paracenteses there and is not amenable to having a drain installed.  · Will continue goals of care discussion during this admission.

## 2017-12-19 NOTE — ED NOTES
Pt transferred from room 17 to room 02 due to decline in pt's status. On arrival pt vomited copious amount of bright red blood with clots. Critical care and Dr. Hoang at bedside at this time.

## 2017-12-19 NOTE — MEDICAL/APP STUDENT
"Progress Note  Hospital Medicine    Patient Name: Soy Reza  MRN: [id number]  Team: [facility] 5 Yahaira Simmons  Admit Date: 12/19/2017     Patient information was obtained from patient, relative(s) and ER records.     Subjective   Principal Problem: Decompensated hepatic cirrhosis    Chief Complaint:   Chief Complaint   Patient presents with    Abdominal Swelling     Abd swelling hx of Cirrhosis. Usualy gets tapped weekly.        HPI   Soy Reza is a 67 y.o. male who  has a past medical history of Diabetes mellitus; Hep C w/o coma, chronic; Hypertension; Macular degeneration; and Neuropathy., presenting with ascites and increased abdominal pain. He says that over the past 24 hours his stomach "acted like a big balloon" and became very swollen. It has very sharp pain all over.     The history was provided by the patient.     Hospital Course:  He has been in the ED since arrival. Since arriving, he has had increase hematemesis. He has had multiple episodes, the largest being 400 mL with clots. Around noon, a paracentesis was performed which drained 2.5 L of milky white blood tinged fluid. During paracentesis, patient's systolic blood pressure dropped from 130s to low 100s and has remained stable around 108. Following this, patient will receive procedural intubation for EGD. After this procedure, patient will be moved to the ICU.     Interval History: Patient has been very uncomfortable throughout the day. He continuously complains of being sat up too forward in the bed. However, his care team worries of aspiration of hematemesis and cannot change his bed. Since paracentesis, patient has been mildly lethargic but is able to respond to questioning.      Review Of Systems:  Review of Systems   Constitutional: Negative for chills, fever and weight loss.       Objective     Vitals:    12/19/17 1202 12/19/17 1222 12/19/17 1230 12/19/17 1233   BP: 103/67 104/72 101/67 106/73   Pulse: (!) 113 109 (!) 112 " (!) 112   Resp:       Temp:       TempSrc:       SpO2: 99% 100% 100% 99%   Weight:           Physical Exam:  Physical Exam    Significant Labs: {Results:76200}      Significant Imaging: {Imaging Review:22545}    ASSESSMENT/PLAN:     Plan    Patient Active Problem List   Diagnosis    Uncontrolled type 2 diabetes mellitus with diabetic polyneuropathy, without long-term current use of insulin    Hep C w/o coma, chronic    Anemia due to vitamin B12 deficiency    Alcoholism in remission    Essential hypertension    Ascites due to alcoholic hepatitis    Wound, open    MAURICE (acute kidney injury)    Pain in both feet    Ascites due to alcoholic cirrhosis    Debility    Hydrothorax    Generalized abdominal pain    Constipation    Alcoholic cirrhosis of liver with ascites    Type 2 diabetes mellitus    Chylous ascites    SBP (spontaneous bacterial peritonitis)    Spontaneous bacterial peritonitis    Hepatic encephalopathy    Hyperphosphatemia    Goals of care, counseling/discussion    Palliative care encounter    Delirium    Physical deconditioning    Fall    Decompensated hepatic cirrhosis    Pain    Hyperkalemia    Hyponatremia         VTE Risk Mitigation         Ordered     heparin (porcine) injection 5,000 Units  Every 8 hours     Route:  Subcutaneous        12/19/17 0442     Medium Risk of VTE  Once      12/19/17 0320     Place LYNETTE hose  Until discontinued      12/19/17 0320        VTE PPx:   ***      Dispo:   ***    ______________________________________________________________________  Yahaira Vesselinovitch  Medical Student  Department of Hospital Medicine

## 2017-12-19 NOTE — ASSESSMENT & PLAN NOTE
Hx alcohol abuse and HCV  On lasix 40mg qd at home  C/w lasix  Cannot receive aldactone due to chronically having issues w/ hyperkalemia  F/u bmp  Patient refusing drain placement for recurring ascites and will likely continue to present to hospital weekly for paracentesis  --Recommend performing therapeutic paracentesis, arranging outpatient IR paracentesis to prevent recurrent admissions  Had discussion w/ patient, he stated he would like hospice however he does not seem to appreciate what this means   Currently full code  Recommend Palliative care consult

## 2017-12-19 NOTE — ED TRIAGE NOTES
Pt reporting he is having abdominal discomfort and a little trouble taking a deep breath. Pt has a hx of cirrhosis and gets tapped weekly. Pt states he is due to be tapped.

## 2017-12-19 NOTE — INTERVAL H&P NOTE
Pre-Procedure H and P Addendum    Patient seen and examined.  History and exam unchanged from prior history and physical.      Procedure: EGD  Indication: Hematemesis  ASA Class: per anesthesiology  Airway: normal  Neck Mobility: full range of motion  Mallampatti score: per anesthesia  History of anesthesia problems: no  Family history of anesthesia problems: no  Anesthesia Plan: MAC    Anesthesia/Surgery risks, benefits and alternative options discussed and understood by patient/family.          Active Hospital Problems    Diagnosis  POA    *Gastrointestinal hemorrhage with hematemesis [K92.0]  Yes    Hyperkalemia [E87.5]  Yes    Hyponatremia [E87.1]  Yes    Decompensated hepatic cirrhosis [K72.90]  Yes     Chronic    Type 2 diabetes mellitus [E11.9]  Yes    Generalized abdominal pain [R10.84]  Yes      Resolved Hospital Problems    Diagnosis Date Resolved POA   No resolved problems to display.

## 2017-12-19 NOTE — ASSESSMENT & PLAN NOTE
· No known history of varices per chart review.  · However given history of cirrhosis would consider variceal bleed most likely culprit.  · Have d/w GI who will be performing a scope.  · 3 18ga PIV in place. Type and screen.  · Admit Hgb 10.0   · Transfused one unit after initial bout of hematemesis repeat hgb 8.0.  · Octreotide gtt  · Protonix 80mg IVx1 then 40mg BID.

## 2017-12-19 NOTE — ED NOTES
IM 5 Notified that pt is actively vomiting blood w/ clots and current status is worsening with onset of multiple episodes of vomiting bright red blood. IM5 states that critical care will not be consulted at this time. IM5 notified that [facility] Supervisor has offered a room post EGD-- however, when contacted, IM5 states EGD is not definite at this time and coming to ER to re-evaluate the patient. ER RNs at bedside

## 2017-12-19 NOTE — H&P
[facility]  Critical Care Medicine  History & Physical    Patient Name: Soy Reza  MRN: [id number]  Admission Date: 12/19/2017  Hospital Length of Stay: 0 days  Code Status: Full Code  Attending Physician: No att. providers found   Primary Care Provider: Jean Carlos Swanson MD   Principal Problem: Gastrointestinal hemorrhage with hematemesis    Subjective:     HPI:  Soy Reza is a 67 y.o. male with PMHx cirrhosis and ascites. He presented to [facility] ED with C/C of abdominal pain. This is a standard presentation similar to multiple prior where he receives a paracentesis.  During this stay he was admitted to hospital medicine for removal of ascitic fluid when he became acutely nauseous and vomited BRB and clots. GI was consulted however management of hyperkalemia was needed before anesthesia could be safely achieved. Work up in ED was notable for an Hgb of 9.5 (down from 10.5 earlier this month, PLT of 217 and a normal INR. The patient has no known history of varices per documentation available at [facility].     In the interim a 2L paracentesis was performed yielding cloud, purulent Recommendation was made for large volume paracentesis which was performed yielding cloudy, purulent appearing fluid. Immediately following procedure the patient was noted to become acutely lethargic and hypotensive prompting critical care consult.         Hospital/ICU Course:  No notes on file     Past Medical History:   Diagnosis Date    Diabetes mellitus     Hep C w/o coma, chronic     Hypertension     Macular degeneration     Neuropathy        Past Surgical History:   Procedure Laterality Date    EYE SURGERY      HAND SURGERY         Review of patient's allergies indicates:  No Known Allergies    Family History     None        Social History Main Topics    Smoking status: Former Smoker    Smokeless tobacco: Never Used    Alcohol use No      Comment: 2 5th per week- stopped one month ago approx 9/10/16     Drug use: No    Sexual activity: No      Review of Systems   Constitutional: Positive for appetite change and fatigue. Negative for chills and fever.   Respiratory: Negative for cough and chest tightness.    Cardiovascular: Negative for chest pain and leg swelling.   Gastrointestinal: Positive for abdominal distention, blood in stool, nausea and vomiting.   Genitourinary: Negative for flank pain and frequency.     Objective:     Vital Signs (Most Recent):  Temp: 98.2 °F (36.8 °C) (12/19/17 0601)  Pulse: (!) 112 (12/19/17 1233)  Resp: 18 (12/19/17 0701)  BP: 106/73 (12/19/17 1233)  SpO2: 99 % (12/19/17 1233) Vital Signs (24h Range):  Temp:  [98.2 °F (36.8 °C)-99.4 °F (37.4 °C)] 98.2 °F (36.8 °C)  Pulse:  [] 112  Resp:  [17-19] 18  SpO2:  [97 %-100 %] 99 %  BP: (101-158)/(67-90) 106/73   Weight: 90.7 kg (200 lb)  Body mass index is 27.89 kg/m².    No intake or output data in the 24 hours ending 12/19/17 1253    Physical Exam   Constitutional: He appears well-developed. He appears lethargic. He appears cachectic. He has a sickly appearance. He appears ill. No distress.   Temporal wasting. Sunken cheeks. Jaundiced skin. Beard heavily stained with blood and clots.    HENT:   Head: Normocephalic and atraumatic.   Cardiovascular: Tachycardia present.  Exam reveals no friction rub.    No murmur heard.  Pulmonary/Chest: Effort normal.   Abdominal: He exhibits distension and ascites. Bowel sounds are decreased. There is generalized tenderness. There is rigidity and rebound.   Guaic+.    Neurological: He appears lethargic. He is disoriented.   Oriented to person/place only.    Nursing note and vitals reviewed.      Vents:     Lines/Drains/Airways     Peripheral Intravenous Line                 Peripheral IV - Single Lumen 12/10/17 0501 Left Antecubital 9 days         Peripheral IV - Single Lumen 12/19/17 1006 Right Antecubital less than 1 day              Significant Labs:    CBC/Anemia Profile:    Recent Labs  Lab  12/19/17 0223 12/19/17  0840   WBC 6.92 6.87   HGB 10.0* 9.5*   HCT 30.8* 29.7*   * 147*   MCV 76* 78*   RDW 15.9* 15.9*        Chemistries:    Recent Labs  Lab 12/19/17 0223 12/19/17  0840   * 128*   K 6.1* 6.5*    103   CO2 18* 20*   BUN 86* 90*   CREATININE 2.2* 2.2*   CALCIUM 8.5* 8.2*   ALBUMIN 2.2*  --    PROT 6.4  --    BILITOT 0.7  --    ALKPHOS 174*  --    ALT 43  --    AST 33  --    MG 2.5  --    PHOS 3.9  --      Lab Results   Component Value Date    INR 0.9 12/19/2017    INR 1.0 12/12/2017    INR 1.0 12/11/2017     MELD-Na score: 22 at 12/19/2017  8:40 AM  MELD score: 14 at 12/19/2017  8:40 AM  Calculated from:  Serum Creatinine: 2.2 mg/dL at 12/19/2017  8:40 AM  Serum Sodium: 128 mmol/L at 12/19/2017  8:40 AM  Total Bilirubin: 0.7 mg/dL (Rounded to 1) at 12/19/2017  2:23 AM  INR(ratio): 0.9 (Rounded to 1) at 12/19/2017  2:23 AM  Age: 67 years    Significant Imaging:   No pertinent imaging taken during this admission.     Assessment/Plan:     Renal/   Hyperkalemia    · Shifted. No EKG changes. Unable to give kayexylate 2/2 NPO status and pending scope.         Endocrine   Type 2 diabetes mellitus    · NPO.   · Scheduled accuchecks  · LDSSI.        GI   * Gastrointestinal hemorrhage with hematemesis    · No known history of varices per chart review.  · However given history of cirrhosis would consider variceal bleed most likely culprit.  · Have d/w GI who will be performing a scope.  · 3 18ga PIV in place. Type and screen.  · Admit Hgb 10.0   · Transfused one unit after initial bout of hematemesis repeat hgb 8.0.  · Octreotide gtt  · Protonix 80mg IVx1 then 40mg BID.         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 PETH 12/2017 negative.  · Will defer diuretics for ascites pending resolution of acute bleed.  · Unable to tolerate spironolctone 2/2 persistent hyperkalemia.  · Has had multiple goals of care discussions in the past and has  refused hospice as he would not be able to get paracenteses there and is not amenable to having a drain installed.  · Will continue goals of care discussion during this admission.         Hepatic coma/encephalopathy    · Intubated for airway protection and scope for GI bleed.  · Ammonia >100 and lethargic on presentation  · Lactulose MS TID. Will add rifaximin. Add oral rifixamin when bleeding stable.         Spontaneous bacterial peritonitis    · Purulent appearing ascitic fluid.   · F/u ascitic fluid stuides.   · F/u cultures  · Vanco loaded in ED and Zosyn given.  ·             Critical Care Daily Checklist:    A: Awake: RASS Goal/Actual Goal:    Actual:     B: Spontaneous Breathing Trial Performed?     C: SAT & SBT Coordinated?  N/A                      D: Delirium: CAM-ICU     E: Early Mobility Performed? Yes   F: Feeding Goal:    Status:     Current Diet Order   Procedures    Diet NPO      AS: Analgesia/Sedation Fentanyl gtt   T: Thromboembolic Prophylaxis scd only   H: HOB > 300 Yes   U: Stress Ulcer Prophylaxis (if needed) protonix loaded.   G: Glucose Control ldssi   B: Bowel Function     I: Indwelling Catheter (Lines & Carcamo) Necessity Carcamo, peripherals.   D: De-escalation of Antimicrobials/Pharmacotherapies Na    Plan for the day/ETD Scope admit to icu    Code Status:  Family/Goals of Care: Full Code         Critical secondary to Patient has a condition that poses threat to life and bodily function: Fulminant gastrointestinal hemorrhage.      Critical care was time spent personally by me on the following activities: development of treatment plan with patient or surrogate and bedside caregivers, discussions with consultants, evaluation of patient's response to treatment, examination of patient, ordering and performing treatments and interventions, ordering and review of laboratory studies, ordering and review of radiographic studies, pulse oximetry, re-evaluation of patient's condition. This critical  care time did not overlap with that of any other provider or involve time for any procedures.     FRANCESCO Bellamy II  Critical Care Medicine  [facility]

## 2017-12-20 NOTE — ASSESSMENT & PLAN NOTE
· No known history of varices per chart review.  · However given history of cirrhosis would consider variceal bleed most likely culprit.  · Had EGD with GI varices banded.  · 3 18ga PIV in place. Type and screen.  · Admit Hgb 10.0   · Transfused one unit after initial bout of hematemesis repeat hgb 8.0.  · Octreotide gtt  · Protonix 80mg IVx1 then 40mg BID.   · No signs of bleeding at present.

## 2017-12-20 NOTE — PROVATION PATIENT INSTRUCTIONS
Discharge Summary/Instructions after an Endoscopic Procedure  Patient Name: Soy Reza  Patient MRN: [id number]  Patient YOB: 1950 Tuesday, December 19, 2017  Joe Panchal MD  RESTRICTIONS:  During your procedure today, you received medications for sedation.  These   medications may affect your judgment, balance and coordination.  Therefore,   for 24 hours, you have the following restrictions:   - DO NOT drive a car, operate machinery, make legal/financial decisions,   sign important papers or drink alcohol.    ACTIVITY:  The following day: return to full activity including work, except no heavy   lifting, straining or running for 3 days if polyps were removed.  DIET:  Eat and drink normally unless instructed otherwise.     TREATMENT FOR COMMON SIDE EFFECTS:  - Mild abdominal pain, belching, bloating or excessive gas: rest, eat   lightly and use a heating pad.  - Sore Throat: treat with throat lozenges and/or gargle with warm salt   water.  SYMPTOMS TO WATCH FOR AND REPORT TO YOUR PHYSICIAN:  1. Abdominal pain or bloating, other than gas cramps.  2. Chest pain.  3. Back pain.  4. Chills or fever occurring within 24 hours after the procedure.  5. Rectal bleeding, which would show as bright red, maroon, or black stools.   (A tablespoon of blood from the rectum is not serious, especially if   hemorrhoids are present.)  6. Vomiting.  7. Weakness or dizziness.  8. Because air was used during the procedure, expelling large amounts of air   from your rectum or belching is normal.  9. If a bowel prep was taken, you may not have a bowel movement for 1-3   days.  This is normal.  GO DIRECTLY TO THE NEAREST EMERGENCY ROOM IF YOU HAVE ANY OF THE FOLLOWING:      Difficulty breathing  Chills and/or fever over 101 F   Persistent vomiting and/or vomiting blood   Severe abdominal pain   Severe chest pain   Black, tarry stools   Bleeding- more than one tablespoon   Any other symptom or condition that you may  feel needs urgent attention  Your doctor recommends these additional instructions:  If any biopsies were taken, your doctor s clinic will contact you in 1 to 2   weeks with any results.  Continue your present medications.   Your physician has recommended a repeat upper endoscopy in two weeks for   retreatment.  For questions, problems or results please call your physician - Joe Panchal MD at Work:  [phone].  [facility] [place], EMERGENCY ROOM PHONE NUMBER: [phone]  IF A COMPLICATION OR EMERGENCY SITUATION ARISES AND YOU ARE UNABLE TO REACH   YOUR PHYSICIAN - GO DIRECTLY TO THE EMERGENCY ROOM.  Joe Panchal MD  12/20/2017 12:10:05 AM  This report has been verified and signed electronically.

## 2017-12-20 NOTE — TELEPHONE ENCOUNTER
----- Message from Liliana Zamora sent at 12/20/2017 10:24 AM CST -----  Contact: [facility] @[phone]  They would like a call from the nurse in regards to a referral for the pt,please advise

## 2017-12-20 NOTE — PROGRESS NOTES
[facility]  Critical Care Medicine  Progress Note    Patient Name: Soy Reza  MRN: [id number]  Admission Date: 12/19/2017  Hospital Length of Stay: 1 days  Code Status: DNR  Attending Provider: Los Avila MD  Primary Care Provider: Jean Carlos Swanson MD   Principal Problem: Gastrointestinal hemorrhage with hematemesis    Subjective:     HPI:  Soy Reza is a 67 y.o. male with PMHx cirrhosis and ascites. He [place] [facility] [place] with C/C of abdominal pain. This is a standard presentation similar to multiple prior where he receives a paracentesis.  During this stay he was admitted to hospital medicine for removal of ascitic fluid when he became acutely nauseous and vomited BRB and clots. GI was consulted however management of hyperkalemia was needed before anesthesia could be safely achieved. Work up in ED was notable for an Hgb of 9.5 (down from 10.5 earlier this month, PLT of 217 and a normal INR. The patient has no known history of varices per documentation available at [facility].     In the interim a 2L paracentesis was performed yielding cloud, purulent Recommendation was made for large volume paracentesis which was performed yielding cloudy, purulent appearing fluid. Immediately following procedure the patient was noted to become acutely lethargic and hypotensive prompting critical care consult.         Hospital/ICU Course:  No notes on file    Interval History/Significant Events: NAEON. Pt with comfort care measures in place. Remains intubated.     Review of Systems  Objective:     Vital Signs (Most Recent):  Temp: 96.5 °F (35.8 °C) (12/20/17 0700)  Pulse: 90 (12/20/17 1314)  Resp: 15 (12/20/17 1300)  BP: 112/62 (12/20/17 1300)  SpO2: 100 % (12/20/17 1314) Vital Signs (24h Range):  Temp:  [96.5 °F (35.8 °C)-98.8 °F (37.1 °C)] 96.5 °F (35.8 °C)  Pulse:  [] 90  Resp:  [10-29] 15  SpO2:  [100 %] 100 %  BP: ()/(56-79) 112/62   Weight: 84.3 kg (185 lb 13.6 oz)  Body mass  index is 25.92 kg/m².      Intake/Output Summary (Last 24 hours) at 12/20/17 1505  Last data filed at 12/20/17 1300   Gross per 24 hour   Intake          1758.75 ml   Output              915 ml   Net           843.75 ml       Physical Exam    Vents:  Vent Mode: Spont (12/20/17 1314)  Ventilator Initiated: Yes (12/19/17 1406)  Set Rate: 0 bmp (12/20/17 1314)  Vt Set: 400 mL (12/20/17 1314)  Pressure Support: 5 cmH20 (12/20/17 1314)  PEEP/CPAP: 5 cmH20 (12/20/17 1314)  Oxygen Concentration (%): 40 (12/20/17 1314)  Peak Airway Pressure: 11 cmH2O (12/20/17 1314)  Plateau Pressure: 0 cmH20 (12/20/17 1314)  Total Ve: 9.23 mL (12/20/17 1314)  F/VT Ratio<105 (RSBI): (!) 34.97 (12/20/17 0504)  Lines/Drains/Airways     Drain                 Urethral Catheter 12/19/17 1344 Latex 16 Fr. 1 day          Airway                 Airway - Non-Surgical 12/19/17 1453 Endotracheal Tube 1 day          Peripheral Intravenous Line                 Peripheral IV - Single Lumen 12/19/17 1006 Right Antecubital 1 day         Peripheral IV - Single Lumen 12/19/17 1354 Right Hand 1 day         Peripheral IV - Single Lumen 12/19/17 1948 Left Antecubital less than 1 day              Significant Labs:    CBC/Anemia Profile:    Recent Labs  Lab 12/19/17  0840  12/19/17  1406 12/19/17  1512 12/19/17  1801   WBC 6.87  --  10.84  --  17.99*   HGB 9.5*  --  8.0*  --  9.6*   HCT 29.7*  < > 25.4* 28* 30.8*   *  --  217  --  219   MCV 78*  --  79*  --  81*   RDW 15.9*  --  15.9*  --  16.6*   < > = values in this interval not displayed.     Chemistries:    Recent Labs  Lab 12/19/17  0223 12/19/17  0840 12/19/17  1406 12/19/17  1801   * 128* 129* 126*   K 6.1* 6.5* 7.3* 6.8*    103 105 97   CO2 18* 20* 14* 16*   BUN 86* 90* 90* 85*   CREATININE 2.2* 2.2* 2.4* 2.6*   CALCIUM 8.5* 8.2* 8.0* 7.8*   ALBUMIN 2.2*  --   --   --    PROT 6.4  --   --   --    BILITOT 0.7  --   --   --    ALKPHOS 174*  --   --   --    ALT 43  --   --   --    AST 33   --   --   --    MG 2.5  --   --   --    PHOS 3.9  --   --   --      No new labs.     Significant Imaging:  No new imaging.     Assessment/Plan:     Renal/   Hyponatremia    - Labs discontinued. Plan for hospice.        Hyperkalemia    · Shifted. No EKG changes. Unable to give kayexylate 2/2 NPO status.  · Labs discontinued.   · Pt and family amenable to hospice. Comfort care measures in place.         Endocrine   Type 2 diabetes mellitus    · NPO.   · LDSSI.        GI   * Gastrointestinal hemorrhage with hematemesis    · No known history of varices per chart review.  · However given history of cirrhosis would consider variceal bleed most likely culprit.  · Had EGD with GI varices banded.  · 3 18ga PIV in place. Type and screen.  · Admit Hgb 10.0   · Transfused one unit after initial bout of hematemesis repeat hgb 8.0.  · Octreotide gtt  · Protonix 80mg IVx1 then 40mg BID.   · No signs of bleeding at present.         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 PETH 12/2017 negative.  · Will defer diuretics for ascites pending resolution of acute bleed.  · Unable to tolerate spironolctone 2/2 persistent hyperkalemia.  · Pt and family amenable to hospice. Comfort care measures in place.         Hepatic coma/encephalopathy    · Intubated for airway protection and scope for GI bleed.  · Pt and family amenable to hospice. Comfort care measures in place.   · Plan for extubation and transfer to inpatient hospice care.         Spontaneous bacterial peritonitis    · Purulent appearing ascitic fluid.   · F/u ascitic fluid stuides.   · F/u cultures  · Vanco loaded in ED and Zosyn given.  · Abx therapies discontinued at pt and family will pursue Hospice           Critical Care Daily Checklist:    A: Awake: RASS Goal/Actual Goal:    Actual: Wade Agitation Sedation Scale (RASS): Moderate sedation   B: Spontaneous Breathing Trial Performed?     C: SAT & SBT Coordinated?  Plan for  extubation and transfer to hospice today.                       D: Delirium: CAM-ICU Overall CAM-ICU: Positive   E: Early Mobility Performed? No   F: Feeding Goal:    Status:     Current Diet Order   Procedures    Diet NPO      AS: Analgesia/Sedation Morphine gtt; benzo prn   T: Thromboembolic Prophylaxis SCDs only   H: HOB > 300 Yes   U: Stress Ulcer Prophylaxis (if needed) protonix BID   G: Glucose Control ldssi    B: Bowel Function     I: Indwelling Catheter (Lines & Carcamo) Necessity Carcamo, PIV x3   D: De-escalation of Antimicrobials/Pharmacotherapies n/a    Plan for the day/ETD Plan for extubation and transfer to hospice.     Code Status:  Family/Goals of Care: DNR  Hospice        Critical secondary to Patient has a condition that poses threat to life and bodily function: fulminant GI hemorrhage.      Critical care was time spent personally by me on the following activities: development of treatment plan with patient or surrogate and bedside caregivers, discussions with consultants, evaluation of patient's response to treatment, examination of patient, ordering and performing treatments and interventions, ordering and review of laboratory studies, ordering and review of radiographic studies, pulse oximetry, re-evaluation of patient's condition. This critical care time did not overlap with that of any other provider or involve time for any procedures.     Tania Brito MD  Critical Care Medicine  [facility]

## 2017-12-20 NOTE — ASSESSMENT & PLAN NOTE
· Purulent appearing ascitic fluid.   · F/u ascitic fluid stuides.   · F/u cultures  · Vanco loaded in ED and Zosyn given.  · Abx therapies discontinued at pt and family will pursue Hospice

## 2017-12-20 NOTE — TELEPHONE ENCOUNTER
Spoke with shannon (palliative care). She knows pt is back in hosp. She can see EPIC,  And knows he  has agreed to hospice. She has tried several times to contact him before today and he either lets it ring or hangs up on shannon. Decisions will be made after hosp release, he may go home with hospice which is what he really needs.

## 2017-12-20 NOTE — ASSESSMENT & PLAN NOTE
· Intubated for airway protection and scope for GI bleed.  · Pt and family amenable to hospice. Comfort care measures in place.   · Plan for extubation and transfer to inpatient hospice care.

## 2017-12-20 NOTE — SIGNIFICANT EVENT
Care Plan Update    Extensive goals of care/code status discussion held with the patient's mother (next of kin) and grandchild via telephone (on speakerphone).  I relayed the interval course of events that transpired throughout the day: hematemesis requiring emergent intubation/EGD with multiple EVs that were subsequently banded.  At this time, the patient's mother and grandchild want to focus on keeping the patient comfortable and not escalating care moving forward (i.e invasive procedures with repeat EGD, central lines for vasopressors/hemodyalytsis, or chest compressions/defibrilations).  They are aware that he is not a liver transplant candidate and are familiar with his most recent hospitilization when hospice was discussed.  They would like to opt for home hospice if possible moving forward.  At this time, we will not further escalate care and focus on palliative interventions.      Rip Antunez MD, MSc  Pulmonary/Critical Care Fellow

## 2017-12-20 NOTE — PLAN OF CARE
Attempted to complete assessment. No family at bedside. Pt. Intubated on vent. Attempted to call mother. No answer. Pt. lives with mother.  Information for assessment obtained from medical records. Sw/CM will continue to follow and determine d/c needs once pt. Is medically stable for d/c.     Payor: MEDICARE / Plan: MEDICARE PART A & B / Product Type: Government /      Jean Carlos Swanson MD       [employer] [id number]  [place] - [address] AT [facility] [address]  [address] [place] [address]  Phone: [phone] Fax: [phone]    [facility] [place] - [address] [address]  Phone: [phone] Fax: [phone]    [employer] [id number]  [place] - [address] AT [facility] [address]  [address] [place] [address]  Phone: [phone] Fax: [phone]      Extended Emergency Contact Information  Primary Emergency Contact: [place] Reza  Address: [address] [place]  Home Phone: [phone]  Mobile Phone: [phone]  Relation: Mother        12/20/17 1011   Discharge Assessment   Assessment Type Discharge Planning Assessment   Confirmed/corrected address and phone number on facesheet? Yes   Assessment information obtained from? Medical Record  (no family at bedside)   Expected Length of Stay (days) 5   Communicated expected length of stay with patient/caregiver no   Prior to hospitilization cognitive status: Alert/Oriented   Prior to hospitalization functional status: Needs Assistance   Current cognitive status: Coma/Sedated/Intubated   Current Functional Status: Completely Dependent   Lives With parent(s)  (lives with mother)   Able to Return to Prior Arrangements unable to determine at this time (comments)   Is patient able to care for self after discharge? Yes  (with assistance)   Who are your  caregiver(s) and their phone number(s)? [facility] [phone]   Patient's perception of discharge disposition home or selfcare   Readmission Within The Last 30 Days current reason for admission unrelated to previous admission   Patient currently being followed by outpatient case management? No   Patient currently receives any other outside agency services? No   Equipment Currently Used at Home none   Do you have any problems affording any of your prescribed medications? No   Is the patient taking medications as prescribed? yes   Does the patient have transportation home? Yes   Transportation Available family or friend will provide   Does the patient receive services at the [facility]mad[facility] Clinic? No   Discharge Plan A Home;Home Health   Discharge Plan B Home;Home with family   Patient/Family In Agreement With Plan unable to assess

## 2017-12-20 NOTE — PLAN OF CARE
Problem: Patient Care Overview  Goal: Plan of Care Review  Outcome: Ongoing (interventions implemented as appropriate)  Pt remains on Octreotide gtt per MD orders. Critical care service ordered for comfort care measures for patient as discussed with pt's mother and family. VS and assessment per flow sheet. Plan of care reviewed with family, all questions and concerns addressed, will continue to monitor. Plan for possible hospice today.

## 2017-12-20 NOTE — ASSESSMENT & PLAN NOTE
· Shifted. No EKG changes. Unable to give kayexylate 2/2 NPO status.  · Labs discontinued.   · Pt and family amenable to hospice. Comfort care measures in place.

## 2017-12-20 NOTE — ED NOTES
Pt transported to [facility] via bvm and 02. Pt placed on pb 840 ventilator on arrival. bbs loud and equal. Pt tolerated well with no adverse reactions noted.   DC instructions

## 2017-12-20 NOTE — SUBJECTIVE & OBJECTIVE
Interval History/Significant Events: NAEON. Pt with comfort care measures in place. Remains intubated.     Review of Systems  Objective:     Vital Signs (Most Recent):  Temp: 96.5 °F (35.8 °C) (12/20/17 0700)  Pulse: 90 (12/20/17 1314)  Resp: 15 (12/20/17 1300)  BP: 112/62 (12/20/17 1300)  SpO2: 100 % (12/20/17 1314) Vital Signs (24h Range):  Temp:  [96.5 °F (35.8 °C)-98.8 °F (37.1 °C)] 96.5 °F (35.8 °C)  Pulse:  [] 90  Resp:  [10-29] 15  SpO2:  [100 %] 100 %  BP: ()/(56-79) 112/62   Weight: 84.3 kg (185 lb 13.6 oz)  Body mass index is 25.92 kg/m².      Intake/Output Summary (Last 24 hours) at 12/20/17 1505  Last data filed at 12/20/17 1300   Gross per 24 hour   Intake          1758.75 ml   Output              915 ml   Net           843.75 ml       Physical Exam    Vents:  Vent Mode: Spont (12/20/17 1314)  Ventilator Initiated: Yes (12/19/17 1406)  Set Rate: 0 bmp (12/20/17 1314)  Vt Set: 400 mL (12/20/17 1314)  Pressure Support: 5 cmH20 (12/20/17 1314)  PEEP/CPAP: 5 cmH20 (12/20/17 1314)  Oxygen Concentration (%): 40 (12/20/17 1314)  Peak Airway Pressure: 11 cmH2O (12/20/17 1314)  Plateau Pressure: 0 cmH20 (12/20/17 1314)  Total Ve: 9.23 mL (12/20/17 1314)  F/VT Ratio<105 (RSBI): (!) 34.97 (12/20/17 0504)  Lines/Drains/Airways     Drain                 Urethral Catheter 12/19/17 1344 Latex 16 Fr. 1 day          Airway                 Airway - Non-Surgical 12/19/17 1453 Endotracheal Tube 1 day          Peripheral Intravenous Line                 Peripheral IV - Single Lumen 12/19/17 1006 Right Antecubital 1 day         Peripheral IV - Single Lumen 12/19/17 1354 Right Hand 1 day         Peripheral IV - Single Lumen 12/19/17 1948 Left Antecubital less than 1 day              Significant Labs:    CBC/Anemia Profile:    Recent Labs  Lab 12/19/17  0840  12/19/17  1406 12/19/17  1512 12/19/17  1801   WBC 6.87  --  10.84  --  17.99*   HGB 9.5*  --  8.0*  --  9.6*   HCT 29.7*  < > 25.4* 28* 30.8*   *  --   217  --  219   MCV 78*  --  79*  --  81*   RDW 15.9*  --  15.9*  --  16.6*   < > = values in this interval not displayed.     Chemistries:    Recent Labs  Lab 12/19/17  0223 12/19/17  0840 12/19/17  1406 12/19/17  1801   * 128* 129* 126*   K 6.1* 6.5* 7.3* 6.8*    103 105 97   CO2 18* 20* 14* 16*   BUN 86* 90* 90* 85*   CREATININE 2.2* 2.2* 2.4* 2.6*   CALCIUM 8.5* 8.2* 8.0* 7.8*   ALBUMIN 2.2*  --   --   --    PROT 6.4  --   --   --    BILITOT 0.7  --   --   --    ALKPHOS 174*  --   --   --    ALT 43  --   --   --    AST 33  --   --   --    MG 2.5  --   --   --    PHOS 3.9  --   --   --      No new labs.     Significant Imaging:  No new imaging.

## 2017-12-20 NOTE — ASSESSMENT & PLAN NOTE
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 PETH 12/2017 negative.  · Will defer diuretics for ascites pending resolution of acute bleed.  · Unable to tolerate spironolctone 2/2 persistent hyperkalemia.  · Pt and family amenable to hospice. Comfort care measures in place.

## 2017-12-20 NOTE — PLAN OF CARE
Problem: Patient Care Overview  Goal: Plan of Care Review  Outcome: Ongoing (interventions implemented as appropriate)  Pt extubated to NC and transitioned to comfort care measures today. Morphine gtt initiated. Plan to D/C to inpatient hospice tomorrow. All concerns addressed w/ family. Will continue to monitor.

## 2017-12-21 ENCOUNTER — OUTPATIENT CASE MANAGEMENT (OUTPATIENT)
Dept: ADMINISTRATIVE | Facility: OTHER | Age: 67
End: 2017-12-21

## 2017-12-21 NOTE — SIGNIFICANT EVENT
Death Note    Called to bedside by patient's nurse after patient's telemetry showed asystole. No Family at bedside.     Patient is not responding to verbal or tactile stimuli. Patient does not have pupillary reflex. Pupils are fixed and dilated. No heart or breath sounds on auscultation. No respirations. No palpable pulses.     Time of death: 21:51  12/2017     Cause of Death:  Decompensated hepatic cirrhosis with GIB and SBP, shock (septic and hemorrhagic)     Called Ijeoma (niece) at [phone]. Updated her on the events. She will reach out to Ms Herrera (elderly mother) tomorrow AM to update her personally.       Geneva Mitchell MD  Internal Medicine PGY-3  Pager [phone]

## 2017-12-21 NOTE — DISCHARGE SUMMARY
DISCHARGE SUMMARY  Critical Care    Team: [facility] CRITICAL CARE MEDICINE    Patient Name: Soy Reza  YOB: 1950    Admit Date: 2017    Discharge Date: 2017    Discharge Attending Physician: Los Avila MD     Chief Complaint: GIB, decompensated cirrhosis / ESLD, shock- hemorrhagic and septic, HE    Princilpal Diagnoses:  Active Hospital Problems    Diagnosis  POA    *Gastrointestinal hemorrhage with hematemesis [K92.0]  Yes    Hyperkalemia [E87.5]  Yes    Hyponatremia [E87.1]  Yes    Decompensated hepatic cirrhosis [K72.90]  Yes     Chronic    Hepatic coma/encephalopathy [K72.91]  Yes    Spontaneous bacterial peritonitis [K65.2]  Yes     Chronic    Type 2 diabetes mellitus [E11.9]  Yes    Generalized abdominal pain [R10.84]  Yes      Resolved Hospital Problems    Diagnosis Date Resolved POA   No resolved problems to display.       Discharged Condition: patient     HOSPITAL COURSE:      Initial Presentation:    67 y.o. male with PMHx ESLD secondary to HCV and EtOH, with recurrent ascites,and DMII, HTN, CKD III  Presents with abdominal pain and distension as well as significant hepatic encephalopathy.  He was recently hospitalized from 12/10 to  for symptomatic hyperkalemia. On last visit, patient did undergo therapeutic paracentesis and there were discussions about possible home hospice vs home with home health. While patient was in ED ton ,  he was witnessed to have bright red blood emesis by nursing staff at around 7:30am. He reportedly came in encephalopathic. He had another episode of hematemesis around 8:30am. He was tachycardiac but blood pressure was ok. He arrived with Hg of 10 which downtrended to 9.5. He was started on octreotide and protonix.     GI was consulted . At that time, patient had hyperkalemia (K of 6.5) and abdominal distention for which he was going to undergo therapeutic paracentesis. Per anesthesia, these issues needed to be  sorted out prior to scope. Patient's potassium was shifted with Kayexalate. 2L paracentesis was performed yielding cloud, purulent Recommendation was made for large volume paracentesis which was performed yielding cloudy, purulent appearing fluid. Immediately following procedure the patient was noted to become acutely lethargic and hypotensive prompting critical care consult. He continued to vomit blood and was subsequently intubated, sedated, and started on pressors.        Course of Principle Problem for Admission:    Gastrointestinal hemorrhage with hematemesis  Admit Hgb 10.0. Transfused one unit after initial bout of hematemesis repeat hgb 8.0. Started on Octreotide gtt and received Protonix 80mg IVx1 and started on 40mg V BID. EGD on  showed Very large esophageal varices that were Incompletely eradicated/ Banded, as well as  Clotted blood in the entire stomach, Extensive lavage and removal performed. Transiently required levophed for possible hemorrhagic and/or septic shock. Patient was initiated on comfort measures, extubated and started on morphine gtt. Unfortunately, patient  on 17 21:51.     Shock  Transiently required levophed for possible hemorrhagic and/or septic shock.    Decompensated hepatic cirrhosis  ESLD 2/2 HCV and ETOH cirrhosis. PETH 2017 negative. Pt and family amenable to hospice, he was supposed to have been hospice as outpatient. Comfort care measures initiated in the ICU.     Acute respiratory failure/ Hepatic coma/encephalopathy  intubated for airway protection in setting of HE as well as the EGD. Had a cuff leak and tube was exchanged. Extubated when comfort measures were initiated.     Spontaneous bacterial peritonitis  Received zosyn. Paracentesis 17, 2500 cc drained       Ref. Range 2017 12:39   Fluid Color Unknown beige   Fluid Appearance Unknown Turbid   Body Fluid Type Unknown Ascites   WBC, Body Fluid Latest Units: /cu mm 166   Segs, Fluid Latest  Units: % 10   Lymphs, Fluid Latest Units: % 72   Monocytes/Macrophages, Fluid Latest Units: % 18     Acute on chronic kidney injury  Baseline Cr of 2. Cr increased to 2.6, likely 2/2 shock/ prerenal / ATN.       CONSULTS: GI    Labs:    Chemistries:     Recent Labs  Lab 12/19/17 0223 12/19/17  0840 12/19/17  1406 12/19/17  1801   * 128* 129* 126*   K 6.1* 6.5* 7.3* 6.8*    103 105 97   CO2 18* 20* 14* 16*   BUN 86* 90* 90* 85*   CREATININE 2.2* 2.2* 2.4* 2.6*   CALCIUM 8.5* 8.2* 8.0* 7.8*   PROT 6.4  --   --   --    BILITOT 0.7  --   --   --    ALKPHOS 174*  --   --   --    ALT 43  --   --   --    AST 33  --   --   --    MG 2.5  --   --   --    PHOS 3.9  --   --   --         WBC:     Recent Labs  Lab 12/19/17 0223 12/19/17  0840 12/19/17  1406 12/19/17  1801   WBC 6.92 6.87 10.84 17.99*     Bands:     CBC/Anemia Labs: Coags:      Recent Labs  Lab 12/19/17  0840  12/19/17  1406 12/19/17  1512 12/19/17  1801   WBC 6.87  --  10.84  --  17.99*   HGB 9.5*  --  8.0*  --  9.6*   HCT 29.7*  < > 25.4* 28* 30.8*   *  --  217  --  219   MCV 78*  --  79*  --  81*   RDW 15.9*  --  15.9*  --  16.6*   < > = values in this interval not displayed.   Recent Labs  Lab 12/19/17 0223   INR 0.9          Diagnostic Results:    Microbiology Results (last 7 days)     Procedure Component Value Units Date/Time    (rule out SBP) Aerobic culture [[id number]] Collected:  12/19/17 1238    Order Status:  Completed Specimen:  Body Fluid from Ascites Updated:  12/20/17 0850     Aerobic Bacterial Culture No growth    Narrative:       To rule out SBP order labs: Aerobic culture [PDM385],  Culture, Anaerobic [RTW001], Gram stain [SCH037], Albumin  [BYQ559], Protein [XWE223], LDH [RCK660], WBC \T\ Dff  [FIC7452].    (rule out SBP) Culture, Anaerobic [[id number]] Collected:  12/19/17 1238    Order Status:  Completed Specimen:  Body Fluid from Ascites Updated:  12/20/17 0754     Anaerobic Culture Culture in progress    Narrative:        To rule out SBP order labs: Aerobic culture [ZFT007],  Culture, Anaerobic [UEC588], Gram stain [IOQ341], Albumin  [UYN278], Protein [III767], LDH [ELK252], WBC \T\ Dff  [NTR5265].    (rule out SBP) Gram stain [[id number]] Collected:  12/19/17 1238    Order Status:  Completed Specimen:  Body Fluid from Ascites Updated:  12/19/17 2010     Gram Stain Result No WBC's      No organisms seen    Narrative:       To rule out SBP order labs: Aerobic culture [IHI808],  Culture, Anaerobic [DFA060], Gram stain [WRS948], Albumin  [BTK415], Protein [IPM848], LDH [NZN710], WBC \T\ Dff  [ZTY2979].          Special Treatments/Procedures:     Paracentesis 12/19/17  2500 cc drained       Ref. Range 12/19/2017 12:39   Fluid Color Unknown beige   Fluid Appearance Unknown Turbid   Body Fluid Type Unknown Ascites   WBC, Body Fluid Latest Units: /cu mm 166   Segs, Fluid Latest Units: % 10   Lymphs, Fluid Latest Units: % 72   Monocytes/Macrophages, Fluid Latest Units: % 18       EGD  12/20/17  Impression:   - Very large esophageal varices. Incompletely                         eradicated. Banded.                        - Clotted blood in the entire stomach. Extensive                         lavage and removal performed.                        - Type 1 gastroesophageal varices (GOV1, esophageal                         varices which extend along the lesser curvature),                         without bleeding. Completely eradicated. Banded.                        - Multiple gastric polyps.      Signing Physician:  Geneva Mitchell MD

## 2017-12-22 LAB — BACTERIA SPEC AEROBE CULT: NO GROWTH

## 2017-12-23 LAB
BLD PROD TYP BPU: NORMAL
BLD PROD TYP BPU: NORMAL
BLOOD UNIT EXPIRATION DATE: NORMAL
BLOOD UNIT EXPIRATION DATE: NORMAL
BLOOD UNIT TYPE CODE: 8400
BLOOD UNIT TYPE CODE: 8400
BLOOD UNIT TYPE: NORMAL
BLOOD UNIT TYPE: NORMAL
CODING SYSTEM: NORMAL
CODING SYSTEM: NORMAL
DISPENSE STATUS: NORMAL
DISPENSE STATUS: NORMAL
TRANS ERYTHROCYTES VOL PATIENT: NORMAL ML
TRANS ERYTHROCYTES VOL PATIENT: NORMAL ML

## 2017-12-26 LAB — BACTERIA SPEC ANAEROBE CULT: NORMAL

## 2018-02-09 NOTE — PROGRESS NOTES
Patient discharged via [employer] wheelchair transport in stable condition. Denies any pain at present. Denies any questions/concerns regarding discharge.   Spoke to Zuri Rjoas at [facility]  Per Dr Ashwini Nazario request Appt to be cancelled today  Will notify son,Addison

## 2019-11-25 NOTE — ED NOTES
November 25, 2019     Patient: Varghese Monaco   YOB: 2008   Date of Visit: 11/25/2019       To Whom it May Concern:    Varghese Monaco is under my professional care  She was seen in my office on 11/25/2019  She may return to school on 11/26/19  If you have any questions or concerns, please don't hesitate to call           Sincerely,          Solange Escobedo MD        CC: No Recipients Portable chest x-ray completed at bedside, patient tolerated well, will continue to monitor.

## 2020-02-12 NOTE — DISCHARGE SUMMARY
"[facility] Hospital Medicine  Discharge Summary      Patient Name: Soy Reza  MRN: [id number]  Admission Date: 11/13/2017  Hospital Length of Stay: 2 days  Discharge Date and Time:  11/15/2017 4:38 PM  Attending Physician: Timbo Garduno MD   Discharging Provider: Chris Anaya MD  Primary Care Provider: Jean Carlos Swanson MD  Hospital Medicine Team: Networked reference to record PCT  Chris Anaya MD    HPI:   Soy Reza is a 66 y.o. male with history of DM2, HCV, EtOH cirrhosis, HTN who presents to [facility] ED with chief complaint of generalized abdominal pain with distention and shortness of breath worsening for 4 days.  His abdominal pain is rated 9/10. The patient was most recently admitted 11/4-11/9 for treatment of SBP with Ceftriaxone; DC'd on Bactrim for PPx. However, states that he has not taken his Bactrim since discharge due to difficulty picking up his prescription.  He reports chest pain, which is consistent with pain he was experiencing while in admitted to the hospital recently.  He is taking Lactulose as prescribed.  Patient reports shortness of breath worsening last night. He had  "minimal chest pain" in the substernal area yesterday associated with the shortness of breath, but denies having any chest pain today.  He has nausea, but denies LE swelling, fever, chills,   vomiting, confusion, or additional complaints at this time.      On 11/4, he was admitted after presenting with worsening abdominal pain, swelling and nausea vomiting. History was taken largely from chart review and telephone conversation with his mother, as he was acutely encephalopathic at time of interview. Pt recently underwent paracentesis (11/2) which revealed SBP. Pt's mother endorses poor compliance with medication on the part of her son. During his admission, he was treated for SBP with Ceftriaxone, received a therapeutic paracentesis (~5L removed), and dc'd home with Bactrim for SBP " PPx.    Na 131, K 5.7 in ED 11/13         * No surgery found *      Hospital Course:   Pt was admitted for hyperkalemia in the setting of abdominal ascites 2/2 HCV/EtOH cirrhosis. Performed diagnostic paracentesis on 11/14 - labs and cultures pending. Performed therapeutic paracentesis on 11/15 - 1L removed. Started diuresis with Lasix 40 PO qd. Starting on glimepiride 1mq qd and januvia 100mg qd for glucose control. Pt will be discharged home with home health.    11/14 Started diuresis with lasix. Following K. Plan for diagnostic paracentesis.  11/15 Awaiting Dx paracentesis results. Pt will go home with .     Consults:   Consults         Status Ordering Provider     Inpatient consult to Palliative Care  Once     Provider:  (Not yet assigned)    Completed NERISSA STOCK     Inpatient consult to Psychiatry  Once     Provider:  (Not yet assigned)    Completed NERISSA STOCK          No new Assessment & Plan notes have been filed under this hospital service since the last note was generated.  Service: Hospital Medicine    Final Active Diagnoses:    Diagnosis Date Noted POA    PRINCIPAL PROBLEM:  Hyperkalemia [E87.5] 11/05/2017 Yes    Delirium [R41.0] 11/15/2017 Unknown    Palliative care encounter [Z51.5] 11/14/2017 Not Applicable    Alcoholic cirrhosis of liver with ascites [K70.31] 10/15/2017 Yes    Type 2 diabetes mellitus with stage 2 chronic kidney disease and hypertension [E11.22, I12.9, N18.2] 10/15/2017 Yes      Problems Resolved During this Admission:    Diagnosis Date Noted Date Resolved POA       Discharged Condition: good    Disposition: Pt has been medically optimized and is ok for discharge home with home health.     Follow Up:  Follow-up Information     Schedule an appointment as soon as possible for a visit with [facility] - Hepatology.    Specialty:  Hepatology  Contact information:  [address] [facility]  [facility] [address] [phone]  Additional information:  1st Floor -  [facility], located by [facility]           Jean Carlos Swanson MD On 11/24/2017.    Specialty:  Family Medicine  Why:  Friday 11/24 @ 9am  Contact information:  [address]  [address]  [place] [address]  [phone]             [facility] [place].    Specialty:  Home Health Services  Why:  Home Health  Contact information:  [address] [address]  [phone]                 Patient Instructions:     Ambulatory Referral to Hepatology   Referral Priority: Routine Referral Type: Consultation   Referral Reason: Specialty Services Required    Referred to Provider: RAMIRO VELAZQUEZ    Number of Visits Requested: 1      Ambulatory Referral to Hepatology   Referral Priority: Routine Referral Type: Consultation   Referral Reason: Specialty Services Required    Number of Visits Requested: 1      Diet general   Order Specific Question Answer Comments   Additional restrictions: Cardiac (Low Na/Chol)      Activity as tolerated     Call MD for:  temperature >100.4     Call MD for:  difficulty breathing or increased cough     Call MD for:  severe uncontrolled pain     Call MD for:  redness, tenderness, or signs of infection (pain, swelling, redness, odor or green/yellow discharge around incision site)     Call MD for:  persistent nausea and vomiting or diarrhea     Call MD for:  increased confusion or weakness     Call MD for:  persistent dizziness, light-headedness, or visual disturbances         Significant Diagnostic Studies:   CBC:   Recent Labs  Lab 11/09/17  0343 11/13/17  0805   WBC 4.38 5.71   RBC 3.35* 4.27*   HGB 9.1* 11.6*   HCT 27.4* 34.5*    52*   MCV 82 81*   MCH 27.2 27.2   MCHC 33.2 33.6     BMP:   Recent Labs  Lab 11/14/17  0524 11/14/17  1501 11/15/17  0508 11/15/17  1241   *  --  213* 176*   *  --  132* 134*   K 5.6* 5.4* 5.6* 5.2*     --  104 104   CO2 18*  --  21* 22*   BUN 63*  --  57* 57*   CREATININE  1.9*  --  1.9* 1.8*   CALCIUM 8.6*  --  8.4* 8.3*   MG 2.3  --  2.0  --      Coagulation:   Recent Labs  Lab 11/13/17  0805   INR 0.9     Microbiology Results (last 7 days)     Procedure Component Value Units Date/Time    Aerobic culture [[id number]] Collected:  11/14/17 1615    Order Status:  Completed Specimen:  Body Fluid from Ascites Updated:  11/15/17 0709     Aerobic Bacterial Culture No growth    Culture, Body Fluid - Bactec [[id number]] Collected:  11/14/17 1615    Order Status:  Completed Specimen:  Ascites Updated:  11/15/17 0115     Body Fluid Culture, Sterile No Growth to date    Culture, Anaerobe [[id number]] Collected:  11/14/17 1615    Order Status:  Canceled Specimen:  Ascites from Ascites Updated:  11/14/17 1718            Pending Diagnostic Studies:     Procedure Component Value Units Date/Time    Cytology Specimen-Medical Cytology (Fluid/Wash/Brush) [[id number]] Collected:  11/14/17 1615    Order Status:  Sent Lab Status:  In process Updated:  11/15/17 1007    Specimen:  Ascites          Medications:  Reconciled Home Medications:   Current Discharge Medication List      START taking these medications    Details   acetaminophen (TYLENOL) 325 MG tablet Take 2 tablets (650 mg total) by mouth every 8 (eight) hours as needed.  Refills: 0      ciprofloxacin HCl (CIPRO) 500 MG tablet Take 1 tablet (500 mg total) by mouth once daily.  Qty: 30 tablet, Refills: 3      furosemide (LASIX) 40 MG tablet Take 1 tablet (40 mg total) by mouth once daily.  Qty: 7 tablet, Refills: 0      glimepiride (AMARYL) 1 MG tablet Take 1 tablet (1 mg total) by mouth before breakfast.  Qty: 90 tablet, Refills: 3      SITagliptin (JANUVIA) 25 MG Tab Take 4 tablets (100 mg total) by mouth once daily.  Qty: 120 tablet, Refills: 3         CONTINUE these medications which have NOT CHANGED    Details   lactulose (CHRONULAC) 10 gram/15 mL solution Take 30 mLs (20 g total) by mouth 3 (three) times daily.  Qty: 236 mL, Refills: 0       aspirin 81 MG Chew Take 81 mg by mouth every other day.          STOP taking these medications       metformin (GLUCOPHAGE-XR) 500 MG 24 hr tablet Comments:   Reason for Stopping:         sulfamethoxazole-trimethoprim 400-80mg (BACTRIM,SEPTRA) 400-80 mg per tablet Comments:   Reason for Stopping:               Indwelling Lines/Drains at time of discharge:   Lines/Drains/Airways          No matching active lines, drains, or airways          Time spent on the discharge of patient: 30 minutes  Patient was seen and examined on the date of discharge and determined to be suitable for discharge.         Chris Anaya MD  Department of Hospital Medicine  [facility]   No

## 2021-08-26 NOTE — HPI
Mr Libia is a 67 yo M w PMH significant for hepatitis C, EtOH cirrhosis and T2DM presents with two day history of worsening abdominal pain, swelling and nausea vomiting. History was taken largely from chart review and telephone conversation w pt's mother as he was acutely encephalopathic at time of interview. Pt recently underwent paracentesis (11/2) which revealed SBP. Hepatology attempted to telephone the pt for direct admission from clinic (11/3), however, (per mother) pt refused admission as symptoms were not severe at that time. Pt's mother states that he asked her to call an ambulance this morning d/t worsening confusion and abdominal pain. Pt's mother endorses poor compliance with medication on the part of her son.     Per ED staff, pt was alert and oriented to person and place and had been complaining of some abdominal pain localized to the epigastric region. He was then given 25 mg IV phenergan, 4mg injection of morphine and 1mg injection of hydromorphone upon arrival. Pt was nonverbal at time of interview.  
No
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
## 2021-10-27 NOTE — DISCHARGE INSTRUCTIONS
For scheduling: Call Esther at [phone]    For questions or concerns call: [facility] 8 AM- 5PM [phone]. Radiology resident on call [phone].    For immediate concerns that are not emergent, you may call our radiology clinic at: [phone]     Retired  Lives with 2 dogs   Sewing, reading, beading.     No  No  No

## 2021-11-04 NOTE — TELEPHONE ENCOUNTER
Immediate Post- Operative Note        Findings: ARF.      Procedure(s): CT guided renal biopsy.       Estimated Blood Loss: Less than 5 ml        Complications: None            11/4/2021     1326 PM     Juan Sue M.D.         appt made, pt is in hospital now. I will f/u. Closing encounter

## 2021-12-10 NOTE — PLAN OF CARE
Problem: Diabetes, Type 2 (Adult)  Goal: Signs and Symptoms of Listed Potential Problems Will be Absent, Minimized or Managed (Diabetes, Type 2)  Signs and symptoms of listed potential problems will be absent, minimized or managed by discharge/transition of care (reference Diabetes, Type 2 (Adult) CPG).   Glucose this am = 186 mg/dl; no prn correctional dose required. Remains on regular diet; tolerating well.       Crescentic Advancement Flap Text: The defect edges were debeveled with a #15 scalpel blade.  Given the location of the defect and the proximity to free margins a crescentic advancement flap was deemed most appropriate.  Using a sterile surgical marker, the appropriate advancement flap was drawn incorporating the defect and placing the expected incisions within the relaxed skin tension lines where possible.    The area thus outlined was incised deep to adipose tissue with a #15 scalpel blade.  The skin margins were undermined to an appropriate distance in all directions utilizing iris scissors.

## 2022-01-11 NOTE — ED NOTES
Attempted to place nasogastric tube, but patient gagging and would not keep his head down. Tube coiled and came out of patient's mouth. Myself and YANIRA Rosado, attempted to place tube. Medicine team paged.   Tried calling rosalba back. Prompts sts \"you have reached someone who does not have a vm box set up yet.  Please try again later\"

## 2022-03-08 NOTE — ASSESSMENT & PLAN NOTE
- Ammonia at 96  - Per pt's mother, he is non-adherent to regimen of lactulose and rifaximin regimen at home  - multiple BMs, AAOx3, cognition much improved  - lactulose 30g TID   Yes

## 2023-06-11 NOTE — PROCEDURES
Pharmacist Admission Medication History    Admission medication history is complete. The information provided in this note is only as accurate as the sources available at the time of the update.    Medication reconciliation/reorder completed by provider prior to medication history? Yes    Information Source(s): Patient and CareEverywhere/SureScripts via phone    Changes made to PTA medication list:    Added: -One-a-day multivitamin daily  -Ferrous sulfate 325 mg daily     Deleted: None    Changed: None     Allergies reviewed with patient and updates made in EHR: yes    Medication History Completed By: Claude Hector PharmD 6/11/2023 11:17 AM    Prior to Admission medications    Medication Sig Last Dose Taking? Auth Provider Long Term End Date   ferrous sulfate (FEROSUL) 325 (65 Fe) MG tablet Take 325 mg by mouth daily (with breakfast) 6/10/2023 Yes Unknown, Entered By History     Multiple Vitamins-Minerals (ONE-A-DAY 50 PLUS PO) Take 1 tablet by mouth daily 6/10/2023 Yes Unknown, Entered By History          Radiology Post-Procedure Note    Pre Op Diagnosis: Ascites  Post Op Diagnosis: Same    Procedure: Ultrasound Guided Paracentesis    Procedure performed by: Nakia ALCANTAR, Ama     Written Informed Consent Obtained: Yes  Specimen Removed: YES cloudy peach   Estimated Blood Loss: Minimal    Findings:   Successful paracentesis.  Albumin administered PRN per protocol.    Patient tolerated procedure well.    Ama Yee, APRN, FNP  Interventional Radiology  [phone] [employer]

## 2023-06-29 NOTE — NURSING
Pt to IR via stretcher. Pt accompanied by transport associate. No distress noted.    Mercedes Flap Text: The defect edges were debeveled with a #15 scalpel blade.  Given the location of the defect, shape of the defect and the proximity to free margins a Mercedes flap was deemed most appropriate.  Using a sterile surgical marker, an appropriate advancement flap was drawn incorporating the defect and placing the expected incisions within the relaxed skin tension lines where possible. The area thus outlined was incised deep to adipose tissue with a #15 scalpel blade.  The skin margins were undermined to an appropriate distance in all directions utilizing iris scissors.

## 2023-11-14 NOTE — SUBJECTIVE & OBJECTIVE
Interval History: Patient with no events overnight, no new complaints.  Data Review: Results recorded below were reviewed 10/18/2017.    Review of Systems   Constitutional: Negative for fever.   Respiratory: Negative for shortness of breath.    Gastrointestinal: Negative for abdominal pain.     Objective:     Vital Signs (Most Recent):  Temp: 97.7 °F (36.5 °C) (10/18/17 1936)  Pulse: 73 (10/18/17 1936)  Resp: 16 (10/18/17 1600)  BP: 130/76 (10/18/17 1936)  SpO2: 95 % (10/18/17 1936) Vital Signs (24h Range):  Temp:  [97.5 °F (36.4 °C)-98.6 °F (37 °C)] 97.7 °F (36.5 °C)  Pulse:  [73-79] 73  Resp:  [15-16] 16  SpO2:  [95 %-99 %] 95 %  BP: (112-132)/(65-83) 130/76     Weight: 73.2 kg (161 lb 6 oz)  Body mass index is 22.51 kg/m².    Intake/Output Summary (Last 24 hours) at 10/18/17 2057  Last data filed at 10/18/17 1800   Gross per 24 hour   Intake             1100 ml   Output             1575 ml   Net             -475 ml      Physical Exam   Constitutional: He appears well-developed.   HENT:   Head: Normocephalic.   Mouth/Throat: Mucous membranes are not pale and not cyanotic.   Eyes: Conjunctivae and lids are normal. Pupils are equal.   Neck: Neck supple.   Cardiovascular: S1 normal and S2 normal.    Pulmonary/Chest: Effort normal. He has decreased breath sounds in the right lower field.   Abdominal: Soft. Bowel sounds are normal. He exhibits ascites. There is no tenderness.   Musculoskeletal: He exhibits no edema.   Neurological: He is alert. He is not disoriented.   Skin: Skin is warm and dry. He is not diaphoretic. No cyanosis. Nails show no clubbing.   Psychiatric: He has a normal mood and affect.       Significant Labs:   A1C:     Recent Labs  Lab 08/04/17  0523 10/02/17  0908   HGBA1C 6.3* 6.4*     CBC:     Recent Labs  Lab 10/17/17  0451 10/18/17  0351   WBC 3.45* 4.80   HGB 9.3* 10.0*   HCT 27.1* 29.5*   * 136*     CMP:     Recent Labs  Lab 10/17/17  0451 10/18/17  0351   * 134*   K 4.9 4.6   CL  104 102   CO2 22* 24   * 184*   BUN 47* 48*   CREATININE 1.2 1.5*   CALCIUM 8.4* 8.3*   PROT 6.0 5.8*   ALBUMIN 2.8* 2.7*   BILITOT 1.1* 1.1*   ALKPHOS 107 107   AST 21 24   ALT 19 18   ANIONGAP 9 8   EGFRNONAA >60.0 48.2*     Coagulation:     Recent Labs  Lab 10/18/17  0351   INR 0.9     Magnesium:     Recent Labs  Lab 10/18/17  0351   MG 2.2     POCT Glucose:     Recent Labs  Lab 10/18/17  0850 10/18/17  1210 10/18/17  1725   POCTGLUCOSE 209* 132* 222*      General

## 2025-07-08 NOTE — PROGRESS NOTES
[facility]  Hepatology  Progress Note    Patient Name: Soy Reza  MRN: [id number]  Admission Date: 10/8/2017  Hospital Length of Stay: 1 days  Attending Provider: Lisandra Sutton MD   Primary Care Physician: Jean Carlos Swanson MD  Principal Problem:Generalized abdominal pain    Subjective:     Transplant status: No    HPI: 66 year old male with a history of HepC/Alcoholic cirrhosis complicated with refractory ascites as well as hepatic hydrothorax admitted to the hospital for worsening dyspnea and ascites. Hepatology consulted for further management.    Patient was here on 10/2 due to recurrent ascites. He had a paracentesis and a was sent home. He now returns with worsening dyspnea as well as ascites. Chest Xray shows a worsening right pleural effusion.     He lives with his mother and states that he stopped drinking alcohol 1 month ago. He does not follow a diet low in sodium nor watches his fluid intake.    Interval History:   No acute events overnight. Patient met with Psychiatry this morning.    Current Facility-Administered Medications   Medication    cefTRIAXone (ROCEPHIN) 1 g in dextrose 5 % 50 mL IVPB    dextrose 50% injection 12.5 g    dextrose 50% injection 25 g    glucagon (human recombinant) injection 1 mg    glucose chewable tablet 16 g    glucose chewable tablet 24 g    hydrocodone-acetaminophen 5-325mg per tablet 1 tablet    insulin aspart pen 0-5 Units    lactulose 20 gram/30 mL solution Soln 15 g    ondansetron injection 4 mg       Objective:     Vital Signs (Most Recent):  Temp: 97.4 °F (36.3 °C) (10/10/17 1101)  Pulse: 79 (10/10/17 1101)  Resp: 16 (10/10/17 1101)  BP: 137/86 (10/10/17 1101)  SpO2: 97 % (10/10/17 1101) Vital Signs (24h Range):  Temp:  [96.9 °F (36.1 °C)-98 °F (36.7 °C)] 97.4 °F (36.3 °C)  Pulse:  [73-79] 79  Resp:  [16-19] 16  SpO2:  [94 %-97 %] 97 %  BP: (131-166)/(76-89) 137/86     Weight: 84.1 kg (185 lb 6.4 oz) (10/09/17 0039)  Body mass index is  26.6 kg/m².    Physical Exam   Constitutional: No distress.   HENT:   Head: Normocephalic.   Eyes: Pupils are equal, round, and reactive to light.   Neck: Normal range of motion.   Cardiovascular: Normal rate and regular rhythm.    Pulmonary/Chest:   Decrease breath sounds on right lung base   Abdominal: Bowel sounds are normal. He exhibits distension. He exhibits no mass. There is no tenderness. There is no rebound and no guarding. No hernia.   Musculoskeletal: Normal range of motion. He exhibits no edema.   Skin: He is not diaphoretic.       MELD-Na score: 9 at 10/10/2017  5:31 AM  MELD score: 9 at 10/10/2017  5:31 AM  Calculated from:  Serum Creatinine: 1.0 mg/dL at 10/10/2017  5:31 AM  Serum Sodium: 139 mmol/L (Rounded to 137) at 10/10/2017  5:31 AM  Total Bilirubin: 2.1 mg/dL at 10/10/2017  5:31 AM  INR(ratio): 1.0 at 10/10/2017  5:31 AM  Age: 66 years    Significant Labs:  CBC:   Recent Labs  Lab 10/10/17  0531   WBC 4.33   RBC 3.68*   HGB 9.9*   HCT 28.3*   *     CMP:   Recent Labs  Lab 10/10/17  0531   *   CALCIUM 8.2*   ALBUMIN 2.9*   PROT 5.8*      K 4.3   CO2 24      BUN 37*   CREATININE 1.0   ALKPHOS 73   ALT 20   AST 26   BILITOT 2.1*     Coagulation:   Recent Labs  Lab 10/10/17  0531   INR 1.0       Significant Imaging:  X-Ray: Reviewed    Assessment/Plan:     Decompensated hepatic cirrhosis    66 year old male with a history of HepC/Alcoholic cirrhosis complicated with refractory ascites as well as hepatic hydrothorax admitted to the hospital for worsening dyspnea and ascites. Hepatology consulted for further management.    Patient has a long standing history of refractory ascites with frequent paracentesis.He was just admitted for ascites last week. At this point want to focus on a low sodium/fluid diet as well as initiating low dose diuretics.    Recommendations:  -Low sodium diet with fluid restriction to possibly help with re-accumulation of ascites  -Start low dose  diuretics (Lasix 20 mg PO Qdaily and Spironolactone 50 mg PO Qdaily)  -Therapeutic/diagnostic thoracentesis and paracentesis  -Transplant psychosocial eval              Thank you for your consult. I will follow-up with patient. Please contact us if you have any additional questions.    Bill Zamora M.D.  Gastroenterology Fellow, PGY-IV  Pager: [phone]  [facility]           POC Tests:   Recent Labs     07/08/25  0158   POCGLU 94       Coags:   Lab Results   Component Value Date/Time    PROTIME 15.1 09/12/2023 04:49 AM    INR 1.19 09/12/2023 04:49 AM       HCG (If Applicable):   Lab Results   Component Value Date    PREGTESTUR Negative 09/11/2023    PREGSERUM Negative 07/07/2025        ABGs: No results found for: \"PHART\", \"PO2ART\", \"JLN2EIR\", \"AUQ3ABZ\", \"BEART\", \"M3OORITB\"     Type & Screen (If Applicable):  No results found for: \"ABORH\", \"LABANTI\"    Drug/Infectious Status (If Applicable):  No results found for: \"HIV\", \"HEPCAB\"    COVID-19 Screening (If Applicable):   Lab Results   Component Value Date/Time    COVID19 Not Detected 01/05/2025 11:41 AM           Anesthesia Evaluation  Patient summary reviewed and Nursing notes reviewed   no history of anesthetic complications:   Airway: Mallampati: II     Neck ROM: full     Dental:          Pulmonary:Negative Pulmonary ROS and normal exam                               Cardiovascular:Negative CV ROS                      Neuro/Psych:   Negative Neuro/Psych ROS  (+) psychiatric history:depression/anxiety             GI/Hepatic/Renal: Neg GI/Hepatic/Renal ROS       (-) hiatal hernia and GERD       Endo/Other: Negative Endo/Other ROS                    Abdominal:             Vascular:          Other Findings:       Anesthesia Plan      general     ASA 2     (I discussed with the patient the risks and benefits of PIV, general anesthesia, IV Narcotics, PACU.  All questions were answered the patient agrees with the plan and wishes to proceed.  )  Induction: intravenous.                        Mt Willis MD   7/8/2025